# Patient Record
Sex: FEMALE | Race: WHITE | NOT HISPANIC OR LATINO | Employment: OTHER | URBAN - METROPOLITAN AREA
[De-identification: names, ages, dates, MRNs, and addresses within clinical notes are randomized per-mention and may not be internally consistent; named-entity substitution may affect disease eponyms.]

---

## 2018-06-18 ENCOUNTER — HOSPITAL ENCOUNTER (EMERGENCY)
Facility: HOSPITAL | Age: 23
Discharge: HOME | End: 2018-06-18
Attending: EMERGENCY MEDICINE
Payer: MEDICARE

## 2018-06-18 VITALS
HEART RATE: 86 BPM | HEIGHT: 59 IN | RESPIRATION RATE: 14 BRPM | TEMPERATURE: 99 F | DIASTOLIC BLOOD PRESSURE: 69 MMHG | SYSTOLIC BLOOD PRESSURE: 112 MMHG | WEIGHT: 98 LBS | OXYGEN SATURATION: 99 % | BODY MASS INDEX: 19.76 KG/M2

## 2018-06-18 DIAGNOSIS — H60.502 ACUTE OTITIS EXTERNA OF LEFT EAR, UNSPECIFIED TYPE: Primary | ICD-10-CM

## 2018-06-18 PROCEDURE — 99283 EMERGENCY DEPT VISIT LOW MDM: CPT

## 2018-06-18 RX ORDER — CIPROFLOXACIN AND DEXAMETHASONE 3; 1 MG/ML; MG/ML
4 SUSPENSION/ DROPS AURICULAR (OTIC) 2 TIMES DAILY
Qty: 10 ML | Refills: 0 | Status: SHIPPED | OUTPATIENT
Start: 2018-06-18 | End: 2018-12-02

## 2018-06-18 NOTE — ED PROVIDER NOTES
"Encounter Date: 6/18/2018    SCRIBE #1 NOTE: IYuli, am scribing for, and in the presence of, Zahraa Farris PA-C.       History     Chief Complaint   Patient presents with    Otalgia     L ear       06/18/2018 3:41 PM     Chief complaint: Ear Pain      Sherry Burns is a 22 y.o. female with no pertinent PMHx or SHx on file who presents to the ED with complaints of left ear pain that started last week. The patient denies fever, runny nose, cough, and sore throat. The patient endorses visiting [facility] x2 days ago (5/16) and was told that "she had an ear infection" and was given "pain medications". The patient endorses left ear pain radiates to her left jaw. She reports recently being exposed to water. Denies onset of any other new symptoms. The patient has no other medical concerns or complaints at this moment. NKDA noted.       The history is provided by the patient.     Review of patient's allergies indicates:  No Known Allergies  History reviewed. No pertinent past medical history.  History reviewed. No pertinent surgical history.  History reviewed. No pertinent family history.  Social History   Substance Use Topics    Smoking status: Never Smoker    Smokeless tobacco: Not on file    Alcohol use Not on file     Review of Systems   Constitutional: Negative for activity change, chills and fever.   HENT: Positive for ear pain (left). Negative for congestion, facial swelling, hearing loss, nosebleeds, rhinorrhea, sore throat and tinnitus.    Respiratory: Negative for cough and shortness of breath.    Cardiovascular: Negative for chest pain.   Gastrointestinal: Negative for abdominal pain, nausea and vomiting.   Musculoskeletal: Negative for back pain, gait problem and neck pain.   Skin: Negative for pallor, rash and wound.   Neurological: Negative for seizures, weakness and headaches.   Hematological: Does not bruise/bleed easily.   Psychiatric/Behavioral: Negative for confusion.       Physical " Exam     Initial Vitals [06/18/18 1510]   BP Pulse Resp Temp SpO2   112/69 86 14 98.5 °F (36.9 °C) 99 %      MAP       --         Physical Exam    Nursing note and vitals reviewed.  Constitutional: Vital signs are normal. She appears well-developed and well-nourished. She is not diaphoretic. She is cooperative.  Non-toxic appearance. She does not have a sickly appearance. She does not appear ill. No distress.   HENT:   Head: Normocephalic and atraumatic.   Right Ear: Hearing, tympanic membrane, external ear and ear canal normal.   Left Ear: External ear normal. There is drainage and swelling. No mastoid tenderness. Tympanic membrane is not perforated and not bulging.   Nose: Nose normal.   Mouth/Throat: Uvula is midline, oropharynx is clear and moist and mucous membranes are normal. No oropharyngeal exudate, posterior oropharyngeal edema, posterior oropharyngeal erythema or tonsillar abscesses.   Mild swelling and discharge noted to the left ear canal. TM visualized with perforation or bulging. No mastoid tenderness.    Eyes: EOM and lids are normal.   Neck: Normal range of motion and full passive range of motion without pain.   Cardiovascular: Normal rate, regular rhythm and normal heart sounds. Exam reveals no gallop and no friction rub.    No murmur heard.  Pulmonary/Chest: Breath sounds normal. She has no wheezes. She has no rhonchi. She has no rales.   Abdominal: Normal appearance. There is no rigidity.   Musculoskeletal: Normal range of motion.   Neurological: She is alert and oriented to person, place, and time.   Skin: Skin is warm, dry and intact. Capillary refill takes less than 2 seconds. No rash noted.   Psychiatric: She has a normal mood and affect.         ED Course   Procedures  Labs Reviewed - No data to display       No orders to display        Medical Decision Making:   History:   Old Medical Records: I decided to obtain old medical records.       APC / Resident Notes:   Urgent evaluation of a 22  year old female with complaint of left ear pain. Patient denied fever.  No abdominal pain, nausea or vomiting.  Vital signs are stable.  Patient is afebrile.  Bilateral TMs with no erythema, retraction or perforation.  There is no mastoid tenderness.  There is no movement tenderness to bilateral ears.  There is swelling and discharge to the left canal consistent with otitis externa. Return precautions given. Patient is to follow up with their primary care provider. Case was discussed with Dr. Mcdonald who is in agreement with the plan of care. All questions answered.          Scribe Attestation:   Scribe #1: I performed the above scribed service and the documentation accurately describes the services I performed. I attest to the accuracy of the note.    Attending Attestation:     Physician Attestation Statement for NP/PA:   I discussed this assessment and plan of this patient with the NP/PA, but I did not personally examine the patient. The face to face encounter was performed by the NP/PA.            I, Zahraa Farris PA-C, personally performed the services described in this documentation. All medical record entries made by the scribe were at my direction and in my presence.  I have reviewed the chart and agree that the record reflects my personal performance and is accurate and complete. Zahraa Farris PA-C.  5:28 PM 06/18/2018             Clinical Impression:   The encounter diagnosis was Acute otitis externa of left ear, unspecified type.      Disposition:   Disposition: Discharged  Condition: Stable                        Zahraa Farris PA-C  06/18/18 1729       Toño Mcdonald MD  06/18/18 1946

## 2018-06-18 NOTE — DISCHARGE INSTRUCTIONS
Take tylenol or motrin as needed for pain.  Use drops as prescribed.  Follow up with your primary care provider. If you don't have one, see the list attached.  For worsening symptoms, chest pain, shortness of breath, increased abdominal pain, high grade fever, stroke or stroke like symptoms, immediately go to the nearest Emergency Room or call 911 as soon as possible.

## 2018-09-21 ENCOUNTER — HOSPITAL ENCOUNTER (EMERGENCY)
Facility: HOSPITAL | Age: 23
Discharge: HOME | End: 2018-09-21
Attending: EMERGENCY MEDICINE
Payer: MEDICARE

## 2018-09-21 VITALS
SYSTOLIC BLOOD PRESSURE: 142 MMHG | OXYGEN SATURATION: 98 % | RESPIRATION RATE: 20 BRPM | DIASTOLIC BLOOD PRESSURE: 67 MMHG | HEART RATE: 92 BPM | TEMPERATURE: 98 F

## 2018-09-21 DIAGNOSIS — L30.9 DERMATITIS: Primary | ICD-10-CM

## 2018-09-21 LAB
B-HCG UR QL: NEGATIVE
CTP QC/QA: YES

## 2018-09-21 PROCEDURE — 81025 URINE PREGNANCY TEST: CPT | Performed by: PHYSICIAN ASSISTANT

## 2018-09-21 PROCEDURE — 63600175 PHARM REV CODE 636 W HCPCS: Performed by: PHYSICIAN ASSISTANT

## 2018-09-21 PROCEDURE — 99283 EMERGENCY DEPT VISIT LOW MDM: CPT | Mod: 25

## 2018-09-21 RX ORDER — PREDNISONE 20 MG/1
40 TABLET ORAL DAILY
Qty: 10 TABLET | Refills: 0 | Status: SHIPPED | OUTPATIENT
Start: 2018-09-21 | End: 2018-09-26

## 2018-09-21 RX ORDER — PREDNISONE 20 MG/1
40 TABLET ORAL
Status: COMPLETED | OUTPATIENT
Start: 2018-09-21 | End: 2018-09-21

## 2018-09-21 RX ADMIN — PREDNISONE 40 MG: 20 TABLET ORAL at 04:09

## 2018-09-21 NOTE — ED PROVIDER NOTES
Encounter Date: 9/21/2018    SCRIBE #1 NOTE: I, Todd Rubin, am scribing for, and in the presence of, Zahraa Farris PA-C.       History     Chief Complaint   Patient presents with    Eczema       Time seen by provider: 3:55 PM on 09/21/2018    Sherry Burns is a 23 y.o. Female who presents to the ED with an onset of a persistent rash to her neck, legs, and scalp. 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 She attempted to see her dermatologist today, but was referred to the ER for further mangement. She was seen by her dermatologist by 12 days ago and prescribed an antibiotic shampoo and a topical steroid cream which she reports no improvements on. Patient describes the quality as itching, burning rash that has been increasing in severity. Patient denies rash on the stomach or back. She states that she is otherwise healthy with no known drug allergies. The patient denies any other symptoms at this time. Mother reports she sometimes has to take steroids to improve her symptoms. Last steroid use was two months ago.      The history is provided by the patient and a parent.     Review of patient's allergies indicates:  No Known Allergies  No past medical history on file.  No past surgical history on file.  No family history on file.  Social History     Tobacco Use    Smoking status: Never Smoker   Substance Use Topics    Alcohol use: Not on file    Drug use: Not on file     Review of Systems   Constitutional: Negative for activity change, appetite change, chills, fatigue and fever.   HENT: Negative for congestion, rhinorrhea and sore throat.    Eyes: Negative for redness and visual disturbance.   Respiratory: Negative for cough, chest tightness and shortness of breath.    Cardiovascular: Negative for chest pain.   Gastrointestinal: Negative for abdominal pain, diarrhea, nausea and vomiting.   Genitourinary: Negative for  dysuria and frequency.   Musculoskeletal: Negative for back pain, neck pain and neck stiffness.   Skin: Positive for rash (Scalp, legs, and neck).   Neurological: Negative for dizziness, syncope, numbness and headaches.       Physical Exam     Initial Vitals [09/21/18 1444]   BP Pulse Resp Temp SpO2   (!) 142/67 92 20 98 °F (36.7 °C) 98 %      MAP       --         Physical Exam    Nursing note and vitals reviewed.  Constitutional: She appears well-developed and well-nourished. She is cooperative.  Non-toxic appearance. She does not have a sickly appearance.   HENT:   Head: Normocephalic and atraumatic.   Right Ear: External ear normal.   Left Ear: External ear normal.   Nose: Nose normal.   Mouth/Throat: Oropharynx is clear and moist.   Eyes: Conjunctivae and lids are normal. Pupils are equal, round, and reactive to light.   Neck: Normal range of motion and full passive range of motion without pain. Neck supple.   Cardiovascular: Normal rate, regular rhythm and normal heart sounds. Exam reveals no gallop and no friction rub.    No murmur heard.  Pulmonary/Chest: Breath sounds normal. She has no wheezes. She has no rhonchi. She has no rales.   Abdominal: Normal appearance. There is no rigidity.   Neurological: She is alert and oriented to person, place, and time.   Skin: Skin is warm, dry and intact. Rash noted.   Diffuse erythematous, pruritic, papular rash. No oral lesions. No petechia or purpura.          ED Course   Procedures  Labs Reviewed   POCT URINE PREGNANCY          Imaging Results    None          Medical Decision Making:   History:   Old Medical Records: I decided to obtain old medical records.       APC / Resident Notes:   Urgent evaluation of a 23-year-old female who presents with worsening of her chronic dermatitis.  She states she intermittently has to use steroids to help relieve her symptoms. She tried to see Dermatology was unable to get in with them today she has no oral lesions. No stridor.  No  sign of anaphylaxis.  She is to continue the recommendations from her dermatologist will add a short course of steroids.  Further management to be done by her dermatologist. Discussed results with patient. Return precautions given. Based on my clinical evaluation, I do not appreciate any immediate, emergent, or life threatening condition or etiology that warrants additional workup today and feel that the patient can be discharged with close follow up care.  Patient is to follow up with their primary care provider. Case was discussed with Dr. henry who is in agreement with the plan of care. All questions answered.          Scribe Attestation:   Scribe #1: I performed the above scribed service and the documentation accurately describes the services I performed. I attest to the accuracy of the note.    I, Zahraa Farris PA-C, personally performed the services described in this documentation. All medical record entries made by the scribe were at my direction and in my presence.  I have reviewed the chart and agree that the record reflects my personal performance and is accurate and complete. Zahraa Farris PA-C.  4:24 PM 09/22/2018             Clinical Impression:   The encounter diagnosis was Dermatitis.      Disposition:   Disposition: Discharged  Condition: Stable                        Zahraa Farris PA-C  09/22/18 [id number]

## 2018-09-21 NOTE — DISCHARGE INSTRUCTIONS
Continue the medications prescribed by your dermatologist.  Call and schedule a follow up as soon as possible.  For worsening symptoms, chest pain, shortness of breath, increased abdominal pain, high grade fever, stroke or stroke like symptoms, immediately go to the nearest Emergency Room or call 911 as soon as possible.

## 2019-05-01 ENCOUNTER — OFFICE VISIT (OUTPATIENT)
Dept: URGENT CARE | Facility: CLINIC | Age: 24
End: 2019-05-01
Payer: MEDICARE

## 2019-05-01 VITALS
SYSTOLIC BLOOD PRESSURE: 115 MMHG | TEMPERATURE: 97 F | WEIGHT: 93.19 LBS | OXYGEN SATURATION: 100 % | BODY MASS INDEX: 18.82 KG/M2 | HEART RATE: 73 BPM | DIASTOLIC BLOOD PRESSURE: 66 MMHG | RESPIRATION RATE: 16 BRPM

## 2019-05-01 DIAGNOSIS — L30.9 ECZEMA, UNSPECIFIED TYPE: Primary | ICD-10-CM

## 2019-05-01 PROCEDURE — 99204 OFFICE O/P NEW MOD 45 MIN: CPT | Mod: 25,S$GLB,, | Performed by: NURSE PRACTITIONER

## 2019-05-01 PROCEDURE — 99204 PR OFFICE/OUTPT VISIT, NEW, LEVL IV, 45-59 MIN: ICD-10-PCS | Mod: 25,S$GLB,, | Performed by: NURSE PRACTITIONER

## 2019-05-01 RX ORDER — TRIAMCINOLONE ACETONIDE 1 MG/G
CREAM TOPICAL 2 TIMES DAILY PRN
Qty: 80 G | Refills: 0 | Status: SHIPPED | OUTPATIENT
Start: 2019-05-01 | End: 2021-05-14 | Stop reason: CLARIF

## 2019-05-01 RX ORDER — CLOBETASOL PROPIONATE 0.5 MG/G
1 CREAM TOPICAL 2 TIMES DAILY
Qty: 60 G | Status: SHIPPED | OUTPATIENT
Start: 2019-05-01 | End: 2021-05-14 | Stop reason: CLARIF

## 2019-05-01 RX ORDER — BETAMETHASONE VALERATE 0.1 %
1 LOTION (ML) TOPICAL DAILY
Qty: 60 ML | Refills: 0 | Status: SHIPPED | OUTPATIENT
Start: 2019-05-01 | End: 2021-05-14 | Stop reason: CLARIF

## 2019-05-01 RX ORDER — CLOBETASOL PROPIONATE 0.5 MG/G
1 CREAM TOPICAL 2 TIMES DAILY
COMMUNITY
Start: 2016-10-07 | End: 2019-05-01 | Stop reason: SDUPTHER

## 2019-05-01 RX ORDER — TRIAMCINOLONE ACETONIDE 1 MG/G
1 CREAM TOPICAL 2 TIMES DAILY PRN
COMMUNITY
Start: 2018-09-12 | End: 2019-05-01 | Stop reason: SDUPTHER

## 2019-05-01 RX ORDER — KETOCONAZOLE 20 MG/ML
1 SHAMPOO, SUSPENSION TOPICAL
Qty: 120 ML | Refills: 0 | Status: SHIPPED | OUTPATIENT
Start: 2019-05-02 | End: 2021-05-14 | Stop reason: CLARIF

## 2019-05-01 RX ORDER — KETOCONAZOLE 20 MG/ML
1 SHAMPOO, SUSPENSION TOPICAL
COMMUNITY
Start: 2018-09-13 | End: 2019-05-01

## 2019-05-01 RX ORDER — PREDNISONE 20 MG/1
20 TABLET ORAL 2 TIMES DAILY
Qty: 10 TABLET | Refills: 0 | Status: SHIPPED | OUTPATIENT
Start: 2019-05-01 | End: 2019-05-06

## 2019-05-01 RX ORDER — DEXAMETHASONE SODIUM PHOSPHATE 4 MG/ML
8 INJECTION, SOLUTION INTRA-ARTICULAR; INTRALESIONAL; INTRAMUSCULAR; INTRAVENOUS; SOFT TISSUE
Status: COMPLETED | OUTPATIENT
Start: 2019-05-01 | End: 2019-05-01

## 2019-05-01 RX ORDER — BETAMETHASONE VALERATE 0.1 %
1 LOTION (ML) TOPICAL DAILY
COMMUNITY
Start: 2018-09-12 | End: 2019-05-01 | Stop reason: SDUPTHER

## 2019-05-01 RX ADMIN — DEXAMETHASONE SODIUM PHOSPHATE 8 MG: 4 INJECTION, SOLUTION INTRA-ARTICULAR; INTRALESIONAL; INTRAMUSCULAR; INTRAVENOUS; SOFT TISSUE at 06:05

## 2019-05-01 NOTE — PROGRESS NOTES
"Subjective: rash on arms and feet, redness with itching, pt states "has eczema and out of lotion"       Patient ID: Sherry Burns is a 23 y.o. female.    Vitals:  weight is 42.3 kg (93 lb 3.2 oz). Her temperature is 97.4 °F (36.3 °C). Her blood pressure is 115/66 and her pulse is 73. Her respiration is 16 and oxygen saturation is 100%.     Chief Complaint: Rash (redness with itching on arms and feet)    Rash   This is a chronic problem. The affected locations include the head, right hand, left hand, left foot, right foot and left arm. Pertinent negatives include no cough, fever or sore throat.       Constitution: Negative for chills and fever.   HENT: Negative for facial swelling and sore throat.    Neck: Negative for painful lymph nodes.   Eyes: Negative for eye itching and eyelid swelling.   Respiratory: Negative for cough.    Musculoskeletal: Negative for joint pain and joint swelling.   Skin: Positive for rash and erythema. Negative for color change, pale, wound, abrasion, laceration, lesion, skin thickening/induration, puncture wound, bruising, abscess, avulsion and hives.   Allergic/Immunologic: Negative for environmental allergies, immunocompromised state and hives.   Hematologic/Lymphatic: Negative for swollen lymph nodes.       Objective:      Physical Exam   Constitutional: She is oriented to person, place, and time. She appears well-developed and well-nourished.   HENT:   Head: Normocephalic and atraumatic. Head is without abrasion, without contusion and without laceration.   Right Ear: External ear normal.   Left Ear: External ear normal.   Nose: Nose normal.   Mouth/Throat: Oropharynx is clear and moist.   Eyes: Pupils are equal, round, and reactive to light. Conjunctivae, EOM and lids are normal.   Neck: Trachea normal, full passive range of motion without pain and phonation normal. Neck supple.   Cardiovascular: Normal rate, regular rhythm and normal heart sounds.   Pulmonary/Chest: Effort " normal and breath sounds normal. No stridor. No respiratory distress.   Musculoskeletal: Normal range of motion.   Neurological: She is alert and oriented to person, place, and time.   Skin: Skin is warm, dry and intact. Capillary refill takes less than 2 seconds. Rash noted. No abrasion, no bruising, no burn, no ecchymosis, no laceration and no lesion noted. Rash is maculopapular. There is erythema.   Eczema flare to forearms, feet and head   Psychiatric: She has a normal mood and affect. Her speech is normal and behavior is normal. Judgment and thought content normal. Cognition and memory are normal.   Nursing note and vitals reviewed.      Assessment:       1. Eczema, unspecified type        Plan:         Eczema, unspecified type    Other orders  -     betamethasone valerate 0.1% (VALISONE) 0.1 % Lotn; Apply 1 application topically once daily. Apply to scalp once or twice daily as needed for redness itching and scaling  Dispense: 60 mL; Refill: 0  -     clobetasol (TEMOVATE) 0.05 % cream; Apply 1 application topically 2 (two) times daily. Apply topically twice a day, thin layer to affected area  Dispense: 60 g; Refill: g  -     ketoconazole (NIZORAL) 2 % shampoo; Apply 1 application topically twice a week. Apply topically twice a week, lather, leave on 5 minutes and wash off  Dispense: 120 mL; Refill: 0  -     triamcinolone acetonide 0.1% (KENALOG) 0.1 % cream; Apply topically 2 (two) times daily as needed.  Dispense: 80 g; Refill: 0  -     dexamethasone injection 8 mg  -     predniSONE (DELTASONE) 20 MG tablet; Take 1 tablet (20 mg total) by mouth 2 (two) times daily. for 5 days  Dispense: 10 tablet; Refill: 0

## 2019-05-02 ENCOUNTER — TELEPHONE (OUTPATIENT)
Dept: URGENT CARE | Facility: CLINIC | Age: 24
End: 2019-05-02

## 2019-05-02 NOTE — TELEPHONE ENCOUNTER
Spoke with Zaida, she said we had to change the strength of prednisone because they did not have the 20 mg in stock , all they had was the 5 mg. Per Ownes, change it to the 5 mg that equals up to 20mg BID.

## 2019-10-01 ENCOUNTER — HOSPITAL ENCOUNTER (EMERGENCY)
Facility: HOSPITAL | Age: 24
Discharge: HOME | End: 2019-10-01
Attending: EMERGENCY MEDICINE
Payer: MEDICARE

## 2019-10-01 VITALS
HEIGHT: 59 IN | HEART RATE: 100 BPM | TEMPERATURE: 98 F | SYSTOLIC BLOOD PRESSURE: 119 MMHG | BODY MASS INDEX: 17.74 KG/M2 | WEIGHT: 88 LBS | DIASTOLIC BLOOD PRESSURE: 72 MMHG | OXYGEN SATURATION: 99 %

## 2019-10-01 DIAGNOSIS — R07.89 CHEST DISCOMFORT: ICD-10-CM

## 2019-10-01 DIAGNOSIS — R07.9 CHEST PAIN: ICD-10-CM

## 2019-10-01 DIAGNOSIS — R07.89 ATYPICAL CHEST PAIN: Primary | ICD-10-CM

## 2019-10-01 LAB
ALBUMIN SERPL BCP-MCNC: 4.7 G/DL (ref 3.5–5.2)
ALP SERPL-CCNC: 47 U/L (ref 55–135)
ALT SERPL W/O P-5'-P-CCNC: 12 U/L (ref 10–44)
ANION GAP SERPL CALC-SCNC: 8 MMOL/L (ref 8–16)
AST SERPL-CCNC: 18 U/L (ref 10–40)
B-HCG UR QL: NEGATIVE
BASOPHILS # BLD AUTO: 0.09 K/UL (ref 0–0.2)
BASOPHILS NFR BLD: 1.2 % (ref 0–1.9)
BILIRUB SERPL-MCNC: 1 MG/DL (ref 0.1–1)
BNP SERPL-MCNC: 22 PG/ML (ref 0–99)
BUN SERPL-MCNC: 13 MG/DL (ref 6–20)
CALCIUM SERPL-MCNC: 9.8 MG/DL (ref 8.7–10.5)
CHLORIDE SERPL-SCNC: 108 MMOL/L (ref 95–110)
CO2 SERPL-SCNC: 24 MMOL/L (ref 23–29)
CREAT SERPL-MCNC: 0.8 MG/DL (ref 0.5–1.4)
CTP QC/QA: YES
DIFFERENTIAL METHOD: ABNORMAL
EOSINOPHIL # BLD AUTO: 0.5 K/UL (ref 0–0.5)
EOSINOPHIL NFR BLD: 7.1 % (ref 0–8)
ERYTHROCYTE [DISTWIDTH] IN BLOOD BY AUTOMATED COUNT: 16.6 % (ref 11.5–14.5)
EST. GFR  (AFRICAN AMERICAN): >60 ML/MIN/1.73 M^2
EST. GFR  (NON AFRICAN AMERICAN): >60 ML/MIN/1.73 M^2
GLUCOSE SERPL-MCNC: 93 MG/DL (ref 70–110)
HCT VFR BLD AUTO: 36 % (ref 37–48.5)
HGB BLD-MCNC: 11.3 G/DL (ref 12–16)
IMM GRANULOCYTES # BLD AUTO: 0.01 K/UL (ref 0–0.04)
LYMPHOCYTES # BLD AUTO: 2 K/UL (ref 1–4.8)
LYMPHOCYTES NFR BLD: 25.8 % (ref 18–48)
MCH RBC QN AUTO: 24.7 PG (ref 27–31)
MCHC RBC AUTO-ENTMCNC: 31.4 G/DL (ref 32–36)
MCV RBC AUTO: 79 FL (ref 82–98)
MONOCYTES # BLD AUTO: 0.7 K/UL (ref 0.3–1)
MONOCYTES NFR BLD: 8.5 % (ref 4–15)
NEUTROPHILS # BLD AUTO: 4.4 K/UL (ref 1.8–7.7)
NEUTROPHILS NFR BLD: 57.3 % (ref 38–73)
NRBC BLD-RTO: 0 /100 WBC
PLATELET # BLD AUTO: 296 K/UL (ref 150–350)
PMV BLD AUTO: 9.7 FL (ref 9.2–12.9)
POTASSIUM SERPL-SCNC: 3.5 MMOL/L (ref 3.5–5.1)
PROT SERPL-MCNC: 7.6 G/DL (ref 6–8.4)
RBC # BLD AUTO: 4.58 M/UL (ref 4–5.4)
SODIUM SERPL-SCNC: 140 MMOL/L (ref 136–145)
TROPONIN I SERPL DL<=0.01 NG/ML-MCNC: <0.006 NG/ML (ref 0–0.03)
WBC # BLD AUTO: 7.65 K/UL (ref 3.9–12.7)

## 2019-10-01 PROCEDURE — 83880 ASSAY OF NATRIURETIC PEPTIDE: CPT

## 2019-10-01 PROCEDURE — 93005 ELECTROCARDIOGRAM TRACING: CPT

## 2019-10-01 PROCEDURE — 80053 COMPREHEN METABOLIC PANEL: CPT

## 2019-10-01 PROCEDURE — 81025 URINE PREGNANCY TEST: CPT | Performed by: EMERGENCY MEDICINE

## 2019-10-01 PROCEDURE — 36415 COLL VENOUS BLD VENIPUNCTURE: CPT

## 2019-10-01 PROCEDURE — 84484 ASSAY OF TROPONIN QUANT: CPT

## 2019-10-01 PROCEDURE — 85025 COMPLETE CBC W/AUTO DIFF WBC: CPT

## 2019-10-01 PROCEDURE — 99284 EMERGENCY DEPT VISIT MOD MDM: CPT | Mod: 25

## 2019-10-01 RX ORDER — DICLOFENAC SODIUM 50 MG/1
50 TABLET, DELAYED RELEASE ORAL 3 TIMES DAILY PRN
Qty: 30 TABLET | Refills: 0 | Status: SHIPPED | OUTPATIENT
Start: 2019-10-01 | End: 2021-03-03

## 2019-10-01 NOTE — ED PROVIDER NOTES
Encounter Date: 10/1/2019    SCRIBE #1 NOTE: IYuli, am scribing for, and in the presence of, Oseas Lozano MD.       History     Chief Complaint   Patient presents with    Chest Pain    Shortness of Breath       Time seen by provider: 3:10 PM on 10/01/2019    Sherry Burns is a 24 y.o. female with no PMHx or SHx who presents to the ED with complaints of mid sternal chest pain that started last night associated with racing heart beat and SOB. Patient endorses having similar pain over the course of x5 years. She endorses pain typically comes and goes daily and episodes lasts for a few minutes each time. She denies cough, fever, or vomiting but reports having recent diarrhea and nausea. She denies blood in urine or stool, urinary symptoms, or onset of any other new symptoms currently. She denies taking any OTC medications for the pain. The patient has no other medical concerns or complaints at this moment. NKDA.    The history is provided by the patient.     Review of patient's allergies indicates:  No Known Allergies  No past medical history on file.  No past surgical history on file.  No family history on file.  Social History     Tobacco Use    Smoking status: Never Smoker   Substance Use Topics    Alcohol use: Not on file    Drug use: Not on file     Review of Systems   Constitutional: Negative for chills and fever.   Respiratory: Positive for shortness of breath. Negative for cough.    Cardiovascular: Positive for chest pain and palpitations. Negative for leg swelling.   Gastrointestinal: Positive for diarrhea and nausea. Negative for abdominal pain, blood in stool, constipation and vomiting.   Genitourinary: Negative for decreased urine volume, difficulty urinating, dysuria, frequency and hematuria.   Musculoskeletal: Negative for joint swelling.   Skin: Negative for rash.   Neurological: Negative for weakness and numbness.   Hematological: Does not bruise/bleed easily.    Psychiatric/Behavioral: The patient is not nervous/anxious.        Physical Exam     Initial Vitals [10/01/19 1502]   BP Pulse Resp Temp SpO2   119/72 100 -- 98 °F (36.7 °C) 99 %      MAP       --         Physical Exam    Nursing note and vitals reviewed.  Constitutional: She appears well-developed and well-nourished. She is not diaphoretic. No distress.   HENT:   Head: Normocephalic and atraumatic.   Eyes: EOM are normal. Pupils are equal, round, and reactive to light.   Neck: Normal range of motion. Neck supple.   Cardiovascular: Normal rate, regular rhythm, normal heart sounds and intact distal pulses. Exam reveals no gallop and no friction rub.    No murmur heard.  Pulmonary/Chest: Breath sounds normal. No respiratory distress. She has no wheezes. She has no rhonchi. She has no rales. She exhibits tenderness.   Reproducible tenderness to mid sternal chest wall region.    Abdominal: Soft. Bowel sounds are normal. There is no tenderness. There is no rebound and no guarding.   Musculoskeletal: Normal range of motion.   Neurological: She is alert and oriented to person, place, and time.   Skin: Skin is warm and dry.   Psychiatric: Her behavior is normal. Judgment and thought content normal. Her mood appears anxious.         ED Course   Procedures  Labs Reviewed   COMPREHENSIVE METABOLIC PANEL - Abnormal; Notable for the following components:       Result Value    Alkaline Phosphatase 47 (*)     All other components within normal limits   CBC W/ AUTO DIFFERENTIAL - Abnormal; Notable for the following components:    Hemoglobin 11.3 (*)     Hematocrit 36.0 (*)     Mean Corpuscular Volume 79 (*)     Mean Corpuscular Hemoglobin 24.7 (*)     Mean Corpuscular Hemoglobin Conc 31.4 (*)     RDW 16.6 (*)     All other components within normal limits   B-TYPE NATRIURETIC PEPTIDE   TROPONIN I   POCT URINE PREGNANCY     EKG Readings: (Independently Interpreted)   Initial Reading: No STEMI. Heart Rate: 86 BPM. Axis: Normal.    Sinus rhythm. Normal intervals. No acute ST segment or T wave changes.        Imaging Results          X-Ray Chest PA And Lateral (Final result)  Result time 10/01/19 15:48:46    Final result by Michael Ordoñez MD (10/01/19 15:48:46)                 Impression:      Negative chest.  No significant change.      Electronically signed by: Michael Ordoñez MD  Date:    10/01/2019  Time:    15:48             Narrative:    EXAMINATION:  XR CHEST PA AND LATERAL    CLINICAL HISTORY:  Chest pain, unspecified    TECHNIQUE:  PA and lateral views of the chest were performed.    COMPARISON:  09/26/2018    FINDINGS:  The cardiomediastinal silhouette is with normal limits.  The lungs are well expanded without consolidation or pleural effusion.                                 Medical Decision Making:   History:   Old Medical Records: I decided to obtain old medical records.  Initial Assessment:   24-year-old female presented with a chief complaint of chest pain.  Differential Diagnosis:   Initial differential diagnosis includes but is not limited to: chest wall pain, anxiety, and atypical MI.  Independently Interpreted Test(s):   I have ordered and independently interpreted EKG Reading(s) - see prior notes  Clinical Tests:   Lab Tests: Reviewed and Ordered  Radiological Study: Reviewed and Ordered  Medical Tests: Reviewed and Ordered  ED Management:  The patient was emergently evaluated in the emergency department, her evaluation was significant for a young female with exactly reproducible chest pain on palpation.  The patient's EKG showed no acute abnormalities per my independent interpretation.  The patient's chest x-ray and labs showed no acute abnormalities.  The patient likely has chest wall pain.  She is stable for discharge to home.  She will be discharged home with p.o. diclofenac and she is to keep her follow-up as scheduled for further care.            Scribe Attestation:   Scribe #1: I performed the above scribed  service and the documentation accurately describes the services I performed. I attest to the accuracy of the note.           I, Dr. Oseas Lozano, personally performed the services described in this documentation. All medical record entries made by the scribe were at my direction and in my presence.  I have reviewed the chart and agree that the record reflects my personal performance and is accurate and complete. Oseas Lozano MD.  4:53 PM 10/01/2019       Clinical Impression:       ICD-10-CM ICD-9-CM   1. Atypical chest pain R07.89 786.59   2. Chest discomfort R07.89 786.59   3. Chest pain R07.9 786.50         Disposition:   Disposition: Discharged  Condition: Stable                        Oseas Lozano MD  10/01/19 [id number]

## 2020-01-03 ENCOUNTER — LAB VISIT (OUTPATIENT)
Dept: LAB | Facility: HOSPITAL | Age: 25
End: 2020-01-03
Attending: SPECIALIST
Payer: MEDICARE

## 2020-01-03 DIAGNOSIS — Z00.00 ROUTINE GENERAL MEDICAL EXAMINATION AT A HEALTH CARE FACILITY: ICD-10-CM

## 2020-01-03 DIAGNOSIS — R07.9 CHEST PAIN, UNSPECIFIED: Primary | ICD-10-CM

## 2020-01-03 DIAGNOSIS — R00.0 TACHYCARDIA, UNSPECIFIED: ICD-10-CM

## 2020-01-03 LAB
ALBUMIN SERPL BCP-MCNC: 4.5 G/DL (ref 3.5–5.2)
ALP SERPL-CCNC: 35 U/L (ref 55–135)
ALT SERPL W/O P-5'-P-CCNC: 15 U/L (ref 10–44)
ANION GAP SERPL CALC-SCNC: 7 MMOL/L (ref 8–16)
AST SERPL-CCNC: 16 U/L (ref 10–40)
BASOPHILS # BLD AUTO: 0.08 K/UL (ref 0–0.2)
BASOPHILS NFR BLD: 1.4 % (ref 0–1.9)
BILIRUB SERPL-MCNC: 0.9 MG/DL (ref 0.1–1)
BUN SERPL-MCNC: 16 MG/DL (ref 6–20)
CALCIUM SERPL-MCNC: 9.7 MG/DL (ref 8.7–10.5)
CHLORIDE SERPL-SCNC: 108 MMOL/L (ref 95–110)
CHOLEST SERPL-MCNC: 159 MG/DL (ref 120–199)
CHOLEST/HDLC SERPL: 2.7 {RATIO} (ref 2–5)
CO2 SERPL-SCNC: 27 MMOL/L (ref 23–29)
CREAT SERPL-MCNC: 0.9 MG/DL (ref 0.5–1.4)
DIFFERENTIAL METHOD: ABNORMAL
EOSINOPHIL # BLD AUTO: 0.5 K/UL (ref 0–0.5)
EOSINOPHIL NFR BLD: 8.3 % (ref 0–8)
ERYTHROCYTE [DISTWIDTH] IN BLOOD BY AUTOMATED COUNT: 14.9 % (ref 11.5–14.5)
EST. GFR  (AFRICAN AMERICAN): >60 ML/MIN/1.73 M^2
EST. GFR  (NON AFRICAN AMERICAN): >60 ML/MIN/1.73 M^2
GLUCOSE SERPL-MCNC: 96 MG/DL (ref 70–110)
HCT VFR BLD AUTO: 35.6 % (ref 37–48.5)
HDLC SERPL-MCNC: 58 MG/DL (ref 40–75)
HDLC SERPL: 36.5 % (ref 20–50)
HGB BLD-MCNC: 10.9 G/DL (ref 12–16)
IMM GRANULOCYTES # BLD AUTO: 0.02 K/UL (ref 0–0.04)
IMM GRANULOCYTES NFR BLD AUTO: 0.3 % (ref 0–0.5)
LDLC SERPL CALC-MCNC: 91.6 MG/DL (ref 63–159)
LYMPHOCYTES # BLD AUTO: 1.5 K/UL (ref 1–4.8)
LYMPHOCYTES NFR BLD: 26.5 % (ref 18–48)
MCH RBC QN AUTO: 23.9 PG (ref 27–31)
MCHC RBC AUTO-ENTMCNC: 30.6 G/DL (ref 32–36)
MCV RBC AUTO: 78 FL (ref 82–98)
MONOCYTES # BLD AUTO: 0.6 K/UL (ref 0.3–1)
MONOCYTES NFR BLD: 10.7 % (ref 4–15)
NEUTROPHILS # BLD AUTO: 3.1 K/UL (ref 1.8–7.7)
NEUTROPHILS NFR BLD: 52.8 % (ref 38–73)
NONHDLC SERPL-MCNC: 101 MG/DL
NRBC BLD-RTO: 0 /100 WBC
PLATELET # BLD AUTO: 319 K/UL (ref 150–350)
PMV BLD AUTO: 10 FL (ref 9.2–12.9)
POTASSIUM SERPL-SCNC: 4.1 MMOL/L (ref 3.5–5.1)
PROT SERPL-MCNC: 7.4 G/DL (ref 6–8.4)
RBC # BLD AUTO: 4.56 M/UL (ref 4–5.4)
SODIUM SERPL-SCNC: 142 MMOL/L (ref 136–145)
TRIGL SERPL-MCNC: 47 MG/DL (ref 30–150)
TSH SERPL DL<=0.005 MIU/L-ACNC: 1.89 UIU/ML (ref 0.34–5.6)
WBC # BLD AUTO: 5.81 K/UL (ref 3.9–12.7)

## 2020-01-03 PROCEDURE — 80053 COMPREHEN METABOLIC PANEL: CPT

## 2020-01-03 PROCEDURE — 36415 COLL VENOUS BLD VENIPUNCTURE: CPT

## 2020-01-03 PROCEDURE — 84443 ASSAY THYROID STIM HORMONE: CPT

## 2020-01-03 PROCEDURE — 80061 LIPID PANEL: CPT

## 2020-01-03 PROCEDURE — 85025 COMPLETE CBC W/AUTO DIFF WBC: CPT

## 2020-01-14 ENCOUNTER — OFFICE VISIT (OUTPATIENT)
Dept: URGENT CARE | Facility: CLINIC | Age: 25
End: 2020-01-14
Payer: MEDICARE

## 2020-01-14 VITALS
HEART RATE: 58 BPM | TEMPERATURE: 98 F | WEIGHT: 86.19 LBS | DIASTOLIC BLOOD PRESSURE: 58 MMHG | SYSTOLIC BLOOD PRESSURE: 93 MMHG | OXYGEN SATURATION: 98 % | RESPIRATION RATE: 18 BRPM | HEIGHT: 59 IN | BODY MASS INDEX: 17.38 KG/M2

## 2020-01-14 DIAGNOSIS — H66.92 LEFT OTITIS MEDIA, UNSPECIFIED OTITIS MEDIA TYPE: Primary | ICD-10-CM

## 2020-01-14 PROCEDURE — 99214 OFFICE O/P EST MOD 30 MIN: CPT | Mod: 25,S$GLB,, | Performed by: NURSE PRACTITIONER

## 2020-01-14 PROCEDURE — 99214 PR OFFICE/OUTPT VISIT, EST, LEVL IV, 30-39 MIN: ICD-10-PCS | Mod: 25,S$GLB,, | Performed by: NURSE PRACTITIONER

## 2020-01-14 RX ORDER — AMOXICILLIN 875 MG/1
875 TABLET, FILM COATED ORAL 2 TIMES DAILY
Qty: 20 TABLET | Refills: 0 | Status: SHIPPED | OUTPATIENT
Start: 2020-01-14 | End: 2020-01-24

## 2020-01-14 NOTE — PROGRESS NOTES
"Subjective:       Patient ID: Sherry Burns is a 24 y.o. female.    Vitals:  height is 4' 11" (1.499 m) and weight is 39.1 kg (86 lb 3.2 oz). Her temperature is 97.5 °F (36.4 °C). Her blood pressure is 93/58 (abnormal) and her pulse is 58 (abnormal). Her respiration is 18 and oxygen saturation is 98%.     Chief Complaint: Sinus Problem    Pt presents with left ear pain x 2 days. Pt states pain is throbbing quality, constant timing, mod severity. She denies f/c/n/v.     Sinus Problem   Episode onset: 2 day  Associated symptoms include ear pain. Pertinent negatives include no chills, congestion, coughing, diaphoresis, headaches, shortness of breath, sinus pressure or sore throat. Treatments tried: dayquil  The treatment provided no relief.       Constitution: Negative for chills, sweating, fatigue and fever.   HENT: Positive for ear pain. Negative for congestion, sinus pain, sinus pressure, sore throat and voice change.    Neck: Negative for painful lymph nodes.   Eyes: Negative for eye redness.   Respiratory: Negative for chest tightness, cough, sputum production, bloody sputum, COPD, shortness of breath, stridor, wheezing and asthma.    Gastrointestinal: Negative for nausea and vomiting.   Musculoskeletal: Negative for muscle ache.   Skin: Negative for rash.   Allergic/Immunologic: Negative for seasonal allergies and asthma.   Neurological: Negative for headaches.   Hematologic/Lymphatic: Negative for swollen lymph nodes.       Objective:      Physical Exam   Constitutional: She is oriented to person, place, and time. She appears well-developed and well-nourished. She is cooperative.  Non-toxic appearance. She does not have a sickly appearance. She does not appear ill. No distress.   HENT:   Head: Normocephalic and atraumatic.   Right Ear: Hearing, tympanic membrane, external ear and ear canal normal.   Left Ear: Hearing, external ear and ear canal normal. Tympanic membrane is erythematous.   Nose: Nose " normal. No mucosal edema, rhinorrhea or nasal deformity. No epistaxis. Right sinus exhibits no maxillary sinus tenderness and no frontal sinus tenderness. Left sinus exhibits no maxillary sinus tenderness and no frontal sinus tenderness.   Mouth/Throat: Uvula is midline, oropharynx is clear and moist and mucous membranes are normal. No trismus in the jaw. Normal dentition. No uvula swelling. No oropharyngeal exudate, posterior oropharyngeal edema or posterior oropharyngeal erythema.   Eyes: Pupils are equal, round, and reactive to light. Conjunctivae, EOM and lids are normal. No scleral icterus.   Neck: Trachea normal, full passive range of motion without pain and phonation normal. Neck supple. No neck rigidity. No edema and no erythema present.   Cardiovascular: Normal rate, regular rhythm, normal heart sounds, intact distal pulses and normal pulses.   Pulmonary/Chest: Effort normal and breath sounds normal. No stridor. No respiratory distress. She has no decreased breath sounds. She has no wheezes. She has no rhonchi. She has no rales. She exhibits no tenderness.   Abdominal: Normal appearance.   Musculoskeletal: Normal range of motion. She exhibits no edema or deformity.   Neurological: She is alert and oriented to person, place, and time. She exhibits normal muscle tone. Coordination normal.   Skin: Skin is warm, dry, intact, not diaphoretic and not pale.   Psychiatric: She has a normal mood and affect. Her speech is normal and behavior is normal. Judgment and thought content normal. Cognition and memory are normal.   Nursing note and vitals reviewed.        Assessment:       1. Left otitis media, unspecified otitis media type        Plan:         Left otitis media, unspecified otitis media type    Other orders  -     amoxicillin (AMOXIL) 875 MG tablet; Take 1 tablet (875 mg total) by mouth 2 (two) times daily. for 10 days  Dispense: 20 tablet; Refill: 0

## 2020-01-14 NOTE — PATIENT INSTRUCTIONS
Middle Ear Infection (Adult)  You have an infection of the middle ear, the space behind the eardrum. This is also called acute otitis media (AOM). Sometimes it is caused by the common cold. This is because congestion can block the internal passage (eustachian tube) that drains fluid from the middle ear. When the middle ear fills with fluid, bacteria can grow there and cause an infection. Oral antibiotics are used to treat this illness, not ear drops. Symptoms usually start to improve within 1 to 2 days of treatment.    Home care  The following are general care guidelines:  · Finish all of the antibiotic medicine given, even though you may feel better after the first few days.  · You may use over-the-counter medicine, such as acetaminophen or ibuprofen, to control pain and fever, unless something else was prescribed. If you have chronic liver or kidney disease or have ever had a stomach ulcer or gastrointestinal bleeding, talk with your healthcare provider before using these medicines. Do not give aspirin to anyone under 18 years of age who has a fever. It may cause severe illness or death.  Follow-up care  Follow up with your healthcare provider, or as advised, in 2 weeks if all symptoms have not gotten better, or if hearing doesn't go back to normal within 1 month.  When to seek medical advice  Call your healthcare provider right away if any of these occur:  · Ear pain gets worse or does not improve after 3 days of treatment  · Unusual drowsiness or confusion  · Neck pain, stiff neck, or headache  · Fluid or blood draining from the ear canal  · Fever of 100.4°F (38°C) or as advised   · Seizure  Date Last Reviewed: 6/1/2016  © 8635-7122 [employer]. [address] [place] [address], [place] [address]. All rights reserved. This information is not intended as a substitute for professional medical care. Always follow your healthcare professional's instructions.

## 2020-02-01 ENCOUNTER — HOSPITAL ENCOUNTER (EMERGENCY)
Facility: HOSPITAL | Age: 25
Discharge: HOME | End: 2020-02-01
Attending: EMERGENCY MEDICINE
Payer: MEDICARE

## 2020-02-01 VITALS
DIASTOLIC BLOOD PRESSURE: 56 MMHG | HEIGHT: 59 IN | OXYGEN SATURATION: 100 % | SYSTOLIC BLOOD PRESSURE: 98 MMHG | TEMPERATURE: 98 F | WEIGHT: 86 LBS | RESPIRATION RATE: 16 BRPM | HEART RATE: 67 BPM | BODY MASS INDEX: 17.34 KG/M2

## 2020-02-01 DIAGNOSIS — L30.9 ECZEMA, UNSPECIFIED TYPE: Primary | ICD-10-CM

## 2020-02-01 LAB
B-HCG UR QL: NEGATIVE
CTP QC/QA: YES

## 2020-02-01 PROCEDURE — 99284 EMERGENCY DEPT VISIT MOD MDM: CPT | Mod: 25

## 2020-02-01 PROCEDURE — 96372 THER/PROPH/DIAG INJ SC/IM: CPT | Mod: 59

## 2020-02-01 PROCEDURE — 63600175 PHARM REV CODE 636 W HCPCS: Performed by: NURSE PRACTITIONER

## 2020-02-01 PROCEDURE — 81025 URINE PREGNANCY TEST: CPT | Performed by: NURSE PRACTITIONER

## 2020-02-01 RX ORDER — HYDROXYZINE HYDROCHLORIDE 25 MG/1
25 TABLET, FILM COATED ORAL 3 TIMES DAILY PRN
Qty: 30 TABLET | Refills: 1 | Status: SHIPPED | OUTPATIENT
Start: 2020-02-01 | End: 2021-05-14 | Stop reason: CLARIF

## 2020-02-01 RX ORDER — DEXAMETHASONE SODIUM PHOSPHATE 4 MG/ML
8 INJECTION, SOLUTION INTRA-ARTICULAR; INTRALESIONAL; INTRAMUSCULAR; INTRAVENOUS; SOFT TISSUE
Status: COMPLETED | OUTPATIENT
Start: 2020-02-01 | End: 2020-02-01

## 2020-02-01 RX ORDER — HYDROCORTISONE 1 %
CREAM (GRAM) TOPICAL
Qty: 30 G | Refills: 0 | Status: SHIPPED | OUTPATIENT
Start: 2020-02-01 | End: 2021-05-14 | Stop reason: CLARIF

## 2020-02-01 RX ORDER — HYDROXYZINE HYDROCHLORIDE 25 MG/1
25 TABLET, FILM COATED ORAL 3 TIMES DAILY
COMMUNITY
End: 2020-02-01 | Stop reason: SDUPTHER

## 2020-02-01 RX ORDER — CETIRIZINE HYDROCHLORIDE 10 MG/1
10 TABLET ORAL DAILY PRN
COMMUNITY

## 2020-02-01 RX ADMIN — DEXAMETHASONE SODIUM PHOSPHATE 8 MG: 4 INJECTION, SOLUTION INTRAMUSCULAR; INTRAVENOUS at 09:02

## 2020-02-02 NOTE — DISCHARGE INSTRUCTIONS
Take medications as prescribed. Follow up with dermatology for recheck. Return to ER for any worsening symptoms or concerns.

## 2020-02-02 NOTE — ED PROVIDER NOTES
Encounter Date: 2/1/2020       History     Chief Complaint   Patient presents with    Rash     c/o itching, burning, dry, scaly rash to ankles x 1 month.  Also reports dryness and itching to arms.  Was treated by her MD with Triamcinolone cream without improvement.  Mother reports pt has had eczema all of her life and this is a flare up.     Patient presents with eczema flare to bilateral ankles and hands.  Patient states eczema flare has been going on for approximately a week.  She has tried over-the-counter medications without improvement.  She states normally her eczema flare will improve with a Decadron injection.  She is also out of her Atarax and is requesting a refill.  She denies fevers, chills, nausea and vomiting.        Review of patient's allergies indicates:  No Known Allergies  Past Medical History:   Diagnosis Date    Eczema      History reviewed. No pertinent surgical history.  History reviewed. No pertinent family history.  Social History     Tobacco Use    Smoking status: Never Smoker   Substance Use Topics    Alcohol use: Not on file    Drug use: Not on file     Review of Systems   Constitutional: Negative for fever.   HENT: Negative for sore throat.    Respiratory: Negative for shortness of breath.    Cardiovascular: Negative for chest pain.   Gastrointestinal: Negative for nausea.   Genitourinary: Negative for dysuria.   Musculoskeletal: Negative for back pain.   Skin: Negative for rash.        eczema to ankles and hands   Neurological: Negative for weakness.   Hematological: Does not bruise/bleed easily.   All other systems reviewed and are negative.      Physical Exam     Initial Vitals [02/01/20 1908]   BP Pulse Resp Temp SpO2   (!) 98/56 67 16 97.9 °F (36.6 °C) 100 %      MAP       --         Physical Exam    Nursing note and vitals reviewed.  Constitutional: She appears well-developed and well-nourished.   HENT:   Head: Normocephalic and atraumatic.   Eyes: Conjunctivae and EOM are  normal. Pupils are equal, round, and reactive to light.   Neck: Normal range of motion. Neck supple.   Cardiovascular: Normal rate, regular rhythm, normal heart sounds and intact distal pulses.   Pulmonary/Chest: Breath sounds normal.   Abdominal: Soft. Bowel sounds are normal.   Musculoskeletal: Normal range of motion.   Neurological: She is alert and oriented to person, place, and time. She has normal strength.   Skin: Skin is warm and dry. Capillary refill takes less than 2 seconds.   Eczema flare to bilat ankles and hands, no open wounds or infection   Psychiatric: She has a normal mood and affect.         ED Course   Procedures  Labs Reviewed   POCT URINE PREGNANCY          Imaging Results    None          Medical Decision Making:   ED Management:  Patient presents for eczema flare-up to ankles and hands.  Patient given decadron inj in ED and discharged with prescription for hydrocortisone cream and advised on follow-up with dermatology.  Patient verbalized understanding of discharge instructions and return precautions.                                 Clinical Impression:       ICD-10-CM ICD-9-CM   1. Eczema, unspecified type L30.9 692.9         Disposition:   Disposition: Discharged  Condition: Stable                     Ilda Schmidt NP  02/01/20 9713

## 2020-02-14 ENCOUNTER — CLINICAL SUPPORT (OUTPATIENT)
Dept: URGENT CARE | Facility: CLINIC | Age: 25
End: 2020-02-14
Payer: MEDICARE

## 2020-02-14 VITALS
SYSTOLIC BLOOD PRESSURE: 84 MMHG | BODY MASS INDEX: 17.94 KG/M2 | OXYGEN SATURATION: 100 % | HEIGHT: 59 IN | DIASTOLIC BLOOD PRESSURE: 54 MMHG | HEART RATE: 80 BPM | TEMPERATURE: 98 F | RESPIRATION RATE: 16 BRPM | WEIGHT: 89 LBS

## 2020-02-14 DIAGNOSIS — R21 RASH OF BODY: Primary | ICD-10-CM

## 2020-02-14 PROCEDURE — 96372 PR INJECTION,THERAP/PROPH/DIAG2ST, IM OR SUBCUT: ICD-10-PCS | Mod: S$GLB,,, | Performed by: NURSE PRACTITIONER

## 2020-02-14 PROCEDURE — 99214 OFFICE O/P EST MOD 30 MIN: CPT | Mod: 25,S$GLB,, | Performed by: NURSE PRACTITIONER

## 2020-02-14 PROCEDURE — 96372 THER/PROPH/DIAG INJ SC/IM: CPT | Mod: S$GLB,,, | Performed by: NURSE PRACTITIONER

## 2020-02-14 PROCEDURE — 99214 PR OFFICE/OUTPT VISIT, EST, LEVL IV, 30-39 MIN: ICD-10-PCS | Mod: 25,S$GLB,, | Performed by: NURSE PRACTITIONER

## 2020-02-14 RX ORDER — PREDNISONE 20 MG/1
20 TABLET ORAL 2 TIMES DAILY
Qty: 10 TABLET | Refills: 0 | Status: SHIPPED | OUTPATIENT
Start: 2020-02-14 | End: 2020-02-19

## 2020-02-14 RX ORDER — DEXAMETHASONE SODIUM PHOSPHATE 4 MG/ML
8 INJECTION, SOLUTION INTRA-ARTICULAR; INTRALESIONAL; INTRAMUSCULAR; INTRAVENOUS; SOFT TISSUE
Status: COMPLETED | OUTPATIENT
Start: 2020-02-14 | End: 2020-02-14

## 2020-02-14 RX ADMIN — DEXAMETHASONE SODIUM PHOSPHATE 8 MG: 4 INJECTION, SOLUTION INTRA-ARTICULAR; INTRALESIONAL; INTRAMUSCULAR; INTRAVENOUS; SOFT TISSUE at 01:02

## 2020-02-14 NOTE — PROGRESS NOTES
"Subjective:       Patient ID: Sherry Burns is a 24 y.o. female.    Vitals:  height is 4' 11" (1.499 m) and weight is 40.4 kg (89 lb). Her temperature is 98.4 °F (36.9 °C). Her blood pressure is 84/54 (abnormal) and her pulse is 80. Her respiration is 16 and oxygen saturation is 100%.     Chief Complaint: Rash (rash to both legs and both arms X one week. Was seen in [facility] ER and was given a shot and OTC cream without results)    Pt presents with rash to bilat arms and legs. Pt states rash started several days ago and has progressively worsened. She denies known etiology. She reports itching to areas. She denies f/c/n/v.     Rash   This is a recurrent problem. The current episode started in the past 7 days. The problem has been gradually worsening since onset. Pertinent negatives include no cough, fever or sore throat.       Constitution: Negative for chills and fever.   HENT: Negative for facial swelling and sore throat.    Neck: Negative for painful lymph nodes.   Eyes: Negative for eye itching and eyelid swelling.   Respiratory: Negative for cough.    Musculoskeletal: Negative for joint pain and joint swelling.   Skin: Positive for rash. Negative for color change, pale, wound, abrasion, laceration, lesion, skin thickening/induration, puncture wound, erythema, bruising, abscess, avulsion and hives.   Allergic/Immunologic: Negative for environmental allergies, immunocompromised state and hives.   Hematologic/Lymphatic: Negative for swollen lymph nodes.       Objective:      Physical Exam   Constitutional: She is oriented to person, place, and time. She appears well-developed and well-nourished.   HENT:   Head: Normocephalic and atraumatic. Head is without abrasion, without contusion and without laceration.   Right Ear: External ear normal.   Left Ear: External ear normal.   Nose: Nose normal.   Mouth/Throat: Oropharynx is clear and moist and mucous membranes are normal.   Eyes: Pupils are equal, round, and " reactive to light. Conjunctivae, EOM and lids are normal.   Neck: Trachea normal, full passive range of motion without pain and phonation normal. Neck supple.   Cardiovascular: Normal rate, regular rhythm and normal heart sounds.   Pulmonary/Chest: Effort normal and breath sounds normal. No stridor. No respiratory distress.   Musculoskeletal: Normal range of motion.   Neurological: She is alert and oriented to person, place, and time.   Skin: Skin is warm, dry, intact and rash. Capillary refill takes less than 2 seconds.   Raised maculopapular rash to bilat arms and bilat thighs abrasion, burn, bruising, erythema and ecchymosis  Psychiatric: She has a normal mood and affect. Her speech is normal and behavior is normal. Judgment and thought content normal. Cognition and memory are normal.   Nursing note and vitals reviewed.        Assessment:       1. Rash of body        Plan:         Rash of body  -     Ambulatory referral/consult to Allergy    Other orders  -     dexamethasone injection 8 mg  -     predniSONE (DELTASONE) 20 MG tablet; Take 1 tablet (20 mg total) by mouth 2 (two) times daily. for 5 days  Dispense: 10 tablet; Refill: 0

## 2020-02-14 NOTE — PATIENT INSTRUCTIONS
Self-Care for Skin Rashes     Pat your skin dry. Do not rub.     When your skin reacts to a substance your body is sensitive to, it can cause a rash. You can treat most rashes at home by keeping the skin clean and dry. Many rashes may get better on their own within 2 to 3 days. You may need medical attention if your rash itches, drains, or hurts, particularly if the rash is getting worse.  What can cause a skin rash?  · Sun poisoning, caused by too much exposure to the sun  · An irritant or allergic reaction to a certain type of food, plant, or chemical, such as  shellfish, poison ivy, and or cleaning products  · An infection caused by a fungus (ringworm), virus (chickenpox), or bacteria (strep)  · Bites or infestation caused by insects or pests, such as ticks, lice, or mites  · Dry skin, which is often seen during the winter months and in older people  How can I control itching and skin damage?  · Take soothing lukewarm baths in a colloidal oatmeal product. You can buy this at the [employer].  · Do your best not to scratch. Clip fingernails short, especially in young children, to reduce skin damage if scratching does occur.  · Use moisturizing skin lotion instead of scratching your dry skin.  · Use sunscreen whenever going out into direct sun.  · Use only mild cleansing agents whenever possible.  · Wash with mild, nonirritating soap and warm water.  · Wear clothing that breathes, such as cotton shirts or canvas shoes.  · If fluid is seeping from the rash, cover it loosely with clean gauze to absorb the discharge.  · Many rashes are contagious. Prevent the rash from spreading to others by washing your hands often before or after touching others with any skin rash.  Use medicine  · Antihistamines such as diphenhydramine can help control itching. But use with caution because they can make you drowsy.  · Using over-the-counter hydrocortisone cream on small rashes may help reduce swelling and itching  · Most  over-the-counter antifungal medicines can treat athletes foot and many other fungal infections of the skin.  Check with your healthcare provider  Call your healthcare provider if:  · You were told that you have a fungal infection on your skin to make sure you have the correct type of medicine.  · You have questions or concerns about medicines or their side effects.     Call 911  Call 911 if either of these occur:  · Your tongue or lips start to swell  · You have difficulty breathing      Call your healthcare provider  Call your healthcare provider if any of these occur:  · Temperature of more than 101.0°F (38.3°C), or as directed  · Sore throat, a cough, or unusual fatigue  · Red, oozy, or painful rash gets worse. These are signs of infection.  · Rash covers your face, genitals, or most of your body  · Crusty sores or red rings that begin to spread  · You were exposed to someone who has a contagious rash, such as scabies or lice.  · Red bulls-eye rash with a white center (a sign of Lyme disease)  · You were told that you have resistant bacteria (MRSA) on your skin.   Date Last Reviewed: 5/12/2015  © 5435-4341 [employer]. [address] [place] [address], [place] [address]. All rights reserved. This information is not intended as a substitute for professional medical care. Always follow your healthcare professional's instructions.

## 2020-06-26 ENCOUNTER — OFFICE VISIT (OUTPATIENT)
Dept: URGENT CARE | Facility: CLINIC | Age: 25
End: 2020-06-26
Payer: MEDICARE

## 2020-06-26 VITALS
HEART RATE: 100 BPM | SYSTOLIC BLOOD PRESSURE: 120 MMHG | DIASTOLIC BLOOD PRESSURE: 75 MMHG | WEIGHT: 91 LBS | RESPIRATION RATE: 14 BRPM | OXYGEN SATURATION: 99 % | BODY MASS INDEX: 18.35 KG/M2 | TEMPERATURE: 99 F | HEIGHT: 59 IN

## 2020-06-26 DIAGNOSIS — R11.2 NAUSEA VOMITING AND DIARRHEA: Primary | ICD-10-CM

## 2020-06-26 DIAGNOSIS — R14.0 ABDOMINAL BLOATING: ICD-10-CM

## 2020-06-26 DIAGNOSIS — R19.7 NAUSEA VOMITING AND DIARRHEA: Primary | ICD-10-CM

## 2020-06-26 LAB
B-HCG UR QL: NEGATIVE
BILIRUB UR QL STRIP: NEGATIVE
CTP QC/QA: YES
GLUCOSE UR QL STRIP: NEGATIVE
KETONES UR QL STRIP: NEGATIVE
LEUKOCYTE ESTERASE UR QL STRIP: NEGATIVE
PH, POC UA: 5.5
POC BLOOD, URINE: NEGATIVE
POC NITRATES, URINE: NEGATIVE
PROT UR QL STRIP: NEGATIVE
SP GR UR STRIP: 1.02 (ref 1–1.03)
UROBILINOGEN UR STRIP-ACNC: NORMAL (ref 0.1–1.1)

## 2020-06-26 PROCEDURE — 99214 OFFICE O/P EST MOD 30 MIN: CPT | Mod: 25,S$GLB,, | Performed by: NURSE PRACTITIONER

## 2020-06-26 PROCEDURE — 81003 POCT URINALYSIS, DIPSTICK, AUTOMATED, W/O SCOPE: ICD-10-PCS | Mod: QW,S$GLB,, | Performed by: NURSE PRACTITIONER

## 2020-06-26 PROCEDURE — 99214 PR OFFICE/OUTPT VISIT, EST, LEVL IV, 30-39 MIN: ICD-10-PCS | Mod: 25,S$GLB,, | Performed by: NURSE PRACTITIONER

## 2020-06-26 PROCEDURE — 81003 URINALYSIS AUTO W/O SCOPE: CPT | Mod: QW,S$GLB,, | Performed by: NURSE PRACTITIONER

## 2020-06-26 PROCEDURE — 81025 PR  URINE PREGNANCY TEST: ICD-10-PCS | Mod: S$GLB,,, | Performed by: NURSE PRACTITIONER

## 2020-06-26 PROCEDURE — 81025 URINE PREGNANCY TEST: CPT | Mod: S$GLB,,, | Performed by: NURSE PRACTITIONER

## 2020-06-26 RX ORDER — OMEPRAZOLE 40 MG/1
40 CAPSULE, DELAYED RELEASE ORAL DAILY
Qty: 30 CAPSULE | Refills: 0 | Status: SHIPPED | OUTPATIENT
Start: 2020-06-26 | End: 2023-08-02 | Stop reason: DRUGHIGH

## 2020-06-26 NOTE — PATIENT INSTRUCTIONS
Begin taking Prilosec daily. Refrain from eating or drinking any dairy products (ice cream, milk, cheese).     Nonspecific Vomiting and Diarrhea (Adult)  Vomiting and diarrhea can have many causes, including:  · Helping your body get rid of harmful substances   · Gastroenteritis caused by viruses, parasites, bacteria, or toxins.  · Allergy to or side effect of a food or medicine  · Severe stress or worry (anxiety)   · Other illnesses  · Pregnancy  It is often hard to pinpoint an exact cause, even with testing. Vomiting and diarrhea often go away within a day or two without problems. If they continue, though, they can lead to too much loss of fluid (dehydration). This can be serious if not treated.    Home care  Medications  · You may use acetaminophen or NSAID medicines like ibuprofen or naproxen to control fever, unless another medicine was prescribed. If you have chronic liver or kidney disease, talk with your healthcare provider before using these medicines. Also talk with your provider if you've had a stomach ulcer or gastrointestinal bleeding. Don't give aspirin to anyone under 18 years of age who is ill with a fever. Don't use NSAID medicines if you are already taking one for another condition (like arthritis) or are on aspirin (such as for heart disease or after a stroke)  · If medicines for diarrhea or vomiting were prescribed, take these only as directed. Never take these without a healthcare providers approval.  General care  · If symptoms are severe, rest at home for the next 24 hours, or until you are feeling better.  · Washing your hands with soap and water, or using alcohol-based hand  is the best way to stop the spread of infection. Wash your hands after touching anyone who is sick.  · Wash your hands after using the toilet and before meals. Clean the toilet after each use.  · Caffeine, tobacco, and alcohol can make the diarrhea, cramping, and pain worse. Remember, caffeine not only is in  coffee, but also is in chocolate, some energy drinks, and teas.  Diet  · Water and clear liquids are important so you don't get dehydrated. Drink a small amount at a time. Don't guzzle down the drinks. That may increase your nausea, make cramping worse, and cause the drinks to come back up.  · Sports drinks may also help. Make sure they are not too sugary, because this can sometimes make things worse. Also, don't drink beverages that are too acidic, like orange juice and grape juice.  · If you are very dehydrated, sports drinks aren't a good choice. They have too much sugar and not enough electrolytes. In this case, commercially available products called oral rehydration solutions are best.  Food  · Don't force yourself to eat, especially if you have cramps, diarrhea, or vomiting. Eat just a little at a time, and then wait a few minutes before you try to eat more.  · Don't eat fatty, greasy, spicy, or fried foods.  · Don't eat dairy products if you have diarrhea. They can make it worse.  During the first 24 hours (the first full day), follow the diet below:  · Beverages: Sports drinks, soft drinks without caffeine, mineral water, and decaffeinated tea and coffee  · Soups: Clear broth, consommé, and bouillon  · Desserts: Plain gelatin, popsicles, and fruit juice bars  During the next 24 hours (the second day), you may add the following to the above if you are better. If not, continue what you did the first day:  · Hot cereal, plain toast, bread, rolls, crackers  · Plain noodles, rice, mashed potatoes, chicken noodle or rice soup  · Unsweetened canned fruit (avoid pineapple), bananas  · Limit fat intake to less than 15 grams per day by avoiding margarine, butter, oils, mayonnaise, sauces, gravies, fried foods, peanut butter, meat, poultry, and fish.  · Limit fiber. Avoid raw or cooked vegetables, fresh fruits (except bananas) and bran cereals.  · Limit caffeine and chocolate. No spices or seasonings except  salt.  During the next 24 hours:  · Gradually resume a normal diet, as you feel better and your symptoms improve.  · If at any time your symptoms start getting worse again, go back to clear liquids until you feel better.  Food preparation  · If you have diarrhea, you should not prepare food for others. When preparing foods, wash your hands before and after.  · Wash your hands or use alcohol-based  after using cutting boards, countertops, and knives that have been in contact with raw food.  · Keep uncooked meats away from cooked and ready-to-eat foods.  Follow-up care  Follow up with your healthcare advisor, or as advised. Call if you do not get better in the next 2 to 3 days. If a stool (diarrhea) sample was taken, or cultures done, you will be told if they are positive, or if your treatment needs to be changed. You may call as directed for the results.  If X-rays were taken, and a radiologist has not yet looked at them, he or she will do so. You will be told if there is a change in the reading, especially if it affects your treatment.  Call 911  Call 911 if any of these occur:  · Trouble breathing  · Chest pain  · Confusion  · Severe drowsiness or trouble awakening  · Fainting or loss of consciousness  · Rapid heart rate  · Seizure  · Stiff neck  · Severe weakness, dizziness, or lightheadedness  When to seek medical advice  Call your healthcare provider right away if any of these occur:  · Bloody or black vomit or stools.  · Severe, steady abdominal pain or any abdominal pain that is getting worse.  · Severe headache or stiff neck  · An inability to hold down even sips of liquids for more than 12 hours.  · Vomiting that lasts more than 24 hours.  · Diarrhea that lasts more than 24 hours.  · Fever of 100.4°F (38.0°C) or higher that lasts more than 48 hours, or as directed by your health care provider  · Yellowish color to your skin or the whites of your eyes.  · Signs of dehydration, such as dry mouth,  little urine (less than every 6 hours), or very dark urine.  Date Last Reviewed: 1/3/2016  © 5228-4260 [employer] [address] [place] [address], [place] [address]. All rights reserved. This information is not intended as a substitute for professional medical care. Always follow your healthcare professional's instructions.        Diet for Vomiting or Diarrhea (Adult)    Your symptoms may return or get worse after eating certain foods listed below. If this happens, stop eating these foods until your symptoms ease and you feel better.  Once the vomiting stops, follow the steps below.   During the first 12 to 24 hours  During the first 12 to 24 hours, follow this diet:  · Drinks. Plain water, sport drinks like electrolyte solutions, soft drinks without caffeine, mineral water (plain or flavored), clear fruit juices, and decaffeinated tea and coffee.  · Soups. Clear broth.  · Desserts. Plain gelatin, popsicles, and fruit juice bars. As you feel better, you may add 6 to 8 ounces of yogurt per day. If you have diarrhea, don't have foods or drinks that contain sugar, high-fructose corn syrup, or sugar alcohols.  During the next 24 hours  During the next 24 hours you may add the following to the above:  · Hot cereal, plain toast, bread, rolls, and crackers  · Plain noodles, rice, mashed potatoes, and chicken noodle or rice soup  · Unsweetened canned fruit (but not pineapple) and bananas  Don't eat more than 15 grams of fat a day. Do this by staying away from margarine, butter, oils, mayonnaise, sauces, gravies, fried foods, peanut butter, meat, poultry, and fish.  Don't eat much fiber. Stay away from raw or cooked vegetables, fresh fruits (except bananas), and bran cereals.  Limit how much caffeine and chocolate you have. Do not use any spices or seasonings except salt.  During the next 24 hours  Slowly go back to your normal diet, as you feel better and your symptoms ease.  Date Last Reviewed: 8/1/2016 © 2000-2017  [employer]. [address] [place] [address], [place] [address]. All rights reserved. This information is not intended as a substitute for professional medical care. Always follow your healthcare professional's instructions.

## 2020-06-26 NOTE — PROGRESS NOTES
"Subjective:       Patient ID: Sherry Burns is a 24 y.o. female.    Vitals:  height is 4' 11" (1.499 m) and weight is 41.3 kg (91 lb). Her temperature is 99 °F (37.2 °C). Her blood pressure is 120/75 and her pulse is 100. Her respiration is 14 and oxygen saturation is 99%.     Chief Complaint: Bloated    Patient complains of feeling nauseated and having abdominal bloating after eating dairy products for 1 week. Reports she has had 3 weeks of diarrhea and has vomited one time last week. Reports she is belching more often, but denies flatulence. Denies fever, weakness, dizziness or abdominal pain.           Constitution: Negative for sweating, fatigue and fever.   HENT: Negative.    Neck: negative.   Cardiovascular: Negative.    Eyes: Negative.    Respiratory: Negative for cough, shortness of breath and wheezing.    Gastrointestinal: Positive for abdominal bloating, nausea, vomiting and diarrhea. Negative for constipation.   Endocrine: negative.   Genitourinary: Negative.    Musculoskeletal: Negative.    Skin: Negative for rash.   Allergic/Immunologic: Negative.    Neurological: Negative.    Hematologic/Lymphatic: Negative.    Psychiatric/Behavioral: Negative.        Objective:      Physical Exam   Constitutional: She is oriented to person, place, and time. She appears well-developed. She is cooperative.  Non-toxic appearance. She does not appear ill. No distress.   HENT:   Head: Normocephalic and atraumatic.   Right Ear: Hearing, tympanic membrane, external ear and ear canal normal.   Left Ear: Hearing, tympanic membrane, external ear and ear canal normal.   Nose: Nose normal. No mucosal edema, rhinorrhea or nasal deformity. No epistaxis. Right sinus exhibits no maxillary sinus tenderness and no frontal sinus tenderness. Left sinus exhibits no maxillary sinus tenderness and no frontal sinus tenderness.   Mouth/Throat: Uvula is midline, oropharynx is clear and moist and mucous membranes are normal. No trismus " in the jaw. Normal dentition. No uvula swelling. No posterior oropharyngeal erythema.   Eyes: Conjunctivae and lids are normal. Right eye exhibits no discharge. Left eye exhibits no discharge. No scleral icterus.   Neck: Trachea normal, normal range of motion, full passive range of motion without pain and phonation normal. Neck supple.   Cardiovascular: Normal rate, regular rhythm and normal pulses.   Murmur heard.  Pulmonary/Chest: Effort normal and breath sounds normal. No respiratory distress.   Abdominal: Soft. Normal appearance and bowel sounds are normal. She exhibits no distension, no pulsatile midline mass and no mass. There is no abdominal tenderness.   Musculoskeletal: Normal range of motion.         General: No deformity.   Neurological: She is alert and oriented to person, place, and time. She exhibits normal muscle tone. Coordination normal. GCS eye subscore is 4. GCS verbal subscore is 5. GCS motor subscore is 6.   Skin: Skin is warm, dry, intact, not diaphoretic and not pale.   Psychiatric: Her speech is normal and behavior is normal. Judgment and thought content normal.   Nursing note and vitals reviewed.        Assessment:       1. Nausea vomiting and diarrhea    2. Abdominal bloating        Plan:       UA negative. UPT negative.     Advised patient to refrain from eating diary products for about 1-2 weeks to determine if she is still experiencing the symptoms. Advised to follow up with Dr. Odonnell. Will prescribe Prilosec for symptoms.     Nausea vomiting and diarrhea  -     POCT Urinalysis, Dipstick, Automated, W/O Scope  -     POCT urine pregnancy    Abdominal bloating    Other orders  -     omeprazole (PRILOSEC) 40 MG capsule; Take 1 capsule (40 mg total) by mouth once daily.  Dispense: 30 capsule; Refill: 0

## 2020-12-28 ENCOUNTER — OFFICE VISIT (OUTPATIENT)
Dept: CARDIOLOGY | Facility: CLINIC | Age: 25
End: 2020-12-28
Payer: MEDICARE

## 2020-12-28 VITALS
DIASTOLIC BLOOD PRESSURE: 70 MMHG | HEIGHT: 59 IN | OXYGEN SATURATION: 100 % | HEART RATE: 85 BPM | SYSTOLIC BLOOD PRESSURE: 120 MMHG | WEIGHT: 86 LBS | BODY MASS INDEX: 17.34 KG/M2

## 2020-12-28 DIAGNOSIS — R00.2 PALPITATIONS: ICD-10-CM

## 2020-12-28 DIAGNOSIS — K29.70 GASTRITIS, PRESENCE OF BLEEDING UNSPECIFIED, UNSPECIFIED CHRONICITY, UNSPECIFIED GASTRITIS TYPE: Primary | ICD-10-CM

## 2020-12-28 PROCEDURE — 99213 PR OFFICE/OUTPT VISIT, EST, LEVL III, 20-29 MIN: ICD-10-PCS | Mod: S$GLB,,, | Performed by: SPECIALIST

## 2020-12-28 PROCEDURE — 3008F PR BODY MASS INDEX (BMI) DOCUMENTED: ICD-10-PCS | Mod: CPTII,S$GLB,, | Performed by: SPECIALIST

## 2020-12-28 PROCEDURE — 3008F BODY MASS INDEX DOCD: CPT | Mod: CPTII,S$GLB,, | Performed by: SPECIALIST

## 2020-12-28 PROCEDURE — 99213 OFFICE O/P EST LOW 20 MIN: CPT | Mod: S$GLB,,, | Performed by: SPECIALIST

## 2020-12-28 RX ORDER — ALBUTEROL SULFATE 90 UG/1
1 AEROSOL, METERED RESPIRATORY (INHALATION) EVERY 4 HOURS PRN
COMMUNITY
Start: 2020-11-13 | End: 2023-08-17 | Stop reason: SDUPTHER

## 2020-12-28 RX ORDER — METOPROLOL TARTRATE 25 MG/1
25 TABLET, FILM COATED ORAL 2 TIMES DAILY
COMMUNITY
End: 2022-07-27 | Stop reason: SDUPTHER

## 2020-12-29 NOTE — PROGRESS NOTES
Subjective:    Patient ID:  Sherry Burns is a 25 y.o. female who presents for   Mitral Valve Prolapse and Heart Murmur    One of palpitations of better she is taken Toprol XL  2.  She is having stomach problems but she is eating hot spicy food and she lows jalapeno and pop by fried chicken spice examined  She is on omeprazole      Review of patient's allergies indicates:  No Known Allergies    Past Medical History:   Diagnosis Date    Asthma     Eczema      History reviewed. No pertinent surgical history.  Social History     Tobacco Use    Smoking status: Never Smoker    Smokeless tobacco: Never Used   Substance Use Topics    Alcohol use: Not Currently    Drug use: Never        Review of Systems     Review of Systems   Constitution: Positive for weight loss.   HENT: Negative.    Cardiovascular: Positive for palpitations.   Gastrointestinal: Positive for abdominal pain and heartburn.   Neurological: Negative.            Objective:        Vitals:    12/28/20 1733   BP: 120/70   Pulse: 85       Lab Results   Component Value Date    WBC 5.81 01/03/2020    HGB 10.9 (L) 01/03/2020    HCT 35.6 (L) 01/03/2020     01/03/2020    CHOL 159 01/03/2020    TRIG 47 01/03/2020    HDL 58 01/03/2020    ALT 15 01/03/2020    AST 16 01/03/2020     01/03/2020    K 4.1 01/03/2020     01/03/2020    CREATININE 0.9 01/03/2020    BUN 16 01/03/2020    CO2 27 01/03/2020    TSH 1.890 01/03/2020        ECHOCARDIOGRAM RESULTS  No results found for this or any previous visit.    CURRENT/PREVIOUS VISIT EKG  Results for orders placed or performed during the hospital encounter of 10/01/19   EKG 12-lead    Collection Time: 10/01/19  3:09 PM    Narrative    Test Reason : R07.89,    Vent. Rate : 086 BPM     Atrial Rate : 086 BPM     P-R Int : 128 ms          QRS Dur : 072 ms      QT Int : 362 ms       P-R-T Axes : 043 064 045 degrees     QTc Int : 433 ms    Normal sinus rhythm  Normal ECG  No previous ECGs  available  Confirmed by Aylin ZAMBRANO, Red ([id number]) on 10/5/2019 11:24:31 AM    Referred By: [email or web address]   SELF           Confirmed By:Red Viera MD     No results found for this or any previous visit.  No results found for this or any previous visit.    PHYSICAL EXAM    Physical Exam of blood pressure is 100/80 pulse rates a  Lungs are clear  Cardiac sounds no  Abdomen mild tenderness    Medication List with Changes/Refills   Current Medications    ALBUTEROL (PROVENTIL/VENTOLIN HFA) 90 MCG/ACTUATION INHALER        BETAMETHASONE VALERATE 0.1% (VALISONE) 0.1 % LOTN    Apply 1 application topically once daily. Apply to scalp once or twice daily as needed for redness itching and scaling    CETIRIZINE (ZYRTEC) 10 MG TABLET    Take 10 mg by mouth once daily.    CLOBETASOL (TEMOVATE) 0.05 % CREAM    Apply 1 application topically 2 (two) times daily. Apply topically twice a day, thin layer to affected area    DICLOFENAC (VOLTAREN) 50 MG EC TABLET    Take 1 tablet (50 mg total) by mouth 3 (three) times daily as needed (pain).    HYDROCORTISONE 1 % CREAM    Apply to affected area 2 times daily    HYDROXYZINE HCL (ATARAX) 25 MG TABLET    Take 1 tablet (25 mg total) by mouth 3 (three) times daily as needed for Itching.    KETOCONAZOLE (NIZORAL) 2 % SHAMPOO    Apply 1 application topically twice a week. Apply topically twice a week, lather, leave on 5 minutes and wash off    METOPROLOL TARTRATE (LOPRESSOR) 25 MG TABLET    Take 25 mg by mouth 2 (two) times daily.    OMEPRAZOLE (PRILOSEC) 40 MG CAPSULE    Take 1 capsule (40 mg total) by mouth once daily.    TRIAMCINOLONE ACETONIDE 0.1% (KENALOG) 0.1 % CREAM    Apply topically 2 (two) times daily as needed.           Assessment:     gastritis secondary to spicy food  Tachycardia control with metoprolol XL       Plan:   Stop spicy food return to clinic in 8 month    Problem List Items Addressed This Visit     None           Follow up in about 8 months (around 8/28/2021).

## 2021-03-03 ENCOUNTER — HOSPITAL ENCOUNTER (OUTPATIENT)
Dept: PREADMISSION TESTING | Facility: HOSPITAL | Age: 26
Discharge: HOME | End: 2021-03-03
Attending: INTERNAL MEDICINE
Payer: MEDICARE

## 2021-03-03 VITALS
BODY MASS INDEX: 17.78 KG/M2 | HEART RATE: 76 BPM | WEIGHT: 88.19 LBS | SYSTOLIC BLOOD PRESSURE: 109 MMHG | DIASTOLIC BLOOD PRESSURE: 78 MMHG | OXYGEN SATURATION: 100 % | RESPIRATION RATE: 14 BRPM | TEMPERATURE: 97 F | HEIGHT: 59 IN

## 2021-03-03 DIAGNOSIS — Z01.818 PRE-OP TESTING: Primary | ICD-10-CM

## 2021-03-03 PROCEDURE — U0003 INFECTIOUS AGENT DETECTION BY NUCLEIC ACID (DNA OR RNA); SEVERE ACUTE RESPIRATORY SYNDROME CORONAVIRUS 2 (SARS-COV-2) (CORONAVIRUS DISEASE [COVID-19]), AMPLIFIED PROBE TECHNIQUE, MAKING USE OF HIGH THROUGHPUT TECHNOLOGIES AS DESCRIBED BY CMS-2020-01-R: HCPCS | Performed by: ANESTHESIOLOGY

## 2021-03-03 PROCEDURE — U0005 INFEC AGEN DETEC AMPLI PROBE: HCPCS | Performed by: ANESTHESIOLOGY

## 2021-03-03 RX ORDER — BUPROPION HYDROCHLORIDE 75 MG/1
75 TABLET ORAL DAILY
COMMUNITY
End: 2021-05-14 | Stop reason: CLARIF

## 2021-03-03 RX ORDER — VENLAFAXINE HYDROCHLORIDE 75 MG/1
75 CAPSULE, EXTENDED RELEASE ORAL DAILY
COMMUNITY
End: 2021-05-14 | Stop reason: CLARIF

## 2021-03-04 LAB — SARS-COV-2 RNA RESP QL NAA+PROBE: NOT DETECTED

## 2021-03-05 ENCOUNTER — HOSPITAL ENCOUNTER (OUTPATIENT)
Facility: HOSPITAL | Age: 26
Discharge: HOME | End: 2021-03-05
Attending: INTERNAL MEDICINE | Admitting: INTERNAL MEDICINE
Payer: MEDICARE

## 2021-03-05 ENCOUNTER — ANESTHESIA EVENT (OUTPATIENT)
Dept: SURGERY | Facility: HOSPITAL | Age: 26
End: 2021-03-05
Payer: MEDICARE

## 2021-03-05 ENCOUNTER — ANESTHESIA (OUTPATIENT)
Dept: SURGERY | Facility: HOSPITAL | Age: 26
End: 2021-03-05
Payer: MEDICARE

## 2021-03-05 VITALS
TEMPERATURE: 98 F | RESPIRATION RATE: 18 BRPM | OXYGEN SATURATION: 99 % | HEART RATE: 97 BPM | DIASTOLIC BLOOD PRESSURE: 92 MMHG | SYSTOLIC BLOOD PRESSURE: 121 MMHG

## 2021-03-05 DIAGNOSIS — D50.9 IRON DEFICIENCY ANEMIA: ICD-10-CM

## 2021-03-05 LAB
B-HCG UR QL: NEGATIVE
CTP QC/QA: YES

## 2021-03-05 PROCEDURE — 27200043 HC FORCEPS, BIOPSY: Performed by: INTERNAL MEDICINE

## 2021-03-05 PROCEDURE — 88305 TISSUE EXAM BY PATHOLOGIST: CPT | Mod: TC,59

## 2021-03-05 PROCEDURE — 43239 EGD BIOPSY SINGLE/MULTIPLE: CPT | Performed by: INTERNAL MEDICINE

## 2021-03-05 PROCEDURE — 37000008 HC ANESTHESIA 1ST 15 MINUTES: Performed by: INTERNAL MEDICINE

## 2021-03-05 PROCEDURE — 00813 ANES UPR LWR GI NDSC PX: CPT | Performed by: INTERNAL MEDICINE

## 2021-03-05 PROCEDURE — 25000003 PHARM REV CODE 250: Performed by: NURSE ANESTHETIST, CERTIFIED REGISTERED

## 2021-03-05 PROCEDURE — 45378 DIAGNOSTIC COLONOSCOPY: CPT | Performed by: INTERNAL MEDICINE

## 2021-03-05 PROCEDURE — 81025 URINE PREGNANCY TEST: CPT | Performed by: INTERNAL MEDICINE

## 2021-03-05 PROCEDURE — 63600175 PHARM REV CODE 636 W HCPCS: Performed by: NURSE ANESTHETIST, CERTIFIED REGISTERED

## 2021-03-05 PROCEDURE — 27000671 HC TUBING MICROBORE EXT: Performed by: ANESTHESIOLOGY

## 2021-03-05 PROCEDURE — 37000009 HC ANESTHESIA EA ADD 15 MINS: Performed by: INTERNAL MEDICINE

## 2021-03-05 RX ORDER — SODIUM CHLORIDE 0.9 % (FLUSH) 0.9 %
2 SYRINGE (ML) INJECTION
Status: DISCONTINUED | OUTPATIENT
Start: 2021-03-05 | End: 2021-03-05 | Stop reason: HOSPADM

## 2021-03-05 RX ORDER — SODIUM CHLORIDE 9 MG/ML
INJECTION, SOLUTION INTRAVENOUS CONTINUOUS
Status: DISCONTINUED | OUTPATIENT
Start: 2021-03-05 | End: 2021-03-05 | Stop reason: HOSPADM

## 2021-03-05 RX ORDER — PROPOFOL 10 MG/ML
VIAL (ML) INTRAVENOUS
Status: DISCONTINUED | OUTPATIENT
Start: 2021-03-05 | End: 2021-03-05

## 2021-03-05 RX ADMIN — PROPOFOL 20 MG: 10 INJECTION, EMULSION INTRAVENOUS at 10:03

## 2021-03-05 RX ADMIN — PROPOFOL 30 MG: 10 INJECTION, EMULSION INTRAVENOUS at 10:03

## 2021-03-05 RX ADMIN — PROPOFOL 60 MG: 10 INJECTION, EMULSION INTRAVENOUS at 10:03

## 2021-03-05 RX ADMIN — PROPOFOL 20 MG: 10 INJECTION, EMULSION INTRAVENOUS at 11:03

## 2021-03-05 RX ADMIN — SODIUM CHLORIDE: 900 INJECTION INTRAVENOUS at 10:03

## 2021-03-19 ENCOUNTER — OFFICE VISIT (OUTPATIENT)
Dept: URGENT CARE | Facility: CLINIC | Age: 26
End: 2021-03-19
Payer: MEDICARE

## 2021-03-19 VITALS
HEIGHT: 59 IN | DIASTOLIC BLOOD PRESSURE: 65 MMHG | OXYGEN SATURATION: 99 % | HEART RATE: 70 BPM | WEIGHT: 90.38 LBS | TEMPERATURE: 97 F | SYSTOLIC BLOOD PRESSURE: 109 MMHG | BODY MASS INDEX: 18.22 KG/M2

## 2021-03-19 DIAGNOSIS — L30.9 ECZEMA, UNSPECIFIED TYPE: Primary | ICD-10-CM

## 2021-03-19 PROCEDURE — 96372 PR INJECTION,THERAP/PROPH/DIAG2ST, IM OR SUBCUT: ICD-10-PCS | Mod: S$GLB,,, | Performed by: EMERGENCY MEDICINE

## 2021-03-19 PROCEDURE — 3008F PR BODY MASS INDEX (BMI) DOCUMENTED: ICD-10-PCS | Mod: CPTII,S$GLB,, | Performed by: NURSE PRACTITIONER

## 2021-03-19 PROCEDURE — 99214 PR OFFICE/OUTPT VISIT, EST, LEVL IV, 30-39 MIN: ICD-10-PCS | Mod: 25,S$GLB,, | Performed by: NURSE PRACTITIONER

## 2021-03-19 PROCEDURE — 96372 THER/PROPH/DIAG INJ SC/IM: CPT | Mod: S$GLB,,, | Performed by: EMERGENCY MEDICINE

## 2021-03-19 PROCEDURE — 3008F BODY MASS INDEX DOCD: CPT | Mod: CPTII,S$GLB,, | Performed by: NURSE PRACTITIONER

## 2021-03-19 PROCEDURE — 99214 OFFICE O/P EST MOD 30 MIN: CPT | Mod: 25,S$GLB,, | Performed by: NURSE PRACTITIONER

## 2021-03-19 RX ORDER — FLUTICASONE PROPIONATE 50 MCG
1 SPRAY, SUSPENSION (ML) NASAL DAILY
Qty: 16 G | Refills: 0 | Status: SHIPPED | OUTPATIENT
Start: 2021-03-19 | End: 2024-02-15

## 2021-03-19 RX ORDER — DEXAMETHASONE SODIUM PHOSPHATE 4 MG/ML
8 INJECTION, SOLUTION INTRA-ARTICULAR; INTRALESIONAL; INTRAMUSCULAR; INTRAVENOUS; SOFT TISSUE
Status: COMPLETED | OUTPATIENT
Start: 2021-03-19 | End: 2021-03-19

## 2021-03-19 RX ADMIN — DEXAMETHASONE SODIUM PHOSPHATE 8 MG: 4 INJECTION, SOLUTION INTRA-ARTICULAR; INTRALESIONAL; INTRAMUSCULAR; INTRAVENOUS; SOFT TISSUE at 12:03

## 2021-03-30 ENCOUNTER — OFFICE VISIT (OUTPATIENT)
Dept: DERMATOLOGY | Facility: CLINIC | Age: 26
End: 2021-03-30
Payer: MEDICARE

## 2021-03-30 DIAGNOSIS — L30.9 DERMATITIS: Primary | ICD-10-CM

## 2021-03-30 PROCEDURE — 99204 PR OFFICE/OUTPT VISIT, NEW, LEVL IV, 45-59 MIN: ICD-10-PCS | Mod: S$GLB,,, | Performed by: STUDENT IN AN ORGANIZED HEALTH CARE EDUCATION/TRAINING PROGRAM

## 2021-03-30 PROCEDURE — 99999 PR PBB SHADOW E&M-EST. PATIENT-LVL III: CPT | Mod: PBBFAC,,, | Performed by: STUDENT IN AN ORGANIZED HEALTH CARE EDUCATION/TRAINING PROGRAM

## 2021-03-30 PROCEDURE — 99999 PR PBB SHADOW E&M-EST. PATIENT-LVL III: ICD-10-PCS | Mod: PBBFAC,,, | Performed by: STUDENT IN AN ORGANIZED HEALTH CARE EDUCATION/TRAINING PROGRAM

## 2021-03-30 PROCEDURE — 99204 OFFICE O/P NEW MOD 45 MIN: CPT | Mod: S$GLB,,, | Performed by: STUDENT IN AN ORGANIZED HEALTH CARE EDUCATION/TRAINING PROGRAM

## 2021-03-30 RX ORDER — HYDROXYZINE HYDROCHLORIDE 25 MG/1
25 TABLET, FILM COATED ORAL NIGHTLY PRN
Qty: 30 TABLET | Refills: 1 | Status: SHIPPED | OUTPATIENT
Start: 2021-03-30 | End: 2021-05-14 | Stop reason: CLARIF

## 2021-03-30 RX ORDER — HYDROCORTISONE 25 MG/G
CREAM TOPICAL 2 TIMES DAILY
Qty: 20 G | Refills: 2 | Status: SHIPPED | OUTPATIENT
Start: 2021-03-30 | End: 2021-04-13

## 2021-03-30 RX ORDER — CLOBETASOL PROPIONATE 0.46 MG/ML
SOLUTION TOPICAL 2 TIMES DAILY
Qty: 50 ML | Refills: 2 | Status: SHIPPED | OUTPATIENT
Start: 2021-03-30 | End: 2021-05-14 | Stop reason: CLARIF

## 2021-04-16 ENCOUNTER — OFFICE VISIT (OUTPATIENT)
Dept: HEMATOLOGY/ONCOLOGY | Facility: CLINIC | Age: 26
End: 2021-04-16
Payer: MEDICARE

## 2021-04-16 VITALS
DIASTOLIC BLOOD PRESSURE: 77 MMHG | HEIGHT: 59 IN | SYSTOLIC BLOOD PRESSURE: 122 MMHG | BODY MASS INDEX: 18.55 KG/M2 | WEIGHT: 92 LBS | HEART RATE: 97 BPM | RESPIRATION RATE: 20 BRPM

## 2021-04-16 DIAGNOSIS — R00.2 RAPID PALPITATIONS: ICD-10-CM

## 2021-04-16 DIAGNOSIS — D53.9 ANEMIA ASSOCIATED WITH NUTRITIONAL DEFICIENCY: Primary | ICD-10-CM

## 2021-04-16 DIAGNOSIS — R23.3 EASY BRUISING: ICD-10-CM

## 2021-04-16 PROCEDURE — 3008F BODY MASS INDEX DOCD: CPT | Mod: S$GLB,,, | Performed by: INTERNAL MEDICINE

## 2021-04-16 PROCEDURE — 99204 OFFICE O/P NEW MOD 45 MIN: CPT | Mod: S$GLB,,, | Performed by: INTERNAL MEDICINE

## 2021-04-16 PROCEDURE — 1126F AMNT PAIN NOTED NONE PRSNT: CPT | Mod: S$GLB,,, | Performed by: INTERNAL MEDICINE

## 2021-04-16 PROCEDURE — 99204 PR OFFICE/OUTPT VISIT, NEW, LEVL IV, 45-59 MIN: ICD-10-PCS | Mod: S$GLB,,, | Performed by: INTERNAL MEDICINE

## 2021-04-16 PROCEDURE — 3008F PR BODY MASS INDEX (BMI) DOCUMENTED: ICD-10-PCS | Mod: S$GLB,,, | Performed by: INTERNAL MEDICINE

## 2021-04-16 PROCEDURE — 1126F PR PAIN SEVERITY QUANTIFIED, NO PAIN PRESENT: ICD-10-PCS | Mod: S$GLB,,, | Performed by: INTERNAL MEDICINE

## 2021-04-21 ENCOUNTER — IMMUNIZATION (OUTPATIENT)
Dept: PRIMARY CARE CLINIC | Facility: CLINIC | Age: 26
End: 2021-04-21
Payer: MEDICARE

## 2021-04-21 ENCOUNTER — TELEPHONE (OUTPATIENT)
Dept: HEMATOLOGY/ONCOLOGY | Facility: HOSPITAL | Age: 26
End: 2021-04-21

## 2021-04-21 DIAGNOSIS — Z23 NEED FOR VACCINATION: Primary | ICD-10-CM

## 2021-04-21 PROCEDURE — 0001A COVID-19, MRNA, LNP-S, PF, 30 MCG/0.3 ML DOSE VACCINE: CPT | Mod: CV19,S$GLB,, | Performed by: FAMILY MEDICINE

## 2021-04-21 PROCEDURE — 91300 COVID-19, MRNA, LNP-S, PF, 30 MCG/0.3 ML DOSE VACCINE: CPT | Mod: S$GLB,,, | Performed by: FAMILY MEDICINE

## 2021-04-21 PROCEDURE — 91300 COVID-19, MRNA, LNP-S, PF, 30 MCG/0.3 ML DOSE VACCINE: ICD-10-PCS | Mod: S$GLB,,, | Performed by: FAMILY MEDICINE

## 2021-04-21 PROCEDURE — 0001A COVID-19, MRNA, LNP-S, PF, 30 MCG/0.3 ML DOSE VACCINE: ICD-10-PCS | Mod: CV19,S$GLB,, | Performed by: FAMILY MEDICINE

## 2021-04-23 LAB
ALBUMIN SERPL-MCNC: 4.7 G/DL (ref 3.6–5.1)
ALBUMIN/GLOB SERPL: 1.7 (CALC) (ref 1–2.5)
ALP SERPL-CCNC: 52 U/L (ref 31–125)
ALT SERPL-CCNC: 50 U/L (ref 6–29)
AST SERPL-CCNC: 35 U/L (ref 10–30)
BASOPHILS # BLD AUTO: 77 CELLS/UL (ref 0–200)
BASOPHILS NFR BLD AUTO: 1.1 %
BILIRUB SERPL-MCNC: 0.7 MG/DL (ref 0.2–1.2)
BUN SERPL-MCNC: 16 MG/DL (ref 7–25)
BUN/CREAT SERPL: ABNORMAL (CALC) (ref 6–22)
CALCIUM SERPL-MCNC: 9.6 MG/DL (ref 8.6–10.2)
CHLORIDE SERPL-SCNC: 103 MMOL/L (ref 98–110)
CLOT LYSIS PPP: >60 MIN
CO2 SERPL-SCNC: 27 MMOL/L (ref 20–32)
CREAT SERPL-MCNC: 0.69 MG/DL (ref 0.5–1.1)
EOSINOPHIL # BLD AUTO: 399 CELLS/UL (ref 15–500)
EOSINOPHIL NFR BLD AUTO: 5.7 %
EPO SERPL-ACNC: 26.4 MIU/ML (ref 2.6–18.5)
ERYTHROCYTE [DISTWIDTH] IN BLOOD BY AUTOMATED COUNT: 14.5 % (ref 11–15)
FACT IX ACT/NOR PPP: 79 % NORMAL (ref 60–160)
FACT V ACT/NOR PPP: 130 % NORMAL (ref 65–150)
FACT VII ACT/NOR PPP: 77 % NORMAL (ref 60–150)
FACT X ACT/NOR PPP: 117 % NORMAL (ref 70–150)
FACT XII ACT/NOR PPP: 88 % NORMAL (ref 50–150)
FACT XIII ACT/NOR PPP CHRO: 157 % ACTIVITY (ref 57–192)
FERRITIN SERPL-MCNC: 6 NG/ML (ref 16–154)
FIBRINOGEN PPP-MCNC: 287 MG/DL (ref 175–425)
FOLATE SERPL-MCNC: 7.5 NG/ML
GLOBULIN SER CALC-MCNC: 2.7 G/DL (CALC) (ref 1.9–3.7)
GLUCOSE SERPL-MCNC: 105 MG/DL (ref 65–99)
HCT VFR BLD AUTO: 32.4 % (ref 35–45)
HGB BLD-MCNC: 9.9 G/DL (ref 11.7–15.5)
LYMPHOCYTES # BLD AUTO: 1694 CELLS/UL (ref 850–3900)
LYMPHOCYTES NFR BLD AUTO: 24.2 %
MCH RBC QN AUTO: 22.6 PG (ref 27–33)
MCHC RBC AUTO-ENTMCNC: 30.6 G/DL (ref 32–36)
MCV RBC AUTO: 74 FL (ref 80–100)
MONOCYTES # BLD AUTO: 462 CELLS/UL (ref 200–950)
MONOCYTES NFR BLD AUTO: 6.6 %
NEUTROPHILS # BLD AUTO: 4368 CELLS/UL (ref 1500–7800)
NEUTROPHILS NFR BLD AUTO: 62.4 %
PLATELET # BLD AUTO: 428 THOUSAND/UL (ref 140–400)
PMV BLD REES-ECKER: 9.6 FL (ref 7.5–12.5)
POTASSIUM SERPL-SCNC: 3.6 MMOL/L (ref 3.5–5.3)
PROT SERPL-MCNC: 7.4 G/DL (ref 6.1–8.1)
RBC # BLD AUTO: 4.38 MILLION/UL (ref 3.8–5.1)
RETICS #: NORMAL CELLS/UL (ref 20000–80000)
RETICS/RBC NFR AUTO: 0.9 %
SODIUM SERPL-SCNC: 138 MMOL/L (ref 135–146)
THROMBIN TIME: 21 SEC (ref 13–19)
TSH SERPL-ACNC: 1.83 MIU/L
VIT B12 SERPL-MCNC: 552 PG/ML (ref 200–1100)
VIT B6 SERPL-MCNC: 9.3 NG/ML (ref 2.1–21.7)
VWF AG ACT/NOR PPP IA: 146 % (ref 50–217)
VWF MULTIMERS PPP QL: NORMAL
WBC # BLD AUTO: 7 THOUSAND/UL (ref 3.8–10.8)

## 2021-04-27 ENCOUNTER — LAB VISIT (OUTPATIENT)
Dept: LAB | Facility: HOSPITAL | Age: 26
End: 2021-04-27
Attending: STUDENT IN AN ORGANIZED HEALTH CARE EDUCATION/TRAINING PROGRAM
Payer: MEDICARE

## 2021-04-27 ENCOUNTER — OFFICE VISIT (OUTPATIENT)
Dept: DERMATOLOGY | Facility: CLINIC | Age: 26
End: 2021-04-27
Payer: MEDICARE

## 2021-04-27 ENCOUNTER — OFFICE VISIT (OUTPATIENT)
Dept: HEMATOLOGY/ONCOLOGY | Facility: CLINIC | Age: 26
End: 2021-04-27
Payer: MEDICARE

## 2021-04-27 VITALS
HEART RATE: 81 BPM | SYSTOLIC BLOOD PRESSURE: 108 MMHG | DIASTOLIC BLOOD PRESSURE: 70 MMHG | TEMPERATURE: 98 F | RESPIRATION RATE: 18 BRPM | WEIGHT: 93.81 LBS | BODY MASS INDEX: 18.95 KG/M2

## 2021-04-27 DIAGNOSIS — L30.9 DERMATITIS: Primary | ICD-10-CM

## 2021-04-27 DIAGNOSIS — Z51.81 MEDICATION MONITORING ENCOUNTER: ICD-10-CM

## 2021-04-27 DIAGNOSIS — L29.9 PRURITUS: ICD-10-CM

## 2021-04-27 DIAGNOSIS — D50.0 IRON DEFICIENCY ANEMIA DUE TO CHRONIC BLOOD LOSS: Primary | ICD-10-CM

## 2021-04-27 DIAGNOSIS — L30.9 DERMATITIS: ICD-10-CM

## 2021-04-27 DIAGNOSIS — R23.3 EASY BRUISING: ICD-10-CM

## 2021-04-27 PROCEDURE — 86706 HEP B SURFACE ANTIBODY: CPT | Performed by: STUDENT IN AN ORGANIZED HEALTH CARE EDUCATION/TRAINING PROGRAM

## 2021-04-27 PROCEDURE — 1126F PR PAIN SEVERITY QUANTIFIED, NO PAIN PRESENT: ICD-10-PCS | Mod: S$GLB,,, | Performed by: STUDENT IN AN ORGANIZED HEALTH CARE EDUCATION/TRAINING PROGRAM

## 2021-04-27 PROCEDURE — 87340 HEPATITIS B SURFACE AG IA: CPT | Performed by: STUDENT IN AN ORGANIZED HEALTH CARE EDUCATION/TRAINING PROGRAM

## 2021-04-27 PROCEDURE — 11104 PR PUNCH BIOPSY, SKIN, SINGLE LESION: ICD-10-PCS | Mod: S$GLB,,, | Performed by: STUDENT IN AN ORGANIZED HEALTH CARE EDUCATION/TRAINING PROGRAM

## 2021-04-27 PROCEDURE — 3008F BODY MASS INDEX DOCD: CPT | Mod: S$GLB,,, | Performed by: INTERNAL MEDICINE

## 2021-04-27 PROCEDURE — 86704 HEP B CORE ANTIBODY TOTAL: CPT | Performed by: STUDENT IN AN ORGANIZED HEALTH CARE EDUCATION/TRAINING PROGRAM

## 2021-04-27 PROCEDURE — 3008F PR BODY MASS INDEX (BMI) DOCUMENTED: ICD-10-PCS | Mod: S$GLB,,, | Performed by: INTERNAL MEDICINE

## 2021-04-27 PROCEDURE — 88305 TISSUE EXAM BY PATHOLOGIST: ICD-10-PCS | Mod: 26,,, | Performed by: PATHOLOGY

## 2021-04-27 PROCEDURE — 88312 PR  SPECIAL STAINS,GROUP I: ICD-10-PCS | Mod: 26,,, | Performed by: PATHOLOGY

## 2021-04-27 PROCEDURE — 99214 PR OFFICE/OUTPT VISIT, EST, LEVL IV, 30-39 MIN: ICD-10-PCS | Mod: 25,S$GLB,, | Performed by: STUDENT IN AN ORGANIZED HEALTH CARE EDUCATION/TRAINING PROGRAM

## 2021-04-27 PROCEDURE — 88305 TISSUE EXAM BY PATHOLOGIST: CPT | Performed by: PATHOLOGY

## 2021-04-27 PROCEDURE — 1125F PR PAIN SEVERITY QUANTIFIED, PAIN PRESENT: ICD-10-PCS | Mod: S$GLB,,, | Performed by: INTERNAL MEDICINE

## 2021-04-27 PROCEDURE — 88305 TISSUE EXAM BY PATHOLOGIST: CPT | Mod: 26,,, | Performed by: PATHOLOGY

## 2021-04-27 PROCEDURE — 88312 SPECIAL STAINS GROUP 1: CPT | Mod: 26,,, | Performed by: PATHOLOGY

## 2021-04-27 PROCEDURE — 1125F AMNT PAIN NOTED PAIN PRSNT: CPT | Mod: S$GLB,,, | Performed by: INTERNAL MEDICINE

## 2021-04-27 PROCEDURE — 86803 HEPATITIS C AB TEST: CPT | Performed by: STUDENT IN AN ORGANIZED HEALTH CARE EDUCATION/TRAINING PROGRAM

## 2021-04-27 PROCEDURE — 11104 PUNCH BX SKIN SINGLE LESION: CPT | Mod: S$GLB,,, | Performed by: STUDENT IN AN ORGANIZED HEALTH CARE EDUCATION/TRAINING PROGRAM

## 2021-04-27 PROCEDURE — 99214 OFFICE O/P EST MOD 30 MIN: CPT | Mod: 25,S$GLB,, | Performed by: STUDENT IN AN ORGANIZED HEALTH CARE EDUCATION/TRAINING PROGRAM

## 2021-04-27 PROCEDURE — 88312 SPECIAL STAINS GROUP 1: CPT | Performed by: PATHOLOGY

## 2021-04-27 PROCEDURE — 99999 PR PBB SHADOW E&M-EST. PATIENT-LVL III: ICD-10-PCS | Mod: PBBFAC,,, | Performed by: STUDENT IN AN ORGANIZED HEALTH CARE EDUCATION/TRAINING PROGRAM

## 2021-04-27 PROCEDURE — 1126F AMNT PAIN NOTED NONE PRSNT: CPT | Mod: S$GLB,,, | Performed by: STUDENT IN AN ORGANIZED HEALTH CARE EDUCATION/TRAINING PROGRAM

## 2021-04-27 PROCEDURE — 99214 OFFICE O/P EST MOD 30 MIN: CPT | Mod: S$GLB,,, | Performed by: INTERNAL MEDICINE

## 2021-04-27 PROCEDURE — 99214 PR OFFICE/OUTPT VISIT, EST, LEVL IV, 30-39 MIN: ICD-10-PCS | Mod: S$GLB,,, | Performed by: INTERNAL MEDICINE

## 2021-04-27 PROCEDURE — 99999 PR PBB SHADOW E&M-EST. PATIENT-LVL III: CPT | Mod: PBBFAC,,, | Performed by: STUDENT IN AN ORGANIZED HEALTH CARE EDUCATION/TRAINING PROGRAM

## 2021-04-27 PROCEDURE — 36415 COLL VENOUS BLD VENIPUNCTURE: CPT | Performed by: STUDENT IN AN ORGANIZED HEALTH CARE EDUCATION/TRAINING PROGRAM

## 2021-04-27 RX ORDER — TRIAMCINOLONE ACETONIDE 1 MG/G
OINTMENT TOPICAL 2 TIMES DAILY
Qty: 454 G | Refills: 1 | Status: SHIPPED | OUTPATIENT
Start: 2021-04-27 | End: 2021-05-14 | Stop reason: CLARIF

## 2021-04-27 RX ORDER — HYDROXYZINE HYDROCHLORIDE 25 MG/1
25 TABLET, FILM COATED ORAL NIGHTLY
Qty: 30 TABLET | Refills: 1 | Status: SHIPPED | OUTPATIENT
Start: 2021-04-27 | End: 2023-04-13

## 2021-04-28 ENCOUNTER — TELEPHONE (OUTPATIENT)
Dept: DERMATOLOGY | Facility: CLINIC | Age: 26
End: 2021-04-28

## 2021-04-28 LAB
HBV CORE AB SERPL QL IA: NEGATIVE
HBV SURFACE AB SER-ACNC: NEGATIVE M[IU]/ML
HBV SURFACE AG SERPL QL IA: NEGATIVE
HCV AB SERPL QL IA: NEGATIVE

## 2021-04-30 LAB
FINAL PATHOLOGIC DIAGNOSIS: NORMAL
GROSS: NORMAL
Lab: NORMAL
MICROSCOPIC EXAM: NORMAL

## 2021-05-11 ENCOUNTER — IMMUNIZATION (OUTPATIENT)
Dept: PRIMARY CARE CLINIC | Facility: CLINIC | Age: 26
End: 2021-05-11
Payer: MEDICARE

## 2021-05-11 DIAGNOSIS — Z23 NEED FOR VACCINATION: Primary | ICD-10-CM

## 2021-05-11 PROCEDURE — 91300 COVID-19, MRNA, LNP-S, PF, 30 MCG/0.3 ML DOSE VACCINE: ICD-10-PCS | Mod: S$GLB,,, | Performed by: FAMILY MEDICINE

## 2021-05-11 PROCEDURE — 0002A COVID-19, MRNA, LNP-S, PF, 30 MCG/0.3 ML DOSE VACCINE: ICD-10-PCS | Mod: CV19,S$GLB,, | Performed by: FAMILY MEDICINE

## 2021-05-11 PROCEDURE — 0002A COVID-19, MRNA, LNP-S, PF, 30 MCG/0.3 ML DOSE VACCINE: CPT | Mod: CV19,S$GLB,, | Performed by: FAMILY MEDICINE

## 2021-05-11 PROCEDURE — 91300 COVID-19, MRNA, LNP-S, PF, 30 MCG/0.3 ML DOSE VACCINE: CPT | Mod: S$GLB,,, | Performed by: FAMILY MEDICINE

## 2021-05-12 ENCOUNTER — OFFICE VISIT (OUTPATIENT)
Dept: DERMATOLOGY | Facility: CLINIC | Age: 26
End: 2021-05-12
Payer: MEDICARE

## 2021-05-12 ENCOUNTER — SPECIALTY PHARMACY (OUTPATIENT)
Dept: PHARMACY | Facility: CLINIC | Age: 26
End: 2021-05-12

## 2021-05-12 DIAGNOSIS — Q80.0 ICHTHYOSIS VULGARIS: ICD-10-CM

## 2021-05-12 DIAGNOSIS — L20.84 INTRINSIC ATOPIC DERMATITIS: Primary | ICD-10-CM

## 2021-05-12 PROCEDURE — 99999 PR PBB SHADOW E&M-EST. PATIENT-LVL III: CPT | Mod: PBBFAC,,, | Performed by: STUDENT IN AN ORGANIZED HEALTH CARE EDUCATION/TRAINING PROGRAM

## 2021-05-12 PROCEDURE — 1126F AMNT PAIN NOTED NONE PRSNT: CPT | Mod: S$GLB,,, | Performed by: STUDENT IN AN ORGANIZED HEALTH CARE EDUCATION/TRAINING PROGRAM

## 2021-05-12 PROCEDURE — 99214 OFFICE O/P EST MOD 30 MIN: CPT | Mod: S$GLB,,, | Performed by: STUDENT IN AN ORGANIZED HEALTH CARE EDUCATION/TRAINING PROGRAM

## 2021-05-12 PROCEDURE — 1126F PR PAIN SEVERITY QUANTIFIED, NO PAIN PRESENT: ICD-10-PCS | Mod: S$GLB,,, | Performed by: STUDENT IN AN ORGANIZED HEALTH CARE EDUCATION/TRAINING PROGRAM

## 2021-05-12 PROCEDURE — 99214 PR OFFICE/OUTPT VISIT, EST, LEVL IV, 30-39 MIN: ICD-10-PCS | Mod: S$GLB,,, | Performed by: STUDENT IN AN ORGANIZED HEALTH CARE EDUCATION/TRAINING PROGRAM

## 2021-05-12 PROCEDURE — 99999 PR PBB SHADOW E&M-EST. PATIENT-LVL III: ICD-10-PCS | Mod: PBBFAC,,, | Performed by: STUDENT IN AN ORGANIZED HEALTH CARE EDUCATION/TRAINING PROGRAM

## 2021-05-12 RX ORDER — DUPILUMAB 300 MG/2ML
INJECTION, SOLUTION SUBCUTANEOUS
Qty: 4 ML | Refills: 2 | OUTPATIENT
Start: 2021-05-12 | End: 2021-05-14 | Stop reason: CLARIF

## 2021-05-12 RX ORDER — DUPILUMAB 300 MG/2ML
INJECTION, SOLUTION SUBCUTANEOUS
Qty: 8 ML | Refills: 0 | OUTPATIENT
Start: 2021-05-12 | End: 2021-05-14 | Stop reason: CLARIF

## 2021-05-14 ENCOUNTER — HOSPITAL ENCOUNTER (EMERGENCY)
Facility: HOSPITAL | Age: 26
Discharge: HOME | End: 2021-05-14
Attending: EMERGENCY MEDICINE
Payer: MEDICARE

## 2021-05-14 VITALS
SYSTOLIC BLOOD PRESSURE: 114 MMHG | BODY MASS INDEX: 19.56 KG/M2 | HEART RATE: 82 BPM | WEIGHT: 97 LBS | OXYGEN SATURATION: 100 % | TEMPERATURE: 98 F | HEIGHT: 59 IN | RESPIRATION RATE: 16 BRPM | DIASTOLIC BLOOD PRESSURE: 67 MMHG

## 2021-05-14 DIAGNOSIS — N83.209 CYST OF OVARY, UNSPECIFIED LATERALITY: Primary | ICD-10-CM

## 2021-05-14 LAB
ALBUMIN SERPL BCP-MCNC: 4.2 G/DL (ref 3.5–5.2)
ALP SERPL-CCNC: 68 U/L (ref 55–135)
ALT SERPL W/O P-5'-P-CCNC: 43 U/L (ref 10–44)
ANION GAP SERPL CALC-SCNC: 11 MMOL/L (ref 8–16)
AST SERPL-CCNC: 23 U/L (ref 10–40)
B-HCG UR QL: NEGATIVE
BACTERIA #/AREA URNS HPF: ABNORMAL /HPF
BASOPHILS # BLD AUTO: 0.09 K/UL (ref 0–0.2)
BASOPHILS NFR BLD: 0.8 % (ref 0–1.9)
BILIRUB SERPL-MCNC: 0.5 MG/DL (ref 0.1–1)
BILIRUB UR QL STRIP: NEGATIVE
BUN SERPL-MCNC: 17 MG/DL (ref 6–20)
CALCIUM SERPL-MCNC: 9.7 MG/DL (ref 8.7–10.5)
CHLORIDE SERPL-SCNC: 103 MMOL/L (ref 95–110)
CLARITY UR: CLEAR
CO2 SERPL-SCNC: 25 MMOL/L (ref 23–29)
COLOR UR: YELLOW
CREAT SERPL-MCNC: 0.8 MG/DL (ref 0.5–1.4)
CTP QC/QA: YES
DIFFERENTIAL METHOD: ABNORMAL
EOSINOPHIL # BLD AUTO: 0.5 K/UL (ref 0–0.5)
EOSINOPHIL NFR BLD: 4.5 % (ref 0–8)
ERYTHROCYTE [DISTWIDTH] IN BLOOD BY AUTOMATED COUNT: 15.4 % (ref 11.5–14.5)
EST. GFR  (AFRICAN AMERICAN): >60 ML/MIN/1.73 M^2
EST. GFR  (NON AFRICAN AMERICAN): >60 ML/MIN/1.73 M^2
GLUCOSE SERPL-MCNC: 83 MG/DL (ref 70–110)
GLUCOSE UR QL STRIP: NEGATIVE
HCT VFR BLD AUTO: 33.1 % (ref 37–48.5)
HGB BLD-MCNC: 10 G/DL (ref 12–16)
HGB UR QL STRIP: ABNORMAL
IMM GRANULOCYTES # BLD AUTO: 0.05 K/UL (ref 0–0.04)
IMM GRANULOCYTES NFR BLD AUTO: 0.4 % (ref 0–0.5)
KETONES UR QL STRIP: NEGATIVE
LEUKOCYTE ESTERASE UR QL STRIP: NEGATIVE
LIPASE SERPL-CCNC: 31 U/L (ref 4–60)
LYMPHOCYTES # BLD AUTO: 1.2 K/UL (ref 1–4.8)
LYMPHOCYTES NFR BLD: 10.5 % (ref 18–48)
MCH RBC QN AUTO: 22.7 PG (ref 27–31)
MCHC RBC AUTO-ENTMCNC: 30.2 G/DL (ref 32–36)
MCV RBC AUTO: 75 FL (ref 82–98)
MICROSCOPIC COMMENT: ABNORMAL
MONOCYTES # BLD AUTO: 0.8 K/UL (ref 0.3–1)
MONOCYTES NFR BLD: 6.7 % (ref 4–15)
NEUTROPHILS # BLD AUTO: 9 K/UL (ref 1.8–7.7)
NEUTROPHILS NFR BLD: 77.1 % (ref 38–73)
NITRITE UR QL STRIP: NEGATIVE
NRBC BLD-RTO: 0 /100 WBC
PH UR STRIP: 6 [PH] (ref 5–8)
PLATELET # BLD AUTO: 356 K/UL (ref 150–450)
PMV BLD AUTO: 9.9 FL (ref 9.2–12.9)
POTASSIUM SERPL-SCNC: 3.9 MMOL/L (ref 3.5–5.1)
PROT SERPL-MCNC: 7.6 G/DL (ref 6–8.4)
PROT UR QL STRIP: NEGATIVE
RBC # BLD AUTO: 4.4 M/UL (ref 4–5.4)
RBC #/AREA URNS HPF: 2 /HPF (ref 0–4)
SODIUM SERPL-SCNC: 139 MMOL/L (ref 136–145)
SP GR UR STRIP: 1.02 (ref 1–1.03)
SQUAMOUS #/AREA URNS HPF: 5 /HPF
URN SPEC COLLECT METH UR: ABNORMAL
UROBILINOGEN UR STRIP-ACNC: NEGATIVE EU/DL
WBC # BLD AUTO: 11.67 K/UL (ref 3.9–12.7)

## 2021-05-14 PROCEDURE — 80053 COMPREHEN METABOLIC PANEL: CPT | Performed by: EMERGENCY MEDICINE

## 2021-05-14 PROCEDURE — 63600175 PHARM REV CODE 636 W HCPCS: Performed by: EMERGENCY MEDICINE

## 2021-05-14 PROCEDURE — 81025 URINE PREGNANCY TEST: CPT | Performed by: EMERGENCY MEDICINE

## 2021-05-14 PROCEDURE — 81000 URINALYSIS NONAUTO W/SCOPE: CPT | Performed by: EMERGENCY MEDICINE

## 2021-05-14 PROCEDURE — 25500020 PHARM REV CODE 255

## 2021-05-14 PROCEDURE — 85025 COMPLETE CBC W/AUTO DIFF WBC: CPT | Performed by: EMERGENCY MEDICINE

## 2021-05-14 PROCEDURE — 99285 EMERGENCY DEPT VISIT HI MDM: CPT | Mod: 25

## 2021-05-14 PROCEDURE — 36415 COLL VENOUS BLD VENIPUNCTURE: CPT | Performed by: EMERGENCY MEDICINE

## 2021-05-14 PROCEDURE — 83690 ASSAY OF LIPASE: CPT | Performed by: EMERGENCY MEDICINE

## 2021-05-14 PROCEDURE — 96374 THER/PROPH/DIAG INJ IV PUSH: CPT | Mod: 59

## 2021-05-14 RX ORDER — KETOROLAC TROMETHAMINE 30 MG/ML
10 INJECTION, SOLUTION INTRAMUSCULAR; INTRAVENOUS
Status: COMPLETED | OUTPATIENT
Start: 2021-05-14 | End: 2021-05-14

## 2021-05-14 RX ORDER — DICLOFENAC SODIUM 50 MG/1
50 TABLET, DELAYED RELEASE ORAL 3 TIMES DAILY PRN
Qty: 15 TABLET | Refills: 0 | Status: SHIPPED | OUTPATIENT
Start: 2021-05-14 | End: 2024-01-05

## 2021-05-14 RX ORDER — SERTRALINE HYDROCHLORIDE 100 MG/1
100 TABLET, FILM COATED ORAL NIGHTLY
COMMUNITY
End: 2024-01-05 | Stop reason: ALTCHOICE

## 2021-05-14 RX ORDER — BUSPIRONE HYDROCHLORIDE 10 MG/1
10 TABLET ORAL 2 TIMES DAILY
COMMUNITY
End: 2023-04-13

## 2021-05-14 RX ADMIN — KETOROLAC TROMETHAMINE 10 MG: 30 INJECTION, SOLUTION INTRAMUSCULAR; INTRAVENOUS at 05:05

## 2021-05-14 RX ADMIN — IOHEXOL 75 ML: 350 INJECTION, SOLUTION INTRAVENOUS at 04:05

## 2021-05-17 DIAGNOSIS — R23.3 EASY BRUISABILITY: Primary | ICD-10-CM

## 2021-05-18 ENCOUNTER — TELEPHONE (OUTPATIENT)
Dept: HEMATOLOGY/ONCOLOGY | Facility: CLINIC | Age: 26
End: 2021-05-18

## 2021-05-20 ENCOUNTER — OFFICE VISIT (OUTPATIENT)
Dept: URGENT CARE | Facility: CLINIC | Age: 26
End: 2021-05-20
Payer: MEDICARE

## 2021-05-20 ENCOUNTER — LAB VISIT (OUTPATIENT)
Dept: LAB | Facility: HOSPITAL | Age: 26
End: 2021-05-20
Payer: MEDICARE

## 2021-05-20 VITALS
DIASTOLIC BLOOD PRESSURE: 73 MMHG | HEIGHT: 59 IN | WEIGHT: 97 LBS | SYSTOLIC BLOOD PRESSURE: 120 MMHG | TEMPERATURE: 98 F | BODY MASS INDEX: 19.56 KG/M2 | HEART RATE: 71 BPM | RESPIRATION RATE: 16 BRPM

## 2021-05-20 DIAGNOSIS — R23.3 EASY BRUISABILITY: ICD-10-CM

## 2021-05-20 DIAGNOSIS — R59.9 ADENOPATHY: Primary | ICD-10-CM

## 2021-05-20 DIAGNOSIS — L20.84 INTRINSIC ATOPIC DERMATITIS: Primary | ICD-10-CM

## 2021-05-20 LAB
CTP QC/QA: YES
PLATELET AGGREGATION: NORMAL
SARS-COV-2 RDRP RESP QL NAA+PROBE: NEGATIVE

## 2021-05-20 PROCEDURE — U0002: ICD-10-PCS | Mod: QW,S$GLB,, | Performed by: NURSE PRACTITIONER

## 2021-05-20 PROCEDURE — 3008F BODY MASS INDEX DOCD: CPT | Mod: CPTII,S$GLB,, | Performed by: NURSE PRACTITIONER

## 2021-05-20 PROCEDURE — 99214 OFFICE O/P EST MOD 30 MIN: CPT | Mod: S$GLB,,, | Performed by: NURSE PRACTITIONER

## 2021-05-20 PROCEDURE — 99214 PR OFFICE/OUTPT VISIT, EST, LEVL IV, 30-39 MIN: ICD-10-PCS | Mod: S$GLB,,, | Performed by: NURSE PRACTITIONER

## 2021-05-20 PROCEDURE — 3008F PR BODY MASS INDEX (BMI) DOCUMENTED: ICD-10-PCS | Mod: CPTII,S$GLB,, | Performed by: NURSE PRACTITIONER

## 2021-05-20 PROCEDURE — U0002 COVID-19 LAB TEST NON-CDC: HCPCS | Mod: QW,S$GLB,, | Performed by: NURSE PRACTITIONER

## 2021-05-20 PROCEDURE — 85576 BLOOD PLATELET AGGREGATION: CPT | Mod: 91 | Performed by: INTERNAL MEDICINE

## 2021-05-20 RX ORDER — DUPILUMAB 300 MG/2ML
INJECTION, SOLUTION SUBCUTANEOUS
Qty: 4 ML | Refills: 2 | Status: SHIPPED | OUTPATIENT
Start: 2021-05-20 | End: 2022-06-02 | Stop reason: SDUPTHER

## 2021-05-20 RX ORDER — DUPILUMAB 300 MG/2ML
INJECTION, SOLUTION SUBCUTANEOUS
Qty: 8 ML | Refills: 0 | Status: SHIPPED | OUTPATIENT
Start: 2021-05-20 | End: 2022-06-02 | Stop reason: SDUPTHER

## 2021-05-25 ENCOUNTER — TELEPHONE (OUTPATIENT)
Dept: DERMATOLOGY | Facility: CLINIC | Age: 26
End: 2021-05-25

## 2021-05-25 DIAGNOSIS — L20.84 INTRINSIC ATOPIC DERMATITIS: ICD-10-CM

## 2021-05-28 ENCOUNTER — HOSPITAL ENCOUNTER (EMERGENCY)
Facility: HOSPITAL | Age: 26
Discharge: HOME | End: 2021-05-28
Attending: EMERGENCY MEDICINE
Payer: MEDICARE

## 2021-05-28 VITALS
DIASTOLIC BLOOD PRESSURE: 68 MMHG | WEIGHT: 97 LBS | BODY MASS INDEX: 19.56 KG/M2 | HEART RATE: 78 BPM | RESPIRATION RATE: 16 BRPM | HEIGHT: 59 IN | OXYGEN SATURATION: 100 % | TEMPERATURE: 98 F | SYSTOLIC BLOOD PRESSURE: 110 MMHG

## 2021-05-28 DIAGNOSIS — N93.8 DYSFUNCTIONAL UTERINE BLEEDING: Primary | ICD-10-CM

## 2021-05-28 DIAGNOSIS — D64.9 ANEMIA, UNSPECIFIED TYPE: ICD-10-CM

## 2021-05-28 LAB
ALBUMIN SERPL BCP-MCNC: 4 G/DL (ref 3.5–5.2)
ALP SERPL-CCNC: 52 U/L (ref 55–135)
ALT SERPL W/O P-5'-P-CCNC: 27 U/L (ref 10–44)
ANION GAP SERPL CALC-SCNC: 8 MMOL/L (ref 8–16)
AST SERPL-CCNC: 21 U/L (ref 10–40)
B-HCG UR QL: NEGATIVE
BACTERIA #/AREA URNS HPF: NORMAL /HPF
BASOPHILS # BLD AUTO: 0.11 K/UL (ref 0–0.2)
BASOPHILS NFR BLD: 1.5 % (ref 0–1.9)
BILIRUB SERPL-MCNC: 0.5 MG/DL (ref 0.1–1)
BILIRUB UR QL STRIP: NEGATIVE
BUN SERPL-MCNC: 15 MG/DL (ref 6–20)
CALCIUM SERPL-MCNC: 9.5 MG/DL (ref 8.7–10.5)
CHLORIDE SERPL-SCNC: 103 MMOL/L (ref 95–110)
CLARITY UR: CLEAR
CO2 SERPL-SCNC: 27 MMOL/L (ref 23–29)
COLOR UR: YELLOW
CREAT SERPL-MCNC: 0.7 MG/DL (ref 0.5–1.4)
CTP QC/QA: YES
DIFFERENTIAL METHOD: ABNORMAL
EOSINOPHIL # BLD AUTO: 0.5 K/UL (ref 0–0.5)
EOSINOPHIL NFR BLD: 7.2 % (ref 0–8)
ERYTHROCYTE [DISTWIDTH] IN BLOOD BY AUTOMATED COUNT: 15.3 % (ref 11.5–14.5)
EST. GFR  (AFRICAN AMERICAN): >60 ML/MIN/1.73 M^2
EST. GFR  (NON AFRICAN AMERICAN): >60 ML/MIN/1.73 M^2
GLUCOSE SERPL-MCNC: 95 MG/DL (ref 70–110)
GLUCOSE UR QL STRIP: NEGATIVE
HCT VFR BLD AUTO: 29.8 % (ref 37–48.5)
HGB BLD-MCNC: 8.8 G/DL (ref 12–16)
HGB UR QL STRIP: ABNORMAL
IMM GRANULOCYTES # BLD AUTO: 0.03 K/UL (ref 0–0.04)
IMM GRANULOCYTES NFR BLD AUTO: 0.4 % (ref 0–0.5)
KETONES UR QL STRIP: NEGATIVE
LEUKOCYTE ESTERASE UR QL STRIP: NEGATIVE
LIPASE SERPL-CCNC: 23 U/L (ref 4–60)
LYMPHOCYTES # BLD AUTO: 1.6 K/UL (ref 1–4.8)
LYMPHOCYTES NFR BLD: 21.4 % (ref 18–48)
MCH RBC QN AUTO: 22.3 PG (ref 27–31)
MCHC RBC AUTO-ENTMCNC: 29.5 G/DL (ref 32–36)
MCV RBC AUTO: 76 FL (ref 82–98)
MICROSCOPIC COMMENT: NORMAL
MONOCYTES # BLD AUTO: 0.6 K/UL (ref 0.3–1)
MONOCYTES NFR BLD: 8.2 % (ref 4–15)
NEUTROPHILS # BLD AUTO: 4.5 K/UL (ref 1.8–7.7)
NEUTROPHILS NFR BLD: 61.3 % (ref 38–73)
NITRITE UR QL STRIP: NEGATIVE
NRBC BLD-RTO: 0 /100 WBC
PH UR STRIP: 7 [PH] (ref 5–8)
PLATELET # BLD AUTO: 317 K/UL (ref 150–450)
PMV BLD AUTO: 9 FL (ref 9.2–12.9)
POTASSIUM SERPL-SCNC: 4 MMOL/L (ref 3.5–5.1)
PROT SERPL-MCNC: 6.8 G/DL (ref 6–8.4)
PROT UR QL STRIP: NEGATIVE
RBC # BLD AUTO: 3.94 M/UL (ref 4–5.4)
RBC #/AREA URNS HPF: 2 /HPF (ref 0–4)
SODIUM SERPL-SCNC: 138 MMOL/L (ref 136–145)
SP GR UR STRIP: 1.01 (ref 1–1.03)
SQUAMOUS #/AREA URNS HPF: 3 /HPF
URN SPEC COLLECT METH UR: ABNORMAL
UROBILINOGEN UR STRIP-ACNC: NEGATIVE EU/DL
WBC # BLD AUTO: 7.35 K/UL (ref 3.9–12.7)

## 2021-05-28 PROCEDURE — 85025 COMPLETE CBC W/AUTO DIFF WBC: CPT | Performed by: NURSE PRACTITIONER

## 2021-05-28 PROCEDURE — 99285 EMERGENCY DEPT VISIT HI MDM: CPT | Mod: 25

## 2021-05-28 PROCEDURE — 36415 COLL VENOUS BLD VENIPUNCTURE: CPT | Performed by: NURSE PRACTITIONER

## 2021-05-28 PROCEDURE — 81025 URINE PREGNANCY TEST: CPT | Performed by: NURSE PRACTITIONER

## 2021-05-28 PROCEDURE — 81000 URINALYSIS NONAUTO W/SCOPE: CPT | Performed by: NURSE PRACTITIONER

## 2021-05-28 PROCEDURE — 83690 ASSAY OF LIPASE: CPT | Performed by: NURSE PRACTITIONER

## 2021-05-28 PROCEDURE — 25500020 PHARM REV CODE 255

## 2021-05-28 PROCEDURE — 80053 COMPREHEN METABOLIC PANEL: CPT | Performed by: NURSE PRACTITIONER

## 2021-05-28 RX ORDER — FERROUS SULFATE 325(65) MG
325 TABLET ORAL DAILY
Qty: 30 TABLET | Refills: 0 | Status: SHIPPED | OUTPATIENT
Start: 2021-05-28 | End: 2021-06-27

## 2021-05-28 RX ADMIN — IOHEXOL 75 ML: 350 INJECTION, SOLUTION INTRAVENOUS at 02:05

## 2021-05-30 ENCOUNTER — TELEPHONE (OUTPATIENT)
Dept: HEMATOLOGY/ONCOLOGY | Facility: CLINIC | Age: 26
End: 2021-05-30

## 2021-06-02 DIAGNOSIS — R10.2 PELVIC AND PERINEAL PAIN: Primary | ICD-10-CM

## 2021-06-04 ENCOUNTER — HOSPITAL ENCOUNTER (OUTPATIENT)
Dept: RADIOLOGY | Facility: HOSPITAL | Age: 26
Discharge: HOME | End: 2021-06-04
Attending: NURSE PRACTITIONER
Payer: MEDICARE

## 2021-06-04 DIAGNOSIS — R10.2 PELVIC AND PERINEAL PAIN: ICD-10-CM

## 2021-06-04 PROCEDURE — 76830 TRANSVAGINAL US NON-OB: CPT | Mod: TC

## 2021-09-08 ENCOUNTER — TELEPHONE (OUTPATIENT)
Dept: DERMATOLOGY | Facility: CLINIC | Age: 26
End: 2021-09-08

## 2021-09-10 ENCOUNTER — TELEPHONE (OUTPATIENT)
Dept: DERMATOLOGY | Facility: CLINIC | Age: 26
End: 2021-09-10

## 2021-09-12 DIAGNOSIS — L20.84 INTRINSIC ECZEMA: Primary | ICD-10-CM

## 2021-09-12 RX ORDER — TRIAMCINOLONE ACETONIDE 1 MG/G
CREAM TOPICAL 2 TIMES DAILY
Qty: 454 G | Refills: 0 | Status: SHIPPED | OUTPATIENT
Start: 2021-09-12 | End: 2022-12-01 | Stop reason: SDUPTHER

## 2022-01-12 ENCOUNTER — LAB VISIT (OUTPATIENT)
Dept: PRIMARY CARE CLINIC | Facility: OTHER | Age: 27
End: 2022-01-12
Attending: INTERNAL MEDICINE
Payer: MEDICARE

## 2022-01-12 DIAGNOSIS — Z20.822 ENCOUNTER FOR LABORATORY TESTING FOR COVID-19 VIRUS: ICD-10-CM

## 2022-01-12 PROCEDURE — U0003 INFECTIOUS AGENT DETECTION BY NUCLEIC ACID (DNA OR RNA); SEVERE ACUTE RESPIRATORY SYNDROME CORONAVIRUS 2 (SARS-COV-2) (CORONAVIRUS DISEASE [COVID-19]), AMPLIFIED PROBE TECHNIQUE, MAKING USE OF HIGH THROUGHPUT TECHNOLOGIES AS DESCRIBED BY CMS-2020-01-R: HCPCS | Performed by: INTERNAL MEDICINE

## 2022-01-13 LAB
SARS-COV-2 RNA RESP QL NAA+PROBE: DETECTED
SARS-COV-2- CYCLE NUMBER: 39

## 2022-03-27 ENCOUNTER — HOSPITAL ENCOUNTER (EMERGENCY)
Facility: HOSPITAL | Age: 27
Discharge: HOME | End: 2022-03-27
Attending: EMERGENCY MEDICINE
Payer: MEDICARE

## 2022-03-27 VITALS
RESPIRATION RATE: 16 BRPM | HEIGHT: 59 IN | TEMPERATURE: 98 F | WEIGHT: 130 LBS | SYSTOLIC BLOOD PRESSURE: 130 MMHG | OXYGEN SATURATION: 100 % | DIASTOLIC BLOOD PRESSURE: 81 MMHG | HEART RATE: 68 BPM | BODY MASS INDEX: 26.21 KG/M2

## 2022-03-27 DIAGNOSIS — S51.812A LACERATION OF LEFT FOREARM, INITIAL ENCOUNTER: Primary | ICD-10-CM

## 2022-03-27 PROCEDURE — 99283 EMERGENCY DEPT VISIT LOW MDM: CPT | Mod: 25

## 2022-03-27 PROCEDURE — 90471 IMMUNIZATION ADMIN: CPT | Performed by: STUDENT IN AN ORGANIZED HEALTH CARE EDUCATION/TRAINING PROGRAM

## 2022-03-27 PROCEDURE — 12002 RPR S/N/AX/GEN/TRNK2.6-7.5CM: CPT

## 2022-03-27 PROCEDURE — 63600175 PHARM REV CODE 636 W HCPCS: Performed by: STUDENT IN AN ORGANIZED HEALTH CARE EDUCATION/TRAINING PROGRAM

## 2022-03-27 PROCEDURE — 25000003 PHARM REV CODE 250: Performed by: STUDENT IN AN ORGANIZED HEALTH CARE EDUCATION/TRAINING PROGRAM

## 2022-03-27 PROCEDURE — 90714 TD VACC NO PRESV 7 YRS+ IM: CPT | Performed by: STUDENT IN AN ORGANIZED HEALTH CARE EDUCATION/TRAINING PROGRAM

## 2022-03-27 RX ORDER — CEPHALEXIN 500 MG/1
500 CAPSULE ORAL 4 TIMES DAILY
Qty: 20 CAPSULE | Refills: 0 | Status: SHIPPED | OUTPATIENT
Start: 2022-03-27 | End: 2022-04-01

## 2022-03-27 RX ORDER — LIDOCAINE HYDROCHLORIDE 10 MG/ML
10 INJECTION, SOLUTION EPIDURAL; INFILTRATION; INTRACAUDAL; PERINEURAL
Status: COMPLETED | OUTPATIENT
Start: 2022-03-27 | End: 2022-03-27

## 2022-03-27 RX ADMIN — LIDOCAINE HYDROCHLORIDE 100 MG: 10 INJECTION, SOLUTION EPIDURAL; INFILTRATION; INTRACAUDAL; PERINEURAL at 09:03

## 2022-03-27 RX ADMIN — TETANUS AND DIPHTHERIA TOXOIDS ADSORBED 0.5 ML: 2; 2 INJECTION INTRAMUSCULAR at 09:03

## 2022-03-28 NOTE — DISCHARGE INSTRUCTIONS
Take antibiotics as prescribed.  Return to the emergency room, urgent care or the care for re-evaluation of the wound and suture removal.

## 2022-03-28 NOTE — ED PROVIDER NOTES
Encounter Date: 3/27/2022       History     Chief Complaint   Patient presents with    Laceration     Pt states that she cut her arm while closing a gate     26-year-old female without significant past medical history presents emergency room for evaluation of laceration to the anterior left forearm.  Patient states she was reaching over a gate when she cut herself.  Laceration shallow, bleeding controlled prior to presentation.  Last known tetanus unknown.  No additional complaints today.        Review of patient's allergies indicates:  No Known Allergies  Past Medical History:   Diagnosis Date    Anemia     Asthma     Eczema     Glaucoma      Past Surgical History:   Procedure Laterality Date    COLONOSCOPY N/A 3/5/2021    Procedure: COLONOSCOPY;  Surgeon: Jake Williamson MD;  Location: [facility];  Service: Endoscopy;  Laterality: N/A;    ESOPHAGOGASTRODUODENOSCOPY N/A 3/5/2021    Procedure: EGD (ESOPHAGOGASTRODUODENOSCOPY);  Surgeon: Jake Williamson MD;  Location: [facility];  Service: Endoscopy;  Laterality: N/A;     Family History   Problem Relation Age of Onset    Melanoma Neg Hx      Social History     Tobacco Use    Smoking status: Never Smoker    Smokeless tobacco: Never Used   Substance Use Topics    Alcohol use: Not Currently    Drug use: Never     Review of Systems   Constitutional: Negative for chills, fatigue and fever.   HENT: Negative for congestion, hearing loss, sore throat and trouble swallowing.    Eyes: Negative for visual disturbance.   Respiratory: Negative for cough, chest tightness and shortness of breath.    Cardiovascular: Negative for chest pain.   Gastrointestinal: Negative for abdominal pain and nausea.   Endocrine: Negative for polyuria.   Genitourinary: Negative for difficulty urinating.   Musculoskeletal: Negative for arthralgias and myalgias.   Skin: Positive for wound. Negative for rash.   Neurological: Negative for dizziness and headaches.   Psychiatric/Behavioral: The  patient is not nervous/anxious.    All other systems reviewed and are negative.      Physical Exam     Initial Vitals [03/27/22 2100]   BP Pulse Resp Temp SpO2   135/80 77 18 98.1 °F (36.7 °C) 98 %      MAP       --         Physical Exam    Nursing note and vitals reviewed.  Constitutional: She appears well-developed and well-nourished.   HENT:   Head: Normocephalic and atraumatic.   Eyes: Conjunctivae are normal. Pupils are equal, round, and reactive to light.   Neck: Neck supple.   Normal range of motion.  Cardiovascular: Normal rate, regular rhythm and normal heart sounds.   Pulmonary/Chest: Breath sounds normal.   Abdominal: Abdomen is soft. She exhibits no distension. There is no abdominal tenderness.   Musculoskeletal:         General: Normal range of motion.      Cervical back: Normal range of motion and neck supple.     Neurological: She is alert and oriented to person, place, and time. GCS score is 15. GCS eye subscore is 4. GCS verbal subscore is 5. GCS motor subscore is 6.   Skin: Skin is warm and dry. Capillary refill takes less than 2 seconds.   Neurovascularly intact left upper extremity demonstrates a 6 cm linear, horizontal laceration to the proximal anterior forearm.  It is shallow, not involving muscle belly or tendinous structures.  Bleeding controlled prior to arrival.  No obvious foreign body, no significant visual visual contamination.   Psychiatric: She has a normal mood and affect. Her behavior is normal. Judgment and thought content normal.         ED Course   Lac Repair    Date/Time: 3/27/2022 9:40 PM  Performed by: Florian Reeder PA-C  Authorized by: Julieta Waters MD     Consent:     Consent obtained:  Verbal    Consent given by:  Patient    Risks, benefits, and alternatives were discussed: yes      Risks discussed:  Infection, pain, poor cosmetic result, poor wound healing and need for additional repair    Alternatives discussed:  No treatment, delayed treatment and  observation  Laceration details:     Location:  Shoulder/arm    Shoulder/arm location:  R lower arm    Length (cm):  6    Depth (mm):  4  Pre-procedure details:     Preparation:  Patient was prepped and draped in usual sterile fashion  Exploration:     Limited defect created (wound extended): no      Hemostasis achieved with:  Direct pressure and epinephrine    Wound exploration: wound explored through full range of motion and entire depth of wound visualized      Wound extent: no foreign bodies/material noted    Treatment:     Area cleansed with:  Soap and water    Amount of cleaning:  Standard    Irrigation solution:  Sterile saline    Irrigation volume:  1000mL    Irrigation method:  Syringe    Debridement:  None    Undermining:  None  Skin repair:     Repair method:  Sutures    Suture size:  4-0    Suture material:  Nylon    Suture technique:  Simple interrupted    Number of sutures:  4  Approximation:     Approximation:  Close  Repair type:     Repair type:  Simple  Post-procedure details:     Dressing:  Non-adherent dressing      Labs Reviewed - No data to display       Imaging Results    None          Medications   diptheria-tetanus toxoids 2-2 Lf unit/0.5 mL injection 0.5 mL (0.5 mLs Intramuscular Given 3/27/22 2117)   LIDOcaine (PF) 10 mg/ml (1%) injection 100 mg (100 mg Infiltration Given 3/27/22 2117)     Medical Decision Making:   Initial Assessment:   26-year-old female without significant past medical history presents emergency room for evaluation of laceration to the anterior left forearm.  Patient states she was reaching over a gate when she cut herself.  Laceration shallow, bleeding controlled prior to presentation.  Last known tetanus unknown.  No additional complaints today.  Differential Diagnosis:   Laceration  ED Management:  26-year-old female as above with shallow laceration to the anterior left forearm.  Repaired using 4-0 nylon sutures, x4.  Patient tolerated procedure well.  Wound was  irrigated prior to repair with 1 L sterile water.  Plan for suture removal in 5 days.  Patient placed on Keflex on discharge.    Disposition:  Improved, discharged.  Plan to discharge home with appropriate follow-up, including primary care manager.  Will discharge with prescription for Keflex.    I discussed the findings and plan of care with this patient.  All questions were answered to the patient's satisfaction.  Disposition plan as above.  Verbal and written discharge instructions provided to the patient on discharge.  Return precautions discussed prior to discharge.     I discuss this patient case with the cosigning physician, who agrees with diagnosis and plan of care. This note was written using the assistance of a dictation program and may contain grammatical errors.                       Clinical Impression:   Final diagnoses:  [S53.084Y] Laceration of left forearm, initial encounter (Primary)          ED Disposition Condition    Discharge Stable        ED Prescriptions     Medication Sig Dispense Start Date End Date Auth. Provider    cephALEXin (KEFLEX) 500 MG capsule Take 1 capsule (500 mg total) by mouth 4 (four) times daily. for 5 days 20 capsule 3/27/2022 4/1/2022 Florian Reeder PA-C        Follow-up Information     Follow up With Specialties Details Why Contact Info Additional Information    Kleber Odonnell MD Internal Medicine Schedule an appointment as soon as possible for a visit in 1 week  [address] [place] [address] [address]  [facility] [id number]  [phone]       [facility] - Emergency Dept Emergency Medicine Go to  As needed, If symptoms worsen [address] [facility] [id number]  [phone] 1st floor           Florian Reeder PA-C  03/28/22 [id number]

## 2022-05-04 ENCOUNTER — OFFICE VISIT (OUTPATIENT)
Dept: HEMATOLOGY/ONCOLOGY | Facility: CLINIC | Age: 27
End: 2022-05-04
Payer: MEDICARE

## 2022-05-04 ENCOUNTER — TELEPHONE (OUTPATIENT)
Dept: HEMATOLOGY/ONCOLOGY | Facility: CLINIC | Age: 27
End: 2022-05-04

## 2022-05-04 VITALS
DIASTOLIC BLOOD PRESSURE: 60 MMHG | BODY MASS INDEX: 24.39 KG/M2 | TEMPERATURE: 98 F | WEIGHT: 121 LBS | HEIGHT: 59 IN | SYSTOLIC BLOOD PRESSURE: 98 MMHG | HEART RATE: 83 BPM | RESPIRATION RATE: 18 BRPM

## 2022-05-04 DIAGNOSIS — D51.8 OTHER VITAMIN B12 DEFICIENCY ANEMIA: ICD-10-CM

## 2022-05-04 DIAGNOSIS — D50.0 IRON DEFICIENCY ANEMIA DUE TO CHRONIC BLOOD LOSS: Primary | ICD-10-CM

## 2022-05-04 PROCEDURE — 1159F PR MEDICATION LIST DOCUMENTED IN MEDICAL RECORD: ICD-10-PCS | Mod: CPTII,S$GLB,, | Performed by: INTERNAL MEDICINE

## 2022-05-04 PROCEDURE — 3008F BODY MASS INDEX DOCD: CPT | Mod: CPTII,S$GLB,, | Performed by: INTERNAL MEDICINE

## 2022-05-04 PROCEDURE — 3074F SYST BP LT 130 MM HG: CPT | Mod: CPTII,S$GLB,, | Performed by: INTERNAL MEDICINE

## 2022-05-04 PROCEDURE — 1159F MED LIST DOCD IN RCRD: CPT | Mod: CPTII,S$GLB,, | Performed by: INTERNAL MEDICINE

## 2022-05-04 PROCEDURE — 3078F PR MOST RECENT DIASTOLIC BLOOD PRESSURE < 80 MM HG: ICD-10-PCS | Mod: CPTII,S$GLB,, | Performed by: INTERNAL MEDICINE

## 2022-05-04 PROCEDURE — 3008F PR BODY MASS INDEX (BMI) DOCUMENTED: ICD-10-PCS | Mod: CPTII,S$GLB,, | Performed by: INTERNAL MEDICINE

## 2022-05-04 PROCEDURE — 3074F PR MOST RECENT SYSTOLIC BLOOD PRESSURE < 130 MM HG: ICD-10-PCS | Mod: CPTII,S$GLB,, | Performed by: INTERNAL MEDICINE

## 2022-05-04 PROCEDURE — 99214 PR OFFICE/OUTPT VISIT, EST, LEVL IV, 30-39 MIN: ICD-10-PCS | Mod: S$GLB,,, | Performed by: INTERNAL MEDICINE

## 2022-05-04 PROCEDURE — 3078F DIAST BP <80 MM HG: CPT | Mod: CPTII,S$GLB,, | Performed by: INTERNAL MEDICINE

## 2022-05-04 PROCEDURE — 99214 OFFICE O/P EST MOD 30 MIN: CPT | Mod: S$GLB,,, | Performed by: INTERNAL MEDICINE

## 2022-05-05 ENCOUNTER — TELEPHONE (OUTPATIENT)
Dept: HEMATOLOGY/ONCOLOGY | Facility: CLINIC | Age: 27
End: 2022-05-05

## 2022-05-05 LAB
BASOPHILS # BLD AUTO: 70 CELLS/UL (ref 0–200)
BASOPHILS NFR BLD AUTO: 0.8 %
EOSINOPHIL # BLD AUTO: 348 CELLS/UL (ref 15–500)
EOSINOPHIL NFR BLD AUTO: 4 %
ERYTHROCYTE [DISTWIDTH] IN BLOOD BY AUTOMATED COUNT: 12.4 % (ref 11–15)
FERRITIN SERPL-MCNC: 22 NG/ML (ref 16–154)
HCT VFR BLD AUTO: 39.3 % (ref 35–45)
HGB BLD-MCNC: 13.3 G/DL (ref 11.7–15.5)
LYMPHOCYTES # BLD AUTO: 1375 CELLS/UL (ref 850–3900)
LYMPHOCYTES NFR BLD AUTO: 15.8 %
MCH RBC QN AUTO: 29.6 PG (ref 27–33)
MCHC RBC AUTO-ENTMCNC: 33.8 G/DL (ref 32–36)
MCV RBC AUTO: 87.3 FL (ref 80–100)
MONOCYTES # BLD AUTO: 687 CELLS/UL (ref 200–950)
MONOCYTES NFR BLD AUTO: 7.9 %
NEUTROPHILS # BLD AUTO: 6221 CELLS/UL (ref 1500–7800)
NEUTROPHILS NFR BLD AUTO: 71.5 %
PLATELET # BLD AUTO: 296 THOUSAND/UL (ref 140–400)
PMV BLD REES-ECKER: 9.8 FL (ref 7.5–12.5)
RBC # BLD AUTO: 4.5 MILLION/UL (ref 3.8–5.1)
VIT B12 SERPL-MCNC: 427 PG/ML (ref 200–1100)
WBC # BLD AUTO: 8.7 THOUSAND/UL (ref 3.8–10.8)

## 2022-05-05 NOTE — PROGRESS NOTES
Colleen Pal and Dr. Flood's group are not accepting new patients with Medicaid/[employer] unless the patient is pregnant.  Called to speak with patient; no answer, message left with family member to return my call.

## 2022-05-05 NOTE — TELEPHONE ENCOUNTER
"She has a medicare [employer] managed product.    She needs GYN referral.    She will not see Dr. Irby, said she had "bad experience".    Would you see if there are other GYN in area on her plan.?  "

## 2022-05-05 NOTE — PROGRESS NOTES
[facility] in office hematology Oncology Subsequent  encounter note    5/4/22    Subjective:      Patient ID:   Sherry Burns  26 y.o. female  1995    [place]    Chief Complaint:   Anemia    HPI:  26 y.o. female   With intermittent anemia and easy bruisability.  She has been on iron pills in the past but has not been on B12 injections.  She denies history of jaundice or gallbladder disorder and has not been transfused.  Her mother admits to having easy bruisability also.      She has history of increased heart rate and is seen per Dr. Parson.  She has history of digestive problems of unclear nature and has had EGD and colonoscopy per Dr. Williamson.  Other history includes eczema and asthma.    She has not had previous surgeries.  She does not have history of drug allergies.  She does not smoke.  She does not drink alcohol.  She is not presently employed.    She had onset of menses at age 16, she describes her menses as normal to heavy, she does not have history of pregnancy.    Her mother has hypertension, diabetes, high cholesterol and admits to having easy bruising.  Her father is alive and well.  She has a brother with asthma.  She does not have children.    Last seen 4/2021.  Hx of Fe deficiency, on po Fe, but compliance is unclear.    She has [employer] Medicare  Try for GYN referral on her plan, she says she will not use Dr. Irby's office.    Check CBC, Ferritin.  Ferrlicet trial?      ROS:   GEN: normal without any fever, night sweats or weight loss  HEENT: normal with no HA's, sore throat, stiff neck, changes in vision  CV: normal with no CP, SOB, PND, RAMIREZ or orthopnea.  See HPI  PULM: normal with no SOB, cough, hemoptysis, sputum or pleuritic pain.  See HPI  GI: See HPI  : normal with no hematuria, dysuria  BREAST: normal with no mass, discharge, pain  SKIN: See HPI.     Past Medical History:   Diagnosis Date    Anemia     Asthma     Eczema     Glaucoma      Past Surgical History:    Procedure Laterality Date    COLONOSCOPY N/A 3/5/2021    Procedure: COLONOSCOPY;  Surgeon: Jake Williamson MD;  Location: [facility];  Service: Endoscopy;  Laterality: N/A;    ESOPHAGOGASTRODUODENOSCOPY N/A 3/5/2021    Procedure: EGD (ESOPHAGOGASTRODUODENOSCOPY);  Surgeon: Jake Williamson MD;  Location: [facility];  Service: Endoscopy;  Laterality: N/A;       Review of patient's allergies indicates:  No Known Allergies  Social History     Socioeconomic History    Marital status: Single   Tobacco Use    Smoking status: Never Smoker    Smokeless tobacco: Never Used   Substance and Sexual Activity    Alcohol use: Not Currently    Drug use: Never         Current Outpatient Medications:     albuterol (PROVENTIL/VENTOLIN HFA) 90 mcg/actuation inhaler, Inhale 1 puff into the lungs every 4 (four) hours as needed. , Disp: , Rfl:     busPIRone (BUSPAR) 10 MG tablet, Take 10 mg by mouth 2 (two) times daily., Disp: , Rfl:     cetirizine (ZYRTEC) 10 MG tablet, Take 10 mg by mouth daily as needed. , Disp: , Rfl:     fluticasone propionate (FLONASE) 50 mcg/actuation nasal spray, 1 spray (50 mcg total) by Each Nostril route once daily., Disp: 16 g, Rfl: 0    triamcinolone acetonide 0.1% (KENALOG) 0.1 % cream, Apply topically 2 (two) times daily., Disp: 454 g, Rfl: 0    diclofenac (VOLTAREN) 50 MG EC tablet, Take 1 tablet (50 mg total) by mouth 3 (three) times daily as needed (pain). (Patient not taking: No sig reported), Disp: 15 tablet, Rfl: 0    dupilumab (DUPIXENT) 300 mg/2 mL Syrg, Inject 600 mg into the skin on day 1, then 300 mg on day 14 and day 28 (Patient not taking: Reported on 5/4/2022), Disp: 8 mL, Rfl: 0    dupilumab (DUPIXENT) 300 mg/2 mL Syrg, Inject 2 mLs (300 mg total) into the skin every other week. (Patient not taking: Reported on 5/4/2022), Disp: 4 mL, Rfl: 2    DUPIXENT SYRINGE 300 mg/2 mL Syrg, 600mg (4ml) SQ on day 1 then 300mg (2ml) on day 14 and day 28 (Patient not taking: Reported on  "5/4/2022), Disp: 8 mL, Rfl: 0    DUPIXENT SYRINGE 300 mg/2 mL Syrg, 300mg (2ml) SQ q o week (Patient not taking: Reported on 5/4/2022), Disp: 4 mL, Rfl: 2    hydrOXYzine HCL (ATARAX) 25 MG tablet, Take 1 tablet (25 mg total) by mouth every evening. (Patient not taking: Reported on 5/4/2022), Disp: 30 tablet, Rfl: 1    metoprolol tartrate (LOPRESSOR) 25 MG tablet, Take 25 mg by mouth 2 (two) times daily., Disp: , Rfl:     omeprazole (PRILOSEC) 40 MG capsule, Take 1 capsule (40 mg total) by mouth once daily., Disp: 30 capsule, Rfl: 0    sertraline (ZOLOFT) 100 MG tablet, Take 100 mg by mouth every evening., Disp: , Rfl:           Objective:   Vitals:  Blood pressure 98/60, pulse 83, temperature 98.2 °F (36.8 °C), resp. rate 18, height 4' 11" (1.499 m), weight 54.9 kg (121 lb).    Physical Examination:   GEN: no apparent distress, comfortable  HEAD: atraumatic and normocephalic  EYES: no pallor, no icterus, no jaundice  ENT:  no pharyngeal erythema, external ears WNL; no nasal discharge  NECK: no masses, thyroid normal, trachea midline, no LAD/LN's, supple  CV: RRR with no murmur; normal pulse  CHEST: Normal respiratory effort; CTAB; normal breath sounds; no wheeze or crackles  ABDOM: nontender and nondistended; soft; no rebound/guarding, L/S NP  MUSC/Skeletal: ROM normal; no crepitus; joints normal  EXTREM: no clubbing, cyanosis, inflammation or swelling  SKIN: no rashes, lesions, ulcers, petechiae.  She has 2 small ecchymoses on the anterior lower aspect of the right leg  : no cvat  NEURO: grossly intact; motor/sensory WNL; no tremors  PSYCH: normal mood, affect and behavior  LYMPH: normal cervical, supraclavicular, axillary and groin LN's  BREASTS:  Left and right breast nontender, without discharge, without palpable mass.    Labs:   Lab Results   Component Value Date    WBC 7.35 05/28/2021    HGB 8.8 (L) 05/28/2021    HCT 29.8 (L) 05/28/2021    MCV 76 (L) 05/28/2021     05/28/2021    CMP  Sodium "   Date Value Ref Range Status   05/28/2021 138 136 - 145 mmol/L Final     Potassium   Date Value Ref Range Status   05/28/2021 4.0 3.5 - 5.1 mmol/L Final     Chloride   Date Value Ref Range Status   05/28/2021 103 95 - 110 mmol/L Final     CO2   Date Value Ref Range Status   05/28/2021 27 23 - 29 mmol/L Final     Glucose   Date Value Ref Range Status   05/28/2021 95 70 - 110 mg/dL Final     BUN   Date Value Ref Range Status   05/28/2021 15 6 - 20 mg/dL Final     Creatinine   Date Value Ref Range Status   05/28/2021 0.7 0.5 - 1.4 mg/dL Final     Calcium   Date Value Ref Range Status   05/28/2021 9.5 8.7 - 10.5 mg/dL Final     Total Protein   Date Value Ref Range Status   05/28/2021 6.8 6.0 - 8.4 g/dL Final     Albumin   Date Value Ref Range Status   05/28/2021 4.0 3.5 - 5.2 g/dL Final     Total Bilirubin   Date Value Ref Range Status   05/28/2021 0.5 0.1 - 1.0 mg/dL Final     Comment:     For infants and newborns, interpretation of results should be based  on gestational age, weight and in agreement with clinical  observations.    Premature Infant recommended reference ranges:  Up to 24 hours.............<8.0 mg/dL  Up to 48 hours............<12.0 mg/dL  3-5 days..................<15.0 mg/dL  6-29 days.................<15.0 mg/dL       Alkaline Phosphatase   Date Value Ref Range Status   05/28/2021 52 (L) 55 - 135 U/L Final     AST   Date Value Ref Range Status   05/28/2021 21 10 - 40 U/L Final     ALT   Date Value Ref Range Status   05/28/2021 27 10 - 44 U/L Final     Anion Gap   Date Value Ref Range Status   05/28/2021 8 8 - 16 mmol/L Final     eGFR if    Date Value Ref Range Status   05/28/2021 >60 >60 mL/min/1.73 m^2 Final     eGFR if non    Date Value Ref Range Status   05/28/2021 >60 >60 mL/min/1.73 m^2 Final     Comment:     Calculation used to obtain the estimated glomerular filtration  rate (eGFR) is the CKD-EPI equation.            Assessment:   (1) 26 y.o. female with  history of easy bruisability.  Initial coagulation studies have been done thus far and have returned normal.  I have ordered additional studies to be completed at [facility].    These results have shown normal coag studies.  I am scheduling platelet aggregation studies at [facility] main campus.    (2) she appears to have a moderate microcytic anemia.  Hgb 8.  Of This may be nutritional or secondary to iron deficiency.    Ferritin returned low at 6.  Fe deficiency anemia 2nd heavy menses.  She is on oral FeSO4 daily.    Await CBC, Ferritin report.  GYN referral    RTC 6 months with CBC, Ferritin.              I have explained and the patient understands all of  the current recommendation(s). I have answered all of their questions to the best of my ability and to their complete satisfaction.

## 2022-05-06 ENCOUNTER — TELEPHONE (OUTPATIENT)
Dept: HEMATOLOGY/ONCOLOGY | Facility: CLINIC | Age: 27
End: 2022-05-06

## 2022-05-06 DIAGNOSIS — Z01.419 ENCOUNTER FOR ANNUAL ROUTINE GYNECOLOGICAL EXAMINATION: Primary | ICD-10-CM

## 2022-05-06 NOTE — PROGRESS NOTES
Dr. Salgado is the only Gyn accepting new patients with Medicaid but patient can't be seen until September.  Number provided to patient.  She was instructed to go to ED if needed.

## 2022-05-17 ENCOUNTER — TELEPHONE (OUTPATIENT)
Dept: HEMATOLOGY/ONCOLOGY | Facility: CLINIC | Age: 27
End: 2022-05-17

## 2022-05-17 RX ORDER — HEPARIN 100 UNIT/ML
500 SYRINGE INTRAVENOUS
Status: CANCELLED | OUTPATIENT
Start: 2022-05-17

## 2022-05-17 RX ORDER — DIPHENHYDRAMINE HYDROCHLORIDE 50 MG/ML
50 INJECTION INTRAMUSCULAR; INTRAVENOUS ONCE AS NEEDED
Status: CANCELLED | OUTPATIENT
Start: 2022-05-17

## 2022-05-17 RX ORDER — SODIUM CHLORIDE 0.9 % (FLUSH) 0.9 %
10 SYRINGE (ML) INJECTION
Status: CANCELLED | OUTPATIENT
Start: 2022-05-17

## 2022-05-17 RX ORDER — METHYLPREDNISOLONE SOD SUCC 125 MG
125 VIAL (EA) INJECTION ONCE AS NEEDED
Status: CANCELLED | OUTPATIENT
Start: 2022-05-17

## 2022-05-17 RX ORDER — EPINEPHRINE 0.3 MG/.3ML
0.3 INJECTION SUBCUTANEOUS ONCE AS NEEDED
Status: CANCELLED | OUTPATIENT
Start: 2022-05-17

## 2022-05-17 NOTE — TELEPHONE ENCOUNTER
Spoke to pts mother.  Per Dr. Bowling, she is not anemic.  But her iron is low.  Dr. bowling wants to start her on iron infusions weekly x 8 weeks.  Verbalized understanding. Orders placed. Will call and schedule once auth received.

## 2022-05-17 NOTE — TELEPHONE ENCOUNTER
----- Message from Davina Herrera sent at 5/16/2022  2:35 PM CDT -----  The patient wants a call back with the results of her lab work. Please call her at [phone]

## 2022-05-18 ENCOUNTER — OFFICE VISIT (OUTPATIENT)
Dept: DERMATOLOGY | Facility: CLINIC | Age: 27
End: 2022-05-18
Payer: MEDICARE

## 2022-05-18 ENCOUNTER — HOSPITAL ENCOUNTER (EMERGENCY)
Facility: HOSPITAL | Age: 27
Discharge: HOME | End: 2022-05-18
Attending: EMERGENCY MEDICINE
Payer: MEDICARE

## 2022-05-18 VITALS
WEIGHT: 120 LBS | BODY MASS INDEX: 24.19 KG/M2 | OXYGEN SATURATION: 98 % | HEIGHT: 59 IN | SYSTOLIC BLOOD PRESSURE: 112 MMHG | TEMPERATURE: 98 F | RESPIRATION RATE: 18 BRPM | HEART RATE: 72 BPM | DIASTOLIC BLOOD PRESSURE: 76 MMHG

## 2022-05-18 DIAGNOSIS — N73.0 PID (ACUTE PELVIC INFLAMMATORY DISEASE): Primary | ICD-10-CM

## 2022-05-18 DIAGNOSIS — L20.84 INTRINSIC ECZEMA: ICD-10-CM

## 2022-05-18 DIAGNOSIS — Q80.0 ICHTHYOSIS VULGARIS: Primary | ICD-10-CM

## 2022-05-18 LAB
ALBUMIN SERPL BCP-MCNC: 3.9 G/DL (ref 3.5–5.2)
ALP SERPL-CCNC: 68 U/L (ref 55–135)
ALT SERPL W/O P-5'-P-CCNC: 16 U/L (ref 10–44)
ANION GAP SERPL CALC-SCNC: 10 MMOL/L (ref 8–16)
AST SERPL-CCNC: 14 U/L (ref 10–40)
B-HCG UR QL: NEGATIVE
BACTERIA #/AREA URNS HPF: ABNORMAL /HPF
BACTERIA GENITAL QL WET PREP: ABNORMAL
BASOPHILS # BLD AUTO: 0.08 K/UL (ref 0–0.2)
BASOPHILS NFR BLD: 0.8 % (ref 0–1.9)
BILIRUB SERPL-MCNC: 0.6 MG/DL (ref 0.1–1)
BILIRUB UR QL STRIP: NEGATIVE
BUN SERPL-MCNC: 14 MG/DL (ref 6–20)
CALCIUM SERPL-MCNC: 9.6 MG/DL (ref 8.7–10.5)
CHLORIDE SERPL-SCNC: 103 MMOL/L (ref 95–110)
CLARITY UR: ABNORMAL
CLUE CELLS VAG QL WET PREP: ABNORMAL
CO2 SERPL-SCNC: 28 MMOL/L (ref 23–29)
COLOR UR: YELLOW
CREAT SERPL-MCNC: 0.8 MG/DL (ref 0.5–1.4)
DIFFERENTIAL METHOD: NORMAL
EOSINOPHIL # BLD AUTO: 0.4 K/UL (ref 0–0.5)
EOSINOPHIL NFR BLD: 3.4 % (ref 0–8)
ERYTHROCYTE [DISTWIDTH] IN BLOOD BY AUTOMATED COUNT: 12.3 % (ref 11.5–14.5)
EST. GFR  (AFRICAN AMERICAN): >60 ML/MIN/1.73 M^2
EST. GFR  (NON AFRICAN AMERICAN): >60 ML/MIN/1.73 M^2
FILAMENT FUNGI VAG WET PREP-#/AREA: ABNORMAL
GLUCOSE SERPL-MCNC: 100 MG/DL (ref 70–110)
GLUCOSE UR QL STRIP: NEGATIVE
HCT VFR BLD AUTO: 38.2 % (ref 37–48.5)
HGB BLD-MCNC: 12.7 G/DL (ref 12–16)
HGB UR QL STRIP: ABNORMAL
IMM GRANULOCYTES # BLD AUTO: 0.03 K/UL (ref 0–0.04)
IMM GRANULOCYTES NFR BLD AUTO: 0.3 % (ref 0–0.5)
KETONES UR QL STRIP: NEGATIVE
LEUKOCYTE ESTERASE UR QL STRIP: ABNORMAL
LIPASE SERPL-CCNC: 27 U/L (ref 4–60)
LYMPHOCYTES # BLD AUTO: 2.2 K/UL (ref 1–4.8)
LYMPHOCYTES NFR BLD: 21.4 % (ref 18–48)
MCH RBC QN AUTO: 29 PG (ref 27–31)
MCHC RBC AUTO-ENTMCNC: 33.2 G/DL (ref 32–36)
MCV RBC AUTO: 87 FL (ref 82–98)
MICROSCOPIC COMMENT: ABNORMAL
MONOCYTES # BLD AUTO: 0.9 K/UL (ref 0.3–1)
MONOCYTES NFR BLD: 9.3 % (ref 4–15)
NEUTROPHILS # BLD AUTO: 6.6 K/UL (ref 1.8–7.7)
NEUTROPHILS NFR BLD: 64.8 % (ref 38–73)
NITRITE UR QL STRIP: NEGATIVE
NRBC BLD-RTO: 0 /100 WBC
PH UR STRIP: 6 [PH] (ref 5–8)
PLATELET # BLD AUTO: 323 K/UL (ref 150–450)
PMV BLD AUTO: 9.5 FL (ref 9.2–12.9)
POTASSIUM SERPL-SCNC: 4.1 MMOL/L (ref 3.5–5.1)
PROT SERPL-MCNC: 6.7 G/DL (ref 6–8.4)
PROT UR QL STRIP: NEGATIVE
RBC # BLD AUTO: 4.38 M/UL (ref 4–5.4)
RBC #/AREA URNS HPF: 10 /HPF (ref 0–4)
SODIUM SERPL-SCNC: 141 MMOL/L (ref 136–145)
SP GR UR STRIP: 1.02 (ref 1–1.03)
SPECIMEN SOURCE: ABNORMAL
SQUAMOUS #/AREA URNS HPF: 10 /HPF
T VAGINALIS GENITAL QL WET PREP: ABNORMAL
TRICHOMONAS UR QL MICRO: ABNORMAL
URN SPEC COLLECT METH UR: ABNORMAL
UROBILINOGEN UR STRIP-ACNC: NEGATIVE EU/DL
WBC # BLD AUTO: 10.15 K/UL (ref 3.9–12.7)
WBC #/AREA URNS HPF: 60 /HPF (ref 0–5)
WBC #/AREA VAG WET PREP: ABNORMAL
YEAST GENITAL QL WET PREP: ABNORMAL

## 2022-05-18 PROCEDURE — 87086 URINE CULTURE/COLONY COUNT: CPT | Performed by: NURSE PRACTITIONER

## 2022-05-18 PROCEDURE — 87389 HIV-1 AG W/HIV-1&-2 AB AG IA: CPT | Performed by: EMERGENCY MEDICINE

## 2022-05-18 PROCEDURE — 86803 HEPATITIS C AB TEST: CPT | Performed by: EMERGENCY MEDICINE

## 2022-05-18 PROCEDURE — 87220 PR  TISSUE EXAM BY KOH: ICD-10-PCS | Mod: S$GLB,,, | Performed by: STUDENT IN AN ORGANIZED HEALTH CARE EDUCATION/TRAINING PROGRAM

## 2022-05-18 PROCEDURE — 1160F PR REVIEW ALL MEDS BY PRESCRIBER/CLIN PHARMACIST DOCUMENTED: ICD-10-PCS | Mod: CPTII,S$GLB,, | Performed by: STUDENT IN AN ORGANIZED HEALTH CARE EDUCATION/TRAINING PROGRAM

## 2022-05-18 PROCEDURE — 85025 COMPLETE CBC W/AUTO DIFF WBC: CPT | Performed by: NURSE PRACTITIONER

## 2022-05-18 PROCEDURE — 99999 PR PBB SHADOW E&M-EST. PATIENT-LVL II: ICD-10-PCS | Mod: PBBFAC,,, | Performed by: STUDENT IN AN ORGANIZED HEALTH CARE EDUCATION/TRAINING PROGRAM

## 2022-05-18 PROCEDURE — 1159F PR MEDICATION LIST DOCUMENTED IN MEDICAL RECORD: ICD-10-PCS | Mod: CPTII,S$GLB,, | Performed by: STUDENT IN AN ORGANIZED HEALTH CARE EDUCATION/TRAINING PROGRAM

## 2022-05-18 PROCEDURE — 87210 SMEAR WET MOUNT SALINE/INK: CPT | Performed by: NURSE PRACTITIONER

## 2022-05-18 PROCEDURE — 99999 PR PBB SHADOW E&M-EST. PATIENT-LVL II: CPT | Mod: PBBFAC,,, | Performed by: STUDENT IN AN ORGANIZED HEALTH CARE EDUCATION/TRAINING PROGRAM

## 2022-05-18 PROCEDURE — 81000 URINALYSIS NONAUTO W/SCOPE: CPT | Performed by: NURSE PRACTITIONER

## 2022-05-18 PROCEDURE — 1159F MED LIST DOCD IN RCRD: CPT | Mod: CPTII,S$GLB,, | Performed by: STUDENT IN AN ORGANIZED HEALTH CARE EDUCATION/TRAINING PROGRAM

## 2022-05-18 PROCEDURE — 96361 HYDRATE IV INFUSION ADD-ON: CPT

## 2022-05-18 PROCEDURE — 1160F RVW MEDS BY RX/DR IN RCRD: CPT | Mod: CPTII,S$GLB,, | Performed by: STUDENT IN AN ORGANIZED HEALTH CARE EDUCATION/TRAINING PROGRAM

## 2022-05-18 PROCEDURE — 63600175 PHARM REV CODE 636 W HCPCS: Performed by: NURSE PRACTITIONER

## 2022-05-18 PROCEDURE — 99285 EMERGENCY DEPT VISIT HI MDM: CPT | Mod: 25

## 2022-05-18 PROCEDURE — 99214 PR OFFICE/OUTPT VISIT, EST, LEVL IV, 30-39 MIN: ICD-10-PCS | Mod: S$GLB,,, | Performed by: STUDENT IN AN ORGANIZED HEALTH CARE EDUCATION/TRAINING PROGRAM

## 2022-05-18 PROCEDURE — 80053 COMPREHEN METABOLIC PANEL: CPT | Performed by: NURSE PRACTITIONER

## 2022-05-18 PROCEDURE — 96374 THER/PROPH/DIAG INJ IV PUSH: CPT

## 2022-05-18 PROCEDURE — 96375 TX/PRO/DX INJ NEW DRUG ADDON: CPT

## 2022-05-18 PROCEDURE — 25000003 PHARM REV CODE 250: Performed by: NURSE PRACTITIONER

## 2022-05-18 PROCEDURE — 87591 N.GONORRHOEAE DNA AMP PROB: CPT | Performed by: NURSE PRACTITIONER

## 2022-05-18 PROCEDURE — 99214 OFFICE O/P EST MOD 30 MIN: CPT | Mod: S$GLB,,, | Performed by: STUDENT IN AN ORGANIZED HEALTH CARE EDUCATION/TRAINING PROGRAM

## 2022-05-18 PROCEDURE — 36415 COLL VENOUS BLD VENIPUNCTURE: CPT | Performed by: EMERGENCY MEDICINE

## 2022-05-18 PROCEDURE — 87220 TISSUE EXAM FOR FUNGI: CPT | Mod: S$GLB,,, | Performed by: STUDENT IN AN ORGANIZED HEALTH CARE EDUCATION/TRAINING PROGRAM

## 2022-05-18 PROCEDURE — 81025 URINE PREGNANCY TEST: CPT | Performed by: NURSE PRACTITIONER

## 2022-05-18 PROCEDURE — 83690 ASSAY OF LIPASE: CPT | Performed by: NURSE PRACTITIONER

## 2022-05-18 PROCEDURE — 96372 THER/PROPH/DIAG INJ SC/IM: CPT | Performed by: NURSE PRACTITIONER

## 2022-05-18 PROCEDURE — 87491 CHLMYD TRACH DNA AMP PROBE: CPT | Performed by: NURSE PRACTITIONER

## 2022-05-18 RX ORDER — ONDANSETRON 2 MG/ML
4 INJECTION INTRAMUSCULAR; INTRAVENOUS
Status: COMPLETED | OUTPATIENT
Start: 2022-05-18 | End: 2022-05-18

## 2022-05-18 RX ORDER — CEFTRIAXONE 500 MG/1
500 INJECTION, POWDER, FOR SOLUTION INTRAMUSCULAR; INTRAVENOUS
Status: COMPLETED | OUTPATIENT
Start: 2022-05-18 | End: 2022-05-18

## 2022-05-18 RX ORDER — CLOBETASOL PROPIONATE 0.5 MG/G
OINTMENT TOPICAL 2 TIMES DAILY
Qty: 60 G | Refills: 1 | Status: SHIPPED | OUTPATIENT
Start: 2022-05-18

## 2022-05-18 RX ORDER — METRONIDAZOLE 500 MG/1
500 TABLET ORAL EVERY 12 HOURS
Qty: 21 TABLET | Refills: 0 | OUTPATIENT
Start: 2022-05-18 | End: 2022-05-28

## 2022-05-18 RX ORDER — KETOROLAC TROMETHAMINE 30 MG/ML
10 INJECTION, SOLUTION INTRAMUSCULAR; INTRAVENOUS
Status: COMPLETED | OUTPATIENT
Start: 2022-05-18 | End: 2022-05-18

## 2022-05-18 RX ORDER — DOXYCYCLINE 100 MG/1
100 CAPSULE ORAL 2 TIMES DAILY
Qty: 28 CAPSULE | Refills: 0 | Status: SHIPPED | OUTPATIENT
Start: 2022-05-18 | End: 2022-06-01

## 2022-05-18 RX ADMIN — CEFTRIAXONE SODIUM 500 MG: 500 INJECTION, POWDER, FOR SOLUTION INTRAMUSCULAR; INTRAVENOUS at 12:05

## 2022-05-18 RX ADMIN — KETOROLAC TROMETHAMINE 10 MG: 30 INJECTION, SOLUTION INTRAMUSCULAR at 10:05

## 2022-05-18 RX ADMIN — ONDANSETRON 4 MG: 2 INJECTION INTRAMUSCULAR; INTRAVENOUS at 10:05

## 2022-05-18 RX ADMIN — SODIUM CHLORIDE 1000 ML: 0.9 INJECTION, SOLUTION INTRAVENOUS at 09:05

## 2022-05-18 NOTE — ED PROVIDER NOTES
"Encounter Date: 5/18/2022    SCRIBE #1 NOTE: IBoris, am scribing for, and in the presence of, Marleny Barahona NP.       History     Chief Complaint   Patient presents with    Abdominal Pain    Diarrhea     Abdominal pain and diarrhea for several weeks, states today the pain is a stabbing pain      Time seen by provider: 9:23 AM on 05/18/2022    Sherry Burns is a 26 y.o. female who presents to the ED with abdominal pain, nausea, and diarrhea. The Pt states that she has had lower right abdominal pain for about a month, but says it worsened today and "feels swollen." She states she has about 3 episodes of a diarrhea per day. The patient denies vaginal discharge, blood in urine, dysuria, vomiting, blood in stool, or any other symptoms at this time. PMHx of anemia and glaucoma. PSHx of colonoscopy.       The history is provided by the patient.     Review of patient's allergies indicates:  No Known Allergies  Past Medical History:   Diagnosis Date    Anemia     Asthma     Eczema     Glaucoma      Past Surgical History:   Procedure Laterality Date    COLONOSCOPY N/A 3/5/2021    Procedure: COLONOSCOPY;  Surgeon: Jake Williamson MD;  Location: [facility];  Service: Endoscopy;  Laterality: N/A;    ESOPHAGOGASTRODUODENOSCOPY N/A 3/5/2021    Procedure: EGD (ESOPHAGOGASTRODUODENOSCOPY);  Surgeon: Jake Williamson MD;  Location: [facility];  Service: Endoscopy;  Laterality: N/A;     Family History   Problem Relation Age of Onset    Melanoma Neg Hx      Social History     Tobacco Use    Smoking status: Never Smoker    Smokeless tobacco: Never Used   Substance Use Topics    Alcohol use: Not Currently    Drug use: Never     Review of Systems   Constitutional: Negative for fever.   HENT: Negative for sore throat.    Respiratory: Negative for shortness of breath.    Cardiovascular: Negative for chest pain.   Gastrointestinal: Positive for abdominal pain, diarrhea and nausea. Negative for blood in stool and " vomiting.   Genitourinary: Negative for dysuria, hematuria and vaginal discharge.   Musculoskeletal: Negative for back pain.   Skin: Negative for rash.   Neurological: Negative for weakness.   Hematological: Does not bruise/bleed easily.       Physical Exam     Initial Vitals [05/18/22 0833]   BP Pulse Resp Temp SpO2   (!) 91/57 79 18 98 °F (36.7 °C) 99 %      MAP       --         Physical Exam    Nursing note and vitals reviewed.  Constitutional: Vital signs are normal. She appears well-developed and well-nourished.   HENT:   Head: Normocephalic and atraumatic.   Eyes: Pupils are equal, round, and reactive to light.   Neck: Neck supple.   Cardiovascular: Normal rate, regular rhythm, normal heart sounds and intact distal pulses. Exam reveals no gallop and no friction rub.    No murmur heard.  Pulmonary/Chest: Breath sounds normal. She has no wheezes. She has no rhonchi. She has no rales.   Abdominal: Abdomen is soft. Bowel sounds are normal. She exhibits no distension. There is abdominal tenderness in the right lower quadrant and suprapubic area. Hernia confirmed negative in the right inguinal area and confirmed negative in the left inguinal area. There is no guarding.   Genitourinary:    Vagina and uterus normal.      Pelvic exam was performed with patient supine.   There is no rash or tenderness on the right labia. There is no rash or tenderness on the left labia. Cervix exhibits discharge. Cervix exhibits no motion tenderness and no friability. Right adnexum displays tenderness. Right adnexum displays no mass and no fullness. Left adnexum displays no mass, no tenderness and no fullness.    Genitourinary Comments: Green thick moderate amount of discharge in vaginal vault.      Musculoskeletal:      Cervical back: Neck supple.     Lymphadenopathy: No inguinal adenopathy noted on the right or left side.   Neurological: She is alert and oriented to person, place, and time. She has normal strength.   Skin: Skin is  warm, dry and intact.   Psychiatric: She has a normal mood and affect. Her speech is normal and behavior is normal.         ED Course   Procedures  Labs Reviewed   VAGINAL SCREEN - Abnormal; Notable for the following components:       Result Value    Trichomonas Many (*)     Clue Cells Rare (*)     WBC - Vaginal Screen Moderate (*)     Bacteria - Vaginal Screen Few (*)     All other components within normal limits    Narrative:     Release to patient->Immediate   URINALYSIS, REFLEX TO URINE CULTURE - Abnormal; Notable for the following components:    Appearance, UA Cloudy (*)     Occult Blood UA 1+ (*)     Leukocytes, UA 2+ (*)     All other components within normal limits    Narrative:     Specimen Source->Urine   URINALYSIS MICROSCOPIC - Abnormal; Notable for the following components:    RBC, UA 10 (*)     WBC, UA 60 (*)     Bacteria Moderate (*)     Trichomonas, UA Moderate (*)     All other components within normal limits    Narrative:     Specimen Source->Urine   CULTURE, URINE   C. TRACHOMATIS/N. GONORRHOEAE BY AMP DNA   CBC W/ AUTO DIFFERENTIAL   COMPREHENSIVE METABOLIC PANEL   LIPASE   PREGNANCY TEST, URINE RAPID    Narrative:     Specimen Source->Urine   HIV 1 / 2 ANTIBODY   HEPATITIS C ANTIBODY          Imaging Results          CT Abdomen Pelvis  Without Contrast (Final result)  Result time 05/18/22 11:23:07    Final result by Albert Montana MD (05/18/22 11:23:07)                 Impression:      No acute findings in the abdomen including appendix.      Electronically signed by: Albert Montana  Date:    05/18/2022  Time:    11:23             Narrative:    EXAMINATION:  CT ABDOMEN PELVIS WITHOUT CONTRAST    CLINICAL HISTORY:  RLQ abdominal pain (Age >= 14y);    TECHNIQUE:  Low dose axial images, sagittal and coronal reformations were obtained from the lung bases to the pubic symphysis.  Oral contrast was not administered.    COMPARISON:  05/28/2021    FINDINGS:  there is a 2 mm nodule in the left  lower lobe unchanged from prior exam in favored benign given length of stability. Normal size heart.  Nondistended gallbladder.    No hydroureteronephrosis or calcified nephroureterolithiasis.  Remaining solid abdominal organs are grossly unremarkable on this noncontrast exam.    There is no enteric contrast which limits bowel assessment. Normal appendix which extends retrocecal, as can be seen coronal series 602, image 79 and sagittal series 601, image 77.    Trace low density pelvic free fluid which could be physiologic.  Unremarkable noncontrast uterus and urinary bladder.    No acute osseous abnormality.                                 Medications   sodium chloride 0.9% bolus 1,000 mL (0 mLs Intravenous Stopped 5/18/22 1040)   ondansetron injection 4 mg (4 mg Intravenous Given 5/18/22 1016)   ketorolac injection 9.999 mg (9.999 mg Intravenous Given 5/18/22 1015)   cefTRIAXone injection 500 mg (500 mg Intramuscular Given 5/18/22 1214)     Medical Decision Making:   History:   Old Medical Records: I decided to obtain old medical records.  Differential Diagnosis:   Appendicitis  PID  TOA  Clinical Tests:   Lab Tests: Ordered and Reviewed  Radiological Study: Ordered and Reviewed       APC / Resident Notes:   Patient is a 26 y.o. female who presents to the ED 05/18/2022 who underwent emergent evaluation for abdominal pain and diarrhea.  The abdominal pain has been present for months and is in the right lower quadrant suprapubic area she is tender.  No CMT but she does have suprapubic and right adnexal tenderness.  Wet prep consistent with BV and Trichomonas.  She is treated empirically for PID and cultures for gonorrhea and chlamydia are pending.  Labs noted are unremarkable.  No leukocytosis.  Vital signs normal.  CT abdomen pelvis without acute findings and I have low suspicion for TOA or ovarian torsion.  No other acute findings such as appendicitis and the appendix appears normal.  Patient's symptoms stable for  1 month.  Will refer to OBGYN.  Patient made aware of all findings. Based on my clinical evaluation, I do not appreciate any immediate, emergent, or life threatening condition or etiology that warrants additional workup today and feel that the patient can be discharged with close follow up care. Case discussed with Dr. Leiva who also evaluated patient and who is agreeable to plan of care. Follow up and return precautions discussed; patient verbalized understanding and is agreeable to plan of care. Patient discharged home in stable condition.            Scribe Attestation:   Scribe #1: I performed the above scribed service and the documentation accurately describes the services I performed. I attest to the accuracy of the note.    Attending Attestation:           Physician Attestation for Scribe:  Physician Attestation Statement for Scribe #1: I, Marleny Barahona, reviewed documentation, as scribed by in my presence, and it is both accurate and complete.     Comments: I, PAVITHRA NicoleC, personally performed the services described in this documentation. All medical record entries made by the scribe were at my direction and in my presence.  I have reviewed the chart and agree that the record reflects my personal performance and is accurate and complete. MICHEAL Nicole.  2:32 PM 05/18/2022                   Clinical Impression:   Final diagnoses:  [N73.0] PID (acute pelvic inflammatory disease) (Primary)          ED Disposition Condition    Discharge Stable        ED Prescriptions     Medication Sig Dispense Start Date End Date Auth. Provider    doxycycline (VIBRAMYCIN) 100 MG Cap Take 1 capsule (100 mg total) by mouth 2 (two) times daily. for 14 days 28 capsule 5/18/2022 6/1/2022 Marleny Barahona NP    metroNIDAZOLE (FLAGYL) 500 MG tablet Take 1 tablet (500 mg total) by mouth every 12 (twelve) hours. for 14 days 21 tablet 5/18/2022 6/1/2022 Marleny Barahona NP        Follow-up Information     Follow up With Specialties  Details Why Contact Info    Marleny Moura MD Obstetrics and Gynecology In 2 days  [address] [facility] [address]  [phone]      [facility] Emergency Dept Emergency Medicine  As needed, If symptoms worsen [address] [place] [id number]  [phone]           Marleny Barahona NP  05/18/22 3621

## 2022-05-18 NOTE — PROGRESS NOTES
Subjective:       Patient ID:  Sherry Burns is a 26 y.o. female who presents for   Chief Complaint   Patient presents with    Follow-up     LOV 5/12/21    Patient here today for f/u on atopic dermatitis   Patient states it is flared up on her feet currently. Itches some on her arms. It was worse on her hands but has resolved some.  No current treatment. Uses lupe butter.   Ordered dupixent 1 year ago but she declined to take it.       Skin, left back, punch biopsy:   - FEATURES SUGGESTIVE OF ICHTHYOSIS VULGARIS (SEE COMMENT).   COMMENT:  These histologic features are relatively subtle and non-specific.   However, the thickened layer of compact orthokeratosis overlying a diminished   granular layer in the absence of any significant epidermal spongiosis or   dermal inflammation are features that are consistent with this diagnosis.  A   resolving eczematous process cannot be entirely excluded.     Previous derm hx:     -born bright red with scaling on whole body. She was treated with PO steroids for > 1 year as a child. Her rash has lessened in severity over time but it is still present.       Review of Systems   Constitutional: Negative for fever, chills and fatigue.   Respiratory: Negative for cough and shortness of breath.    Gastrointestinal: Negative for nausea and vomiting.   Skin: Positive for itching, rash and dry skin.        Objective:    Physical Exam   Constitutional: She appears well-developed and well-nourished.   Neurological: She is alert and oriented to person, place, and time.   Psychiatric: She has a normal mood and affect.   Skin:   Areas Examined (abnormalities noted in diagram):   RUE Inspected  LUE Inspection Performed  RLE Inspected  LLE Inspection Performed              Diagram Legend     Erythematous scaling macule/papule c/w actinic keratosis       Vascular papule c/w angioma      Pigmented verrucoid papule/plaque c/w seborrheic keratosis      Yellow umbilicated papule c/w  sebaceous hyperplasia      Irregularly shaped tan macule c/w lentigo     1-2 mm smooth white papules consistent with Milia      Movable subcutaneous cyst with punctum c/w epidermal inclusion cyst      Subcutaneous movable cyst c/w pilar cyst      Firm pink to brown papule c/w dermatofibroma      Pedunculated fleshy papule(s) c/w skin tag(s)      Evenly pigmented macule c/w junctional nevus     Mildly variegated pigmented, slightly irregular-bordered macule c/w mildly atypical nevus      Flesh colored to evenly pigmented papule c/w intradermal nevus       Pink pearly papule/plaque c/w basal cell carcinoma      Erythematous hyperkeratotic cursted plaque c/w SCC      Surgical scar with no sign of skin cancer recurrence      Open and closed comedones      Inflammatory papules and pustules      Verrucoid papule consistent consistent with wart     Erythematous eczematous patches and plaques     Dystrophic onycholytic nail with subungual debris c/w onychomycosis     Umbilicated papule    Erythematous-base heme-crusted tan verrucoid plaque consistent with inflamed seborrheic keratosis     Erythematous Silvery Scaling Plaque c/w Psoriasis     See annotation      Assessment / Plan:        Ichthyosis vulgaris  Intrinsic eczema- flaring today  -     clobetasol 0.05% (TEMOVATE) 0.05 % Oint; Apply topically 2 (two) times daily.  Dispense: 60 g; Refill: 1  - recommend vanicream or cerave moisturizing cream BID  - can also add amlactin   - clobetasol on itchy plaques on feet BID x 2 weeks PRN flares  - koh on feet negative  - dupixent prescribed last year but while going through the insurance process she declined today start therapy.   - recommend starting with regular use of emollients, TCS which she has not been using consistently before proceeding to dupixent             No follow-ups on file.

## 2022-05-19 ENCOUNTER — PATIENT MESSAGE (OUTPATIENT)
Dept: FAMILY MEDICINE | Facility: CLINIC | Age: 27
End: 2022-05-19
Payer: MEDICARE

## 2022-05-19 LAB
BACTERIA UR CULT: NORMAL
C TRACH DNA SPEC QL NAA+PROBE: NOT DETECTED
N GONORRHOEA DNA SPEC QL NAA+PROBE: NOT DETECTED

## 2022-05-23 LAB
HCV AB SERPL QL IA: NEGATIVE
HIV 1+2 AB+HIV1 P24 AG SERPL QL IA: NEGATIVE

## 2022-05-27 DIAGNOSIS — E73.8 OTHER LACTOSE INTOLERANCE: ICD-10-CM

## 2022-05-27 DIAGNOSIS — K80.50 BILIARY COLIC: Primary | ICD-10-CM

## 2022-05-28 ENCOUNTER — HOSPITAL ENCOUNTER (EMERGENCY)
Facility: HOSPITAL | Age: 27
Discharge: HOME | End: 2022-05-28
Attending: EMERGENCY MEDICINE
Payer: MEDICARE

## 2022-05-28 ENCOUNTER — PATIENT MESSAGE (OUTPATIENT)
Dept: DERMATOLOGY | Facility: CLINIC | Age: 27
End: 2022-05-28
Payer: MEDICARE

## 2022-05-28 VITALS
SYSTOLIC BLOOD PRESSURE: 117 MMHG | RESPIRATION RATE: 18 BRPM | HEIGHT: 59 IN | BODY MASS INDEX: 24.19 KG/M2 | WEIGHT: 120 LBS | TEMPERATURE: 98 F | HEART RATE: 95 BPM | OXYGEN SATURATION: 95 % | DIASTOLIC BLOOD PRESSURE: 58 MMHG

## 2022-05-28 DIAGNOSIS — B37.9 CANDIDIASIS: ICD-10-CM

## 2022-05-28 DIAGNOSIS — R11.2 NON-INTRACTABLE VOMITING WITH NAUSEA, UNSPECIFIED VOMITING TYPE: Primary | ICD-10-CM

## 2022-05-28 LAB
ALBUMIN SERPL BCP-MCNC: 4.2 G/DL (ref 3.5–5.2)
ALP SERPL-CCNC: 61 U/L (ref 55–135)
ALT SERPL W/O P-5'-P-CCNC: 15 U/L (ref 10–44)
ANION GAP SERPL CALC-SCNC: 10 MMOL/L (ref 8–16)
AST SERPL-CCNC: 16 U/L (ref 10–40)
B-HCG UR QL: NEGATIVE
BACTERIA #/AREA URNS HPF: ABNORMAL /HPF
BASOPHILS # BLD AUTO: 0.12 K/UL (ref 0–0.2)
BASOPHILS NFR BLD: 1.2 % (ref 0–1.9)
BILIRUB SERPL-MCNC: 1 MG/DL (ref 0.1–1)
BILIRUB UR QL STRIP: ABNORMAL
BUN SERPL-MCNC: 12 MG/DL (ref 6–20)
CALCIUM SERPL-MCNC: 9.6 MG/DL (ref 8.7–10.5)
CHLORIDE SERPL-SCNC: 107 MMOL/L (ref 95–110)
CLARITY UR: CLEAR
CO2 SERPL-SCNC: 23 MMOL/L (ref 23–29)
COLOR UR: YELLOW
CREAT SERPL-MCNC: 0.9 MG/DL (ref 0.5–1.4)
DIFFERENTIAL METHOD: ABNORMAL
EOSINOPHIL # BLD AUTO: 0.2 K/UL (ref 0–0.5)
EOSINOPHIL NFR BLD: 2.1 % (ref 0–8)
ERYTHROCYTE [DISTWIDTH] IN BLOOD BY AUTOMATED COUNT: 13 % (ref 11.5–14.5)
EST. GFR  (AFRICAN AMERICAN): >60 ML/MIN/1.73 M^2
EST. GFR  (NON AFRICAN AMERICAN): >60 ML/MIN/1.73 M^2
GLUCOSE SERPL-MCNC: 93 MG/DL (ref 70–110)
GLUCOSE UR QL STRIP: NEGATIVE
HCT VFR BLD AUTO: 39.6 % (ref 37–48.5)
HGB BLD-MCNC: 13.6 G/DL (ref 12–16)
HGB UR QL STRIP: NEGATIVE
IMM GRANULOCYTES # BLD AUTO: 0.02 K/UL (ref 0–0.04)
IMM GRANULOCYTES NFR BLD AUTO: 0.2 % (ref 0–0.5)
KETONES UR QL STRIP: NEGATIVE
LEUKOCYTE ESTERASE UR QL STRIP: ABNORMAL
LIPASE SERPL-CCNC: 23 U/L (ref 4–60)
LYMPHOCYTES # BLD AUTO: 1.5 K/UL (ref 1–4.8)
LYMPHOCYTES NFR BLD: 15.8 % (ref 18–48)
MCH RBC QN AUTO: 29.8 PG (ref 27–31)
MCHC RBC AUTO-ENTMCNC: 34.3 G/DL (ref 32–36)
MCV RBC AUTO: 87 FL (ref 82–98)
MICROSCOPIC COMMENT: ABNORMAL
MONOCYTES # BLD AUTO: 0.9 K/UL (ref 0.3–1)
MONOCYTES NFR BLD: 9.2 % (ref 4–15)
NEUTROPHILS # BLD AUTO: 7 K/UL (ref 1.8–7.7)
NEUTROPHILS NFR BLD: 71.5 % (ref 38–73)
NITRITE UR QL STRIP: NEGATIVE
NRBC BLD-RTO: 0 /100 WBC
PH UR STRIP: 7 [PH] (ref 5–8)
PLATELET # BLD AUTO: 320 K/UL (ref 150–450)
PMV BLD AUTO: 9.5 FL (ref 9.2–12.9)
POTASSIUM SERPL-SCNC: 3.9 MMOL/L (ref 3.5–5.1)
PROT SERPL-MCNC: 7 G/DL (ref 6–8.4)
PROT UR QL STRIP: NEGATIVE
RBC # BLD AUTO: 4.57 M/UL (ref 4–5.4)
RBC #/AREA URNS HPF: 3 /HPF (ref 0–4)
SODIUM SERPL-SCNC: 140 MMOL/L (ref 136–145)
SP GR UR STRIP: 1.02 (ref 1–1.03)
SQUAMOUS #/AREA URNS HPF: 8 /HPF
URN SPEC COLLECT METH UR: ABNORMAL
UROBILINOGEN UR STRIP-ACNC: NEGATIVE EU/DL
WBC # BLD AUTO: 9.72 K/UL (ref 3.9–12.7)
WBC #/AREA URNS HPF: 10 /HPF (ref 0–5)
YEAST URNS QL MICRO: ABNORMAL

## 2022-05-28 PROCEDURE — 83690 ASSAY OF LIPASE: CPT | Performed by: EMERGENCY MEDICINE

## 2022-05-28 PROCEDURE — 85025 COMPLETE CBC W/AUTO DIFF WBC: CPT | Performed by: EMERGENCY MEDICINE

## 2022-05-28 PROCEDURE — 80053 COMPREHEN METABOLIC PANEL: CPT | Performed by: EMERGENCY MEDICINE

## 2022-05-28 PROCEDURE — 36415 COLL VENOUS BLD VENIPUNCTURE: CPT | Performed by: EMERGENCY MEDICINE

## 2022-05-28 PROCEDURE — 81025 URINE PREGNANCY TEST: CPT | Performed by: EMERGENCY MEDICINE

## 2022-05-28 PROCEDURE — 99284 EMERGENCY DEPT VISIT MOD MDM: CPT

## 2022-05-28 PROCEDURE — 81000 URINALYSIS NONAUTO W/SCOPE: CPT | Performed by: EMERGENCY MEDICINE

## 2022-05-28 RX ORDER — FLUCONAZOLE 200 MG/1
200 TABLET ORAL ONCE
Qty: 1 TABLET | Refills: 0 | Status: SHIPPED | OUTPATIENT
Start: 2022-05-28 | End: 2022-05-28

## 2022-05-28 RX ORDER — ONDANSETRON 4 MG/1
4 TABLET, ORALLY DISINTEGRATING ORAL EVERY 8 HOURS PRN
Qty: 12 TABLET | Refills: 0 | OUTPATIENT
Start: 2022-05-28 | End: 2023-12-18

## 2022-05-28 RX ORDER — TINIDAZOLE 500 MG/1
2 TABLET ORAL
Qty: 28 TABLET | Refills: 0 | Status: SHIPPED | OUTPATIENT
Start: 2022-05-28 | End: 2022-06-04

## 2022-05-28 NOTE — ED PROVIDER NOTES
Encounter Date: 5/28/2022    SCRIBE #1 NOTE: IMarley, am scribing for, and in the presence of, Oseas Lozano MD.       History     Chief Complaint   Patient presents with    Nausea    Vomiting     X 2 days      Time seen by provider: 5:50 PM on 05/28/2022    Sherry Burns is a 26 y.o. female who presents to the ED with an onset of N/V x 2 days.  Patient was seen 10 days ago for acute PID and was started on Abx with 3 days remaining (finished Tuesday).  Patient reports that whenever she takes her Abx it sits for a few then she starts vomiting.  The patient denies a fever, painful urination, vaginal discharge or any other symptoms at this time.  No EtOH use reported.  PMHx includes asthma, glaucoma and anemia.  She has a PSHx of colonoscopy and EGD.      The history is provided by the patient.     Review of patient's allergies indicates:  No Known Allergies  Past Medical History:   Diagnosis Date    Anemia     Asthma     Eczema     Glaucoma      Past Surgical History:   Procedure Laterality Date    COLONOSCOPY N/A 3/5/2021    Procedure: COLONOSCOPY;  Surgeon: Jake Williamson MD;  Location: [facility];  Service: Endoscopy;  Laterality: N/A;    ESOPHAGOGASTRODUODENOSCOPY N/A 3/5/2021    Procedure: EGD (ESOPHAGOGASTRODUODENOSCOPY);  Surgeon: Jake Williamson MD;  Location: [facility];  Service: Endoscopy;  Laterality: N/A;     Family History   Problem Relation Age of Onset    Melanoma Neg Hx      Social History     Tobacco Use    Smoking status: Never Smoker    Smokeless tobacco: Never Used   Substance Use Topics    Alcohol use: Not Currently    Drug use: Never     Review of Systems   Constitutional: Negative for fever.   HENT: Negative for sore throat.    Respiratory: Negative for shortness of breath.    Cardiovascular: Negative for chest pain.   Gastrointestinal: Positive for nausea and vomiting.   Genitourinary: Negative for dysuria and vaginal discharge.   Musculoskeletal: Negative for  back pain.   Skin: Negative for rash.   Neurological: Negative for dizziness, syncope and weakness.        Positive for near syncopal episodes.   Hematological: Does not bruise/bleed easily.       Physical Exam     Initial Vitals [05/28/22 1725]   BP Pulse Resp Temp SpO2   (!) 117/58 95 18 98.4 °F (36.9 °C) 95 %      MAP       --         Physical Exam    Nursing note and vitals reviewed.  Constitutional: She appears well-developed and well-nourished. She is not diaphoretic. No distress.   HENT:   Head: Normocephalic and atraumatic.   Eyes: EOM are normal. Pupils are equal, round, and reactive to light.   Neck: Neck supple.   Normal range of motion.  Cardiovascular: Normal rate, regular rhythm, normal heart sounds and intact distal pulses. Exam reveals no gallop and no friction rub.    No murmur heard.  Pulmonary/Chest: Breath sounds normal. No respiratory distress. She has no wheezes. She has no rhonchi. She has no rales.   Abdominal: Abdomen is soft. Bowel sounds are normal. There is no abdominal tenderness. There is no rebound and no guarding.   Musculoskeletal:         General: Normal range of motion.      Cervical back: Normal range of motion and neck supple.     Neurological: She is alert and oriented to person, place, and time.   Skin: Skin is warm and dry.   Psychiatric: She has a normal mood and affect. Her behavior is normal. Judgment and thought content normal.         ED Course   Procedures  Labs Reviewed   CBC W/ AUTO DIFFERENTIAL - Abnormal; Notable for the following components:       Result Value    Lymph % 15.8 (*)     All other components within normal limits   URINALYSIS, REFLEX TO URINE CULTURE - Abnormal; Notable for the following components:    Bilirubin (UA) 1+ (*)     Leukocytes, UA 2+ (*)     All other components within normal limits    Narrative:     Specimen Source->Urine   URINALYSIS MICROSCOPIC - Abnormal; Notable for the following components:    WBC, UA 10 (*)     Yeast, UA Occasional (*)      All other components within normal limits    Narrative:     Specimen Source->Urine   COMPREHENSIVE METABOLIC PANEL   PREGNANCY TEST, URINE RAPID    Narrative:     Specimen Source->Urine   LIPASE          Imaging Results    None          Medications - No data to display  Medical Decision Making:   History:   Old Medical Records: I decided to obtain old medical records.  Initial Assessment:   26-year-old female presented with multiple complaints.  Differential Diagnosis:   Initial differential diagnosis included but not limited to medication reaction, dehydration, and electrolyte abnormality.  Clinical Tests:   Lab Tests: Ordered and Reviewed  ED Management:  The patient was emergently evaluated in the emergency department, her evaluation was significant for a young female with a benign abdominal exam.  The patient's labs were significant for 10 white blood cells as well as yeast in her urine.  The etiology of patient's symptoms is likely a side effect of the patient's Flagyl.  The patient has been instructed to stop her p.o. Flagyl.  She will be discharged home with ODT Zofran, p.o. Diflucan, and p.o. Tinidazole.  She is stable for discharge to home.  She is referred to primary care for follow-up.          Scribe Attestation:   Scribe #1: I performed the above scribed service and the documentation accurately describes the services I performed. I attest to the accuracy of the note.              I, Dr. Oseas Lozano, personally performed the services described in this documentation. All medical record entries made by the scribe were at my direction and in my presence.  I have reviewed the chart and agree that the record reflects my personal performance and is accurate and complete. Oseas Lozano MD.  10:46 PM 05/28/2022      Clinical Impression:   Final diagnoses:  [R11.2] Non-intractable vomiting with nausea, unspecified vomiting type (Primary)  [B37.9] Candidiasis          ED Disposition Condition    Discharge  Stable        ED Prescriptions     Medication Sig Dispense Start Date End Date Auth. Provider    ondansetron (ZOFRAN-ODT) 4 MG TbDL Take 1 tablet (4 mg total) by mouth every 8 (eight) hours as needed (nausea/vomiting). 12 tablet 5/28/2022  Oseas Lozano MD    fluconazole (DIFLUCAN) 200 MG Tab (Expires today) Take 1 tablet (200 mg total) by mouth once. for 1 dose 1 tablet 5/28/2022 5/28/2022 Oseas Lozano MD    tinidazole (TINDAMAX) 500 MG tablet Take 4 tablets (2 g total) by mouth daily with breakfast. for 7 days 28 tablet 5/28/2022 6/4/2022 Oseas Lozano MD        Follow-up Information     Follow up With Specialties Details Why Contact Info    Kleber Odonnell MD Internal Medicine Schedule an appointment as soon as possible for a visit   [address] [place] [address] [address]  [facility] [address]  [phone]             Oseas Lozano MD  05/28/22 7336

## 2022-06-01 ENCOUNTER — OFFICE VISIT (OUTPATIENT)
Dept: DERMATOLOGY | Facility: CLINIC | Age: 27
End: 2022-06-01
Payer: MEDICARE

## 2022-06-01 DIAGNOSIS — Q80.9 ICHTHYOSIS: ICD-10-CM

## 2022-06-01 DIAGNOSIS — L20.84 INTRINSIC ATOPIC DERMATITIS: ICD-10-CM

## 2022-06-01 DIAGNOSIS — Z51.81 MEDICATION MONITORING ENCOUNTER: Primary | ICD-10-CM

## 2022-06-01 PROCEDURE — 1159F PR MEDICATION LIST DOCUMENTED IN MEDICAL RECORD: ICD-10-PCS | Mod: CPTII,S$GLB,, | Performed by: STUDENT IN AN ORGANIZED HEALTH CARE EDUCATION/TRAINING PROGRAM

## 2022-06-01 PROCEDURE — 99214 OFFICE O/P EST MOD 30 MIN: CPT | Mod: S$GLB,,, | Performed by: STUDENT IN AN ORGANIZED HEALTH CARE EDUCATION/TRAINING PROGRAM

## 2022-06-01 PROCEDURE — 1159F MED LIST DOCD IN RCRD: CPT | Mod: CPTII,S$GLB,, | Performed by: STUDENT IN AN ORGANIZED HEALTH CARE EDUCATION/TRAINING PROGRAM

## 2022-06-01 PROCEDURE — 99214 PR OFFICE/OUTPT VISIT, EST, LEVL IV, 30-39 MIN: ICD-10-PCS | Mod: S$GLB,,, | Performed by: STUDENT IN AN ORGANIZED HEALTH CARE EDUCATION/TRAINING PROGRAM

## 2022-06-01 PROCEDURE — 1160F RVW MEDS BY RX/DR IN RCRD: CPT | Mod: CPTII,S$GLB,, | Performed by: STUDENT IN AN ORGANIZED HEALTH CARE EDUCATION/TRAINING PROGRAM

## 2022-06-01 PROCEDURE — 1160F PR REVIEW ALL MEDS BY PRESCRIBER/CLIN PHARMACIST DOCUMENTED: ICD-10-PCS | Mod: CPTII,S$GLB,, | Performed by: STUDENT IN AN ORGANIZED HEALTH CARE EDUCATION/TRAINING PROGRAM

## 2022-06-01 PROCEDURE — 99999 PR PBB SHADOW E&M-EST. PATIENT-LVL II: ICD-10-PCS | Mod: PBBFAC,,, | Performed by: STUDENT IN AN ORGANIZED HEALTH CARE EDUCATION/TRAINING PROGRAM

## 2022-06-01 PROCEDURE — 99999 PR PBB SHADOW E&M-EST. PATIENT-LVL II: CPT | Mod: PBBFAC,,, | Performed by: STUDENT IN AN ORGANIZED HEALTH CARE EDUCATION/TRAINING PROGRAM

## 2022-06-01 RX ORDER — DUPILUMAB 300 MG/2ML
INJECTION, SOLUTION SUBCUTANEOUS
Qty: 8 ML | Refills: 0 | Status: SHIPPED | OUTPATIENT
Start: 2022-06-01 | End: 2022-06-13 | Stop reason: SDUPTHER

## 2022-06-01 NOTE — PROGRESS NOTES
Subjective:       Patient ID:  Sherry Burns is a 26 y.o. female who presents for   Chief Complaint   Patient presents with    Follow-up     rash     LOV 5/18/22    Patient here today for f/u on atopic dermatitis   Patient states it is flared up on her feet, arms, hands, and face. It is itching and burning. Very flaky.  She is using Clobetasol ointment and Cerave lotion BID, no improvement. She uses Cocoa Butter and Aquaphor.      Skin, left back, punch biopsy:   - FEATURES SUGGESTIVE OF ICHTHYOSIS VULGARIS (SEE COMMENT).   COMMENT:  These histologic features are relatively subtle and non-specific.   However, the thickened layer of compact orthokeratosis overlying a diminished   granular layer in the absence of any significant epidermal spongiosis or   dermal inflammation are features that are consistent with this diagnosis.  A   resolving eczematous process cannot be entirely excluded.     Previous derm hx:     -born bright red with scaling on whole body. She was treated with PO steroids for > 1 year as a child. Her rash has lessened in severity over time but it is still present.          Review of Systems   Constitutional: Negative for fever, chills and fatigue.   Respiratory: Negative for cough and shortness of breath.    Gastrointestinal: Negative for nausea and vomiting.   Skin: Positive for itching, rash and dry skin.        Objective:    Physical Exam   Constitutional: She appears well-developed and well-nourished.   Neurological: She is alert and oriented to person, place, and time.   Psychiatric: She has a normal mood and affect.   Skin:   Areas Examined (abnormalities noted in diagram):   Back Inspection Performed  RUE Inspected  LUE Inspection Performed  RLE Inspected  LLE Inspection Performed              Diagram Legend     Erythematous scaling macule/papule c/w actinic keratosis       Vascular papule c/w angioma      Pigmented verrucoid papule/plaque c/w seborrheic keratosis      Yellow  umbilicated papule c/w sebaceous hyperplasia      Irregularly shaped tan macule c/w lentigo     1-2 mm smooth white papules consistent with Milia      Movable subcutaneous cyst with punctum c/w epidermal inclusion cyst      Subcutaneous movable cyst c/w pilar cyst      Firm pink to brown papule c/w dermatofibroma      Pedunculated fleshy papule(s) c/w skin tag(s)      Evenly pigmented macule c/w junctional nevus     Mildly variegated pigmented, slightly irregular-bordered macule c/w mildly atypical nevus      Flesh colored to evenly pigmented papule c/w intradermal nevus       Pink pearly papule/plaque c/w basal cell carcinoma      Erythematous hyperkeratotic cursted plaque c/w SCC      Surgical scar with no sign of skin cancer recurrence      Open and closed comedones      Inflammatory papules and pustules      Verrucoid papule consistent consistent with wart     Erythematous eczematous patches and plaques     Dystrophic onycholytic nail with subungual debris c/w onychomycosis     Umbilicated papule    Erythematous-base heme-crusted tan verrucoid plaque consistent with inflamed seborrheic keratosis     Erythematous Silvery Scaling Plaque c/w Psoriasis     See annotation      Assessment / Plan:        Medication monitoring encounter  -     Quantiferon Gold TB; Future; Expected date: 06/01/2022  - cbc, cmp grossly WNL from 5/28/022    Intrinsic atopic dermatitis  Ichthyosis   -     DUPIXENT SYRINGE 300 mg/2 mL Syrg; 600mg (4ml) SQ on day 1 then 300mg (2ml) on day 14 and day 28  Dispense: 8 mL; Refill: 0  -     dupilumab (DUPIXENT) 300 mg/2 mL Syrg; Inject 2 mLs (300 mg total) into the skin every other week.  Dispense: 4 mL; Refill: 2  - reviewed importance of moisturizing with moisturizer BID,  Reviewed application of clobetasol BID and topical moisturizer  - suspect she is not apply topicals as prescribed  - Discussed  benefits and risks of Dupixent including but not limited to injection site reactions, Dupixent-  induced facial dermatitis, increased risk of eye/eyelid irritation and inflammation as well as oral herpes infection and possible helminth infections.    Patient counseled to avoid live vaccines.    Will check baseline CBC and CMP.     Start Dupixent at 600mg SQ x 1 dose then 300mg SQ every other week. Available in prefilled syringe and autoinjector.   Filled out paperwork and [facility]ed [facility]             No follow-ups on file.

## 2022-06-02 ENCOUNTER — SPECIALTY PHARMACY (OUTPATIENT)
Dept: PHARMACY | Facility: CLINIC | Age: 27
End: 2022-06-02
Payer: MEDICARE

## 2022-06-02 ENCOUNTER — TELEPHONE (OUTPATIENT)
Dept: DERMATOLOGY | Facility: CLINIC | Age: 27
End: 2022-06-02
Payer: MEDICARE

## 2022-06-02 ENCOUNTER — LAB VISIT (OUTPATIENT)
Dept: LAB | Facility: HOSPITAL | Age: 27
End: 2022-06-02
Attending: STUDENT IN AN ORGANIZED HEALTH CARE EDUCATION/TRAINING PROGRAM
Payer: MEDICARE

## 2022-06-02 DIAGNOSIS — L20.84 INTRINSIC ATOPIC DERMATITIS: ICD-10-CM

## 2022-06-02 DIAGNOSIS — Z51.81 MEDICATION MONITORING ENCOUNTER: ICD-10-CM

## 2022-06-02 PROCEDURE — 86480 TB TEST CELL IMMUN MEASURE: CPT | Performed by: STUDENT IN AN ORGANIZED HEALTH CARE EDUCATION/TRAINING PROGRAM

## 2022-06-02 NOTE — TELEPHONE ENCOUNTER
PA Case: [id number],   Status: Approved,   Coverage Starts on: 1/1/2022 12:00:00 AM,   Coverage Ends on: 12/31/2022 12:00:00 AM. Questions? Contact [phone].

## 2022-06-02 NOTE — TELEPHONE ENCOUNTER
"Dupixent PA was completed by clinic staff. Test claim for loading dose adjudicates correctly (but must be run at 30 d/s). Maintenance dose order was "printed" -- refill request sent to provider to send electronically to [facility].    Nathan, this is Tori Ferreira with [facility].  We are working on your prescription that your doctor has sent us. We will be working with your insurance to get this approved for you. We will be calling you along the way with updates on your medication.  If you have any questions, you can reach us at [phone].    Welcome call outcome: Patient/caregiver reached  "

## 2022-06-06 LAB
GAMMA INTERFERON BACKGROUND BLD IA-ACNC: 0.01 IU/ML
M TB IFN-G CD4+ BCKGRND COR BLD-ACNC: -0 IU/ML
MITOGEN IGNF BCKGRD COR BLD-ACNC: 9.99 IU/ML
TB GOLD PLUS: NEGATIVE
TB2 - NIL: 0.01 IU/ML

## 2022-06-07 ENCOUNTER — TELEPHONE (OUTPATIENT)
Dept: DERMATOLOGY | Facility: CLINIC | Age: 27
End: 2022-06-07
Payer: MEDICARE

## 2022-06-07 NOTE — TELEPHONE ENCOUNTER
Patient's mom stated that she would call back to schedule an appointment when she picks up the medication.

## 2022-06-07 NOTE — TELEPHONE ENCOUNTER
----- Message from Clair Cartagena sent at 6/7/2022 10:33 AM CDT -----  Contact: [phone]  Type: Needs Medical Advice  Who Called:  Pts Eze brown    Tommy Call Back Number: [phone]    Additional Information: Pts mother calling stating that she has approved for Prescription drug coverage. For DUPIXENT SYRINGE 300 mg/2 mL Syrg. Pls call back and advise

## 2022-06-09 NOTE — TELEPHONE ENCOUNTER
PA Case: [id number] for Dupixent approved for LD and MD per insurance rep, Rubin.  Coverage dates: 1/1/2022 until 12/31/2022

## 2022-06-09 NOTE — TELEPHONE ENCOUNTER
BENEFIT INVESTIGATION: LIS Level 2  Human Medicare plan.  [facility] in network.   Deductible $465 ($465 Met)  Max Out of Pocket $3,450     Patient is in Inital stage of coverage.  Copay $4  in initial   FA not required.

## 2022-06-10 ENCOUNTER — INFUSION (OUTPATIENT)
Dept: INFUSION THERAPY | Facility: HOSPITAL | Age: 27
End: 2022-06-10
Attending: INTERNAL MEDICINE
Payer: MEDICARE

## 2022-06-10 ENCOUNTER — SPECIALTY PHARMACY (OUTPATIENT)
Dept: PHARMACY | Facility: CLINIC | Age: 27
End: 2022-06-10
Payer: MEDICARE

## 2022-06-10 VITALS
HEART RATE: 78 BPM | WEIGHT: 117 LBS | SYSTOLIC BLOOD PRESSURE: 106 MMHG | RESPIRATION RATE: 18 BRPM | TEMPERATURE: 98 F | DIASTOLIC BLOOD PRESSURE: 71 MMHG | BODY MASS INDEX: 23.59 KG/M2 | OXYGEN SATURATION: 99 % | HEIGHT: 59 IN

## 2022-06-10 DIAGNOSIS — D50.0 IRON DEFICIENCY ANEMIA DUE TO CHRONIC BLOOD LOSS: Primary | ICD-10-CM

## 2022-06-10 DIAGNOSIS — L20.9 ATOPIC DERMATITIS, UNSPECIFIED TYPE: Primary | ICD-10-CM

## 2022-06-10 PROCEDURE — 96365 THER/PROPH/DIAG IV INF INIT: CPT

## 2022-06-10 PROCEDURE — 25000003 PHARM REV CODE 250: Performed by: INTERNAL MEDICINE

## 2022-06-10 PROCEDURE — 63600175 PHARM REV CODE 636 W HCPCS: Performed by: INTERNAL MEDICINE

## 2022-06-10 PROCEDURE — A4216 STERILE WATER/SALINE, 10 ML: HCPCS | Performed by: INTERNAL MEDICINE

## 2022-06-10 RX ORDER — METHYLPREDNISOLONE SOD SUCC 125 MG
125 VIAL (EA) INJECTION ONCE AS NEEDED
Status: CANCELLED | OUTPATIENT
Start: 2022-06-17

## 2022-06-10 RX ORDER — DIPHENHYDRAMINE HYDROCHLORIDE 50 MG/ML
50 INJECTION INTRAMUSCULAR; INTRAVENOUS ONCE AS NEEDED
Status: CANCELLED | OUTPATIENT
Start: 2022-06-17

## 2022-06-10 RX ORDER — HEPARIN 100 UNIT/ML
500 SYRINGE INTRAVENOUS
Status: CANCELLED | OUTPATIENT
Start: 2022-06-17

## 2022-06-10 RX ORDER — SODIUM CHLORIDE 0.9 % (FLUSH) 0.9 %
10 SYRINGE (ML) INJECTION
Status: DISCONTINUED | OUTPATIENT
Start: 2022-06-10 | End: 2022-06-10 | Stop reason: HOSPADM

## 2022-06-10 RX ORDER — SODIUM CHLORIDE 0.9 % (FLUSH) 0.9 %
10 SYRINGE (ML) INJECTION
Status: CANCELLED | OUTPATIENT
Start: 2022-06-17

## 2022-06-10 RX ORDER — EPINEPHRINE 0.3 MG/.3ML
0.3 INJECTION SUBCUTANEOUS ONCE AS NEEDED
Status: CANCELLED | OUTPATIENT
Start: 2022-06-17

## 2022-06-10 RX ADMIN — SODIUM CHLORIDE, PRESERVATIVE FREE 10 ML: 5 INJECTION INTRAVENOUS at 03:06

## 2022-06-10 RX ADMIN — SODIUM CHLORIDE 125 MG: 9 INJECTION, SOLUTION INTRAVENOUS at 01:06

## 2022-06-10 NOTE — PLAN OF CARE
Problem: Anemia  Goal: Anemia Symptom Improvement  Outcome: Ongoing, Progressing  Intervention: Monitor and Manage Anemia  Flowsheets (Taken 6/10/2022 1321)  Oral Nutrition Promotion:   nutritional therapy counseling provided   medicated   rest periods promoted  Safety Promotion/Fall Prevention:   Fall Risk reviewed with patient/family   in recliner, wheels locked   supervised activity   instructed to call staff for mobility  Fatigue Management:   paced activity encouraged   fatigue-related activity identified   frequent rest breaks encouraged

## 2022-06-10 NOTE — TELEPHONE ENCOUNTER
Specialty Pharmacy - Initial Clinical Assessment    Specialty Medication Orders Linked to Encounter    Flowsheet Row Most Recent Value   Medication #1 DUPIXENT SYRINGE 300 mg/2 mL Syrg (Order#[id number], Rx#[id number])        Patient Diagnosis   L20.9 - Atopic dermatitis, unspecified type    Subjective    Sherry Burns is a 26 y.o. female, who is followed by the specialty pharmacy service for management and education.    Recent Encounters     Date Type Provider Description    06/10/2022 Specialty Pharmacy Luis Eduardo, PharmD Initial Clinical Assessment    06/02/2022 Specialty Pharmacy Tori Ferreira, PharmD Referral Authorization    05/12/2021 Specialty Pharmacy Myron Danielson, PharmD Referral Authorization        Clinical call attempts since last clinical assessment   No call attempts found.     Current Outpatient Medications   Medication Sig    albuterol (PROVENTIL/VENTOLIN HFA) 90 mcg/actuation inhaler Inhale 1 puff into the lungs every 4 (four) hours as needed.     busPIRone (BUSPAR) 10 MG tablet Take 10 mg by mouth 2 (two) times daily.    cetirizine (ZYRTEC) 10 MG tablet Take 10 mg by mouth daily as needed.     clobetasol 0.05% (TEMOVATE) 0.05 % Oint Apply topically 2 (two) times daily.    diclofenac (VOLTAREN) 50 MG EC tablet Take 1 tablet (50 mg total) by mouth 3 (three) times daily as needed (pain). (Patient not taking: No sig reported)    dupilumab (DUPIXENT) 300 mg/2 mL Syrg Inject 2 mLs (300 mg total) into the skin every other week.    DUPIXENT SYRINGE 300 mg/2 mL Syrg 600mg (4ml) SQ on day 1 then 300mg (2ml) on day 14 and day 28    fluticasone propionate (FLONASE) 50 mcg/actuation nasal spray 1 spray (50 mcg total) by Each Nostril route once daily.    hydrOXYzine HCL (ATARAX) 25 MG tablet Take 1 tablet (25 mg total) by mouth every evening. (Patient not taking: No sig reported)    metoprolol tartrate (LOPRESSOR) 25 MG tablet Take 25 mg by mouth 2 (two) times daily.    omeprazole (PRILOSEC) 40 MG  capsule Take 1 capsule (40 mg total) by mouth once daily.    ondansetron (ZOFRAN-ODT) 4 MG TbDL Take 1 tablet (4 mg total) by mouth every 8 (eight) hours as needed (nausea/vomiting).    sertraline (ZOLOFT) 100 MG tablet Take 100 mg by mouth every evening.    triamcinolone acetonide 0.1% (KENALOG) 0.1 % cream Apply topically 2 (two) times daily.   Last reviewed on 6/10/2022 12:38 PM by Luis Eduardo Cordero, PharmD    Review of patient's allergies indicates:  No Known AllergiesLast reviewed on  6/1/2022 3:19 PM by Skyla Yeager    Drug Interactions    Drug interactions evaluated: yes  Clinically relevant drug interactions identified: no  Provided the patient with educational material regarding drug interactions: not applicable           Assessment Questions - Documented Responses    Flowsheet Row Most Recent Value   Assessment    Medication Reconciliation completed for patient Yes   During the past 4 weeks, has patient missed any activities due to condition or medication? No   During the past 4 weeks, did patient have any of the following urgent care visits? None   Goals of Therapy Status Discussed (new start)   Status of the patients ability to self-administer: Is Able   All education points have been covered with patient? Yes, supplemental printed education provided   Welcome packet contents reviewed and discussed with patient? Yes   Assesment completed? Yes   Plan Therapy being initiated   Do you need to open a clinical intervention (i-vent)? No   Do you want to schedule first shipment? Yes        Refill Questions - Documented Responses    Flowsheet Row Most Recent Value   Refill Screening Questions    When does the patient need to receive the medication? 06/15/22   Refill Delivery Questions    How will the patient receive the medication? Delivery Daisha   When does the patient need to receive the medication? 06/15/22   Shipping Address Home   Address in [facility] confirmed and updated if neccessary? Yes  "  Expected Copay ($) 4   Is the patient able to afford the medication copay? Yes   Payment Method CC on file   Days supply of Refill 42   Supplies needed? No supplies needed   Refill activity completed? Yes   Refill activity plan Refill scheduled   Shipment/Pickup Date: 06/14/22          Objective    She has a past medical history of Anemia, Asthma, Eczema, and Glaucoma.    Tried/failed medications: clobetasol    BP Readings from Last 4 Encounters:   05/28/22 (!) 117/58   05/18/22 112/76   05/04/22 98/60   03/27/22 130/81     Ht Readings from Last 4 Encounters:   05/28/22 4' 11" (1.499 m)   05/18/22 4' 11" (1.499 m)   05/04/22 4' 11" (1.499 m)   03/27/22 4' 11" (1.499 m)     Wt Readings from Last 4 Encounters:   05/28/22 54.4 kg (120 lb)   05/18/22 54.4 kg (120 lb)   05/04/22 54.9 kg (121 lb)   03/27/22 59 kg (130 lb)     Recent Labs   Lab Result Units 05/28/22  1810 05/18/22  0934   Creatinine mg/dL 0.9 0.8   ALT U/L 15 16   AST U/L 16 14     The goals of prescribed drug therapy management include:  · Supporting patient to meet the prescriber's medical treatment objectives  · Improving or maintaining quality of life  · Maintaining optimal therapy adherence  · Minimizing and managing side effects      Goals of Therapy Status: Discussed (new start)    Assessment/Plan  Patient plans to start therapy on 06/15/22      Indication, dosage, appropriateness, effectiveness, safety and convenience of her specialty medication(s) were reviewed today.     Patient Education   Patient received education on the following:    Expectations and possible outcomes of therapy   Proper use, timely administration, and missed dose management   Duration of therapy   Side effects, including prevention, minimization, and management   Contraindications and safety precautions   New or changed medications, including prescribe and over the counter medications and supplements   Reviews recommended vaccinations, as appropriate   Storage, " safe handling, and disposal        Tasks added this encounter   No tasks added.   Tasks due within next 3 months   6/10/2022 - Clinical - Initial Assessment     [facility]  [address]  [facility] [address]  Phone: [phone]  Fax: [phone]

## 2022-06-13 ENCOUNTER — TELEPHONE (OUTPATIENT)
Dept: DERMATOLOGY | Facility: CLINIC | Age: 27
End: 2022-06-13
Payer: MEDICARE

## 2022-06-13 DIAGNOSIS — L20.84 INTRINSIC ATOPIC DERMATITIS: ICD-10-CM

## 2022-06-13 RX ORDER — DUPILUMAB 300 MG/2ML
INJECTION, SOLUTION SUBCUTANEOUS
Qty: 8 ML | Refills: 0 | OUTPATIENT
Start: 2022-06-13 | End: 2024-03-08

## 2022-06-14 ENCOUNTER — TELEPHONE (OUTPATIENT)
Dept: DERMATOLOGY | Facility: CLINIC | Age: 27
End: 2022-06-14
Payer: MEDICARE

## 2022-06-14 NOTE — TELEPHONE ENCOUNTER
----- Message from Pineda Meza sent at 6/14/2022  1:06 PM CDT -----  Regarding: injection  Contact: Mom, Lotus Gautam want to speak with a nurse regarding showing her how to give injections for dupixent, please call back at [phone] (home)     Case number [id number]

## 2022-06-16 ENCOUNTER — CLINICAL SUPPORT (OUTPATIENT)
Dept: DERMATOLOGY | Facility: CLINIC | Age: 27
End: 2022-06-16
Payer: MEDICARE

## 2022-06-16 NOTE — PROGRESS NOTES
Patient here with her mother for training on dupixent injections and for the loading dose of dupixent.  Patient brought the medication with her which was in the syringe form.  Reviewed the steps for correctly administering the injection with patient and her mother.  Writer administered the first injection in patient's right thigh.  Patient's mom administered the second injection following correct technique in patient's left thigh. Patient observed for 15 minutes post injection with no reactions noted.    Dupixent-300 mg/2 ml x 2  Lot # [id number]  Exp: 11/2024

## 2022-06-17 ENCOUNTER — INFUSION (OUTPATIENT)
Dept: INFUSION THERAPY | Facility: HOSPITAL | Age: 27
End: 2022-06-17
Attending: INTERNAL MEDICINE
Payer: MEDICARE

## 2022-06-17 VITALS
HEIGHT: 59 IN | RESPIRATION RATE: 18 BRPM | SYSTOLIC BLOOD PRESSURE: 127 MMHG | BODY MASS INDEX: 23.87 KG/M2 | TEMPERATURE: 98 F | HEART RATE: 85 BPM | DIASTOLIC BLOOD PRESSURE: 85 MMHG | OXYGEN SATURATION: 97 % | WEIGHT: 118.38 LBS

## 2022-06-17 DIAGNOSIS — D50.0 IRON DEFICIENCY ANEMIA DUE TO CHRONIC BLOOD LOSS: Primary | ICD-10-CM

## 2022-06-17 PROCEDURE — 96365 THER/PROPH/DIAG IV INF INIT: CPT

## 2022-06-17 PROCEDURE — 25000003 PHARM REV CODE 250: Performed by: INTERNAL MEDICINE

## 2022-06-17 PROCEDURE — 63600175 PHARM REV CODE 636 W HCPCS: Performed by: INTERNAL MEDICINE

## 2022-06-17 RX ORDER — SODIUM CHLORIDE 0.9 % (FLUSH) 0.9 %
10 SYRINGE (ML) INJECTION
Status: CANCELLED | OUTPATIENT
Start: 2022-06-24

## 2022-06-17 RX ORDER — SODIUM CHLORIDE 0.9 % (FLUSH) 0.9 %
10 SYRINGE (ML) INJECTION
Status: DISCONTINUED | OUTPATIENT
Start: 2022-06-17 | End: 2022-06-17 | Stop reason: HOSPADM

## 2022-06-17 RX ORDER — DIPHENHYDRAMINE HYDROCHLORIDE 50 MG/ML
50 INJECTION INTRAMUSCULAR; INTRAVENOUS ONCE AS NEEDED
Status: CANCELLED | OUTPATIENT
Start: 2022-06-24

## 2022-06-17 RX ORDER — EPINEPHRINE 0.3 MG/.3ML
0.3 INJECTION SUBCUTANEOUS ONCE AS NEEDED
Status: CANCELLED | OUTPATIENT
Start: 2022-06-24

## 2022-06-17 RX ORDER — HEPARIN 100 UNIT/ML
500 SYRINGE INTRAVENOUS
Status: CANCELLED | OUTPATIENT
Start: 2022-06-24

## 2022-06-17 RX ORDER — METHYLPREDNISOLONE SOD SUCC 125 MG
125 VIAL (EA) INJECTION ONCE AS NEEDED
Status: CANCELLED | OUTPATIENT
Start: 2022-06-24

## 2022-06-17 RX ADMIN — SODIUM CHLORIDE 125 MG: 9 INJECTION, SOLUTION INTRAVENOUS at 01:06

## 2022-06-17 NOTE — PLAN OF CARE
Problem: Anemia  Goal: Anemia Symptom Improvement  Outcome: Ongoing, Progressing  Intervention: Monitor and Manage Anemia  Flowsheets (Taken 6/17/2022 1303)  Oral Nutrition Promotion: rest periods promoted  Fatigue Management:   frequent rest breaks encouraged   paced activity encouraged   fatigue-related activity identified

## 2022-06-22 ENCOUNTER — TELEPHONE (OUTPATIENT)
Dept: DERMATOLOGY | Facility: CLINIC | Age: 27
End: 2022-06-22
Payer: MEDICARE

## 2022-06-22 NOTE — TELEPHONE ENCOUNTER
----- Message from Veronica Klein sent at 6/22/2022  1:10 PM CDT -----  Regarding: refill  Can the clinic reply in [facility]:       Please refill the medication(s) listed below. Please call the patient when the prescription(s) is ready for  at this phone number    Fauzia ( [facility] pharmacy )      Medication #1 DUPIXENT SYRINGE 300 mg/2 mL Syrg          Preferred Pharmacy: [place] [address] [phone] fax [phone]

## 2022-06-24 ENCOUNTER — INFUSION (OUTPATIENT)
Dept: INFUSION THERAPY | Facility: HOSPITAL | Age: 27
End: 2022-06-24
Attending: INTERNAL MEDICINE
Payer: MEDICARE

## 2022-06-24 VITALS
HEART RATE: 72 BPM | RESPIRATION RATE: 18 BRPM | SYSTOLIC BLOOD PRESSURE: 107 MMHG | DIASTOLIC BLOOD PRESSURE: 72 MMHG | HEIGHT: 59 IN | TEMPERATURE: 98 F | WEIGHT: 117.5 LBS | BODY MASS INDEX: 23.69 KG/M2 | OXYGEN SATURATION: 99 %

## 2022-06-24 DIAGNOSIS — D50.0 IRON DEFICIENCY ANEMIA DUE TO CHRONIC BLOOD LOSS: Primary | ICD-10-CM

## 2022-06-24 PROCEDURE — 96365 THER/PROPH/DIAG IV INF INIT: CPT

## 2022-06-24 PROCEDURE — 63600175 PHARM REV CODE 636 W HCPCS: Performed by: INTERNAL MEDICINE

## 2022-06-24 PROCEDURE — 25000003 PHARM REV CODE 250: Performed by: INTERNAL MEDICINE

## 2022-06-24 RX ORDER — SODIUM CHLORIDE 0.9 % (FLUSH) 0.9 %
10 SYRINGE (ML) INJECTION
Status: CANCELLED | OUTPATIENT
Start: 2022-07-01

## 2022-06-24 RX ORDER — METHYLPREDNISOLONE SOD SUCC 125 MG
125 VIAL (EA) INJECTION ONCE AS NEEDED
Status: CANCELLED | OUTPATIENT
Start: 2022-07-01

## 2022-06-24 RX ORDER — HEPARIN 100 UNIT/ML
500 SYRINGE INTRAVENOUS
Status: CANCELLED | OUTPATIENT
Start: 2022-07-01

## 2022-06-24 RX ORDER — EPINEPHRINE 0.3 MG/.3ML
0.3 INJECTION SUBCUTANEOUS ONCE AS NEEDED
Status: CANCELLED | OUTPATIENT
Start: 2022-07-01

## 2022-06-24 RX ORDER — DIPHENHYDRAMINE HYDROCHLORIDE 50 MG/ML
50 INJECTION INTRAMUSCULAR; INTRAVENOUS ONCE AS NEEDED
Status: CANCELLED | OUTPATIENT
Start: 2022-07-01

## 2022-06-24 RX ADMIN — SODIUM CHLORIDE: 9 INJECTION, SOLUTION INTRAVENOUS at 01:06

## 2022-06-24 RX ADMIN — SODIUM CHLORIDE 125 MG: 9 INJECTION, SOLUTION INTRAVENOUS at 01:06

## 2022-06-24 NOTE — PLAN OF CARE
Problem: Anemia  Goal: Anemia Symptom Improvement  Outcome: Ongoing, Progressing  Intervention: Monitor and Manage Anemia  Flowsheets (Taken 6/24/2022 1313)  Oral Nutrition Promotion: rest periods promoted  Safety Promotion/Fall Prevention: instructed to call staff for mobility  Fatigue Management:   activity schedule adjusted   frequent rest breaks encouraged

## 2022-06-29 ENCOUNTER — HOSPITAL ENCOUNTER (EMERGENCY)
Facility: HOSPITAL | Age: 27
Discharge: HOME | End: 2022-06-29
Attending: EMERGENCY MEDICINE
Payer: MEDICARE

## 2022-06-29 VITALS
HEART RATE: 74 BPM | DIASTOLIC BLOOD PRESSURE: 69 MMHG | SYSTOLIC BLOOD PRESSURE: 107 MMHG | RESPIRATION RATE: 16 BRPM | TEMPERATURE: 98 F | HEIGHT: 59 IN | OXYGEN SATURATION: 97 % | BODY MASS INDEX: 23.99 KG/M2 | WEIGHT: 119 LBS

## 2022-06-29 DIAGNOSIS — R59.1 LYMPHADENOPATHY: ICD-10-CM

## 2022-06-29 DIAGNOSIS — B00.1 COLD SORE: Primary | ICD-10-CM

## 2022-06-29 LAB — B-HCG UR QL: NEGATIVE

## 2022-06-29 PROCEDURE — 25500020 PHARM REV CODE 255

## 2022-06-29 PROCEDURE — 99285 EMERGENCY DEPT VISIT HI MDM: CPT | Mod: 25

## 2022-06-29 PROCEDURE — 81025 URINE PREGNANCY TEST: CPT | Performed by: NURSE PRACTITIONER

## 2022-06-29 RX ORDER — VALACYCLOVIR HYDROCHLORIDE 1 G/1
1000 TABLET, FILM COATED ORAL EVERY 12 HOURS
Qty: 14 TABLET | Refills: 0 | Status: SHIPPED | OUTPATIENT
Start: 2022-06-29 | End: 2024-01-05

## 2022-06-29 RX ORDER — CLINDAMYCIN HYDROCHLORIDE 150 MG/1
300 CAPSULE ORAL 4 TIMES DAILY
Qty: 56 CAPSULE | Refills: 0 | Status: SHIPPED | OUTPATIENT
Start: 2022-06-29 | End: 2022-06-29 | Stop reason: SDUPTHER

## 2022-06-29 RX ORDER — VALACYCLOVIR HYDROCHLORIDE 1 G/1
2000 TABLET, FILM COATED ORAL EVERY 12 HOURS
Qty: 4 TABLET | Refills: 0 | Status: SHIPPED | OUTPATIENT
Start: 2022-06-29 | End: 2022-06-29 | Stop reason: SDUPTHER

## 2022-06-29 RX ORDER — CLINDAMYCIN HYDROCHLORIDE 150 MG/1
300 CAPSULE ORAL 4 TIMES DAILY
Qty: 56 CAPSULE | Refills: 0 | Status: SHIPPED | OUTPATIENT
Start: 2022-06-29 | End: 2022-07-06

## 2022-06-29 RX ADMIN — IOHEXOL 75 ML: 350 INJECTION, SOLUTION INTRAVENOUS at 10:06

## 2022-06-29 NOTE — ED PROVIDER NOTES
"Encounter Date: 6/29/2022    SCRIBE #1 NOTE: IMarley, am scribing for, and in the presence of, MICHEAL Nicole.       History     Chief Complaint   Patient presents with    Oral Swelling     Pt noticed a lump under chin since Monday.  Says it is becoming painful and having problems swallowing.     Time seen by provider: 9:12 AM on 06/29/2022    Sherry Burns is a 26 y.o. female who presents to the ED for an evaluation of facial swelling.  Patient reports Sx began with chin pain followed by a "lump" under her chin 3 days ago.  Since then the area has become more painful with associated swelling and odynophagia.  No OTC medications were taken for pain.  No other aggravating or alleviating factors discussed.  The patient denies fever, pain to the floor of her mouth, SOB, allergies or any other symptoms at this time.  Patient has a PMHx of eczema, asthma, glaucoma and anemia.  PSHx includes EGD.      The history is provided by the patient.     Review of patient's allergies indicates:  No Known Allergies  Past Medical History:   Diagnosis Date    Anemia     Asthma     Eczema     Glaucoma      Past Surgical History:   Procedure Laterality Date    COLONOSCOPY N/A 3/5/2021    Procedure: COLONOSCOPY;  Surgeon: Jake Williamson MD;  Location: [facility];  Service: Endoscopy;  Laterality: N/A;    ESOPHAGOGASTRODUODENOSCOPY N/A 3/5/2021    Procedure: EGD (ESOPHAGOGASTRODUODENOSCOPY);  Surgeon: Jake Williamson MD;  Location: [facility];  Service: Endoscopy;  Laterality: N/A;     Family History   Problem Relation Age of Onset    Melanoma Neg Hx      Social History     Tobacco Use    Smoking status: Never Smoker    Smokeless tobacco: Never Used   Substance Use Topics    Alcohol use: Not Currently    Drug use: Never     Review of Systems   Constitutional: Negative for fever.   HENT: Positive for facial swelling (chin) and trouble swallowing (due to pain). Negative for sore throat.         Positive for " chin pain and lump to chin.    Respiratory: Negative for shortness of breath.    Cardiovascular: Negative for chest pain.   Gastrointestinal: Negative for nausea.   Genitourinary: Negative for dysuria.   Musculoskeletal: Negative for back pain.   Skin: Negative for rash.   Allergic/Immunologic: Negative for environmental allergies and food allergies.   Neurological: Negative for weakness.   Hematological: Does not bruise/bleed easily.       Physical Exam     Initial Vitals [06/29/22 0835]   BP Pulse Resp Temp SpO2   108/60 82 16 98.3 °F (36.8 °C) 98 %      MAP       --         Physical Exam    Nursing note and vitals reviewed.  Constitutional: Vital signs are normal. She appears well-developed and well-nourished.   HENT:   Head: Normocephalic and atraumatic.   Mouth/Throat: Uvula is midline, oropharynx is clear and moist and mucous membranes are normal.   Induration, tenderness, and swelling to the submental area. No erythema.  Normal phonation. Uvula midline. No facial swelling. Floor of the mouth without swelling or palpable stones or masses. No drainage from bladimir's duct. No drooling. Chin with yellow vesicular lesions. No intra oral lesions.    Eyes: Pupils are equal, round, and reactive to light.   Neck: Phonation normal. Neck supple.   Cardiovascular: Normal rate, regular rhythm, normal heart sounds and intact distal pulses. Exam reveals no gallop and no friction rub.    No murmur heard.  Pulmonary/Chest: Breath sounds normal. She has no wheezes. She has no rhonchi. She has no rales.   Abdominal: Abdomen is soft. Bowel sounds are normal. There is no abdominal tenderness.   Musculoskeletal:      Cervical back: Neck supple.     Neurological: She is alert and oriented to person, place, and time. She has normal strength.   Skin: Skin is warm, dry and intact.   Psychiatric: She has a normal mood and affect. Her speech is normal and behavior is normal.         ED Course   Procedures  Labs Reviewed   PREGNANCY  TEST, URINE RAPID    Narrative:     Specimen Source->Urine          Imaging Results          CT Soft Tissue Neck With Contrast (Final result)  Result time 06/29/22 10:28:34    Final result by Nato Barrera MD (06/29/22 10:28:34)                 Impression:      1. There is stranding in the chin soft tissues likely representing nonspecific inflammation or edema.  There are mildly prominent submental and submandibular lymph nodes.  These are nonspecific but likely reactive.  There is no focal mass, abscess or necrotic lymph node.  The patient has a tongue piercing which does result in some beam hardening artifact.  2. There is asymmetric heterogeneous enhancement in the right parotid gland without focal or well-defined mass.  This could represent nonspecific inflammation or infection.      Electronically signed by: Nato Barrera MD  Date:    06/29/2022  Time:    10:28             Narrative:    EXAMINATION:  CT soft tissue neck with contrast    TECHNIQUE:  Contrast-enhanced soft tissue neck CT was performed after the uneventful intravenous administration of 75 ml Omnipaque 350.  Sagittal and coronal reformatted images were created.  The study is reviewed in bone and soft tissue windows.    COMPARISON:  None    FINDINGS:  Soft tissues: A marker was placed on the skin under the chin.  There is stranding in the subjacent subcutaneous fat.  Also, there are mildly prominent submental and submandibular lymph nodes.  These are nonspecific but likely reactive.  There is no focal solid or cystic mass.  There is no low-density rim enhancing structure to suggest abscess or necrotic lymph node.  The patient has a tongue piercing which does result in some beam hardening artifact.    Skull base and brain: The included intracranial contents are normal.  This is a limited evaluation of the brain but the basilar cisterns are open.  The 4th ventricle is normal in size and position.  The vessels at the base of the brain  enhance normally.    Parotid/Submandibular glands: The submandibular glands appear normal and symmetric in volume and enhancement.  The left parotid gland appears normal.  There is heterogeneous enhancement in the right parotid gland mostly involving the superficial portion.  This is nonspecific and could represent focal inflammation, infection.  A well-defined mass is not present.    Thyroid gland: The thyroid gland appears normal in volume and enhancement.    Larynx, pharynx: The posterior nasopharyngeal soft tissues are normal.  The parapharyngeal fat spaces are preserved.  The epiglottis, vallecular and piriform sinuses are normal.  The vocal cords are normal.    Lymph nodes: As mentioned above, there are mildly prominent bilateral submandibular lymph nodes as well as submental lymph nodes.    Vasculature: The vessels in the neck enhance normally.    Airway, lungs: The airway is patent.  The included lung apices are expanded and clear.    Sinus/mastoid: The paranasal sinuses and mastoid air cells are clear.    Bones: The included spine is normal.  The mandible is normal.  There is normal articulation at the TMJ bilaterally.  The included facial structures are normal.                                 Medications   iohexoL (OMNIPAQUE 350) 350 mg iodine/mL injection (75 mLs Intravenous Given 6/29/22 1007)     Medical Decision Making:   History:   Old Medical Records: I decided to obtain old medical records.  Differential Diagnosis:   Bradley's angina  sialadenitis  Reactive lymphadenopathy  Clinical Tests:   Lab Tests: Ordered and Reviewed  Radiological Study: Ordered and Reviewed       APC / Resident Notes:   Patient is a 26 y.o. female who presents to the ED 06/29/2022 who underwent emergent evaluation for oral swelling.  Patient has some swelling and tenderness to the submental area of her chin.  No swelling of the floor the mouth.  Normal phonation.  She is not drooling.  No palpable oral lesions or masses or  drainable abscess present.  She has no other facial swelling or erythema or induration.  Uvula midline.  Patient does have vesicular lesions on her chin and this is likely reactive lymphadenopathy.  CT without evidence of drainable abscess.  Do not think parotitis.  No evidence of other emergent process such as epiglottitis.  Patient tolerating p.o. and does not appear septic or toxic.  No systemic signs of infection.  She is given antiviral therapy for her oral herpes and she is given antibiotics as well for possibly early secondary bacterial infection.  There is no evidence of airway compromise. Based on my clinical evaluation, I do not appreciate any immediate, emergent, or life threatening condition or etiology that warrants additional workup today and feel that the patient can be discharged with close follow up care. Case discussed with Dr. Lei who is agreeable to plan of care. Follow up and return precautions discussed; patient verbalized understanding and is agreeable to plan of care. Patient discharged home in stable condition.              Scribe Attestation:   Scribe #1: I performed the above scribed service and the documentation accurately describes the services I performed. I attest to the accuracy of the note.    Attending Attestation:           Physician Attestation for Scribe:  Physician Attestation Statement for Scribe #1: I, Marleny Barahona, reviewed documentation, as scribed by in my presence, and it is both accurate and complete.     Comments: I, MICHEAL Nicole, personally performed the services described in this documentation. All medical record entries made by the scribe were at my direction and in my presence.  I have reviewed the chart and agree that the record reflects my personal performance and is accurate and complete. MICHEAL Nicole.  11:30 AM 06/29/2022                     Clinical Impression:   Final diagnoses:  [B00.1] Cold sore (Primary)  [R59.1] Lymphadenopathy          ED  Disposition Condition    Discharge Stable        ED Prescriptions     Medication Sig Dispense Start Date End Date Auth. Provider    clindamycin (CLEOCIN) 150 MG capsule  (Status: Discontinued) Take 2 capsules (300 mg total) by mouth 4 (four) times daily. for 7 days 56 capsule 6/29/2022 6/29/2022 Marleny Barahona NP    valACYclovir (VALTREX) 1000 MG tablet  (Status: Discontinued) Take 2 tablets (2,000 mg total) by mouth every 12 (twelve) hours. for 1 day 4 tablet 6/29/2022 6/29/2022 Marleny Barahona NP    clindamycin (CLEOCIN) 150 MG capsule Take 2 capsules (300 mg total) by mouth 4 (four) times daily. for 7 days 56 capsule 6/29/2022 7/6/2022 Marleny Barahona NP    valACYclovir (VALTREX) 1000 MG tablet Take 1 tablet (1,000 mg total) by mouth every 12 (twelve) hours. for 7 days 14 tablet 6/29/2022 7/6/2022 Marleny Barahona NP        Follow-up Information     Follow up With Specialties Details Why Contact Info    Kleber Odonnell MD Internal Medicine In 2 days  [address] [place] [address] [facility] [id number]  [phone]      [facility] Emergency Dept Emergency Medicine  As needed, If symptoms worsen [address] [place] [id number]  [phone]           Marleny aBrahona NP  06/29/22 7694

## 2022-07-01 ENCOUNTER — INFUSION (OUTPATIENT)
Dept: INFUSION THERAPY | Facility: HOSPITAL | Age: 27
End: 2022-07-01
Attending: INTERNAL MEDICINE
Payer: MEDICARE

## 2022-07-01 VITALS
TEMPERATURE: 98 F | SYSTOLIC BLOOD PRESSURE: 114 MMHG | DIASTOLIC BLOOD PRESSURE: 80 MMHG | BODY MASS INDEX: 23.72 KG/M2 | HEART RATE: 91 BPM | RESPIRATION RATE: 18 BRPM | HEIGHT: 59 IN | WEIGHT: 117.69 LBS | OXYGEN SATURATION: 96 %

## 2022-07-01 DIAGNOSIS — D50.0 IRON DEFICIENCY ANEMIA DUE TO CHRONIC BLOOD LOSS: Primary | ICD-10-CM

## 2022-07-01 PROCEDURE — 96365 THER/PROPH/DIAG IV INF INIT: CPT

## 2022-07-01 PROCEDURE — 63600175 PHARM REV CODE 636 W HCPCS: Performed by: INTERNAL MEDICINE

## 2022-07-01 PROCEDURE — 25000003 PHARM REV CODE 250: Performed by: INTERNAL MEDICINE

## 2022-07-01 RX ORDER — HEPARIN 100 UNIT/ML
500 SYRINGE INTRAVENOUS
Status: CANCELLED | OUTPATIENT
Start: 2022-07-08

## 2022-07-01 RX ORDER — METHYLPREDNISOLONE SOD SUCC 125 MG
125 VIAL (EA) INJECTION ONCE AS NEEDED
Status: CANCELLED | OUTPATIENT
Start: 2022-07-08

## 2022-07-01 RX ORDER — SODIUM CHLORIDE 0.9 % (FLUSH) 0.9 %
10 SYRINGE (ML) INJECTION
Status: CANCELLED | OUTPATIENT
Start: 2022-07-08

## 2022-07-01 RX ORDER — SODIUM CHLORIDE 0.9 % (FLUSH) 0.9 %
10 SYRINGE (ML) INJECTION
Status: DISCONTINUED | OUTPATIENT
Start: 2022-07-01 | End: 2022-07-01 | Stop reason: HOSPADM

## 2022-07-01 RX ORDER — EPINEPHRINE 0.3 MG/.3ML
0.3 INJECTION SUBCUTANEOUS ONCE AS NEEDED
Status: CANCELLED | OUTPATIENT
Start: 2022-07-08

## 2022-07-01 RX ORDER — DIPHENHYDRAMINE HYDROCHLORIDE 50 MG/ML
50 INJECTION INTRAMUSCULAR; INTRAVENOUS ONCE AS NEEDED
Status: CANCELLED | OUTPATIENT
Start: 2022-07-08

## 2022-07-01 RX ADMIN — SODIUM CHLORIDE 125 MG: 9 INJECTION, SOLUTION INTRAVENOUS at 01:07

## 2022-07-01 NOTE — PLAN OF CARE
Problem: Anemia  Goal: Anemia Symptom Improvement  Intervention: Monitor and Manage Anemia  Flowsheets (Taken 7/1/2022 153)  Oral Nutrition Promotion: rest periods promoted  Fatigue Management:   activity schedule adjusted   frequent rest breaks encouraged

## 2022-07-08 ENCOUNTER — INFUSION (OUTPATIENT)
Dept: INFUSION THERAPY | Facility: HOSPITAL | Age: 27
End: 2022-07-08
Attending: INTERNAL MEDICINE
Payer: MEDICARE

## 2022-07-08 VITALS
BODY MASS INDEX: 23.66 KG/M2 | SYSTOLIC BLOOD PRESSURE: 116 MMHG | RESPIRATION RATE: 18 BRPM | DIASTOLIC BLOOD PRESSURE: 77 MMHG | WEIGHT: 117.38 LBS | HEART RATE: 86 BPM | HEIGHT: 59 IN | TEMPERATURE: 98 F

## 2022-07-08 DIAGNOSIS — D50.0 IRON DEFICIENCY ANEMIA DUE TO CHRONIC BLOOD LOSS: Primary | ICD-10-CM

## 2022-07-08 PROCEDURE — 96365 THER/PROPH/DIAG IV INF INIT: CPT

## 2022-07-08 PROCEDURE — 25000003 PHARM REV CODE 250: Performed by: INTERNAL MEDICINE

## 2022-07-08 PROCEDURE — 63600175 PHARM REV CODE 636 W HCPCS: Performed by: INTERNAL MEDICINE

## 2022-07-08 RX ORDER — DIPHENHYDRAMINE HYDROCHLORIDE 50 MG/ML
50 INJECTION INTRAMUSCULAR; INTRAVENOUS ONCE AS NEEDED
Status: CANCELLED | OUTPATIENT
Start: 2022-07-15

## 2022-07-08 RX ORDER — HEPARIN 100 UNIT/ML
500 SYRINGE INTRAVENOUS
Status: CANCELLED | OUTPATIENT
Start: 2022-07-15

## 2022-07-08 RX ORDER — EPINEPHRINE 0.3 MG/.3ML
0.3 INJECTION SUBCUTANEOUS ONCE AS NEEDED
Status: CANCELLED | OUTPATIENT
Start: 2022-07-15

## 2022-07-08 RX ORDER — SODIUM CHLORIDE 0.9 % (FLUSH) 0.9 %
10 SYRINGE (ML) INJECTION
Status: CANCELLED | OUTPATIENT
Start: 2022-07-15

## 2022-07-08 RX ORDER — SODIUM CHLORIDE 0.9 % (FLUSH) 0.9 %
10 SYRINGE (ML) INJECTION
Status: DISCONTINUED | OUTPATIENT
Start: 2022-07-08 | End: 2022-07-08 | Stop reason: HOSPADM

## 2022-07-08 RX ORDER — METHYLPREDNISOLONE SOD SUCC 125 MG
125 VIAL (EA) INJECTION ONCE AS NEEDED
Status: CANCELLED | OUTPATIENT
Start: 2022-07-15

## 2022-07-08 RX ADMIN — SODIUM CHLORIDE 125 MG: 9 INJECTION, SOLUTION INTRAVENOUS at 01:07

## 2022-07-08 NOTE — PLAN OF CARE
Problem: Anemia  Goal: Anemia Symptom Improvement  7/8/2022 1403 by Jami Rios RN  Outcome: Met  7/8/2022 1403 by Jami Rios RN  Outcome: Ongoing, Progressing

## 2022-07-08 NOTE — NURSING
Pt declined the observation period following infusion.  Discussed signs and symptoms of adverse reactions and when to call the doctor and/or EMS.  Pt did not exhibit any adverse reactions upon discharge.

## 2022-07-15 ENCOUNTER — INFUSION (OUTPATIENT)
Dept: INFUSION THERAPY | Facility: HOSPITAL | Age: 27
End: 2022-07-15
Attending: INTERNAL MEDICINE
Payer: MEDICARE

## 2022-07-15 VITALS
DIASTOLIC BLOOD PRESSURE: 72 MMHG | TEMPERATURE: 98 F | HEIGHT: 59 IN | HEART RATE: 69 BPM | SYSTOLIC BLOOD PRESSURE: 108 MMHG | RESPIRATION RATE: 18 BRPM | WEIGHT: 118.88 LBS | BODY MASS INDEX: 23.96 KG/M2

## 2022-07-15 DIAGNOSIS — D50.0 IRON DEFICIENCY ANEMIA DUE TO CHRONIC BLOOD LOSS: Primary | ICD-10-CM

## 2022-07-15 PROCEDURE — 63600175 PHARM REV CODE 636 W HCPCS: Performed by: INTERNAL MEDICINE

## 2022-07-15 PROCEDURE — 96365 THER/PROPH/DIAG IV INF INIT: CPT

## 2022-07-15 PROCEDURE — 25000003 PHARM REV CODE 250: Performed by: INTERNAL MEDICINE

## 2022-07-15 RX ORDER — SODIUM CHLORIDE 0.9 % (FLUSH) 0.9 %
10 SYRINGE (ML) INJECTION
Status: DISCONTINUED | OUTPATIENT
Start: 2022-07-15 | End: 2022-07-15 | Stop reason: HOSPADM

## 2022-07-15 RX ORDER — EPINEPHRINE 0.3 MG/.3ML
0.3 INJECTION SUBCUTANEOUS ONCE AS NEEDED
Status: CANCELLED | OUTPATIENT
Start: 2022-07-22

## 2022-07-15 RX ORDER — DIPHENHYDRAMINE HYDROCHLORIDE 50 MG/ML
50 INJECTION INTRAMUSCULAR; INTRAVENOUS ONCE AS NEEDED
Status: CANCELLED | OUTPATIENT
Start: 2022-07-22

## 2022-07-15 RX ORDER — SODIUM CHLORIDE 0.9 % (FLUSH) 0.9 %
10 SYRINGE (ML) INJECTION
Status: CANCELLED | OUTPATIENT
Start: 2022-07-22

## 2022-07-15 RX ORDER — METHYLPREDNISOLONE SOD SUCC 125 MG
125 VIAL (EA) INJECTION ONCE AS NEEDED
Status: CANCELLED | OUTPATIENT
Start: 2022-07-22

## 2022-07-15 RX ORDER — HEPARIN 100 UNIT/ML
500 SYRINGE INTRAVENOUS
Status: CANCELLED | OUTPATIENT
Start: 2022-07-22

## 2022-07-15 RX ADMIN — SODIUM CHLORIDE 125 MG: 9 INJECTION, SOLUTION INTRAVENOUS at 01:07

## 2022-07-15 NOTE — PLAN OF CARE
Problem: Fatigue  Goal: Improved Activity Tolerance  Intervention: Promote Improved Energy  Flowsheets (Taken 7/15/2022 9969)  Fatigue Management:   frequent rest breaks encouraged   fatigue-related activity identified  Activity Management: Ambulated -L4

## 2022-07-18 ENCOUNTER — SPECIALTY PHARMACY (OUTPATIENT)
Dept: PHARMACY | Facility: CLINIC | Age: 27
End: 2022-07-18
Payer: MEDICARE

## 2022-07-18 NOTE — TELEPHONE ENCOUNTER
Specialty Pharmacy - Refill Coordination    Specialty Medication Orders Linked to Encounter    Flowsheet Row Most Recent Value   Medication #1 dupilumab (DUPIXENT) 300 mg/2 mL Syrg (Order#[id number], Rx#[id number])          Refill Questions - Documented Responses    Flowsheet Row Most Recent Value   Patient Availability and HIPAA Verification    Does patient want to proceed with activity? Yes   HIPAA/medical authority confirmed? Yes   Relationship to patient of person spoken to? Mother   Refill Screening Questions    Changes to allergies? No   Changes to medications? No   New conditions since last clinic visit? No   Unplanned office visit, urgent care, ED, or hospital admission in the last 4 weeks? No   How does patient/caregiver feel medication is working? Good   Financial problems or insurance changes? No   How many doses of your specialty medications were missed in the last 4 weeks? 0   Would patient like to speak to a pharmacist? No   When does the patient need to receive the medication? 07/25/22   Refill Delivery Questions    How will the patient receive the medication? Delivery Daisha   When does the patient need to receive the medication? 07/25/22   Shipping Address Prescription   Address in [facility] confirmed and updated if neccessary? Yes   Expected Copay ($) 4   Is the patient able to afford the medication copay? Yes   Payment Method CC on file   Days supply of Refill 28   Supplies needed? No supplies needed   Refill activity completed? Yes   Refill activity plan Refill scheduled   Shipment/Pickup Date: 07/20/22          Current Outpatient Medications   Medication Sig    albuterol (PROVENTIL/VENTOLIN HFA) 90 mcg/actuation inhaler Inhale 1 puff into the lungs every 4 (four) hours as needed.     busPIRone (BUSPAR) 10 MG tablet Take 10 mg by mouth 2 (two) times daily.    cetirizine (ZYRTEC) 10 MG tablet Take 10 mg by mouth daily as needed.     clobetasol 0.05% (TEMOVATE) 0.05 % Oint Apply topically  2 (two) times daily.    diclofenac (VOLTAREN) 50 MG EC tablet Take 1 tablet (50 mg total) by mouth 3 (three) times daily as needed (pain). (Patient not taking: No sig reported)    dupilumab (DUPIXENT) 300 mg/2 mL Syrg Inject 2 mLs (300 mg total) into the skin every other week.    DUPIXENT SYRINGE 300 mg/2 mL Syrg Inject 600mg (2 syringes) into the skin on day 1 then inject 300mg (1 syringe) every other week.    fluticasone propionate (FLONASE) 50 mcg/actuation nasal spray 1 spray (50 mcg total) by Each Nostril route once daily.    hydrOXYzine HCL (ATARAX) 25 MG tablet Take 1 tablet (25 mg total) by mouth every evening. (Patient not taking: No sig reported)    metoprolol tartrate (LOPRESSOR) 25 MG tablet Take 25 mg by mouth 2 (two) times daily.    omeprazole (PRILOSEC) 40 MG capsule Take 1 capsule (40 mg total) by mouth once daily.    ondansetron (ZOFRAN-ODT) 4 MG TbDL Take 1 tablet (4 mg total) by mouth every 8 (eight) hours as needed (nausea/vomiting).    sertraline (ZOLOFT) 100 MG tablet Take 100 mg by mouth every evening.    triamcinolone acetonide 0.1% (KENALOG) 0.1 % cream Apply topically 2 (two) times daily.    valACYclovir (VALTREX) 1000 MG tablet Take 1 tablet (1,000 mg total) by mouth every 12 (twelve) hours. for 7 days   Last reviewed on 7/8/2022  1:11 PM by Jami Rios RN    Review of patient's allergies indicates:  No Known Allergies Last reviewed on  7/15/2022 1:12 PM by Елена Anderson      Tasks added this encounter   8/25/2022 - Refill Call (Auto Added)   Tasks due within next 3 months   No tasks due.     Cynthia Marie [facility]  [address] [place] [id number]  Phone: [phone]  Fax: [phone]

## 2022-07-22 ENCOUNTER — INFUSION (OUTPATIENT)
Dept: INFUSION THERAPY | Facility: HOSPITAL | Age: 27
End: 2022-07-22
Attending: INTERNAL MEDICINE
Payer: MEDICARE

## 2022-07-22 VITALS
SYSTOLIC BLOOD PRESSURE: 119 MMHG | OXYGEN SATURATION: 97 % | DIASTOLIC BLOOD PRESSURE: 83 MMHG | RESPIRATION RATE: 18 BRPM | TEMPERATURE: 97 F | WEIGHT: 117.63 LBS | HEIGHT: 59 IN | BODY MASS INDEX: 23.72 KG/M2 | HEART RATE: 68 BPM

## 2022-07-22 DIAGNOSIS — D50.0 IRON DEFICIENCY ANEMIA DUE TO CHRONIC BLOOD LOSS: Primary | ICD-10-CM

## 2022-07-22 PROCEDURE — 96365 THER/PROPH/DIAG IV INF INIT: CPT

## 2022-07-22 PROCEDURE — 63600175 PHARM REV CODE 636 W HCPCS: Performed by: INTERNAL MEDICINE

## 2022-07-22 PROCEDURE — 25000003 PHARM REV CODE 250: Performed by: INTERNAL MEDICINE

## 2022-07-22 RX ORDER — HEPARIN 100 UNIT/ML
500 SYRINGE INTRAVENOUS
Status: CANCELLED | OUTPATIENT
Start: 2022-07-29

## 2022-07-22 RX ORDER — SODIUM CHLORIDE 0.9 % (FLUSH) 0.9 %
10 SYRINGE (ML) INJECTION
Status: DISCONTINUED | OUTPATIENT
Start: 2022-07-22 | End: 2022-07-22 | Stop reason: HOSPADM

## 2022-07-22 RX ORDER — SODIUM CHLORIDE 0.9 % (FLUSH) 0.9 %
10 SYRINGE (ML) INJECTION
Status: CANCELLED | OUTPATIENT
Start: 2022-07-29

## 2022-07-22 RX ORDER — EPINEPHRINE 0.3 MG/.3ML
0.3 INJECTION SUBCUTANEOUS ONCE AS NEEDED
Status: CANCELLED | OUTPATIENT
Start: 2022-07-29

## 2022-07-22 RX ORDER — DIPHENHYDRAMINE HYDROCHLORIDE 50 MG/ML
50 INJECTION INTRAMUSCULAR; INTRAVENOUS ONCE AS NEEDED
Status: CANCELLED | OUTPATIENT
Start: 2022-07-29

## 2022-07-22 RX ORDER — METHYLPREDNISOLONE SOD SUCC 125 MG
125 VIAL (EA) INJECTION ONCE AS NEEDED
Status: CANCELLED | OUTPATIENT
Start: 2022-07-29

## 2022-07-22 RX ADMIN — SODIUM CHLORIDE 125 MG: 9 INJECTION, SOLUTION INTRAVENOUS at 01:07

## 2022-07-22 NOTE — NURSING
Patient declines to wait one hour post infusion as ordered by MD. Patient educated on s/s of infusion reaction and what to do if one occurs. Patient verbalized understanding. No acute distress noted at time of discharge.

## 2022-07-22 NOTE — TELEPHONE ENCOUNTER
Incoming call from pt's mother to give new credit card information.  Entered card into [facility] and set up delivery for 7/25.

## 2022-07-22 NOTE — PLAN OF CARE
Problem: Fatigue  Goal: Improved Activity Tolerance  Outcome: Ongoing, Progressing  Intervention: Promote Improved Energy  Flowsheets (Taken 7/22/2022 1307)  Fatigue Management:   frequent rest breaks encouraged   fatigue-related activity identified   paced activity encouraged  Sleep/Rest Enhancement:   regular sleep/rest pattern promoted   relaxation techniques promoted

## 2022-07-25 ENCOUNTER — TELEPHONE (OUTPATIENT)
Dept: PHARMACY | Facility: CLINIC | Age: 27
End: 2022-07-25
Payer: MEDICARE

## 2022-07-25 NOTE — TELEPHONE ENCOUNTER
Called and LVM for pt, ccof declining still. Sent invoice and need updated payment - need to try cc in [facility] if FSA/HSA. Patient owes $8 and will still receive shipment 7/26.

## 2022-07-27 ENCOUNTER — OFFICE VISIT (OUTPATIENT)
Dept: CARDIOLOGY | Facility: CLINIC | Age: 27
End: 2022-07-27
Payer: MEDICARE

## 2022-07-27 VITALS
DIASTOLIC BLOOD PRESSURE: 80 MMHG | WEIGHT: 119.19 LBS | SYSTOLIC BLOOD PRESSURE: 120 MMHG | OXYGEN SATURATION: 100 % | HEIGHT: 59 IN | BODY MASS INDEX: 24.03 KG/M2 | HEART RATE: 92 BPM

## 2022-07-27 DIAGNOSIS — R00.2 PALPITATIONS: Primary | ICD-10-CM

## 2022-07-27 DIAGNOSIS — I95.1 ORTHOSTASIS: ICD-10-CM

## 2022-07-27 PROCEDURE — 3079F PR MOST RECENT DIASTOLIC BLOOD PRESSURE 80-89 MM HG: ICD-10-PCS | Mod: CPTII,S$GLB,, | Performed by: SPECIALIST

## 2022-07-27 PROCEDURE — 93000 EKG 12-LEAD: ICD-10-PCS | Mod: S$GLB,,, | Performed by: INTERNAL MEDICINE

## 2022-07-27 PROCEDURE — 93000 ELECTROCARDIOGRAM COMPLETE: CPT | Mod: S$GLB,,, | Performed by: INTERNAL MEDICINE

## 2022-07-27 PROCEDURE — 1159F MED LIST DOCD IN RCRD: CPT | Mod: CPTII,S$GLB,, | Performed by: SPECIALIST

## 2022-07-27 PROCEDURE — 3074F SYST BP LT 130 MM HG: CPT | Mod: CPTII,S$GLB,, | Performed by: SPECIALIST

## 2022-07-27 PROCEDURE — 99214 OFFICE O/P EST MOD 30 MIN: CPT | Mod: S$GLB,,, | Performed by: SPECIALIST

## 2022-07-27 PROCEDURE — 3008F PR BODY MASS INDEX (BMI) DOCUMENTED: ICD-10-PCS | Mod: CPTII,S$GLB,, | Performed by: SPECIALIST

## 2022-07-27 PROCEDURE — 3074F PR MOST RECENT SYSTOLIC BLOOD PRESSURE < 130 MM HG: ICD-10-PCS | Mod: CPTII,S$GLB,, | Performed by: SPECIALIST

## 2022-07-27 PROCEDURE — 3079F DIAST BP 80-89 MM HG: CPT | Mod: CPTII,S$GLB,, | Performed by: SPECIALIST

## 2022-07-27 PROCEDURE — 99214 PR OFFICE/OUTPT VISIT, EST, LEVL IV, 30-39 MIN: ICD-10-PCS | Mod: S$GLB,,, | Performed by: SPECIALIST

## 2022-07-27 PROCEDURE — 1160F RVW MEDS BY RX/DR IN RCRD: CPT | Mod: CPTII,S$GLB,, | Performed by: SPECIALIST

## 2022-07-27 PROCEDURE — 1159F PR MEDICATION LIST DOCUMENTED IN MEDICAL RECORD: ICD-10-PCS | Mod: CPTII,S$GLB,, | Performed by: SPECIALIST

## 2022-07-27 PROCEDURE — 1160F PR REVIEW ALL MEDS BY PRESCRIBER/CLIN PHARMACIST DOCUMENTED: ICD-10-PCS | Mod: CPTII,S$GLB,, | Performed by: SPECIALIST

## 2022-07-27 PROCEDURE — 3008F BODY MASS INDEX DOCD: CPT | Mod: CPTII,S$GLB,, | Performed by: SPECIALIST

## 2022-07-27 RX ORDER — METOPROLOL TARTRATE 25 MG/1
25 TABLET, FILM COATED ORAL 2 TIMES DAILY
Qty: 90 TABLET | Refills: 3 | Status: SHIPPED | OUTPATIENT
Start: 2022-07-27 | End: 2023-07-19

## 2022-07-27 NOTE — PROGRESS NOTES
Subjective:    Patient ID:  Sherry Burns is a 27 y.o. female who presents for   Mitral Valve Prolapse    Still with palpatations pre syncope   lmp on  Period   Smoke  No etoh    has palpatations  Prn   Patient is feeling well  Having a. The day   1 para 1 AB1        Review of patient's allergies indicates:  No Known Allergies    Past Medical History:   Diagnosis Date    Anemia     Asthma     Eczema     Glaucoma      Past Surgical History:   Procedure Laterality Date    COLONOSCOPY N/A 3/5/2021    Procedure: COLONOSCOPY;  Surgeon: Jake Williamson MD;  [facility];  Service: Endoscopy;  Laterality: N/A;    ESOPHAGOGASTRODUODENOSCOPY N/A 3/5/2021    Procedure: EGD (ESOPHAGOGASTRODUODENOSCOPY);  Surgeon: Jake Williamson MD;  Location: [facility] ENDO;  Service: Endoscopy;  Laterality: N/A;     Social History     Tobacco Use    Smoking status: Never Smoker    Smokeless tobacco: Never Used   Substance Use Topics    Alcohol use: Not Currently    Drug use: Never        Review of Systems     ROS        Objective:        Vitals:    22 1608   BP: 120/80   Pulse: 92       Lab Results   Component Value Date    WBC 9.72 2022    HGB 13.6 2022    HCT 39.6 2022     2022    CHOL 159 2020    TRIG 47 2020    HDL 58 2020    ALT 15 2022    AST 16 2022     2022    K 3.9 2022     2022    CREATININE 0.9 2022    BUN 12 2022    CO2 23 2022    TSH 1.83 2021        ECHOCARDIOGRAM RESULTS  No results found for this or any previous visit.      CURRENT/PREVIOUS VISIT EKG  Results for orders placed or performed during the hospital encounter of 10/01/19   EKG 12-lead    Collection Time: 10/01/19  3:09 PM    Narrative    Test Reason : R07.89,    Vent. Rate : 086 BPM     Atrial Rate : 086 BPM     P-R Int : 128 ms          QRS Dur : 072 ms      QT Int : 362 ms       P-R-T Axes : 043 064 045 degrees     QTc  Int : 433 ms    Normal sinus rhythm  Normal ECG  No previous ECGs available  Confirmed by Aylin ZAMBRANO, Red ([id number]) on 10/5/2019 11:24:31 AM    Referred By: [email or web address]   SELF           Confirmed By:Red Viera MD     No results found for this or any previous visit.    No results found for this or any previous visit.      PHYSICAL EXAM    Physical Exam 100/80  Head neck no bruits  Lungs are clear  Cardiac regular  Extremities normal    Medication List with Changes/Refills   Current Medications    ALBUTEROL (PROVENTIL/VENTOLIN HFA) 90 MCG/ACTUATION INHALER    Inhale 1 puff into the lungs every 4 (four) hours as needed.     BUSPIRONE (BUSPAR) 10 MG TABLET    Take 10 mg by mouth 2 (two) times daily.    CETIRIZINE (ZYRTEC) 10 MG TABLET    Take 10 mg by mouth daily as needed.     CLOBETASOL 0.05% (TEMOVATE) 0.05 % OINT    Apply topically 2 (two) times daily.    DICLOFENAC (VOLTAREN) 50 MG EC TABLET    Take 1 tablet (50 mg total) by mouth 3 (three) times daily as needed (pain).    DUPILUMAB (DUPIXENT) 300 MG/2 ML SYRG    Inject 2 mLs (300 mg total) into the skin every other week.    DUPIXENT SYRINGE 300 MG/2 ML SYRG    Inject 600mg (2 syringes) into the skin on day 1 then inject 300mg (1 syringe) every other week.    FLUTICASONE PROPIONATE (FLONASE) 50 MCG/ACTUATION NASAL SPRAY    1 spray (50 mcg total) by Each Nostril route once daily.    HYDROXYZINE HCL (ATARAX) 25 MG TABLET    Take 1 tablet (25 mg total) by mouth every evening.    METOPROLOL TARTRATE (LOPRESSOR) 25 MG TABLET    Take 25 mg by mouth 2 (two) times daily.    OMEPRAZOLE (PRILOSEC) 40 MG CAPSULE    Take 1 capsule (40 mg total) by mouth once daily.    ONDANSETRON (ZOFRAN-ODT) 4 MG TBDL    Take 1 tablet (4 mg total) by mouth every 8 (eight) hours as needed (nausea/vomiting).    SERTRALINE (ZOLOFT) 100 MG TABLET    Take 100 mg by mouth every evening.    TRIAMCINOLONE ACETONIDE 0.1% (KENALOG) 0.1 % CREAM    Apply topically 2 (two) times daily.    VALACYCLOVIR (VALTREX)  1000 MG TABLET    Take 1 tablet (1,000 mg total) by mouth every 12 (twelve) hours. for 7 days           Assessment:     palpitations  Mild hypotension       Plan:   Metoprolol qd p.r.n.  Patient did well on this medicine previously      Problem List Items Addressed This Visit    None          No follow-ups on file.

## 2022-07-29 ENCOUNTER — INFUSION (OUTPATIENT)
Dept: INFUSION THERAPY | Facility: HOSPITAL | Age: 27
End: 2022-07-29
Attending: INTERNAL MEDICINE
Payer: MEDICARE

## 2022-07-29 ENCOUNTER — TELEPHONE (OUTPATIENT)
Dept: CARDIOLOGY | Facility: CLINIC | Age: 27
End: 2022-07-29
Payer: MEDICARE

## 2022-07-29 VITALS
DIASTOLIC BLOOD PRESSURE: 48 MMHG | BODY MASS INDEX: 23.35 KG/M2 | OXYGEN SATURATION: 100 % | HEIGHT: 59 IN | WEIGHT: 115.81 LBS | HEART RATE: 56 BPM | RESPIRATION RATE: 18 BRPM | SYSTOLIC BLOOD PRESSURE: 93 MMHG | TEMPERATURE: 97 F

## 2022-07-29 DIAGNOSIS — R00.2 PALPITATIONS: Primary | ICD-10-CM

## 2022-07-29 DIAGNOSIS — D50.0 IRON DEFICIENCY ANEMIA DUE TO CHRONIC BLOOD LOSS: Primary | ICD-10-CM

## 2022-07-29 PROCEDURE — 63600175 PHARM REV CODE 636 W HCPCS: Performed by: INTERNAL MEDICINE

## 2022-07-29 PROCEDURE — 25000003 PHARM REV CODE 250: Performed by: INTERNAL MEDICINE

## 2022-07-29 PROCEDURE — 96365 THER/PROPH/DIAG IV INF INIT: CPT

## 2022-07-29 RX ORDER — HEPARIN 100 UNIT/ML
500 SYRINGE INTRAVENOUS
Status: CANCELLED | OUTPATIENT
Start: 2022-08-05

## 2022-07-29 RX ORDER — METHYLPREDNISOLONE SOD SUCC 125 MG
125 VIAL (EA) INJECTION ONCE AS NEEDED
Status: CANCELLED | OUTPATIENT
Start: 2022-08-05

## 2022-07-29 RX ORDER — EPINEPHRINE 0.3 MG/.3ML
0.3 INJECTION SUBCUTANEOUS ONCE AS NEEDED
Status: CANCELLED | OUTPATIENT
Start: 2022-08-05

## 2022-07-29 RX ORDER — SODIUM CHLORIDE 0.9 % (FLUSH) 0.9 %
10 SYRINGE (ML) INJECTION
Status: CANCELLED | OUTPATIENT
Start: 2022-08-05

## 2022-07-29 RX ORDER — SODIUM CHLORIDE 0.9 % (FLUSH) 0.9 %
10 SYRINGE (ML) INJECTION
Status: DISCONTINUED | OUTPATIENT
Start: 2022-07-29 | End: 2022-07-29 | Stop reason: HOSPADM

## 2022-07-29 RX ORDER — DIPHENHYDRAMINE HYDROCHLORIDE 50 MG/ML
50 INJECTION INTRAMUSCULAR; INTRAVENOUS ONCE AS NEEDED
Status: CANCELLED | OUTPATIENT
Start: 2022-08-05

## 2022-07-29 RX ADMIN — SODIUM CHLORIDE 125 MG: 9 INJECTION, SOLUTION INTRAVENOUS at 01:07

## 2022-07-29 NOTE — TELEPHONE ENCOUNTER
----- Message from Pat Gaspar RN sent at 2022 11:46 AM CDT -----  Regardin/27- EKG Order  The EKG performed on 22 is missing an order.  Please place an order so that the EKG can be read.    Thank You,  Pat Gaspar RN  [facility]  [phone]

## 2022-07-29 NOTE — PLAN OF CARE
Problem: Anemia  Goal: Anemia Symptom Improvement  Outcome: Ongoing, Progressing  Intervention: Monitor and Manage Anemia  Flowsheets (Taken 7/29/2022 0171)  Oral Nutrition Promotion: rest periods promoted  Safety Promotion/Fall Prevention: assistive device/personal item within reach  Fatigue Management:   activity schedule adjusted   frequent rest breaks encouraged

## 2022-07-29 NOTE — NURSING
Patient did not want to wait. Instructions  given on signs and symptoms of reaction and to seek medical attention should anything occur. Verbalized understanding.

## 2022-08-11 ENCOUNTER — TELEPHONE (OUTPATIENT)
Dept: PHARMACY | Facility: CLINIC | Age: 27
End: 2022-08-11
Payer: MEDICARE

## 2022-08-25 ENCOUNTER — SPECIALTY PHARMACY (OUTPATIENT)
Dept: PHARMACY | Facility: CLINIC | Age: 27
End: 2022-08-25
Payer: MEDICARE

## 2022-08-25 NOTE — TELEPHONE ENCOUNTER
Specialty Pharmacy - Refill Coordination    Specialty Medication Orders Linked to Encounter    Flowsheet Row Most Recent Value   Medication #1 dupilumab (DUPIXENT) 300 mg/2 mL Syrg (Order#[id number], Rx#[id number])          Refill Questions - Documented Responses    Flowsheet Row Most Recent Value   Patient Availability and HIPAA Verification    Does patient want to proceed with activity? Yes   HIPAA/medical authority confirmed? Yes   Relationship to patient of person spoken to? Mother   Refill Screening Questions    Changes to allergies? No   Changes to medications? No   New conditions since last clinic visit? No   Unplanned office visit, urgent care, ED, or hospital admission in the last 4 weeks? No   How does patient/caregiver feel medication is working? Good   Financial problems or insurance changes? No   How many doses of your specialty medications were missed in the last 4 weeks? 0   Would patient like to speak to a pharmacist? No   When does the patient need to receive the medication? 08/25/22   Refill Delivery Questions    How will the patient receive the medication? Delivery Daisha   When does the patient need to receive the medication? 08/25/22   Shipping Address Home   Address in [facility] confirmed and updated if neccessary? Yes   Expected Copay ($) 0   Is the patient able to afford the medication copay? Yes   Payment Method zero copay   Days supply of Refill 28   Supplies needed? No supplies needed   Refill activity completed? Yes   Shipment/Pickup Date: 08/25/22          Current Outpatient Medications   Medication Sig    albuterol (PROVENTIL/VENTOLIN HFA) 90 mcg/actuation inhaler Inhale 1 puff into the lungs every 4 (four) hours as needed.     busPIRone (BUSPAR) 10 MG tablet Take 10 mg by mouth 2 (two) times daily.    cetirizine (ZYRTEC) 10 MG tablet Take 10 mg by mouth daily as needed.     clobetasol 0.05% (TEMOVATE) 0.05 % Oint Apply topically 2 (two) times daily.    diclofenac (VOLTAREN)  50 MG EC tablet Take 1 tablet (50 mg total) by mouth 3 (three) times daily as needed (pain).    dupilumab (DUPIXENT) 300 mg/2 mL Syrg Inject 2 mLs (300 mg total) into the skin every other week.    DUPIXENT SYRINGE 300 mg/2 mL Syrg Inject 600mg (2 syringes) into the skin on day 1 then inject 300mg (1 syringe) every other week.    fluticasone propionate (FLONASE) 50 mcg/actuation nasal spray 1 spray (50 mcg total) by Each Nostril route once daily.    hydrOXYzine HCL (ATARAX) 25 MG tablet Take 1 tablet (25 mg total) by mouth every evening.    metoprolol tartrate (LOPRESSOR) 25 MG tablet Take 1 tablet (25 mg total) by mouth 2 (two) times daily.    omeprazole (PRILOSEC) 40 MG capsule Take 1 capsule (40 mg total) by mouth once daily.    ondansetron (ZOFRAN-ODT) 4 MG TbDL Take 1 tablet (4 mg total) by mouth every 8 (eight) hours as needed (nausea/vomiting).    sertraline (ZOLOFT) 100 MG tablet Take 100 mg by mouth every evening.    triamcinolone acetonide 0.1% (KENALOG) 0.1 % cream Apply topically 2 (two) times daily.    valACYclovir (VALTREX) 1000 MG tablet Take 1 tablet (1,000 mg total) by mouth every 12 (twelve) hours. for 7 days   Last reviewed on 7/27/2022  5:07 PM by Kevin Parson MD    Review of patient's allergies indicates:  No Known Allergies Last reviewed on  7/29/2022 1:23 PM by Bernardo Boyle      Tasks added this encounter   9/16/2022 - Refill Call (Auto Added)   Tasks due within next 3 months   No tasks due.     Olivia Banuelos, PharmD  [facility]  [address] [place] [id number]  Phone: [phone]  Fax: [phone]

## 2022-09-06 ENCOUNTER — OFFICE VISIT (OUTPATIENT)
Dept: HEMATOLOGY/ONCOLOGY | Facility: CLINIC | Age: 27
End: 2022-09-06
Payer: MEDICARE

## 2022-09-06 VITALS
HEART RATE: 88 BPM | BODY MASS INDEX: 23.41 KG/M2 | TEMPERATURE: 98 F | RESPIRATION RATE: 16 BRPM | WEIGHT: 116.13 LBS | HEIGHT: 59 IN | DIASTOLIC BLOOD PRESSURE: 69 MMHG | SYSTOLIC BLOOD PRESSURE: 102 MMHG

## 2022-09-06 DIAGNOSIS — D50.0 IRON DEFICIENCY ANEMIA DUE TO CHRONIC BLOOD LOSS: Primary | ICD-10-CM

## 2022-09-06 PROCEDURE — 3078F DIAST BP <80 MM HG: CPT | Mod: CPTII,S$GLB,, | Performed by: INTERNAL MEDICINE

## 2022-09-06 PROCEDURE — 1159F MED LIST DOCD IN RCRD: CPT | Mod: CPTII,S$GLB,, | Performed by: INTERNAL MEDICINE

## 2022-09-06 PROCEDURE — 99214 PR OFFICE/OUTPT VISIT, EST, LEVL IV, 30-39 MIN: ICD-10-PCS | Mod: S$GLB,,, | Performed by: INTERNAL MEDICINE

## 2022-09-06 PROCEDURE — 3074F PR MOST RECENT SYSTOLIC BLOOD PRESSURE < 130 MM HG: ICD-10-PCS | Mod: CPTII,S$GLB,, | Performed by: INTERNAL MEDICINE

## 2022-09-06 PROCEDURE — 1159F PR MEDICATION LIST DOCUMENTED IN MEDICAL RECORD: ICD-10-PCS | Mod: CPTII,S$GLB,, | Performed by: INTERNAL MEDICINE

## 2022-09-06 PROCEDURE — 99214 OFFICE O/P EST MOD 30 MIN: CPT | Mod: S$GLB,,, | Performed by: INTERNAL MEDICINE

## 2022-09-06 PROCEDURE — 3008F PR BODY MASS INDEX (BMI) DOCUMENTED: ICD-10-PCS | Mod: CPTII,S$GLB,, | Performed by: INTERNAL MEDICINE

## 2022-09-06 PROCEDURE — 3074F SYST BP LT 130 MM HG: CPT | Mod: CPTII,S$GLB,, | Performed by: INTERNAL MEDICINE

## 2022-09-06 PROCEDURE — 3078F PR MOST RECENT DIASTOLIC BLOOD PRESSURE < 80 MM HG: ICD-10-PCS | Mod: CPTII,S$GLB,, | Performed by: INTERNAL MEDICINE

## 2022-09-06 PROCEDURE — 3008F BODY MASS INDEX DOCD: CPT | Mod: CPTII,S$GLB,, | Performed by: INTERNAL MEDICINE

## 2022-09-06 NOTE — PROGRESS NOTES
[facility] in office hematology Oncology Subsequent  encounter note    September 6, 2022    Subjective:      Patient ID:   Sherry Burns  27 y.o. female  1995    [place]    Chief Complaint:   Anemia    HPI:  27 y.o. female   With intermittent anemia and easy bruisability.  She has been on iron pills in the past but has not been on B12 injections.  She denies history of jaundice or gallbladder disorder and has not been transfused.  Her mother admits to having easy bruisability also.      She has history of increased heart rate and is seen per Dr. Parson.  She has history of digestive problems of unclear nature and has had EGD and colonoscopy per Dr. Williamson.  Other history includes eczema and asthma.    She has not had previous surgeries.  She does not have history of drug allergies.  She does not smoke.  She does not drink alcohol.  She is not presently employed.    She had onset of menses at age 16, she describes her menses as normal to heavy, she does not have history of pregnancy.    Her mother has hypertension, diabetes, high cholesterol and admits to having easy bruising.  Her father is alive and well.  She has a brother with asthma.  She does not have children.    Last seen 4/2021.  Hx of Fe deficiency, on po Fe, but compliance is unclear.    She has [employer]  Try for GYN referral on her plan, she says she will not use Dr. Irby's office.    Since May of 2022 I ordered iron infusion to replenish iron stores.  Energy is much improved, pallor has resolved.  She is in good spirits.  She places her energy at a 10/10.      ROS:   GEN: normal without any fever, night sweats or weight loss  HEENT: normal with no HA's, sore throat, stiff neck, changes in vision  CV: normal with no CP, SOB, PND, RAMIREZ or orthopnea.  See HPI  PULM: normal with no SOB, cough, hemoptysis, sputum or pleuritic pain.  See HPI  GI: See HPI  : normal with no hematuria, dysuria  BREAST: normal with no mass,  discharge, pain  SKIN: See HPI.     Past Medical History:   Diagnosis Date    Anemia     Asthma     Eczema     Glaucoma      Past Surgical History:   Procedure Laterality Date    COLONOSCOPY N/A 3/5/2021    Procedure: COLONOSCOPY;  Surgeon: Jake Williamson MD;  Location: [facility];  Service: Endoscopy;  Laterality: N/A;    ESOPHAGOGASTRODUODENOSCOPY N/A 3/5/2021    Procedure: EGD (ESOPHAGOGASTRODUODENOSCOPY);  Surgeon: Jake Williamson MD;  Location: [facility];  Service: Endoscopy;  Laterality: N/A;       Review of patient's allergies indicates:  No Known Allergies  Social History     Socioeconomic History    Marital status: Single   Tobacco Use    Smoking status: Never    Smokeless tobacco: Never   Substance and Sexual Activity    Alcohol use: Not Currently    Drug use: Never         Current Outpatient Medications:     albuterol (PROVENTIL/VENTOLIN HFA) 90 mcg/actuation inhaler, Inhale 1 puff into the lungs every 4 (four) hours as needed. , Disp: , Rfl:     busPIRone (BUSPAR) 10 MG tablet, Take 10 mg by mouth 2 (two) times daily., Disp: , Rfl:     cetirizine (ZYRTEC) 10 MG tablet, Take 10 mg by mouth daily as needed. , Disp: , Rfl:     clobetasol 0.05% (TEMOVATE) 0.05 % Oint, Apply topically 2 (two) times daily., Disp: 60 g, Rfl: 1    diclofenac (VOLTAREN) 50 MG EC tablet, Take 1 tablet (50 mg total) by mouth 3 (three) times daily as needed (pain)., Disp: 15 tablet, Rfl: 0    dupilumab (DUPIXENT) 300 mg/2 mL Syrg, Inject 2 mLs (300 mg total) into the skin every other week., Disp: 4 mL, Rfl: 2    DUPIXENT SYRINGE 300 mg/2 mL Syrg, Inject 600mg (2 syringes) into the skin on day 1 then inject 300mg (1 syringe) every other week., Disp: 8 mL, Rfl: 0    fluticasone propionate (FLONASE) 50 mcg/actuation nasal spray, 1 spray (50 mcg total) by Each Nostril route once daily., Disp: 16 g, Rfl: 0    hydrOXYzine HCL (ATARAX) 25 MG tablet, Take 1 tablet (25 mg total) by mouth every evening., Disp: 30 tablet, Rfl: 1    metoprolol  "tartrate (LOPRESSOR) 25 MG tablet, Take 1 tablet (25 mg total) by mouth 2 (two) times daily., Disp: 90 tablet, Rfl: 3    ondansetron (ZOFRAN-ODT) 4 MG TbDL, Take 1 tablet (4 mg total) by mouth every 8 (eight) hours as needed (nausea/vomiting)., Disp: 12 tablet, Rfl: 0    sertraline (ZOLOFT) 100 MG tablet, Take 100 mg by mouth every evening., Disp: , Rfl:     triamcinolone acetonide 0.1% (KENALOG) 0.1 % cream, Apply topically 2 (two) times daily., Disp: 454 g, Rfl: 0    omeprazole (PRILOSEC) 40 MG capsule, Take 1 capsule (40 mg total) by mouth once daily., Disp: 30 capsule, Rfl: 0    valACYclovir (VALTREX) 1000 MG tablet, Take 1 tablet (1,000 mg total) by mouth every 12 (twelve) hours. for 7 days, Disp: 14 tablet, Rfl: 0          Objective:   Vitals:  Blood pressure 102/69, pulse 88, temperature 98.3 °F (36.8 °C), resp. rate 16, height 4' 11" (1.499 m), weight 52.7 kg (116 lb 1.6 oz).    Physical Examination:   GEN: no apparent distress, comfortable  HEAD: atraumatic and normocephalic  EYES: no pallor, no icterus, no jaundice  ENT:  no pharyngeal erythema, external ears WNL; no nasal discharge  NECK: no masses, thyroid normal, trachea midline, no LAD/LN's, supple  CV: RRR with no murmur; normal pulse  CHEST: Normal respiratory effort; CTAB; normal breath sounds; no wheeze or crackles  ABDOM: nontender and nondistended; soft; no rebound/guarding, L/S NP  MUSC/Skeletal: ROM normal; no crepitus; joints normal  EXTREM: no clubbing, cyanosis, inflammation or swelling  SKIN: no rashes, lesions, ulcers, petechiae.  She has 2 small ecchymoses on the anterior lower aspect of the right leg  : no cvat  NEURO: grossly intact; motor/sensory WNL; no tremors  PSYCH: normal mood, affect and behavior  LYMPH: normal cervical, supraclavicular, axillary and groin LN's  BREASTS:  Left and right breast nontender, without discharge, without palpable mass.    Labs:   Lab Results   Component Value Date    WBC 9.72 05/28/2022    HGB 13.6 " 05/28/2022    HCT 39.6 05/28/2022    MCV 87 05/28/2022     05/28/2022    CMP  Sodium   Date Value Ref Range Status   05/28/2022 140 136 - 145 mmol/L Final     Potassium   Date Value Ref Range Status   05/28/2022 3.9 3.5 - 5.1 mmol/L Final     Chloride   Date Value Ref Range Status   05/28/2022 107 95 - 110 mmol/L Final     CO2   Date Value Ref Range Status   05/28/2022 23 23 - 29 mmol/L Final     Glucose   Date Value Ref Range Status   05/28/2022 93 70 - 110 mg/dL Final     BUN   Date Value Ref Range Status   05/28/2022 12 6 - 20 mg/dL Final     Creatinine   Date Value Ref Range Status   05/28/2022 0.9 0.5 - 1.4 mg/dL Final     Calcium   Date Value Ref Range Status   05/28/2022 9.6 8.7 - 10.5 mg/dL Final     Total Protein   Date Value Ref Range Status   05/28/2022 7.0 6.0 - 8.4 g/dL Final     Albumin   Date Value Ref Range Status   05/28/2022 4.2 3.5 - 5.2 g/dL Final     Total Bilirubin   Date Value Ref Range Status   05/28/2022 1.0 0.1 - 1.0 mg/dL Final     Comment:     For infants and newborns, interpretation of results should be based  on gestational age, weight and in agreement with clinical  observations.    Premature Infant recommended reference ranges:  Up to 24 hours.............<8.0 mg/dL  Up to 48 hours............<12.0 mg/dL  3-5 days..................<15.0 mg/dL  6-29 days.................<15.0 mg/dL       Alkaline Phosphatase   Date Value Ref Range Status   05/28/2022 61 55 - 135 U/L Final     AST   Date Value Ref Range Status   05/28/2022 16 10 - 40 U/L Final     ALT   Date Value Ref Range Status   05/28/2022 15 10 - 44 U/L Final     Anion Gap   Date Value Ref Range Status   05/28/2022 10 8 - 16 mmol/L Final     eGFR if    Date Value Ref Range Status   05/28/2022 >60 >60 mL/min/1.73 m^2 Final     eGFR if non    Date Value Ref Range Status   05/28/2022 >60 >60 mL/min/1.73 m^2 Final     Comment:     Calculation used to obtain the estimated glomerular  filtration  rate (eGFR) is the CKD-EPI equation.        Hemoglobin is 12.7.    Assessment:   (1) 27 y.o. female with history of easy bruisability.  Initial coagulation studies have been done thus far and have returned normal.  I have ordered additional studies to be completed at [facility].    These results have shown normal coag studies.  I am scheduling platelet aggregation studies at [facility] main [facility].    (2) she appears to have a moderate microcytic anemia.  Hgb 8.  Of This may be nutritional or secondary to iron deficiency.    Ferritin returned low at 6.  Fe deficiency anemia 2nd heavy menses.  She is on oral FeSO4 daily.  I supplemented her with iron infusions given through the infusion center.  Hemoglobin is improved to 12.7, anemia has resolved.      RTC 6 months with CBC, Ferritin.              I have explained and the patient understands all of  the current recommendation(s). I have answered all of their questions to the best of my ability and to their complete satisfaction.

## 2022-09-06 NOTE — LETTER
September 6, 2022        Kleber Odonnell MD  [address] [place] [address] [address]  [place] [address]             [facility] - Hematology Oncology  [address]  [place] [address]  Phone: [phone]  Fax: [phone]   Patient: Sherry Burns   MR Number: [id number]   YOB: 1995   Date of Visit: 9/6/2022       Dear Dr. Odonnell:    Thank you for referring Sherry Burns to me for evaluation. Below are the relevant portions of my assessment and plan of care.            If you have questions, please do not hesitate to call me. I look forward to following Sherry along with you.    Sincerely,      SUMAYA Gregg MD           CC    No Recipients

## 2022-09-16 ENCOUNTER — SPECIALTY PHARMACY (OUTPATIENT)
Dept: PHARMACY | Facility: CLINIC | Age: 27
End: 2022-09-16
Payer: MEDICARE

## 2022-09-16 NOTE — TELEPHONE ENCOUNTER
Specialty Pharmacy - Refill Coordination    Specialty Medication Orders Linked to Encounter      Flowsheet Row Most Recent Value   Medication #1 dupilumab (DUPIXENT) 300 mg/2 mL Syrg (Order#[id number], Rx#[id number])            Refill Questions - Documented Responses      Flowsheet Row Most Recent Value   Patient Availability and HIPAA Verification    Does patient want to proceed with activity? Yes   HIPAA/medical authority confirmed? Yes   Relationship to patient of person spoken to? Self   Refill Screening Questions    Changes to allergies? No   Changes to medications? No   New conditions since last clinic visit? No   Unplanned office visit, urgent care, ED, or hospital admission in the last 4 weeks? No   How does patient/caregiver feel medication is working? Very good   Financial problems or insurance changes? No   How many doses of your specialty medications were missed in the last 4 weeks? 0   Would patient like to speak to a pharmacist? No   When does the patient need to receive the medication? 09/23/22   Refill Delivery Questions    How will the patient receive the medication? Delivery Daisha   When does the patient need to receive the medication? 09/23/22   Shipping Address Home   Address in [facility] confirmed and updated if neccessary? Yes   Expected Copay ($) 0   Is the patient able to afford the medication copay? Yes   Payment Method zero copay   Days supply of Refill 28   Supplies needed? No supplies needed   Refill activity completed? Yes   Refill activity plan Refill scheduled   Shipment/Pickup Date: 09/21/22            Current Outpatient Medications   Medication Sig    albuterol (PROVENTIL/VENTOLIN HFA) 90 mcg/actuation inhaler Inhale 1 puff into the lungs every 4 (four) hours as needed.     busPIRone (BUSPAR) 10 MG tablet Take 10 mg by mouth 2 (two) times daily.    cetirizine (ZYRTEC) 10 MG tablet Take 10 mg by mouth daily as needed.     clobetasol 0.05% (TEMOVATE) 0.05 % Oint Apply topically  2 (two) times daily.    diclofenac (VOLTAREN) 50 MG EC tablet Take 1 tablet (50 mg total) by mouth 3 (three) times daily as needed (pain).    dupilumab (DUPIXENT) 300 mg/2 mL Syrg Inject 2 mLs (300 mg total) into the skin every other week.    DUPIXENT SYRINGE 300 mg/2 mL Syrg Inject 600mg (2 syringes) into the skin on day 1 then inject 300mg (1 syringe) every other week.    fluticasone propionate (FLONASE) 50 mcg/actuation nasal spray 1 spray (50 mcg total) by Each Nostril route once daily.    hydrOXYzine HCL (ATARAX) 25 MG tablet Take 1 tablet (25 mg total) by mouth every evening.    metoprolol tartrate (LOPRESSOR) 25 MG tablet Take 1 tablet (25 mg total) by mouth 2 (two) times daily.    omeprazole (PRILOSEC) 40 MG capsule Take 1 capsule (40 mg total) by mouth once daily.    ondansetron (ZOFRAN-ODT) 4 MG TbDL Take 1 tablet (4 mg total) by mouth every 8 (eight) hours as needed (nausea/vomiting).    sertraline (ZOLOFT) 100 MG tablet Take 100 mg by mouth every evening.    triamcinolone acetonide 0.1% (KENALOG) 0.1 % cream Apply topically 2 (two) times daily.    valACYclovir (VALTREX) 1000 MG tablet Take 1 tablet (1,000 mg total) by mouth every 12 (twelve) hours. for 7 days   Last reviewed on 9/6/2022  2:11 PM by Marlon Brand MA    Review of patient's allergies indicates:  No Known Allergies Last reviewed on  9/6/2022 2:12 PM by Marlon Brand      Tasks added this encounter   10/14/2022 - Refill Call (Auto Added)   Tasks due within next 3 months   No tasks due.     [facility]  [address] [place] [id number]  Phone: [phone]  Fax: [phone]

## 2022-09-19 ENCOUNTER — TELEPHONE (OUTPATIENT)
Dept: CARDIOLOGY | Facility: CLINIC | Age: 27
End: 2022-09-19
Payer: MEDICARE

## 2022-09-19 NOTE — TELEPHONE ENCOUNTER
----- Message from Tad Fall MA sent at 9/19/2022 10:44 AM CDT -----  Contact: CHUCKY WEI [[id number]]  Type: Needs Medical Advice    Who Called: CHUCKY WEI [[id number]]   Best Call Back Number: [phone]  Inquiry/Question: Please call CHUCKY WEI [[id number]] regarding blood pressure fluctuating    Thank you~

## 2022-10-14 ENCOUNTER — SPECIALTY PHARMACY (OUTPATIENT)
Dept: PHARMACY | Facility: CLINIC | Age: 27
End: 2022-10-14
Payer: MEDICARE

## 2022-10-14 DIAGNOSIS — L20.84 INTRINSIC ATOPIC DERMATITIS: ICD-10-CM

## 2022-10-14 RX ORDER — DUPILUMAB 300 MG/2ML
300 INJECTION, SOLUTION SUBCUTANEOUS
Qty: 4 ML | Refills: 2 | Status: CANCELLED | OUTPATIENT
Start: 2022-10-14

## 2022-10-14 NOTE — TELEPHONE ENCOUNTER
Informed patients mother that a refill request was sent to her doctor and once the refills get approved [facility] will follow up.

## 2022-10-17 ENCOUNTER — PATIENT MESSAGE (OUTPATIENT)
Dept: DERMATOLOGY | Facility: CLINIC | Age: 27
End: 2022-10-17
Payer: MEDICARE

## 2022-10-17 DIAGNOSIS — L20.84 INTRINSIC ATOPIC DERMATITIS: ICD-10-CM

## 2022-10-17 NOTE — TELEPHONE ENCOUNTER
Patient aware that [facility] denied our request for Dupixent refill and should contact the office on the denial. Explained typically they want a follow up appt made and to request refills once scheduled. Pt verbalized understanding. Per patient, she is due to inject 10/21/2022.     Will follow up once rx received.

## 2022-10-19 ENCOUNTER — TELEPHONE (OUTPATIENT)
Dept: DERMATOLOGY | Facility: CLINIC | Age: 27
End: 2022-10-19
Payer: MEDICARE

## 2022-10-19 DIAGNOSIS — L20.84 INTRINSIC ATOPIC DERMATITIS: ICD-10-CM

## 2022-10-19 NOTE — TELEPHONE ENCOUNTER
----- Message from Skyla Yeager MD sent at 10/19/2022 12:55 PM CDT -----  She needs f/u scheduled.

## 2022-10-19 NOTE — TELEPHONE ENCOUNTER
Returned pt call in regards to F/U visit for medication. She has an upcoming appointment she stated she will discuss her medications then.

## 2022-10-24 NOTE — TELEPHONE ENCOUNTER
Patient was due to inject 10/21, advised patient to inject once received 10/26 and proceed with a new calendar. Patient agreed to this plan.  Specialty Pharmacy - Refill Coordination    Specialty Medication Orders Linked to Encounter      Flowsheet Row Most Recent Value   Medication #1 DUPIXENT SYRINGE 300 mg/2 mL Syrg (Order#[id number], Rx#[id number])            Refill Questions - Documented Responses      Flowsheet Row Most Recent Value   Patient Availability and HIPAA Verification    Does patient want to proceed with activity? Yes   HIPAA/medical authority confirmed? Yes   Relationship to patient of person spoken to? Self   Refill Screening Questions    Changes to allergies? No   Changes to medications? No   New conditions since last clinic visit? No   Unplanned office visit, urgent care, ED, or hospital admission in the last 4 weeks? No   How does patient/caregiver feel medication is working? Good   Financial problems or insurance changes? No   How many doses of your specialty medications were missed in the last 4 weeks? 0   Would patient like to speak to a pharmacist? No   When does the patient need to receive the medication? 10/25/22   Refill Delivery Questions    How will the patient receive the medication? [facility]   When does the patient need to receive the medication? 10/25/22   Shipping Address Prescription   Address in [facility] confirmed and updated if neccessary? Yes   Expected Copay ($) 0   Is the patient able to afford the medication copay? Yes   Payment Method zero copay   Days supply of Refill 28   Supplies needed? No supplies needed   Refill activity completed? Yes   Refill activity plan Refill scheduled   Shipment/Pickup Date: 10/25/22            Current Outpatient Medications   Medication Sig    albuterol (PROVENTIL/VENTOLIN HFA) 90 mcg/actuation inhaler Inhale 1 puff into the lungs every 4 (four) hours as needed.     busPIRone (BUSPAR) 10 MG tablet Take 10 mg by mouth 2 (two) times daily.     cetirizine (ZYRTEC) 10 MG tablet Take 10 mg by mouth daily as needed.     clobetasol 0.05% (TEMOVATE) 0.05 % Oint Apply topically 2 (two) times daily.    diclofenac (VOLTAREN) 50 MG EC tablet Take 1 tablet (50 mg total) by mouth 3 (three) times daily as needed (pain).    dupilumab (DUPIXENT) 300 mg/2 mL Syrg Inject 2 mLs (300 mg total) into the skin every other week.    DUPIXENT SYRINGE 300 mg/2 mL Syrg Inject 600mg (2 syringes) into the skin on day 1 then inject 300mg (1 syringe) every other week.    fluticasone propionate (FLONASE) 50 mcg/actuation nasal spray 1 spray (50 mcg total) by Each Nostril route once daily.    hydrOXYzine HCL (ATARAX) 25 MG tablet Take 1 tablet (25 mg total) by mouth every evening.    metoprolol tartrate (LOPRESSOR) 25 MG tablet Take 1 tablet (25 mg total) by mouth 2 (two) times daily.    omeprazole (PRILOSEC) 40 MG capsule Take 1 capsule (40 mg total) by mouth once daily.    ondansetron (ZOFRAN-ODT) 4 MG TbDL Take 1 tablet (4 mg total) by mouth every 8 (eight) hours as needed (nausea/vomiting).    sertraline (ZOLOFT) 100 MG tablet Take 100 mg by mouth every evening.    triamcinolone acetonide 0.1% (KENALOG) 0.1 % cream Apply topically 2 (two) times daily.    valACYclovir (VALTREX) 1000 MG tablet Take 1 tablet (1,000 mg total) by mouth every 12 (twelve) hours. for 7 days   Last reviewed on 9/6/2022  2:11 PM by Marlon Brand MA    Review of patient's allergies indicates:  No Known Allergies Last reviewed on  10/19/2022 12:57 PM by Skyla Yeager      Tasks added this encounter   11/15/2022 - Refill Call (Auto Added)   Tasks due within next 3 months   No tasks due.     Samantha Wallace, PharmD  [facility]  [address] [place] [id number]  Phone: [phone]  Fax: [phone]

## 2022-11-15 ENCOUNTER — SPECIALTY PHARMACY (OUTPATIENT)
Dept: PHARMACY | Facility: CLINIC | Age: 27
End: 2022-11-15
Payer: MEDICARE

## 2022-11-15 ENCOUNTER — PATIENT MESSAGE (OUTPATIENT)
Dept: PHARMACY | Facility: CLINIC | Age: 27
End: 2022-11-15
Payer: MEDICARE

## 2022-11-15 DIAGNOSIS — L20.84 INTRINSIC ATOPIC DERMATITIS: ICD-10-CM

## 2022-11-15 RX ORDER — DUPILUMAB 300 MG/2ML
300 INJECTION, SOLUTION SUBCUTANEOUS
Qty: 4 ML | Refills: 0 | OUTPATIENT
Start: 2022-11-15

## 2022-11-15 NOTE — TELEPHONE ENCOUNTER
Patient's mother returned call to [facility]. Informed them that we sent refill request to [facility]. Next injection date 11/25.

## 2022-12-01 ENCOUNTER — OFFICE VISIT (OUTPATIENT)
Dept: DERMATOLOGY | Facility: CLINIC | Age: 27
End: 2022-12-01
Payer: MEDICARE

## 2022-12-01 VITALS — BODY MASS INDEX: 23.39 KG/M2 | HEIGHT: 59 IN | WEIGHT: 116 LBS

## 2022-12-01 DIAGNOSIS — L85.9 HYPERKERATOSIS: ICD-10-CM

## 2022-12-01 DIAGNOSIS — L20.84 INTRINSIC ATOPIC DERMATITIS: Primary | ICD-10-CM

## 2022-12-01 DIAGNOSIS — B07.9 VERRUCA VULGARIS: ICD-10-CM

## 2022-12-01 PROCEDURE — 1160F RVW MEDS BY RX/DR IN RCRD: CPT | Mod: CPTII,S$GLB,, | Performed by: STUDENT IN AN ORGANIZED HEALTH CARE EDUCATION/TRAINING PROGRAM

## 2022-12-01 PROCEDURE — 1159F PR MEDICATION LIST DOCUMENTED IN MEDICAL RECORD: ICD-10-PCS | Mod: CPTII,S$GLB,, | Performed by: STUDENT IN AN ORGANIZED HEALTH CARE EDUCATION/TRAINING PROGRAM

## 2022-12-01 PROCEDURE — 3008F PR BODY MASS INDEX (BMI) DOCUMENTED: ICD-10-PCS | Mod: CPTII,S$GLB,, | Performed by: STUDENT IN AN ORGANIZED HEALTH CARE EDUCATION/TRAINING PROGRAM

## 2022-12-01 PROCEDURE — 99214 OFFICE O/P EST MOD 30 MIN: CPT | Mod: 25,S$GLB,, | Performed by: STUDENT IN AN ORGANIZED HEALTH CARE EDUCATION/TRAINING PROGRAM

## 2022-12-01 PROCEDURE — 17110 DESTRUCTION B9 LES UP TO 14: CPT | Mod: S$GLB,,, | Performed by: STUDENT IN AN ORGANIZED HEALTH CARE EDUCATION/TRAINING PROGRAM

## 2022-12-01 PROCEDURE — 1159F MED LIST DOCD IN RCRD: CPT | Mod: CPTII,S$GLB,, | Performed by: STUDENT IN AN ORGANIZED HEALTH CARE EDUCATION/TRAINING PROGRAM

## 2022-12-01 PROCEDURE — 1160F PR REVIEW ALL MEDS BY PRESCRIBER/CLIN PHARMACIST DOCUMENTED: ICD-10-PCS | Mod: CPTII,S$GLB,, | Performed by: STUDENT IN AN ORGANIZED HEALTH CARE EDUCATION/TRAINING PROGRAM

## 2022-12-01 PROCEDURE — 99999 PR PBB SHADOW E&M-EST. PATIENT-LVL IV: ICD-10-PCS | Mod: PBBFAC,,, | Performed by: STUDENT IN AN ORGANIZED HEALTH CARE EDUCATION/TRAINING PROGRAM

## 2022-12-01 PROCEDURE — 99214 PR OFFICE/OUTPT VISIT, EST, LEVL IV, 30-39 MIN: ICD-10-PCS | Mod: 25,S$GLB,, | Performed by: STUDENT IN AN ORGANIZED HEALTH CARE EDUCATION/TRAINING PROGRAM

## 2022-12-01 PROCEDURE — 17110 PR DESTRUCTION BENIGN LESIONS UP TO 14: ICD-10-PCS | Mod: S$GLB,,, | Performed by: STUDENT IN AN ORGANIZED HEALTH CARE EDUCATION/TRAINING PROGRAM

## 2022-12-01 PROCEDURE — 3008F BODY MASS INDEX DOCD: CPT | Mod: CPTII,S$GLB,, | Performed by: STUDENT IN AN ORGANIZED HEALTH CARE EDUCATION/TRAINING PROGRAM

## 2022-12-01 PROCEDURE — 99999 PR PBB SHADOW E&M-EST. PATIENT-LVL IV: CPT | Mod: PBBFAC,,, | Performed by: STUDENT IN AN ORGANIZED HEALTH CARE EDUCATION/TRAINING PROGRAM

## 2022-12-01 RX ORDER — DUPILUMAB 300 MG/2ML
INJECTION, SOLUTION SUBCUTANEOUS
Qty: 4 ML | Refills: 5 | Status: SHIPPED | OUTPATIENT
Start: 2022-12-01 | End: 2023-04-03 | Stop reason: SDUPTHER

## 2022-12-01 RX ORDER — UREA 40 G/100G
LOTION TOPICAL DAILY
Qty: 226 G | Refills: 5 | Status: SHIPPED | OUTPATIENT
Start: 2022-12-01

## 2022-12-01 RX ORDER — TRIAMCINOLONE ACETONIDE 1 MG/G
CREAM TOPICAL 2 TIMES DAILY
Qty: 454 G | Refills: 2 | Status: SHIPPED | OUTPATIENT
Start: 2022-12-01 | End: 2024-01-08 | Stop reason: SDUPTHER

## 2022-12-01 NOTE — PATIENT INSTRUCTIONS

## 2022-12-01 NOTE — PROGRESS NOTES
Subjective:       Patient ID:  Sherry Burns is a 27 y.o. female who presents for   Chief Complaint   Patient presents with    Follow-up     Eczema     LOV 6/1/22    Patient here today for F/U on eczema  Pt would like to discuss Dupixent- last dose 10/22  Pt states she's been using Vaseline to skin for relief, helps some  Pt states she's very itchy  Was doing well on dupixent without side effects.   She is out of tac cream and would like refills    Patient complains of lesion(s)  Location: right upper arm  Duration: months  Symptoms: raised and scaly  Relieving factors/Previous treatments: none    She also complains of scaling on her left foot. She has been picking at it.   Derm Hx:  Denies Phx of NMSC  Denies Fhx of MM    Current Outpatient Medications:   ·  albuterol (PROVENTIL/VENTOLIN HFA) 90 mcg/actuation inhaler, Inhale 1 puff into the lungs every 4 (four) hours as needed. , Disp: , Rfl:   ·  busPIRone (BUSPAR) 10 MG tablet, Take 10 mg by mouth 2 (two) times daily., Disp: , Rfl:   ·  cetirizine (ZYRTEC) 10 MG tablet, Take 10 mg by mouth daily as needed. , Disp: , Rfl:   ·  clobetasol 0.05% (TEMOVATE) 0.05 % Oint, Apply topically 2 (two) times daily., Disp: 60 g, Rfl: 1  ·  diclofenac (VOLTAREN) 50 MG EC tablet, Take 1 tablet (50 mg total) by mouth 3 (three) times daily as needed (pain)., Disp: 15 tablet, Rfl: 0  ·  dupilumab (DUPIXENT) 300 mg/2 mL Syrg, Inject 2 mLs (300 mg total) into the skin every other week. (Patient not taking: Reported on 12/1/2022), Disp: 4 mL, Rfl: 0  ·  DUPIXENT SYRINGE 300 mg/2 mL Syrg, Inject 600mg (2 syringes) into the skin on day 1 then inject 300mg (1 syringe) every other week. (Patient not taking: Reported on 12/1/2022), Disp: 8 mL, Rfl: 0  ·  fluticasone propionate (FLONASE) 50 mcg/actuation nasal spray, 1 spray (50 mcg total) by Each Nostril route once daily., Disp: 16 g, Rfl: 0  ·  hydrOXYzine HCL (ATARAX) 25 MG tablet, Take 1 tablet (25 mg total) by mouth every  evening. (Patient not taking: Reported on 12/1/2022), Disp: 30 tablet, Rfl: 1  ·  metoprolol tartrate (LOPRESSOR) 25 MG tablet, Take 1 tablet (25 mg total) by mouth 2 (two) times daily., Disp: 90 tablet, Rfl: 3  ·  omeprazole (PRILOSEC) 40 MG capsule, Take 1 capsule (40 mg total) by mouth once daily., Disp: 30 capsule, Rfl: 0  ·  ondansetron (ZOFRAN-ODT) 4 MG TbDL, Take 1 tablet (4 mg total) by mouth every 8 (eight) hours as needed (nausea/vomiting)., Disp: 12 tablet, Rfl: 0  ·  sertraline (ZOLOFT) 100 MG tablet, Take 100 mg by mouth every evening., Disp: , Rfl:   ·  triamcinolone acetonide 0.1% (KENALOG) 0.1 % cream, Apply topically 2 (two) times daily. (Patient not taking: Reported on 12/1/2022), Disp: 454 g, Rfl: 0  ·  valACYclovir (VALTREX) 1000 MG tablet, Take 1 tablet (1,000 mg total) by mouth every 12 (twelve) hours. for 7 days, Disp: 14 tablet, Rfl: 0      Review of Systems   Constitutional:  Negative for fever, chills and fatigue.   Skin:  Positive for itching, dry skin and activity-related sunscreen use.      Objective:    Physical Exam   Constitutional: She appears well-developed and well-nourished.   Neurological: She is alert and oriented to person, place, and time.   Psychiatric: She has a normal mood and affect.   Skin:   Areas Examined (abnormalities noted in diagram):   Head / Face Inspection Performed  Neck Inspection Performed  Chest / Axilla Inspection Performed  Abdomen Inspection Performed  Back Inspection Performed  RUE Inspected  LUE Inspection Performed  RLE Inspected  LLE Inspection Performed            Diagram Legend     Erythematous scaling macule/papule c/w actinic keratosis       Vascular papule c/w angioma      Pigmented verrucoid papule/plaque c/w seborrheic keratosis      Yellow umbilicated papule c/w sebaceous hyperplasia      Irregularly shaped tan macule c/w lentigo     1-2 mm smooth white papules consistent with Milia      Movable subcutaneous cyst with punctum c/w epidermal  inclusion cyst      Subcutaneous movable cyst c/w pilar cyst      Firm pink to brown papule c/w dermatofibroma      Pedunculated fleshy papule(s) c/w skin tag(s)      Evenly pigmented macule c/w junctional nevus     Mildly variegated pigmented, slightly irregular-bordered macule c/w mildly atypical nevus      Flesh colored to evenly pigmented papule c/w intradermal nevus       Pink pearly papule/plaque c/w basal cell carcinoma      Erythematous hyperkeratotic cursted plaque c/w SCC      Surgical scar with no sign of skin cancer recurrence      Open and closed comedones      Inflammatory papules and pustules      Verrucoid papule consistent consistent with wart     Erythematous eczematous patches and plaques     Dystrophic onycholytic nail with subungual debris c/w onychomycosis     Umbilicated papule    Erythematous-base heme-crusted tan verrucoid plaque consistent with inflamed seborrheic keratosis     Erythematous Silvery Scaling Plaque c/w Psoriasis     See annotation                Assessment / Plan:        Intrinsic atopic dermatitis- restart dupixent   -     DUPIXENT SYRINGE 300 mg/2 mL Syrg; 300mg (2ml) SQ q o week  Dispense: 4 mL; Refill: 5  -     triamcinolone acetonide 0.1% (KENALOG) 0.1 % cream; Apply topically 2 (two) times daily.  Dispense: 454 g; Refill: 2  - continue vaseline  Restart dupixent  - restart tac cream  Discussed  benefits and risks of Dupixent including but not limited to injection site reactions, Dupixent- induced facial dermatitis, increased risk of eye/eyelid irritation and inflammation as well as oral herpes infection and possible helminth infections.    Patient counseled to avoid live vaccines.    Will check baseline CBC and CMP.     Dosing:  FDA approved for treatment of adult atopic dermatitis (4/2017) - 121K pts  Start Dupixent at 600mg SQ load then 300mg SQ qoweek   FDA approved for treatment of pediatric AD in ages 6 months+ (7/2022):  Dosing for 30 - 60kg: Start Dupixent at  400mg SC day 0 then 200mg SC q 2 weeks   Dosing for 15 - <30kg: Start Dupixent at 300mg SC q 4weeks  Dosing for 5 - <15kg: Start Dupixent at 200mg SC q 4 weeks     Both 200mg and 300mg available in autoinjector or pre-filled syringe       Hyperkeratosis- feet  -     urea 40 % Lotn lotion; Apply topically once daily.  Dispense: 226 g; Refill: 5  - avoid picking   - use urea lotion nightly-- if not covered can order on amaz[employer]  Gentle use of pumice stone    Verruca vulgaris  Right upper arm  Cryosurgery procedure note:    Verbal consent from the patient is obtained. Liquid nitrogen cryosurgery is applied to 1 verruca with prior paring, as detailed in the physical exam, to produce a freeze injury. 1 consecutive freeze thaw cycles are applied to each verruca. The patient is aware that blisters (possibly blood blisters) may form.             No follow-ups on file.

## 2022-12-06 NOTE — TELEPHONE ENCOUNTER
Specialty Pharmacy - Refill Coordination    Specialty Medication Orders Linked to Encounter      Flowsheet Row Most Recent Value   Medication #1 dupilumab (DUPIXENT) 300 mg/2 mL Syrg (Order#[id number], Rx#[id number])            Refill Questions - Documented Responses      Flowsheet Row Most Recent Value   Patient Availability and HIPAA Verification    Does patient want to proceed with activity? Yes   HIPAA/medical authority confirmed? Yes   Relationship to patient of person spoken to? Self   Refill Screening Questions    Changes to allergies? No   Changes to medications? No   New conditions since last clinic visit? No   Unplanned office visit, urgent care, ED, or hospital admission in the last 4 weeks? No   How does patient/caregiver feel medication is working? Good   Financial problems or insurance changes? No   How many doses of your specialty medications were missed in the last 4 weeks? 0   Would patient like to speak to a pharmacist? No   When does the patient need to receive the medication? 12/07/22   Refill Delivery Questions    How will the patient receive the medication? MEDRx   When does the patient need to receive the medication? 12/07/22   Shipping Address Home   Address in [facility] confirmed and updated if neccessary? Yes   Expected Copay ($) 0   Is the patient able to afford the medication copay? Yes   Payment Method zero copay   Days supply of Refill 28   Supplies needed? No supplies needed   Refill activity completed? Yes   Refill activity plan Refill scheduled   Shipment/Pickup Date: 12/06/22            Current Outpatient Medications   Medication Sig    albuterol (PROVENTIL/VENTOLIN HFA) 90 mcg/actuation inhaler Inhale 1 puff into the lungs every 4 (four) hours as needed.     busPIRone (BUSPAR) 10 MG tablet Take 10 mg by mouth 2 (two) times daily.    cetirizine (ZYRTEC) 10 MG tablet Take 10 mg by mouth daily as needed.     clobetasol 0.05% (TEMOVATE) 0.05 % Oint Apply topically 2 (two) times  daily.    diclofenac (VOLTAREN) 50 MG EC tablet Take 1 tablet (50 mg total) by mouth 3 (three) times daily as needed (pain).    DUPIXENT SYRINGE 300 mg/2 mL Syrg Inject 600mg (2 syringes) into the skin on day 1 then inject 300mg (1 syringe) every other week. (Patient not taking: Reported on 12/1/2022)    DUPIXENT SYRINGE 300 mg/2 mL Syrg 300mg (2ml) SQ q o week    fluticasone propionate (FLONASE) 50 mcg/actuation nasal spray 1 spray (50 mcg total) by Each Nostril route once daily.    hydrOXYzine HCL (ATARAX) 25 MG tablet Take 1 tablet (25 mg total) by mouth every evening. (Patient not taking: Reported on 12/1/2022)    metoprolol tartrate (LOPRESSOR) 25 MG tablet Take 1 tablet (25 mg total) by mouth 2 (two) times daily.    omeprazole (PRILOSEC) 40 MG capsule Take 1 capsule (40 mg total) by mouth once daily.    ondansetron (ZOFRAN-ODT) 4 MG TbDL Take 1 tablet (4 mg total) by mouth every 8 (eight) hours as needed (nausea/vomiting).    sertraline (ZOLOFT) 100 MG tablet Take 100 mg by mouth every evening.    triamcinolone acetonide 0.1% (KENALOG) 0.1 % cream Apply topically 2 (two) times daily.    urea 40 % Lotn lotion Apply topically once daily.    valACYclovir (VALTREX) 1000 MG tablet Take 1 tablet (1,000 mg total) by mouth every 12 (twelve) hours. for 7 days   Last reviewed on 12/1/2022 10:12 AM by Skyla Yeager MD    Review of patient's allergies indicates:  No Known Allergies Last reviewed on  12/1/2022 10:12 AM by Skyla Yeager      Tasks added this encounter   12/28/2022 - Refill Call (Auto Added)   Tasks due within next 3 months   No tasks due.     Cynthia Garcia  [facility]  [address] [place] [id number]  Phone: [phone]  Fax: [phone]

## 2022-12-09 ENCOUNTER — OFFICE VISIT (OUTPATIENT)
Dept: OTOLARYNGOLOGY | Facility: CLINIC | Age: 27
End: 2022-12-09
Payer: MEDICARE

## 2022-12-09 VITALS — HEIGHT: 59 IN | RESPIRATION RATE: 16 BRPM | WEIGHT: 119.06 LBS | BODY MASS INDEX: 24 KG/M2

## 2022-12-09 DIAGNOSIS — R59.0 POSTERIOR AURICULAR LYMPHADENOPATHY: Primary | ICD-10-CM

## 2022-12-09 PROCEDURE — 3008F BODY MASS INDEX DOCD: CPT | Mod: CPTII,S$GLB,, | Performed by: PHYSICIAN ASSISTANT

## 2022-12-09 PROCEDURE — 1160F PR REVIEW ALL MEDS BY PRESCRIBER/CLIN PHARMACIST DOCUMENTED: ICD-10-PCS | Mod: CPTII,S$GLB,, | Performed by: PHYSICIAN ASSISTANT

## 2022-12-09 PROCEDURE — 99203 PR OFFICE/OUTPT VISIT, NEW, LEVL III, 30-44 MIN: ICD-10-PCS | Mod: S$GLB,,, | Performed by: PHYSICIAN ASSISTANT

## 2022-12-09 PROCEDURE — 1160F RVW MEDS BY RX/DR IN RCRD: CPT | Mod: CPTII,S$GLB,, | Performed by: PHYSICIAN ASSISTANT

## 2022-12-09 PROCEDURE — 99999 PR PBB SHADOW E&M-EST. PATIENT-LVL III: CPT | Mod: PBBFAC,,, | Performed by: PHYSICIAN ASSISTANT

## 2022-12-09 PROCEDURE — 99203 OFFICE O/P NEW LOW 30 MIN: CPT | Mod: S$GLB,,, | Performed by: PHYSICIAN ASSISTANT

## 2022-12-09 PROCEDURE — 1159F PR MEDICATION LIST DOCUMENTED IN MEDICAL RECORD: ICD-10-PCS | Mod: CPTII,S$GLB,, | Performed by: PHYSICIAN ASSISTANT

## 2022-12-09 PROCEDURE — 1159F MED LIST DOCD IN RCRD: CPT | Mod: CPTII,S$GLB,, | Performed by: PHYSICIAN ASSISTANT

## 2022-12-09 PROCEDURE — 99999 PR PBB SHADOW E&M-EST. PATIENT-LVL III: ICD-10-PCS | Mod: PBBFAC,,, | Performed by: PHYSICIAN ASSISTANT

## 2022-12-09 PROCEDURE — 3008F PR BODY MASS INDEX (BMI) DOCUMENTED: ICD-10-PCS | Mod: CPTII,S$GLB,, | Performed by: PHYSICIAN ASSISTANT

## 2022-12-09 NOTE — PROGRESS NOTES
[facility]    Subjective:      Patient: Sherry Burns Patient PCP: Kleber Odonnell MD         :  1995     Sex:  female      MRN:  [id number]          Date of Visit: 2022      Chief Complaint: Left post-auricular growth    Patient ID: Sherry Burns is a 27 y.o. female who presents to clinic for growth behind left ear. Pt reports small growth behind left ear that appeared around 10 years ago and has been stable in size. Pt has some tenderness with palpation. Pt denies growth of left ear mass. Pt denies ear pain/discharge, hearing loss or tinnitus. Pt denies history of ear infections as a child. Pt denies prior ear surgery.     Past Medical History  She has a past medical history of Anemia, Asthma, Eczema, and Glaucoma.    Family History  Her family history is not on file.    Past Surgical History:   Procedure Laterality Date    COLONOSCOPY N/A 3/5/2021    Procedure: COLONOSCOPY;  Surgeon: Jake Williamson MD;  Location: [facility];  Service: Endoscopy;  Laterality: N/A;    ESOPHAGOGASTRODUODENOSCOPY N/A 3/5/2021    Procedure: EGD (ESOPHAGOGASTRODUODENOSCOPY);  Surgeon: Jkae Williamson MD;  Location: [facility];  Service: Endoscopy;  Laterality: N/A;     Social History     Tobacco Use    Smoking status: Never    Smokeless tobacco: Never   Substance and Sexual Activity    Alcohol use: Not Currently    Drug use: Never    Sexual activity: Not on file     Medications  She has a current medication list which includes the following prescription(s): albuterol, buspirone, cetirizine, clobetasol 0.05%, diclofenac, dupixent syringe, dupixent syringe, fluticasone propionate, hydroxyzine hcl, metoprolol tartrate, omeprazole, ondansetron, sertraline, triamcinolone acetonide 0.1%, urea, and valacyclovir.    Review of patient's allergies indicates:  No Known Allergies  All medications, allergies, and past history have been reviewed.    Objective:      Vitals:  Vitals - 1 value per visit 2022  12/9/2022   SYSTOLIC - - -   DIASTOLIC - - -   Pulse - - -   Temp - - -   Resp - - 16   SPO2 - - -   Weight (lb) 116 - 119.05   Weight (kg) 52.617 - 54   Height 59 - 59   BMI (Calculated) 23.4 - 24   VISIT REPORT - - -   Pain Score  - 0 -       Body surface area is 1.5 meters squared.    Physical Exam  Constitutional:       General: She is not in acute distress.     Appearance: Normal appearance. She is not ill-appearing.   HENT:      Head: Normocephalic and atraumatic.      Right Ear: Tympanic membrane, ear canal and external ear normal.      Left Ear: Tympanic membrane, ear canal and external ear normal.      Ears:        Nose: Nose normal.      Mouth/Throat:      Lips: Pink. No lesions.      Mouth: Mucous membranes are moist. No oral lesions.      Tongue: No lesions.      Palate: No lesions.      Pharynx: Oropharynx is clear. Uvula midline. No pharyngeal swelling, oropharyngeal exudate, posterior oropharyngeal erythema or uvula swelling.   Eyes:      General:         Right eye: No discharge.         Left eye: No discharge.      Extraocular Movements: Extraocular movements intact.      Conjunctiva/sclera: Conjunctivae normal.   Pulmonary:      Effort: Pulmonary effort is normal.   Neurological:      General: No focal deficit present.      Mental Status: She is alert and oriented to person, place, and time. Mental status is at baseline.   Psychiatric:         Mood and Affect: Mood normal.         Behavior: Behavior normal.         Thought Content: Thought content normal.         Judgment: Judgment normal.     Labs:  WBC   Date Value Ref Range Status   05/28/2022 9.72 3.90 - 12.70 K/uL Final     Platelets   Date Value Ref Range Status   05/28/2022 320 150 - 450 K/uL Final     Creatinine   Date Value Ref Range Status   05/28/2022 0.9 0.5 - 1.4 mg/dL Final     TSH   Date Value Ref Range Status   04/16/2021 1.83 mIU/L Final     Comment:               Reference Range                         > or = 20 Years  0.40-4.50                               Pregnancy Ranges            First trimester    0.26-2.66            Second trimester   0.55-2.73            Third trimester    0.43-2.91       Glucose   Date Value Ref Range Status   05/28/2022 93 70 - 110 mg/dL Final     All lab results and imaging results have been reviewed.    Assessment:        ICD-10-CM ICD-9-CM   1. Posterior auricular lymphadenopathy  R59.0 785.6            Plan:      Benign appearing 2cm post-auricular circumferential growth soft and fluctuant to palpation found today in office. Pt advised that this appears to be benign. Pt reports that growth has been stable and not increased in size over the past 10 years. Possible lymph node vs benign cyst. Will have pt get US Soft Tissue Head to further assess. Will reach out to pt with results and recommendations of US head. If benign appearance on US, then pt advised that she can follow up in office as needed if growth increases in size or if she has issues/concerns.

## 2022-12-28 ENCOUNTER — SPECIALTY PHARMACY (OUTPATIENT)
Dept: PHARMACY | Facility: CLINIC | Age: 27
End: 2022-12-28
Payer: MEDICARE

## 2022-12-28 NOTE — TELEPHONE ENCOUNTER
Specialty Pharmacy - Refill Coordination    Specialty Medication Orders Linked to Encounter      Flowsheet Row Most Recent Value   Medication #1 DUPIXENT SYRINGE 300 mg/2 mL Syrg (Order#[id number], Rx#[id number])            Refill Questions - Documented Responses      Flowsheet Row Most Recent Value   Patient Availability and HIPAA Verification    Does patient want to proceed with activity? Yes   HIPAA/medical authority confirmed? Yes   Relationship to patient of person spoken to? Self   Refill Screening Questions    Changes to allergies? No   Changes to medications? No   New conditions since last clinic visit? No   Unplanned office visit, urgent care, ED, or hospital admission in the last 4 weeks? No   How does patient/caregiver feel medication is working? Good   Financial problems or insurance changes? No   How many doses of your specialty medications were missed in the last 4 weeks? 0   Would patient like to speak to a pharmacist? No   When does the patient need to receive the medication? 01/06/23   Refill Delivery Questions    How will the patient receive the medication? MEDRx   When does the patient need to receive the medication? 01/06/23   Shipping Address Prescription   Address in [facility] confirmed and updated if neccessary? Yes   Expected Copay ($) 0   Is the patient able to afford the medication copay? Yes   Payment Method zero copay   Days supply of Refill 28   Supplies needed? No supplies needed   Refill activity completed? Yes   Refill activity plan Refill scheduled   Shipment/Pickup Date: 01/03/22            Current Outpatient Medications   Medication Sig    albuterol (PROVENTIL/VENTOLIN HFA) 90 mcg/actuation inhaler Inhale 1 puff into the lungs every 4 (four) hours as needed.     busPIRone (BUSPAR) 10 MG tablet Take 10 mg by mouth 2 (two) times daily.    cetirizine (ZYRTEC) 10 MG tablet Take 10 mg by mouth daily as needed.     clobetasol 0.05% (TEMOVATE) 0.05 % Oint Apply topically 2 (two)  times daily.    diclofenac (VOLTAREN) 50 MG EC tablet Take 1 tablet (50 mg total) by mouth 3 (three) times daily as needed (pain).    DUPIXENT SYRINGE 300 mg/2 mL Syrg Inject 600mg (2 syringes) into the skin on day 1 then inject 300mg (1 syringe) every other week. (Patient not taking: Reported on 12/1/2022)    DUPIXENT SYRINGE 300 mg/2 mL Syrg Inject 300 mg (2 mL) into the skin every other week    fluticasone propionate (FLONASE) 50 mcg/actuation nasal spray 1 spray (50 mcg total) by Each Nostril route once daily.    hydrOXYzine HCL (ATARAX) 25 MG tablet Take 1 tablet (25 mg total) by mouth every evening. (Patient not taking: Reported on 12/1/2022)    metoprolol tartrate (LOPRESSOR) 25 MG tablet Take 1 tablet (25 mg total) by mouth 2 (two) times daily.    omeprazole (PRILOSEC) 40 MG capsule Take 1 capsule (40 mg total) by mouth once daily.    ondansetron (ZOFRAN-ODT) 4 MG TbDL Take 1 tablet (4 mg total) by mouth every 8 (eight) hours as needed (nausea/vomiting).    sertraline (ZOLOFT) 100 MG tablet Take 100 mg by mouth every evening.    triamcinolone acetonide 0.1% (KENALOG) 0.1 % cream Apply topically 2 (two) times daily.    urea 40 % Lotn lotion Apply topically once daily.    valACYclovir (VALTREX) 1000 MG tablet Take 1 tablet (1,000 mg total) by mouth every 12 (twelve) hours. for 7 days   Last reviewed on 12/9/2022  2:42 PM by Ra Finch PA-C    Review of patient's allergies indicates:  No Known Allergies Last reviewed on  12/9/2022 2:42 PM by Ra Finch      Tasks added this encounter   1/27/2023 - Refill Call (Auto Added)   Tasks due within next 3 months   3/10/2023 - Clinical - Follow Up Assesement (Annual)     Mirland[facility] Marie [facility]  [address] [place] [id number]  Phone: [phone]  Fax: [phone]

## 2023-01-13 DIAGNOSIS — D50.0 IRON DEFICIENCY ANEMIA DUE TO CHRONIC BLOOD LOSS: Primary | ICD-10-CM

## 2023-01-27 ENCOUNTER — SPECIALTY PHARMACY (OUTPATIENT)
Dept: PHARMACY | Facility: CLINIC | Age: 28
End: 2023-01-27
Payer: MEDICARE

## 2023-01-27 NOTE — TELEPHONE ENCOUNTER
Specialty Pharmacy - Refill Coordination    Specialty Medication Orders Linked to Encounter      Flowsheet Row Most Recent Value   Medication #1 DUPIXENT SYRINGE 300 mg/2 mL Syrg (Order#[id number], Rx#[id number])            Refill Questions - Documented Responses      Flowsheet Row Most Recent Value   Patient Availability and HIPAA Verification    Does patient want to proceed with activity? Yes   HIPAA/medical authority confirmed? Yes   Relationship to patient of person spoken to? Self   Refill Screening Questions    Changes to allergies? No   Changes to medications? No   New conditions since last clinic visit? No   Unplanned office visit, urgent care, ED, or hospital admission in the last 4 weeks? No   How does patient/caregiver feel medication is working? Good   Financial problems or insurance changes? No   How many doses of your specialty medications were missed in the last 4 weeks? 0   Would patient like to speak to a pharmacist? No   When does the patient need to receive the medication? 02/03/23   Refill Delivery Questions    How will the patient receive the medication? MEDRx   When does the patient need to receive the medication? 02/03/23   Shipping Address Home   Address in [facility] confirmed and updated if neccessary? Yes   Expected Copay ($) 0   Is the patient able to afford the medication copay? Yes   Payment Method zero copay   Days supply of Refill 28   Supplies needed? No supplies needed   Refill activity completed? Yes   Refill activity plan Refill scheduled   Shipment/Pickup Date: 01/31/23            Current Outpatient Medications   Medication Sig    albuterol (PROVENTIL/VENTOLIN HFA) 90 mcg/actuation inhaler Inhale 1 puff into the lungs every 4 (four) hours as needed.     busPIRone (BUSPAR) 10 MG tablet Take 10 mg by mouth 2 (two) times daily.    cetirizine (ZYRTEC) 10 MG tablet Take 10 mg by mouth daily as needed.     clobetasol 0.05% (TEMOVATE) 0.05 % Oint Apply topically 2 (two) times  daily.    diclofenac (VOLTAREN) 50 MG EC tablet Take 1 tablet (50 mg total) by mouth 3 (three) times daily as needed (pain).    DUPIXENT SYRINGE 300 mg/2 mL Syrg Inject 600mg (2 syringes) into the skin on day 1 then inject 300mg (1 syringe) every other week. (Patient not taking: Reported on 12/1/2022)    DUPIXENT SYRINGE 300 mg/2 mL Syrg Inject 300 mg (2 mL) into the skin every other week    fluticasone propionate (FLONASE) 50 mcg/actuation nasal spray 1 spray (50 mcg total) by Each Nostril route once daily.    hydrOXYzine HCL (ATARAX) 25 MG tablet Take 1 tablet (25 mg total) by mouth every evening. (Patient not taking: Reported on 12/1/2022)    metoprolol tartrate (LOPRESSOR) 25 MG tablet Take 1 tablet (25 mg total) by mouth 2 (two) times daily.    omeprazole (PRILOSEC) 40 MG capsule Take 1 capsule (40 mg total) by mouth once daily.    ondansetron (ZOFRAN-ODT) 4 MG TbDL Take 1 tablet (4 mg total) by mouth every 8 (eight) hours as needed (nausea/vomiting).    sertraline (ZOLOFT) 100 MG tablet Take 100 mg by mouth every evening.    triamcinolone acetonide 0.1% (KENALOG) 0.1 % cream Apply topically 2 (two) times daily.    urea 40 % Lotn lotion Apply topically once daily.    valACYclovir (VALTREX) 1000 MG tablet Take 1 tablet (1,000 mg total) by mouth every 12 (twelve) hours. for 7 days   Last reviewed on 12/9/2022  2:42 PM by Ra Finch PA-C    Review of patient's allergies indicates:  No Known Allergies Last reviewed on  12/9/2022 2:42 PM by Ra Finch      Tasks added this encounter   2/24/2023 - Refill Call (Auto Added)   Tasks due within next 3 months   3/10/2023 - Clinical - Follow Up Assesement (Annual)     Olya Joseph, Patient Care Assistant  [facility]  [address] [place] [id number]  Phone: [phone]  Fax: [phone]

## 2023-02-24 ENCOUNTER — SPECIALTY PHARMACY (OUTPATIENT)
Dept: PHARMACY | Facility: CLINIC | Age: 28
End: 2023-02-24
Payer: MEDICARE

## 2023-02-24 NOTE — TELEPHONE ENCOUNTER
Outgoing call regarding Dupixent refill, informed pt PA  on  22. Pt states her next injection is due 3/3 which pt does not have an injection for. Informed pt will follow up once PA is approved. Routed to assigned [facility]

## 2023-02-24 NOTE — TELEPHONE ENCOUNTER
Dupixent PA renewal submitted via [facility]. Key: [id number] - PA Case ID: [id number]    Dupixent PA approved. PA Case: [id number], Status: Approved, Coverage Starts on: 1/1/2023 12:00:00 AM, Coverage Ends on: 12/31/2023 12:00:00 AM. Questions? Contact [phone].

## 2023-02-27 NOTE — TELEPHONE ENCOUNTER
Specialty Pharmacy - Refill Coordination  Specialty Pharmacy - Medication/Referral Authorization    Specialty Medication Orders Linked to Encounter      Flowsheet Row Most Recent Value   Medication #1 DUPIXENT SYRINGE 300 mg/2 mL Syrg (Order#[id number], Rx#[id number])            Refill Questions - Documented Responses      Flowsheet Row Most Recent Value   Patient Availability and HIPAA Verification    Does patient want to proceed with activity? Yes   HIPAA/medical authority confirmed? Yes   Relationship to patient of person spoken to? Mother   Refill Screening Questions    Changes to allergies? No   Changes to medications? No   New conditions since last clinic visit? No   Unplanned office visit, urgent care, ED, or hospital admission in the last 4 weeks? No   How does patient/caregiver feel medication is working? Good   Financial problems or insurance changes? No   How many doses of your specialty medications were missed in the last 4 weeks? 0   Would patient like to speak to a pharmacist? No   When does the patient need to receive the medication? 03/03/23   Refill Delivery Questions    How will the patient receive the medication? MEDRx   When does the patient need to receive the medication? 03/03/23   Shipping Address Prescription   Address in [facility] confirmed and updated if neccessary? Yes   Expected Copay ($) 0   Is the patient able to afford the medication copay? Yes   Payment Method zero copay   Days supply of Refill 28   Supplies needed? No supplies needed   Refill activity completed? Yes   Refill activity plan Refill scheduled   Shipment/Pickup Date: 02/28/23            Current Outpatient Medications   Medication Sig    albuterol (PROVENTIL/VENTOLIN HFA) 90 mcg/actuation inhaler Inhale 1 puff into the lungs every 4 (four) hours as needed.     busPIRone (BUSPAR) 10 MG tablet Take 10 mg by mouth 2 (two) times daily.    cetirizine (ZYRTEC) 10 MG tablet Take 10 mg by mouth daily as needed.      clobetasol 0.05% (TEMOVATE) 0.05 % Oint Apply topically 2 (two) times daily.    diclofenac (VOLTAREN) 50 MG EC tablet Take 1 tablet (50 mg total) by mouth 3 (three) times daily as needed (pain).    DUPIXENT SYRINGE 300 mg/2 mL Syrg Inject 600mg (2 syringes) into the skin on day 1 then inject 300mg (1 syringe) every other week. (Patient not taking: Reported on 12/1/2022)    DUPIXENT SYRINGE 300 mg/2 mL Syrg Inject 300 mg (2 mL) into the skin every other week    fluticasone propionate (FLONASE) 50 mcg/actuation nasal spray 1 spray (50 mcg total) by Each Nostril route once daily.    hydrOXYzine HCL (ATARAX) 25 MG tablet Take 1 tablet (25 mg total) by mouth every evening. (Patient not taking: Reported on 12/1/2022)    metoprolol tartrate (LOPRESSOR) 25 MG tablet Take 1 tablet (25 mg total) by mouth 2 (two) times daily.    omeprazole (PRILOSEC) 40 MG capsule Take 1 capsule (40 mg total) by mouth once daily.    ondansetron (ZOFRAN-ODT) 4 MG TbDL Take 1 tablet (4 mg total) by mouth every 8 (eight) hours as needed (nausea/vomiting).    sertraline (ZOLOFT) 100 MG tablet Take 100 mg by mouth every evening.    triamcinolone acetonide 0.1% (KENALOG) 0.1 % cream Apply topically 2 (two) times daily.    urea 40 % Lotn lotion Apply topically once daily.    valACYclovir (VALTREX) 1000 MG tablet Take 1 tablet (1,000 mg total) by mouth every 12 (twelve) hours. for 7 days   Last reviewed on 12/9/2022  2:42 PM by Ra Finch PA-C    Review of patient's allergies indicates:  No Known Allergies Last reviewed on  12/9/2022 2:42 PM by Ra Finch      Tasks added this encounter   3/24/2023 - Refill Call (Auto Added)   Tasks due within next 3 months   3/10/2023 - Clinical - Follow Up Assesement (Annual)     Mirlande Marie [facility] - Specialty Pharmacy  [address] [address]  Phone: [phone]  Fax: [phone]

## 2023-03-03 ENCOUNTER — TELEPHONE (OUTPATIENT)
Dept: HEMATOLOGY/ONCOLOGY | Facility: CLINIC | Age: 28
End: 2023-03-03

## 2023-03-07 ENCOUNTER — LAB VISIT (OUTPATIENT)
Dept: LAB | Facility: HOSPITAL | Age: 28
End: 2023-03-07
Attending: INTERNAL MEDICINE
Payer: MEDICARE

## 2023-03-07 ENCOUNTER — TELEPHONE (OUTPATIENT)
Dept: HEMATOLOGY/ONCOLOGY | Facility: CLINIC | Age: 28
End: 2023-03-07

## 2023-03-07 DIAGNOSIS — D50.0 IRON DEFICIENCY ANEMIA DUE TO CHRONIC BLOOD LOSS: ICD-10-CM

## 2023-03-07 DIAGNOSIS — D50.0 IRON DEFICIENCY ANEMIA DUE TO CHRONIC BLOOD LOSS: Primary | ICD-10-CM

## 2023-03-07 LAB
BASOPHILS # BLD AUTO: 0.06 K/UL (ref 0–0.2)
BASOPHILS NFR BLD: 0.7 % (ref 0–1.9)
DIFFERENTIAL METHOD: ABNORMAL
EOSINOPHIL # BLD AUTO: 0.3 K/UL (ref 0–0.5)
EOSINOPHIL NFR BLD: 3.4 % (ref 0–8)
ERYTHROCYTE [DISTWIDTH] IN BLOOD BY AUTOMATED COUNT: 12.3 % (ref 11.5–14.5)
FERRITIN SERPL-MCNC: 138 NG/ML (ref 20–300)
HCT VFR BLD AUTO: 37.4 % (ref 37–48.5)
HGB BLD-MCNC: 12.7 G/DL (ref 12–16)
IMM GRANULOCYTES # BLD AUTO: 0.03 K/UL (ref 0–0.04)
IMM GRANULOCYTES NFR BLD AUTO: 0.4 % (ref 0–0.5)
LYMPHOCYTES # BLD AUTO: 2.2 K/UL (ref 1–4.8)
LYMPHOCYTES NFR BLD: 25.4 % (ref 18–48)
MCH RBC QN AUTO: 30 PG (ref 27–31)
MCHC RBC AUTO-ENTMCNC: 34 G/DL (ref 32–36)
MCV RBC AUTO: 88 FL (ref 82–98)
MONOCYTES # BLD AUTO: 0.7 K/UL (ref 0.3–1)
MONOCYTES NFR BLD: 7.6 % (ref 4–15)
NEUTROPHILS # BLD AUTO: 5.3 K/UL (ref 1.8–7.7)
NEUTROPHILS NFR BLD: 62.5 % (ref 38–73)
NRBC BLD-RTO: 0 /100 WBC
PLATELET # BLD AUTO: 327 K/UL (ref 150–450)
PMV BLD AUTO: 8.8 FL (ref 9.2–12.9)
RBC # BLD AUTO: 4.23 M/UL (ref 4–5.4)
WBC # BLD AUTO: 8.51 K/UL (ref 3.9–12.7)

## 2023-03-07 PROCEDURE — 85025 COMPLETE CBC W/AUTO DIFF WBC: CPT | Performed by: INTERNAL MEDICINE

## 2023-03-07 PROCEDURE — 36415 COLL VENOUS BLD VENIPUNCTURE: CPT | Performed by: INTERNAL MEDICINE

## 2023-03-07 PROCEDURE — 82728 ASSAY OF FERRITIN: CPT | Performed by: INTERNAL MEDICINE

## 2023-03-09 ENCOUNTER — OFFICE VISIT (OUTPATIENT)
Dept: HEMATOLOGY/ONCOLOGY | Facility: CLINIC | Age: 28
End: 2023-03-09
Payer: MEDICARE

## 2023-03-09 VITALS
HEIGHT: 59 IN | DIASTOLIC BLOOD PRESSURE: 76 MMHG | BODY MASS INDEX: 25.17 KG/M2 | WEIGHT: 124.88 LBS | HEART RATE: 78 BPM | SYSTOLIC BLOOD PRESSURE: 121 MMHG | RESPIRATION RATE: 18 BRPM | TEMPERATURE: 98 F

## 2023-03-09 DIAGNOSIS — D50.0 IRON DEFICIENCY ANEMIA DUE TO CHRONIC BLOOD LOSS: Primary | ICD-10-CM

## 2023-03-09 PROCEDURE — 99214 OFFICE O/P EST MOD 30 MIN: CPT | Mod: S$GLB,,, | Performed by: INTERNAL MEDICINE

## 2023-03-09 PROCEDURE — 3078F PR MOST RECENT DIASTOLIC BLOOD PRESSURE < 80 MM HG: ICD-10-PCS | Mod: CPTII,S$GLB,, | Performed by: INTERNAL MEDICINE

## 2023-03-09 PROCEDURE — 3008F PR BODY MASS INDEX (BMI) DOCUMENTED: ICD-10-PCS | Mod: CPTII,S$GLB,, | Performed by: INTERNAL MEDICINE

## 2023-03-09 PROCEDURE — 1159F PR MEDICATION LIST DOCUMENTED IN MEDICAL RECORD: ICD-10-PCS | Mod: CPTII,S$GLB,, | Performed by: INTERNAL MEDICINE

## 2023-03-09 PROCEDURE — 3074F SYST BP LT 130 MM HG: CPT | Mod: CPTII,S$GLB,, | Performed by: INTERNAL MEDICINE

## 2023-03-09 PROCEDURE — 3078F DIAST BP <80 MM HG: CPT | Mod: CPTII,S$GLB,, | Performed by: INTERNAL MEDICINE

## 2023-03-09 PROCEDURE — 1159F MED LIST DOCD IN RCRD: CPT | Mod: CPTII,S$GLB,, | Performed by: INTERNAL MEDICINE

## 2023-03-09 PROCEDURE — 3008F BODY MASS INDEX DOCD: CPT | Mod: CPTII,S$GLB,, | Performed by: INTERNAL MEDICINE

## 2023-03-09 PROCEDURE — 99214 PR OFFICE/OUTPT VISIT, EST, LEVL IV, 30-39 MIN: ICD-10-PCS | Mod: S$GLB,,, | Performed by: INTERNAL MEDICINE

## 2023-03-09 PROCEDURE — 3074F PR MOST RECENT SYSTOLIC BLOOD PRESSURE < 130 MM HG: ICD-10-PCS | Mod: CPTII,S$GLB,, | Performed by: INTERNAL MEDICINE

## 2023-03-12 NOTE — PROGRESS NOTES
[facility] On[facility] Subsequent  encounter note    3/9/23    Subjective:      Patient ID:   Sherry Burns  27 y.o. female  1995    [place]    Chief Complaint:   Anemia    HPI:  27 y.o. female   With intermittent anemia and easy bruisability.  She has been on iron pills in the past but has not been on B12 injections.  She denies history of jaundice or gallbladder disorder and has not been transfused.  Her mother admits to having easy bruisability also.      She has history of increased heart rate and is seen per Dr. Parson.  She has history of digestive problems of unclear nature and has had EGD and colonoscopy per Dr. Williamson.  Other history includes eczema and asthma.    She has not had previous surgeries.  She does not have history of drug allergies.  She does not smoke.  She does not drink alcohol.  She is not presently employed.    She had onset of menses at age 16, she describes her menses as normal to heavy, she does not have history of pregnancy.    Her mother has hypertension, diabetes, high cholesterol and admits to having easy bruising.  Her father is alive and well.  She has a brother with asthma.  She does not have children.    Last seen 4/2021.  Hx of Fe deficiency, on po Fe, but compliance is unclear.    She has 99Bill Medicare  Try for GYN referral on her plan, she says she will not use Dr. Irby's office.    Since May of 2022 I ordered iron infusion to replenish iron stores.  Energy is much improved, pallor has resolved.  She is in good spirits.  She places her energy at a 10/10.    Hgb 8.8 to 12.7.  Ferritin 6 to 128.      ROS:   GEN: normal without any fever, night sweats or weight loss  HEENT: normal with no HA's, sore throat, stiff neck, changes in vision  CV: normal with no CP, SOB, PND, RAMIREZ or orthopnea.  See HPI  PULM: normal with no SOB, cough, hemoptysis, sputum or pleuritic pain.  See HPI  GI: See HPI  : normal with no hematuria,  dysuria  BREAST: normal with no mass, discharge, pain  SKIN: See HPI.     Past Medical History:   Diagnosis Date    Anemia     Asthma     Eczema     Glaucoma      Past Surgical History:   Procedure Laterality Date    COLONOSCOPY N/A 3/5/2021    Procedure: COLONOSCOPY;  Surgeon: Jake Williamson MD;  Location: [facility];  Service: Endoscopy;  Laterality: N/A;    ESOPHAGOGASTRODUODENOSCOPY N/A 3/5/2021    Procedure: EGD (ESOPHAGOGASTRODUODENOSCOPY);  Surgeon: Jake Williamson MD;  Location: [facility];  Service: Endoscopy;  Laterality: N/A;       Review of patient's allergies indicates:  No Known Allergies  Social History     Socioeconomic History    Marital status: Single   Tobacco Use    Smoking status: Never    Smokeless tobacco: Never   Substance and Sexual Activity    Alcohol use: Not Currently    Drug use: Never         Current Outpatient Medications:     albuterol (PROVENTIL/VENTOLIN HFA) 90 mcg/actuation inhaler, Inhale 1 puff into the lungs every 4 (four) hours as needed. , Disp: , Rfl:     busPIRone (BUSPAR) 10 MG tablet, Take 10 mg by mouth 2 (two) times daily., Disp: , Rfl:     cetirizine (ZYRTEC) 10 MG tablet, Take 10 mg by mouth daily as needed. , Disp: , Rfl:     clobetasol 0.05% (TEMOVATE) 0.05 % Oint, Apply topically 2 (two) times daily., Disp: 60 g, Rfl: 1    diclofenac (VOLTAREN) 50 MG EC tablet, Take 1 tablet (50 mg total) by mouth 3 (three) times daily as needed (pain)., Disp: 15 tablet, Rfl: 0    DUPIXENT SYRINGE 300 mg/2 mL Syrg, Inject 300 mg (2 mL) into the skin every other week, Disp: 4 mL, Rfl: 5    fluticasone propionate (FLONASE) 50 mcg/actuation nasal spray, 1 spray (50 mcg total) by Each Nostril route once daily., Disp: 16 g, Rfl: 0    hydrOXYzine HCL (ATARAX) 25 MG tablet, Take 1 tablet (25 mg total) by mouth every evening., Disp: 30 tablet, Rfl: 1    metoprolol tartrate (LOPRESSOR) 25 MG tablet, Take 1 tablet (25 mg total) by mouth 2 (two) times daily., Disp: 90 tablet, Rfl: 3     "ondansetron (ZOFRAN-ODT) 4 MG TbDL, Take 1 tablet (4 mg total) by mouth every 8 (eight) hours as needed (nausea/vomiting)., Disp: 12 tablet, Rfl: 0    sertraline (ZOLOFT) 100 MG tablet, Take 100 mg by mouth every evening., Disp: , Rfl:     triamcinolone acetonide 0.1% (KENALOG) 0.1 % cream, Apply topically 2 (two) times daily., Disp: 454 g, Rfl: 2    urea 40 % Lotn lotion, Apply topically once daily., Disp: 226 g, Rfl: 5    DUPIXENT SYRINGE 300 mg/2 mL Syrg, Inject 600mg (2 syringes) into the skin on day 1 then inject 300mg (1 syringe) every other week. (Patient not taking: Reported on 12/1/2022), Disp: 8 mL, Rfl: 0    omeprazole (PRILOSEC) 40 MG capsule, Take 1 capsule (40 mg total) by mouth once daily., Disp: 30 capsule, Rfl: 0    valACYclovir (VALTREX) 1000 MG tablet, Take 1 tablet (1,000 mg total) by mouth every 12 (twelve) hours. for 7 days, Disp: 14 tablet, Rfl: 0          Objective:   Vitals:  Blood pressure 121/76, pulse 78, temperature 97.9 °F (36.6 °C), resp. rate 18, height 4' 11" (1.499 m), weight 56.7 kg (124 lb 14.4 oz).    Physical Examination:   GEN: no apparent distress, comfortable  HEAD: atraumatic and normocephalic  EYES: no pallor, no icterus, no jaundice  ENT:  no pharyngeal erythema, external ears WNL; no nasal discharge  NECK: no masses, thyroid normal, trachea midline, no LAD/LN's, supple  CV: RRR with no murmur; normal pulse  CHEST: Normal respiratory effort; CTAB; normal breath sounds; no wheeze or crackles  ABDOM: nontender and nondistended; soft; no rebound/guarding, L/S NP  MUSC/Skeletal: ROM normal; no crepitus; joints normal  EXTREM: no clubbing, cyanosis, inflammation or swelling  SKIN: no rashes, lesions, ulcers, petechiae.  She has 2 small ecchymoses on the anterior lower aspect of the right leg  : no cvat  NEURO: grossly intact; motor/sensory WNL; no tremors  PSYCH: normal mood, affect and behavior  LYMPH: normal cervical, supraclavicular, axillary and groin LN's  BREASTS:  Left " and right breast nontender, without discharge, without palpable mass.    Labs:   Lab Results   Component Value Date    WBC 8.51 03/07/2023    HGB 12.7 03/07/2023    HCT 37.4 03/07/2023    MCV 88 03/07/2023     03/07/2023    CMP  Sodium   Date Value Ref Range Status   05/28/2022 140 136 - 145 mmol/L Final     Potassium   Date Value Ref Range Status   05/28/2022 3.9 3.5 - 5.1 mmol/L Final     Chloride   Date Value Ref Range Status   05/28/2022 107 95 - 110 mmol/L Final     CO2   Date Value Ref Range Status   05/28/2022 23 23 - 29 mmol/L Final     Glucose   Date Value Ref Range Status   05/28/2022 93 70 - 110 mg/dL Final     BUN   Date Value Ref Range Status   05/28/2022 12 6 - 20 mg/dL Final     Creatinine   Date Value Ref Range Status   05/28/2022 0.9 0.5 - 1.4 mg/dL Final     Calcium   Date Value Ref Range Status   05/28/2022 9.6 8.7 - 10.5 mg/dL Final     Total Protein   Date Value Ref Range Status   05/28/2022 7.0 6.0 - 8.4 g/dL Final     Albumin   Date Value Ref Range Status   05/28/2022 4.2 3.5 - 5.2 g/dL Final     Total Bilirubin   Date Value Ref Range Status   05/28/2022 1.0 0.1 - 1.0 mg/dL Final     Comment:     For infants and newborns, interpretation of results should be based  on gestational age, weight and in agreement with clinical  observations.    Premature Infant recommended reference ranges:  Up to 24 hours.............<8.0 mg/dL  Up to 48 hours............<12.0 mg/dL  3-5 days..................<15.0 mg/dL  6-29 days.................<15.0 mg/dL       Alkaline Phosphatase   Date Value Ref Range Status   05/28/2022 61 55 - 135 U/L Final     AST   Date Value Ref Range Status   05/28/2022 16 10 - 40 U/L Final     ALT   Date Value Ref Range Status   05/28/2022 15 10 - 44 U/L Final     Anion Gap   Date Value Ref Range Status   05/28/2022 10 8 - 16 mmol/L Final     eGFR if    Date Value Ref Range Status   05/28/2022 >60 >60 mL/min/1.73 m^2 Final     eGFR if non    Date  Value Ref Range Status   05/28/2022 >60 >60 mL/min/1.73 m^2 Final     Comment:     Calculation used to obtain the estimated glomerular filtration  rate (eGFR) is the CKD-EPI equation.        Hemoglobin is 12.7.    Assessment:   (1) 27 y.o. female with history of easy bruisability.  Initial coagulation studies have been done thus far and have returned normal.  I have ordered additional studies to be completed at [facility].    These results have shown normal coag studies.  I am scheduling platelet aggregation studies at [facility] main campus.    (2) she appears to have a moderate microcytic anemia.  Hgb 8.  Of This may be nutritional or secondary to iron deficiency.    Ferritin returned low at 6.  Fe deficiency anemia 2nd heavy menses.  She is on oral FeSO4 daily.  I supplemented her with iron infusions given through the infusion center.  Hemoglobin is improved to 12.7, anemia has resolved.  Ferritin is now NL at 128.      RTC 6 months with CBC, Ferritin.  [facility].              I have explained and the patient understands all of  the current recommendation(s). I have answered all of their questions to the best of my ability and to their complete satisfaction.

## 2023-03-27 ENCOUNTER — SPECIALTY PHARMACY (OUTPATIENT)
Dept: PHARMACY | Facility: CLINIC | Age: 28
End: 2023-03-27
Payer: MEDICARE

## 2023-03-27 DIAGNOSIS — L20.9 ATOPIC DERMATITIS, UNSPECIFIED TYPE: Primary | ICD-10-CM

## 2023-03-27 NOTE — TELEPHONE ENCOUNTER
Specialty Pharmacy - Clinical Reassessment  Specialty Pharmacy - Refill Coordination    Specialty Medication Orders Linked to Encounter      Flowsheet Row Most Recent Value   Medication #1 DUPIXENT SYRINGE 300 mg/2 mL Syrg (Order#[id number], Rx#[id number])          Patient Diagnosis   L20.9 - Atopic dermatitis, unspecified type    Sherry Burns is a 27 y.o. female, who is followed by the specialty pharmacy service for management and education of her Dupixent.  She has been on therapy with Dupixent for 21 months.  I have reviewed her electronic medical record and current medication list and determined that specialty medication adjustment Is not needed at this time.    Patient has not experienced adverse events.  She Is adherent reporting 0 missed doses since last review.  Adherence has been encouraged with the following mechanism(s): Patient uses a calendar to keep track of doses.  She is meeting goals of therapy and will continue treatment.        3/27/2023 2/27/2023 1/27/2023 12/28/2022 12/6/2022 10/24/2022 9/16/2022   Follow Up Review   # of missed doses 0 0 0 0 0    0 0 0   New Medications? No No No No No    No No No   New Conditions? No No No No No    No No No   New Allergies? No No No No No    No No No   Med Effective? Excellent Good Good Good Good    Good Good Very good   Urgent Care? No No No No No    No No No   Requested Pharmacist? No No No No No    No No No       Multiple values from one day are sorted in reverse-chronological order         Therapy is appropriate to continue.    Therapy is effective: Yes  On scale of 1 to 10, how does patient rank quality of life? (10 - Best): 10  Recommendations: none at this time.  Review Method: Patient Contact    Tasks added this encounter   No tasks added.   Tasks due within next 3 months   3/10/2023 - Clinical - Follow Up Assesement (Annual)  3/24/2023 - Refill Call (Auto Added)     Val[facility] Hoff - Specialty Pharmacy  [address]  [address]  [place] [address]  Phone: [phone]  Fax: [phone]

## 2023-03-27 NOTE — TELEPHONE ENCOUNTER
Specialty Pharmacy - Clinical Reassessment  Specialty Pharmacy - Refill Coordination    Specialty Medication Orders Linked to Encounter      Flowsheet Row Most Recent Value   Medication #1 DUPIXENT SYRINGE 300 mg/2 mL Syrg (Order#[id number], Rx#[id number])            Refill Questions - Documented Responses      Flowsheet Row Most Recent Value   Patient Availability and HIPAA Verification    Does patient want to proceed with activity? Yes   HIPAA/medical authority confirmed? Yes   Relationship to patient of person spoken to? Self   Refill Screening Questions    Changes to allergies? No   Changes to medications? No   New conditions since last clinic visit? No   Unplanned office visit, urgent care, ED, or hospital admission in the last 4 weeks? No   How does patient/caregiver feel medication is working? Excellent   Financial problems or insurance changes? No   How many doses of your specialty medications were missed in the last 4 weeks? 0   Would patient like to speak to a pharmacist? No   When does the patient need to receive the medication? 03/31/23   Refill Delivery Questions    How will the patient receive the medication? MEDRx   When does the patient need to receive the medication? 03/31/23   Shipping Address Home   Address in [facility] confirmed and updated if neccessary? Yes   Expected Copay ($) 0   Is the patient able to afford the medication copay? Yes   Payment Method zero copay   Days supply of Refill 28   Supplies needed? No supplies needed   Refill activity completed? Yes   Refill activity plan Refill scheduled   Shipment/Pickup Date: 03/29/23            Current Outpatient Medications   Medication Sig    albuterol (PROVENTIL/VENTOLIN HFA) 90 mcg/actuation inhaler Inhale 1 puff into the lungs every 4 (four) hours as needed.     busPIRone (BUSPAR) 10 MG tablet Take 10 mg by mouth 2 (two) times daily.    cetirizine (ZYRTEC) 10 MG tablet Take 10 mg by mouth daily as needed.     clobetasol 0.05%  (TEMOVATE) 0.05 % Oint Apply topically 2 (two) times daily.    diclofenac (VOLTAREN) 50 MG EC tablet Take 1 tablet (50 mg total) by mouth 3 (three) times daily as needed (pain).    DUPIXENT SYRINGE 300 mg/2 mL Syrg Inject 600mg (2 syringes) into the skin on day 1 then inject 300mg (1 syringe) every other week. (Patient not taking: Reported on 12/1/2022)    DUPIXENT SYRINGE 300 mg/2 mL Syrg Inject 300 mg (2 mL) into the skin every other week    fluticasone propionate (FLONASE) 50 mcg/actuation nasal spray 1 spray (50 mcg total) by Each Nostril route once daily.    hydrOXYzine HCL (ATARAX) 25 MG tablet Take 1 tablet (25 mg total) by mouth every evening.    metoprolol tartrate (LOPRESSOR) 25 MG tablet Take 1 tablet (25 mg total) by mouth 2 (two) times daily.    omeprazole (PRILOSEC) 40 MG capsule Take 1 capsule (40 mg total) by mouth once daily.    ondansetron (ZOFRAN-ODT) 4 MG TbDL Take 1 tablet (4 mg total) by mouth every 8 (eight) hours as needed (nausea/vomiting).    sertraline (ZOLOFT) 100 MG tablet Take 100 mg by mouth every evening.    triamcinolone acetonide 0.1% (KENALOG) 0.1 % cream Apply topically 2 (two) times daily.    urea 40 % Lotn lotion Apply topically once daily.    valACYclovir (VALTREX) 1000 MG tablet Take 1 tablet (1,000 mg total) by mouth every 12 (twelve) hours. for 7 days   Last reviewed on 3/27/2023 11:39 AM by Luis Eduardo Cordero PharmD    Review of patient's allergies indicates:  No Known Allergies Last reviewed on  3/27/2023 11:39 AM by Luis Eduardo Cordero      Tasks added this encounter   No tasks added.   Tasks due within next 3 months   3/10/2023 - Clinical - Follow Up Assesement (Annual)  3/24/2023 - Refill Call (Auto Added)     [facility]  [facility]  [address] [place] [address]  Phone: [phone]  Fax: [phone]

## 2023-04-03 ENCOUNTER — OFFICE VISIT (OUTPATIENT)
Dept: DERMATOLOGY | Facility: CLINIC | Age: 28
End: 2023-04-03
Payer: MEDICARE

## 2023-04-03 VITALS — BODY MASS INDEX: 25 KG/M2 | HEIGHT: 59 IN | WEIGHT: 124 LBS

## 2023-04-03 DIAGNOSIS — L85.3 XEROSIS CUTIS: Primary | ICD-10-CM

## 2023-04-03 DIAGNOSIS — L20.84 INTRINSIC ATOPIC DERMATITIS: ICD-10-CM

## 2023-04-03 DIAGNOSIS — L29.9 PRURITUS: ICD-10-CM

## 2023-04-03 PROCEDURE — 99999 PR PBB SHADOW E&M-EST. PATIENT-LVL III: ICD-10-PCS | Mod: PBBFAC,,, | Performed by: STUDENT IN AN ORGANIZED HEALTH CARE EDUCATION/TRAINING PROGRAM

## 2023-04-03 PROCEDURE — 1159F MED LIST DOCD IN RCRD: CPT | Mod: CPTII,S$GLB,, | Performed by: STUDENT IN AN ORGANIZED HEALTH CARE EDUCATION/TRAINING PROGRAM

## 2023-04-03 PROCEDURE — 99213 OFFICE O/P EST LOW 20 MIN: CPT | Mod: S$GLB,,, | Performed by: STUDENT IN AN ORGANIZED HEALTH CARE EDUCATION/TRAINING PROGRAM

## 2023-04-03 PROCEDURE — 3008F BODY MASS INDEX DOCD: CPT | Mod: CPTII,S$GLB,, | Performed by: STUDENT IN AN ORGANIZED HEALTH CARE EDUCATION/TRAINING PROGRAM

## 2023-04-03 PROCEDURE — 99999 PR PBB SHADOW E&M-EST. PATIENT-LVL III: CPT | Mod: PBBFAC,,, | Performed by: STUDENT IN AN ORGANIZED HEALTH CARE EDUCATION/TRAINING PROGRAM

## 2023-04-03 PROCEDURE — 1159F PR MEDICATION LIST DOCUMENTED IN MEDICAL RECORD: ICD-10-PCS | Mod: CPTII,S$GLB,, | Performed by: STUDENT IN AN ORGANIZED HEALTH CARE EDUCATION/TRAINING PROGRAM

## 2023-04-03 PROCEDURE — 99213 PR OFFICE/OUTPT VISIT, EST, LEVL III, 20-29 MIN: ICD-10-PCS | Mod: S$GLB,,, | Performed by: STUDENT IN AN ORGANIZED HEALTH CARE EDUCATION/TRAINING PROGRAM

## 2023-04-03 PROCEDURE — 1160F RVW MEDS BY RX/DR IN RCRD: CPT | Mod: CPTII,S$GLB,, | Performed by: STUDENT IN AN ORGANIZED HEALTH CARE EDUCATION/TRAINING PROGRAM

## 2023-04-03 PROCEDURE — 3008F PR BODY MASS INDEX (BMI) DOCUMENTED: ICD-10-PCS | Mod: CPTII,S$GLB,, | Performed by: STUDENT IN AN ORGANIZED HEALTH CARE EDUCATION/TRAINING PROGRAM

## 2023-04-03 PROCEDURE — 1160F PR REVIEW ALL MEDS BY PRESCRIBER/CLIN PHARMACIST DOCUMENTED: ICD-10-PCS | Mod: CPTII,S$GLB,, | Performed by: STUDENT IN AN ORGANIZED HEALTH CARE EDUCATION/TRAINING PROGRAM

## 2023-04-03 RX ORDER — DUPILUMAB 300 MG/2ML
INJECTION, SOLUTION SUBCUTANEOUS
Qty: 4 ML | Refills: 11 | Status: SHIPPED | OUTPATIENT
Start: 2023-04-03 | End: 2023-04-03 | Stop reason: SDUPTHER

## 2023-04-03 RX ORDER — AMMONIUM LACTATE 12 G/100G
LOTION TOPICAL
Qty: 225 G | Refills: 6 | Status: SHIPPED | OUTPATIENT
Start: 2023-04-03

## 2023-04-03 RX ORDER — DUPILUMAB 300 MG/2ML
INJECTION, SOLUTION SUBCUTANEOUS
Qty: 4 ML | Refills: 11 | Status: SHIPPED | OUTPATIENT
Start: 2023-04-03 | End: 2024-01-08 | Stop reason: SDUPTHER

## 2023-04-03 NOTE — PROGRESS NOTES
"  Subjective:      Patient ID:  Sherry Burns is a 27 y.o. female who presents for   Chief Complaint   Patient presents with    medication follw up     No rash     LOV- 12/1/22    Here today for follow up on dupixent  Stated that she has no rash at the current time and no itching  Next injection is due this Friday 4/7  Has been using TAC cream daily   Using Triamcinolone for dryness but does not think it is working as she is "dry all over"    Derm Hx:  Denies Phx of NMSC  Denies Fhx of MM      Review of Systems   Constitutional:  Negative for fever, chills and fatigue.   Skin:  Positive for dry skin and activity-related sunscreen use. Negative for itching and rash.     Objective:   Physical Exam   Constitutional: She appears well-developed and well-nourished.   Neurological: She is alert and oriented to person, place, and time.   Psychiatric: She has a normal mood and affect.   Skin:   Areas Examined (abnormalities noted in diagram):   Head / Face Inspection Performed  Neck Inspection Performed  Chest / Axilla Inspection Performed  Abdomen Inspection Performed  Back Inspection Performed  RUE Inspected  LUE Inspection Performed  RLE Inspected  LLE Inspection Performed          Diagram Legend     Erythematous scaling macule/papule c/w actinic keratosis       Vascular papule c/w angioma      Pigmented verrucoid papule/plaque c/w seborrheic keratosis      Yellow umbilicated papule c/w sebaceous hyperplasia      Irregularly shaped tan macule c/w lentigo     1-2 mm smooth white papules consistent with Milia      Movable subcutaneous cyst with punctum c/w epidermal inclusion cyst      Subcutaneous movable cyst c/w pilar cyst      Firm pink to brown papule c/w dermatofibroma      Pedunculated fleshy papule(s) c/w skin tag(s)      Evenly pigmented macule c/w junctional nevus     Mildly variegated pigmented, slightly irregular-bordered macule c/w mildly atypical nevus      Flesh colored to evenly pigmented papule c/w " intradermal nevus       Pink pearly papule/plaque c/w basal cell carcinoma      Erythematous hyperkeratotic cursted plaque c/w SCC      Surgical scar with no sign of skin cancer recurrence      Open and closed comedones      Inflammatory papules and pustules      Verrucoid papule consistent consistent with wart     Erythematous eczematous patches and plaques     Dystrophic onycholytic nail with subungual debris c/w onychomycosis     Umbilicated papule    Erythematous-base heme-crusted tan verrucoid plaque consistent with inflamed seborrheic keratosis     Erythematous Silvery Scaling Plaque c/w Psoriasis     See annotation      Assessment / Plan:        Xerosis cutis  -     ammonium lactate (LAC-HYDRIN) 12 % lotion; Use on AA nightly  Dispense: 225 g; Refill: 6    Intrinsic atopic dermatitis  -     Discontinue: DUPIXENT SYRINGE 300 mg/2 mL Syrg; Inject 300 mg (2 mL) into the skin every other week  Dispense: 4 mL; Refill: 11  -     DUPIXENT SYRINGE 300 mg/2 mL Syrg; Inject 300 mg (2 mL) into the skin every other week  Dispense: 4 mL; Refill: 11  Cerave moisturizing or vanicream moisturizing cream   Good skin care regimen discussed including limiting to one bath or shower/day, using lukewarm water with mild soap and moisturizing cream to skin 1 - 2x/day.   Doing well, no active eczema today  Discussed  benefits and risks of Dupixent including but not limited to injection site reactions, Dupixent- induced facial dermatitis, increased risk of eye/eyelid irritation and inflammation as well as oral herpes infection and possible helminth infections.    Patient counseled to avoid live vaccines.    Dosing:  FDA approved for treatment of adult atopic dermatitis (4/2017) - 121K pts  Start Dupixent at 600mg SQ load then 300mg SQ qoweek   FDA approved for treatment of pediatric AD in ages 6 months+ (7/2022):  Dosing for 30 - 60kg: Start Dupixent at 400mg SC day 0 then 200mg SC q 2 weeks   Dosing for 15 - <30kg: Start Dupixent at  300mg SC q 4weeks  Dosing for 5 - <15kg: Start Dupixent at 200mg SC q 4 weeks     Both 200mg and 300mg available in autoinjector or pre-filled syringe     Pruritus  - improved today           No follow-ups on file.

## 2023-04-13 ENCOUNTER — OFFICE VISIT (OUTPATIENT)
Dept: URGENT CARE | Facility: CLINIC | Age: 28
End: 2023-04-13
Payer: MEDICARE

## 2023-04-13 VITALS
DIASTOLIC BLOOD PRESSURE: 83 MMHG | BODY MASS INDEX: 26.61 KG/M2 | WEIGHT: 132 LBS | HEART RATE: 97 BPM | OXYGEN SATURATION: 99 % | RESPIRATION RATE: 18 BRPM | HEIGHT: 59 IN | SYSTOLIC BLOOD PRESSURE: 117 MMHG | TEMPERATURE: 97 F

## 2023-04-13 DIAGNOSIS — R09.81 NASAL CONGESTION: ICD-10-CM

## 2023-04-13 DIAGNOSIS — J06.9 VIRAL URI: Primary | ICD-10-CM

## 2023-04-13 DIAGNOSIS — R05.9 COUGH, UNSPECIFIED TYPE: ICD-10-CM

## 2023-04-13 LAB
CTP QC/QA: YES
SARS-COV-2 AG RESP QL IA.RAPID: NEGATIVE

## 2023-04-13 PROCEDURE — 99214 OFFICE O/P EST MOD 30 MIN: CPT | Mod: S$GLB,,, | Performed by: NURSE PRACTITIONER

## 2023-04-13 PROCEDURE — 87811 SARS CORONAVIRUS 2 ANTIGEN POCT, MANUAL READ: ICD-10-PCS | Mod: QW,S$GLB,, | Performed by: NURSE PRACTITIONER

## 2023-04-13 PROCEDURE — 99214 PR OFFICE/OUTPT VISIT, EST, LEVL IV, 30-39 MIN: ICD-10-PCS | Mod: S$GLB,,, | Performed by: NURSE PRACTITIONER

## 2023-04-13 PROCEDURE — 87811 SARS-COV-2 COVID19 W/OPTIC: CPT | Mod: QW,S$GLB,, | Performed by: NURSE PRACTITIONER

## 2023-04-13 RX ORDER — ATOMOXETINE 40 MG/1
CAPSULE ORAL
COMMUNITY
Start: 2023-02-14 | End: 2024-02-15

## 2023-04-13 RX ORDER — ESCITALOPRAM OXALATE 10 MG/1
TABLET ORAL
COMMUNITY
Start: 2023-02-14

## 2023-04-13 RX ORDER — BENZONATATE 100 MG/1
100 CAPSULE ORAL 3 TIMES DAILY PRN
Qty: 30 CAPSULE | Refills: 0 | Status: SHIPPED | OUTPATIENT
Start: 2023-04-13 | End: 2023-04-23

## 2023-04-13 RX ORDER — FLUTICASONE PROPIONATE 50 MCG
1 SPRAY, SUSPENSION (ML) NASAL DAILY
Qty: 9.9 ML | Refills: 0 | Status: SHIPPED | OUTPATIENT
Start: 2023-04-13 | End: 2024-01-05 | Stop reason: SDUPTHER

## 2023-04-13 RX ORDER — MIRTAZAPINE 30 MG/1
TABLET, FILM COATED ORAL
COMMUNITY
Start: 2023-02-14 | End: 2024-01-05

## 2023-04-13 RX ORDER — PROMETHAZINE HYDROCHLORIDE 25 MG/1
TABLET ORAL
COMMUNITY
Start: 2023-03-13 | End: 2024-01-05

## 2023-04-13 RX ORDER — FAMOTIDINE 20 MG/1
TABLET, FILM COATED ORAL
COMMUNITY
Start: 2023-03-13 | End: 2023-08-03

## 2023-04-13 RX ORDER — ARIPIPRAZOLE 5 MG/1
TABLET ORAL
COMMUNITY
Start: 2023-03-15

## 2023-04-13 RX ORDER — PREDNISONE 20 MG/1
20 TABLET ORAL 2 TIMES DAILY
Qty: 10 TABLET | Refills: 0 | Status: SHIPPED | OUTPATIENT
Start: 2023-04-13 | End: 2023-04-18

## 2023-04-13 RX ORDER — ONDANSETRON HYDROCHLORIDE 8 MG/1
TABLET, FILM COATED ORAL 4 TIMES DAILY PRN
COMMUNITY
Start: 2023-03-17

## 2023-04-13 RX ORDER — AZELASTINE 1 MG/ML
SPRAY, METERED NASAL
COMMUNITY
Start: 2022-12-19

## 2023-04-13 RX ORDER — NORETHINDRONE ACETATE AND ETHINYL ESTRADIOL AND FERROUS FUMARATE 1MG-20(21)
KIT ORAL
COMMUNITY
Start: 2023-02-16 | End: 2024-01-05

## 2023-04-13 RX ORDER — BUSPIRONE HYDROCHLORIDE 15 MG/1
TABLET ORAL
COMMUNITY
Start: 2023-02-14

## 2023-04-13 RX ORDER — HYDROXYZINE HYDROCHLORIDE 10 MG/1
10 TABLET, FILM COATED ORAL
COMMUNITY

## 2023-04-13 NOTE — PROGRESS NOTES
"Subjective:      Patient ID: Sherry Burns is a 27 y.o. female.    Vitals:  height is 4' 11" (1.499 m) and weight is 59.9 kg (132 lb). Her oral temperature is 97.4 °F (36.3 °C). Her blood pressure is 117/83 and her pulse is 97. Her respiration is 18 and oxygen saturation is 99%.     Chief Complaint: Sinus Problem    Ms. Burns is a 27 year old female with a history of anxiety, seasonal allergies, and GERD who presents today with complaints of sinus congestion. It is moderate. It is associated with a cough, headache, fatigue, and sore throat. She denies fever, chills, N/V/D, chest pain or SOB. Mother is sick with similar symptoms.     Sinus Problem  The current episode started in the past 7 days (3 days). The problem is unchanged. There has been no fever. Her pain is at a severity of 0/10. She is experiencing no pain. Associated symptoms include congestion, coughing, ear pain, headaches, sneezing and a sore throat. Pertinent negatives include no shortness of breath. Treatments tried: nyquil. The treatment provided mild relief.     Constitution: Negative for appetite change and fever.   HENT:  Positive for ear pain, congestion and sore throat.    Neck: Negative for neck stiffness.   Cardiovascular:  Negative for chest pain.   Eyes:  Negative for blurred vision.   Respiratory:  Positive for cough. Negative for shortness of breath.    Gastrointestinal:  Negative for nausea, vomiting and diarrhea.   Genitourinary:  Negative for dysuria.   Musculoskeletal:  Negative for back pain.   Skin:  Negative for hives.   Allergic/Immunologic: Positive for sneezing. Negative for hives.   Neurological:  Positive for headaches. Negative for dizziness and light-headedness.    Objective:     Physical Exam   Constitutional: She is oriented to person, place, and time. She appears well-developed. She is cooperative.  Non-toxic appearance. She does not appear ill. No distress.   HENT:   Head: Normocephalic and atraumatic.   Ears: "   Right Ear: Hearing and external ear normal.   Left Ear: Hearing and external ear normal.   Nose: Congestion present. No mucosal edema or nasal deformity. No epistaxis. Right sinus exhibits no maxillary sinus tenderness and no frontal sinus tenderness. Left sinus exhibits no maxillary sinus tenderness and no frontal sinus tenderness.   Mouth/Throat: Uvula is midline and mucous membranes are normal. No trismus in the jaw. Normal dentition. No uvula swelling. Posterior oropharyngeal erythema present.      Comments: Mild posterior oropharyngeal erythema  Eyes: Conjunctivae and lids are normal. Right eye exhibits no discharge. Left eye exhibits no discharge. No scleral icterus.   Neck: Trachea normal and phonation normal. Neck supple.   Cardiovascular: Normal rate.   Pulmonary/Chest: Effort normal. No respiratory distress.   Abdominal: Normal appearance. She exhibits no distension and no pulsatile midline mass.   Musculoskeletal: Normal range of motion.         General: No deformity. Normal range of motion.   Neurological: She is alert and oriented to person, place, and time. She exhibits normal muscle tone. Coordination normal.   Skin: Skin is warm, dry, intact, not diaphoretic and not pale.   Psychiatric: Her speech is normal and behavior is normal. Judgment and thought content normal.   Nursing note and vitals reviewed.    Assessment:     1. Viral URI    2. Nasal congestion    3. Cough, unspecified type        Plan:       Viral URI  -     predniSONE (DELTASONE) 20 MG tablet; Take 1 tablet (20 mg total) by mouth 2 (two) times daily. for 5 days  Dispense: 10 tablet; Refill: 0    Nasal congestion  -     SARS Coronavirus 2 Antigen, POCT Manual Read  -     predniSONE (DELTASONE) 20 MG tablet; Take 1 tablet (20 mg total) by mouth 2 (two) times daily. for 5 days  Dispense: 10 tablet; Refill: 0  -     fluticasone propionate (FLONASE) 50 mcg/actuation nasal spray; 1 spray (50 mcg total) by Each Nostril route once daily.   Dispense: 9.9 mL; Refill: 0    Cough, unspecified type  -     benzonatate (TESSALON) 100 MG capsule; Take 1 capsule (100 mg total) by mouth 3 (three) times daily as needed for Cough.  Dispense: 30 capsule; Refill: 0      Rapid covid is negative. Recommend symptomatic management with steroids, cough suppressant, and flonase as symptoms have been present for about 2 days. Return prxn given, pt verbalizes understanding.

## 2023-04-13 NOTE — PATIENT INSTRUCTIONS
You may take AFRIN for only 3 days which you can purchase over the counter. The store brand works just as well.

## 2023-04-21 ENCOUNTER — SPECIALTY PHARMACY (OUTPATIENT)
Dept: PHARMACY | Facility: CLINIC | Age: 28
End: 2023-04-21
Payer: MEDICARE

## 2023-04-21 NOTE — TELEPHONE ENCOUNTER
Specialty Pharmacy - Refill Coordination    Specialty Medication Orders Linked to Encounter      Flowsheet Row Most Recent Value   Medication #1 DUPIXENT SYRINGE 300 mg/2 mL Syrg (Order#[id number], Rx#[id number])            Refill Questions - Documented Responses      Flowsheet Row Most Recent Value   Patient Availability and HIPAA Verification    Does patient want to proceed with activity? Yes   HIPAA/medical authority confirmed? Yes   Relationship to patient of person spoken to? Self   Refill Screening Questions    Changes to allergies? No   Changes to medications? No   New conditions since last clinic visit? No   Unplanned office visit, urgent care, ED, or hospital admission in the last 4 weeks? No   How does patient/caregiver feel medication is working? Good   Financial problems or insurance changes? No   How many doses of your specialty medications were missed in the last 4 weeks? 0   Would patient like to speak to a pharmacist? No   When does the patient need to receive the medication? 04/28/23   Refill Delivery Questions    How will the patient receive the medication? MEDRx   When does the patient need to receive the medication? 04/28/23   Shipping Address Prescription   Address in [facility] confirmed and updated if neccessary? Yes   Expected Copay ($) 0   Is the patient able to afford the medication copay? Yes   Payment Method zero copay   Days supply of Refill 28   Supplies needed? No supplies needed   Refill activity completed? Yes   Refill activity plan Refill scheduled   Shipment/Pickup Date: 04/25/23            Current Outpatient Medications   Medication Sig    albuterol (PROVENTIL/VENTOLIN HFA) 90 mcg/actuation inhaler Inhale 1 puff into the lungs every 4 (four) hours as needed.     ammonium lactate (LAC-HYDRIN) 12 % lotion Use on AA nightly    ARIPiprazole (ABILIFY) 5 MG Tab Take by mouth.    atomoxetine (STRATTERA) 40 MG capsule     azelastine (ASTELIN) 137 mcg (0.1 %) nasal spray      benzonatate (TESSALON) 100 MG capsule Take 1 capsule (100 mg total) by mouth 3 (three) times daily as needed for Cough.    busPIRone (BUSPAR) 15 MG tablet     cetirizine (ZYRTEC) 10 MG tablet Take 10 mg by mouth daily as needed.     clobetasol 0.05% (TEMOVATE) 0.05 % Oint Apply topically 2 (two) times daily.    diclofenac (VOLTAREN) 50 MG EC tablet Take 1 tablet (50 mg total) by mouth 3 (three) times daily as needed (pain).    DUPIXENT SYRINGE 300 mg/2 mL Syrg Inject 600mg (2 syringes) into the skin on day 1 then inject 300mg (1 syringe) every other week.    DUPIXENT SYRINGE 300 mg/2 mL Syrg Inject 300 mg (2 mL) into the skin every other week    EScitalopram oxalate (LEXAPRO) 10 MG tablet     famotidine (PEPCID) 20 MG tablet Take by mouth.    fluticasone propionate (FLONASE) 50 mcg/actuation nasal spray 1 spray (50 mcg total) by Each Nostril route once daily.    fluticasone propionate (FLONASE) 50 mcg/actuation nasal spray 1 spray (50 mcg total) by Each Nostril route once daily.    hydrOXYzine HCL (ATARAX) 10 MG Tab Take 10 mg by mouth 3 times daily as needed.    JUNEL FE 1/20, 28, 1 mg-20 mcg (21)/75 mg (7) per tablet     metoprolol tartrate (LOPRESSOR) 25 MG tablet Take 1 tablet (25 mg total) by mouth 2 (two) times daily.    mirtazapine (REMERON) 30 MG tablet     omeprazole (PRILOSEC) 40 MG capsule Take 1 capsule (40 mg total) by mouth once daily.    ondansetron (ZOFRAN) 8 MG tablet Take by mouth 4 (four) times daily as needed.    ondansetron (ZOFRAN-ODT) 4 MG TbDL Take 1 tablet (4 mg total) by mouth every 8 (eight) hours as needed (nausea/vomiting).    promethazine (PHENERGAN) 25 MG tablet Take by mouth.    sertraline (ZOLOFT) 100 MG tablet Take 100 mg by mouth every evening.    triamcinolone acetonide 0.1% (KENALOG) 0.1 % cream Apply topically 2 (two) times daily.    urea 40 % Lotn lotion Apply topically once daily.    valACYclovir (VALTREX) 1000 MG tablet Take 1 tablet (1,000 mg total) by mouth every 12  (twelve) hours. for 7 days   Last reviewed on 4/13/2023  2:11 PM by Ashley Hussein MA    Review of patient's allergies indicates:  No Known Allergies Last reviewed on  4/13/2023 2:09 PM by Ashley Hussein      Tasks added this encounter   5/19/2023 - Refill Call (Auto Added)   Tasks due within next 3 months   No tasks due.     [facility]  [address] [place] [id number]  Phone: [phone]  Fax: [phone]

## 2023-05-16 ENCOUNTER — SPECIALTY PHARMACY (OUTPATIENT)
Dept: PHARMACY | Facility: CLINIC | Age: 28
End: 2023-05-16
Payer: MEDICARE

## 2023-05-16 NOTE — TELEPHONE ENCOUNTER
Specialty Pharmacy - Refill Coordination    Specialty Medication Orders Linked to Encounter      Flowsheet Row Most Recent Value   Medication #1 DUPIXENT SYRINGE 300 mg/2 mL Syrg (Order#[id number], Rx#[id number])            Refill Questions - Documented Responses      Flowsheet Row Most Recent Value   Patient Availability and HIPAA Verification    Does patient want to proceed with activity? Yes   HIPAA/medical authority confirmed? Yes   Relationship to patient of person spoken to? Self   Refill Screening Questions    Changes to allergies? No   Changes to medications? No   New conditions since last clinic visit? No   Unplanned office visit, urgent care, ED, or hospital admission in the last 4 weeks? No   How does patient/caregiver feel medication is working? Good   Financial problems or insurance changes? No   How many doses of your specialty medications were missed in the last 4 weeks? 0   Would patient like to speak to a pharmacist? No   When does the patient need to receive the medication? 05/19/23   Refill Delivery Questions    How will the patient receive the medication? MEDRx   When does the patient need to receive the medication? 05/19/23   Shipping Address Prescription   Address in [facility] confirmed and updated if neccessary? Yes   Expected Copay ($) 0   Is the patient able to afford the medication copay? Yes   Payment Method zero copay   Days supply of Refill 28   Supplies needed? No supplies needed   Refill activity completed? Yes   Refill activity plan Refill scheduled   Shipment/Pickup Date: 05/17/23            Current Outpatient Medications   Medication Sig    albuterol (PROVENTIL/VENTOLIN HFA) 90 mcg/actuation inhaler Inhale 1 puff into the lungs every 4 (four) hours as needed.     ammonium lactate (LAC-HYDRIN) 12 % lotion Use on AA nightly    ARIPiprazole (ABILIFY) 5 MG Tab Take by mouth.    atomoxetine (STRATTERA) 40 MG capsule     azelastine (ASTELIN) 137 mcg (0.1 %) nasal spray      busPIRone (BUSPAR) 15 MG tablet     cetirizine (ZYRTEC) 10 MG tablet Take 10 mg by mouth daily as needed.     clobetasol 0.05% (TEMOVATE) 0.05 % Oint Apply topically 2 (two) times daily.    diclofenac (VOLTAREN) 50 MG EC tablet Take 1 tablet (50 mg total) by mouth 3 (three) times daily as needed (pain).    DUPIXENT SYRINGE 300 mg/2 mL Syrg Inject 600mg (2 syringes) into the skin on day 1 then inject 300mg (1 syringe) every other week.    DUPIXENT SYRINGE 300 mg/2 mL Syrg Inject 300 mg (2 mL) into the skin every other week    EScitalopram oxalate (LEXAPRO) 10 MG tablet     famotidine (PEPCID) 20 MG tablet Take by mouth.    fluticasone propionate (FLONASE) 50 mcg/actuation nasal spray 1 spray (50 mcg total) by Each Nostril route once daily.    fluticasone propionate (FLONASE) 50 mcg/actuation nasal spray 1 spray (50 mcg total) by Each Nostril route once daily.    hydrOXYzine HCL (ATARAX) 10 MG Tab Take 10 mg by mouth 3 times daily as needed.    JUNEL FE 1/20, 28, 1 mg-20 mcg (21)/75 mg (7) per tablet     metoprolol tartrate (LOPRESSOR) 25 MG tablet Take 1 tablet (25 mg total) by mouth 2 (two) times daily.    mirtazapine (REMERON) 30 MG tablet     omeprazole (PRILOSEC) 40 MG capsule Take 1 capsule (40 mg total) by mouth once daily.    ondansetron (ZOFRAN) 8 MG tablet Take by mouth 4 (four) times daily as needed.    ondansetron (ZOFRAN-ODT) 4 MG TbDL Take 1 tablet (4 mg total) by mouth every 8 (eight) hours as needed (nausea/vomiting).    promethazine (PHENERGAN) 25 MG tablet Take by mouth.    sertraline (ZOLOFT) 100 MG tablet Take 100 mg by mouth every evening.    triamcinolone acetonide 0.1% (KENALOG) 0.1 % cream Apply topically 2 (two) times daily.    urea 40 % Lotn lotion Apply topically once daily.    valACYclovir (VALTREX) 1000 MG tablet Take 1 tablet (1,000 mg total) by mouth every 12 (twelve) hours. for 7 days   Last reviewed on 4/13/2023  2:11 PM by Ashley Hussein MA    Review of patient's allergies  indicates:  No Known Allergies Last reviewed on  4/13/2023 2:09 PM by Ashley Hussein      Tasks added this encounter   No tasks added.   Tasks due within next 3 months   5/19/2023 - Refill Coordination Outreach (1 time occurrence)     Deepika Banuelos, PharmD  [facility]  [address] [place] [id number]  Phone: [phone]  Fax: [phone]

## 2023-06-07 ENCOUNTER — SPECIALTY PHARMACY (OUTPATIENT)
Dept: PHARMACY | Facility: CLINIC | Age: 28
End: 2023-06-07
Payer: MEDICARE

## 2023-06-07 NOTE — TELEPHONE ENCOUNTER
Specialty Pharmacy - Refill Coordination    Specialty Medication Orders Linked to Encounter      Flowsheet Row Most Recent Value   Medication #1 DUPIXENT SYRINGE 300 mg/2 mL Syrg (Order#[id number], Rx#[id number])            Refill Questions - Documented Responses      Flowsheet Row Most Recent Value   Patient Availability and HIPAA Verification    Does patient want to proceed with activity? Yes   HIPAA/medical authority confirmed? Yes   Relationship to patient of person spoken to? Mother   Refill Screening Questions    Changes to allergies? No   Changes to medications? No   New conditions since last clinic visit? No   Unplanned office visit, urgent care, ED, or hospital admission in the last 4 weeks? No   How does patient/caregiver feel medication is working? Good   Financial problems or insurance changes? No   How many doses of your specialty medications were missed in the last 4 weeks? 0   Would patient like to speak to a pharmacist? No   When does the patient need to receive the medication? 06/07/23   Refill Delivery Questions    When does the patient need to receive the medication? 06/07/23            Current Outpatient Medications   Medication Sig    albuterol (PROVENTIL/VENTOLIN HFA) 90 mcg/actuation inhaler Inhale 1 puff into the lungs every 4 (four) hours as needed.     ammonium lactate (LAC-HYDRIN) 12 % lotion Use on AA nightly    ARIPiprazole (ABILIFY) 5 MG Tab Take by mouth.    atomoxetine (STRATTERA) 40 MG capsule     azelastine (ASTELIN) 137 mcg (0.1 %) nasal spray     busPIRone (BUSPAR) 15 MG tablet     cetirizine (ZYRTEC) 10 MG tablet Take 10 mg by mouth daily as needed.     clobetasol 0.05% (TEMOVATE) 0.05 % Oint Apply topically 2 (two) times daily.    diclofenac (VOLTAREN) 50 MG EC tablet Take 1 tablet (50 mg total) by mouth 3 (three) times daily as needed (pain).    DUPIXENT SYRINGE 300 mg/2 mL Syrg Inject 600mg (2 syringes) into the skin on day 1 then inject 300mg (1 syringe) every other week.     DUPIXENT SYRINGE 300 mg/2 mL Syrg Inject 300 mg (2 mL) into the skin every other week    EScitalopram oxalate (LEXAPRO) 10 MG tablet     famotidine (PEPCID) 20 MG tablet Take by mouth.    fluticasone propionate (FLONASE) 50 mcg/actuation nasal spray 1 spray (50 mcg total) by Each Nostril route once daily.    fluticasone propionate (FLONASE) 50 mcg/actuation nasal spray 1 spray (50 mcg total) by Each Nostril route once daily.    hydrOXYzine HCL (ATARAX) 10 MG Tab Take 10 mg by mouth 3 times daily as needed.    JUNEL FE 1/20, 28, 1 mg-20 mcg (21)/75 mg (7) per tablet     metoprolol tartrate (LOPRESSOR) 25 MG tablet Take 1 tablet (25 mg total) by mouth 2 (two) times daily.    mirtazapine (REMERON) 30 MG tablet     omeprazole (PRILOSEC) 40 MG capsule Take 1 capsule (40 mg total) by mouth once daily.    ondansetron (ZOFRAN) 8 MG tablet Take by mouth 4 (four) times daily as needed.    ondansetron (ZOFRAN-ODT) 4 MG TbDL Take 1 tablet (4 mg total) by mouth every 8 (eight) hours as needed (nausea/vomiting).    promethazine (PHENERGAN) 25 MG tablet Take by mouth.    sertraline (ZOLOFT) 100 MG tablet Take 100 mg by mouth every evening.    triamcinolone acetonide 0.1% (KENALOG) 0.1 % cream Apply topically 2 (two) times daily.    urea 40 % Lotn lotion Apply topically once daily.    valACYclovir (VALTREX) 1000 MG tablet Take 1 tablet (1,000 mg total) by mouth every 12 (twelve) hours. for 7 days   Last reviewed on 4/13/2023  2:11 PM by Ashley Hussein MA    Review of patient's allergies indicates:  No Known Allergies Last reviewed on  4/13/2023 2:09 PM by Ashley Hussein      Tasks added this encounter   No tasks added.   Tasks due within next 3 months   6/7/2023 - Refill Coordination Outreach (1 time occurrence)     Destini [facility]  [address] [facility] [address]  Phone: [phone]  Fax: [phone]

## 2023-06-28 ENCOUNTER — PATIENT MESSAGE (OUTPATIENT)
Dept: PHARMACY | Facility: CLINIC | Age: 28
End: 2023-06-28
Payer: MEDICARE

## 2023-07-03 ENCOUNTER — PATIENT MESSAGE (OUTPATIENT)
Dept: PHARMACY | Facility: CLINIC | Age: 28
End: 2023-07-03
Payer: MEDICARE

## 2023-07-06 ENCOUNTER — SPECIALTY PHARMACY (OUTPATIENT)
Dept: PHARMACY | Facility: CLINIC | Age: 28
End: 2023-07-06
Payer: MEDICARE

## 2023-07-06 NOTE — TELEPHONE ENCOUNTER
Specialty Pharmacy - Refill Coordination    Specialty Medication Orders Linked to Encounter      Flowsheet Row Most Recent Value   Medication #1 DUPIXENT SYRINGE 300 mg/2 mL Syrg (Order#[id number], Rx#[id number])            Refill Questions - Documented Responses      Flowsheet Row Most Recent Value   Patient Availability and HIPAA Verification    Does patient want to proceed with activity? Yes   HIPAA/medical authority confirmed? Yes   Relationship to patient of person spoken to? Self   Refill Screening Questions    Changes to allergies? No   Changes to medications? No   New conditions since last clinic visit? No   Unplanned office visit, urgent care, ED, or hospital admission in the last 4 weeks? No   How does patient/caregiver feel medication is working? Very good   Financial problems or insurance changes? No   How many doses of your specialty medications were missed in the last 4 weeks? 0   Would patient like to speak to a pharmacist? No   When does the patient need to receive the medication? 07/21/23   Refill Delivery Questions    How will the patient receive the medication? MEDRx   When does the patient need to receive the medication? 07/21/23   Shipping Address Prescription   Address in [facility] confirmed and updated if neccessary? Yes   Expected Copay ($) 0   Is the patient able to afford the medication copay? Yes   Payment Method zero copay   Days supply of Refill 28   Supplies needed? No supplies needed   Refill activity completed? Yes   Refill activity plan Refill scheduled   Shipment/Pickup Date: 07/13/23            Current Outpatient Medications   Medication Sig    albuterol (PROVENTIL/VENTOLIN HFA) 90 mcg/actuation inhaler Inhale 1 puff into the lungs every 4 (four) hours as needed.     ammonium lactate (LAC-HYDRIN) 12 % lotion Use on AA nightly    ARIPiprazole (ABILIFY) 5 MG Tab Take by mouth.    atomoxetine (STRATTERA) 40 MG capsule     azelastine (ASTELIN) 137 mcg (0.1 %) nasal spray      busPIRone (BUSPAR) 15 MG tablet     cetirizine (ZYRTEC) 10 MG tablet Take 10 mg by mouth daily as needed.     clobetasol 0.05% (TEMOVATE) 0.05 % Oint Apply topically 2 (two) times daily.    diclofenac (VOLTAREN) 50 MG EC tablet Take 1 tablet (50 mg total) by mouth 3 (three) times daily as needed (pain).    DUPIXENT SYRINGE 300 mg/2 mL Syrg Inject 600mg (2 syringes) into the skin on day 1 then inject 300mg (1 syringe) every other week.    DUPIXENT SYRINGE 300 mg/2 mL Syrg Inject 300 mg (2 mL) into the skin every other week    EScitalopram oxalate (LEXAPRO) 10 MG tablet     famotidine (PEPCID) 20 MG tablet Take by mouth.    fluticasone propionate (FLONASE) 50 mcg/actuation nasal spray 1 spray (50 mcg total) by Each Nostril route once daily.    fluticasone propionate (FLONASE) 50 mcg/actuation nasal spray 1 spray (50 mcg total) by Each Nostril route once daily.    hydrOXYzine HCL (ATARAX) 10 MG Tab Take 10 mg by mouth 3 times daily as needed.    JUNEL FE 1/20, 28, 1 mg-20 mcg (21)/75 mg (7) per tablet     metoprolol tartrate (LOPRESSOR) 25 MG tablet Take 1 tablet (25 mg total) by mouth 2 (two) times daily.    mirtazapine (REMERON) 30 MG tablet     omeprazole (PRILOSEC) 40 MG capsule Take 1 capsule (40 mg total) by mouth once daily.    ondansetron (ZOFRAN) 8 MG tablet Take by mouth 4 (four) times daily as needed.    ondansetron (ZOFRAN-ODT) 4 MG TbDL Take 1 tablet (4 mg total) by mouth every 8 (eight) hours as needed (nausea/vomiting).    promethazine (PHENERGAN) 25 MG tablet Take by mouth.    sertraline (ZOLOFT) 100 MG tablet Take 100 mg by mouth every evening.    triamcinolone acetonide 0.1% (KENALOG) 0.1 % cream Apply topically 2 (two) times daily.    urea 40 % Lotn lotion Apply topically once daily.    valACYclovir (VALTREX) 1000 MG tablet Take 1 tablet (1,000 mg total) by mouth every 12 (twelve) hours. for 7 days   Last reviewed on 4/13/2023  2:11 PM by Ashley Hussein MA    Review of patient's allergies  indicates:  No Known Allergies Last reviewed on  4/13/2023 2:09 PM by Ashley Hussein      Tasks added this encounter   No tasks added.   Tasks due within next 3 months   7/6/2023 - Refill Coordination Outreach (1 time occurrence)     Luis Eduardo Cordero, PharmD  [facility]  [address] [place], [place] [address]  [facility] [address]  Phone: [phone]  Fax: [phone]

## 2023-07-18 ENCOUNTER — TELEPHONE (OUTPATIENT)
Dept: CARDIOLOGY | Facility: CLINIC | Age: 28
End: 2023-07-18

## 2023-07-18 NOTE — TELEPHONE ENCOUNTER
----- Message from Boris Little sent at 7/18/2023 11:32 AM CDT -----  Regarding: sooner appt  Contact: mom at [phone]  Type:  Sooner Appointment Request    Caller is requesting a sooner appointment.  Caller declined first available appointment listed below.  Caller will not accept being placed on the waitlist and is requesting a message be sent to doctor.    Name of Caller:  mom // Lotus    When is the first available appointment?  8/16/23    Symptoms:  chest pain    Best Call Back Number:  [phone]    Additional Information:  mom was asking for appt with LENY Vargas but pt has not seen her yet so I cannot schedule with NP.

## 2023-07-19 ENCOUNTER — OFFICE VISIT (OUTPATIENT)
Dept: CARDIOLOGY | Facility: CLINIC | Age: 28
End: 2023-07-19
Payer: MEDICARE

## 2023-07-19 VITALS
BODY MASS INDEX: 26.41 KG/M2 | HEIGHT: 59 IN | DIASTOLIC BLOOD PRESSURE: 72 MMHG | SYSTOLIC BLOOD PRESSURE: 112 MMHG | WEIGHT: 131 LBS

## 2023-07-19 DIAGNOSIS — R07.9 CHEST PAIN, UNSPECIFIED TYPE: Primary | ICD-10-CM

## 2023-07-19 DIAGNOSIS — R00.2 PALPITATIONS: ICD-10-CM

## 2023-07-19 PROCEDURE — 99999 PR PBB SHADOW E&M-EST. PATIENT-LVL IV: CPT | Mod: PBBFAC,,, | Performed by: NURSE PRACTITIONER

## 2023-07-19 PROCEDURE — 99214 OFFICE O/P EST MOD 30 MIN: CPT | Mod: S$GLB,,, | Performed by: NURSE PRACTITIONER

## 2023-07-19 PROCEDURE — 99214 PR OFFICE/OUTPT VISIT, EST, LEVL IV, 30-39 MIN: ICD-10-PCS | Mod: S$GLB,,, | Performed by: NURSE PRACTITIONER

## 2023-07-19 PROCEDURE — 3008F BODY MASS INDEX DOCD: CPT | Mod: CPTII,S$GLB,, | Performed by: NURSE PRACTITIONER

## 2023-07-19 PROCEDURE — 93000 EKG 12-LEAD: ICD-10-PCS | Mod: S$GLB,,, | Performed by: INTERNAL MEDICINE

## 2023-07-19 PROCEDURE — 93000 ELECTROCARDIOGRAM COMPLETE: CPT | Mod: S$GLB,,, | Performed by: INTERNAL MEDICINE

## 2023-07-19 PROCEDURE — 3078F DIAST BP <80 MM HG: CPT | Mod: CPTII,S$GLB,, | Performed by: NURSE PRACTITIONER

## 2023-07-19 PROCEDURE — 3078F PR MOST RECENT DIASTOLIC BLOOD PRESSURE < 80 MM HG: ICD-10-PCS | Mod: CPTII,S$GLB,, | Performed by: NURSE PRACTITIONER

## 2023-07-19 PROCEDURE — 3008F PR BODY MASS INDEX (BMI) DOCUMENTED: ICD-10-PCS | Mod: CPTII,S$GLB,, | Performed by: NURSE PRACTITIONER

## 2023-07-19 PROCEDURE — 3074F PR MOST RECENT SYSTOLIC BLOOD PRESSURE < 130 MM HG: ICD-10-PCS | Mod: CPTII,S$GLB,, | Performed by: NURSE PRACTITIONER

## 2023-07-19 PROCEDURE — 99999 PR PBB SHADOW E&M-EST. PATIENT-LVL IV: ICD-10-PCS | Mod: PBBFAC,,, | Performed by: NURSE PRACTITIONER

## 2023-07-19 PROCEDURE — 1159F MED LIST DOCD IN RCRD: CPT | Mod: CPTII,S$GLB,, | Performed by: NURSE PRACTITIONER

## 2023-07-19 PROCEDURE — 3074F SYST BP LT 130 MM HG: CPT | Mod: CPTII,S$GLB,, | Performed by: NURSE PRACTITIONER

## 2023-07-19 PROCEDURE — 1159F PR MEDICATION LIST DOCUMENTED IN MEDICAL RECORD: ICD-10-PCS | Mod: CPTII,S$GLB,, | Performed by: NURSE PRACTITIONER

## 2023-07-19 RX ORDER — METOPROLOL SUCCINATE 25 MG/1
25 TABLET, EXTENDED RELEASE ORAL NIGHTLY
Qty: 30 TABLET | Refills: 11 | Status: SHIPPED | OUTPATIENT
Start: 2023-07-19 | End: 2024-07-18

## 2023-07-19 NOTE — ASSESSMENT & PLAN NOTE
"Chest pain is atypical and likely related to anxiety over "abnormal" BP noted on home monitor. No exertional chest pain reported. EKG without significant change.     Have advised her to obtain a new home blood pressure log and to always double check when she has an abnormal reading.  We discussed this process.  "

## 2023-07-19 NOTE — PROGRESS NOTES
Subjective:    Patient ID:  Sherry Burns is a 28 y.o. female   Chief Complaint   Patient presents with    Chest Pain    Dizziness    Hypertension       HPI:  Patient seen today with complaints of feeling intermittently dizzy as well as having some chest tightness.  She reports that she has taken her blood pressure a couple times recently at home and has gotten abnormal readings.  At 1 time her blood pressure showed 132/123 and then another time it showed 60/30 however her only symptoms were feeling some chest discomfort which started after the blood pressure was taken.  Patient has another noted blood pressure check that was normal in the 110s over 70s.  And at today's visit her blood pressure is 112/72.  She does still feel mildly dizzy when she stands up quickly and has not been wearing compression stockings.  She also feels her heart racing slightly when she stands quickly.  No complaints of exertional chest discomfort.  Breathing is stable.    Review of patient's allergies indicates:  No Known Allergies    Past Medical History:   Diagnosis Date    Anemia     Asthma     Eczema     Glaucoma      Past Surgical History:   Procedure Laterality Date    COLONOSCOPY N/A 3/5/2021    Procedure: COLONOSCOPY;  Surgeon: Jake Williamson MD;  Location: [facility];  Service: Endoscopy;  Laterality: N/A;    ESOPHAGOGASTRODUODENOSCOPY N/A 3/5/2021    Procedure: EGD (ESOPHAGOGASTRODUODENOSCOPY);  Surgeon: Jake Williamson MD;  Location: [facility];  Service: Endoscopy;  Laterality: N/A;     Social History     Tobacco Use    Smoking status: Never    Smokeless tobacco: Never   Substance Use Topics    Alcohol use: Not Currently    Drug use: Never     Family History   Problem Relation Age of Onset    Melanoma Neg Hx         Review of Systems:   Per HPI     Objective:        Vitals:    07/19/23 0759   BP: 112/72       Lab Results   Component Value Date    WBC 8.51 03/07/2023    HGB 12.7 03/07/2023    HCT 37.4 03/07/2023      03/07/2023    CHOL 159 01/03/2020    TRIG 47 01/03/2020    HDL 58 01/03/2020    ALT 15 05/28/2022    AST 16 05/28/2022     05/28/2022    K 3.9 05/28/2022     05/28/2022    CREATININE 0.9 05/28/2022    BUN 12 05/28/2022    CO2 23 05/28/2022    TSH 1.83 04/16/2021        ECHOCARDIOGRAM RESULTS  No results found for this or any previous visit.        CURRENT/PREVIOUS VISIT EKG  Results for orders placed or performed in visit on 07/27/22   IN OFFICE EKG 12-LEAD (to [facility])    Collection Time: 07/27/22  5:06 PM    Narrative    Test Reason : R00.2,    Vent. Rate : 070 BPM     Atrial Rate : 070 BPM     P-R Int : 138 ms          QRS Dur : 076 ms      QT Int : 384 ms       P-R-T Axes : 056 068 049 degrees     QTc Int : 414 ms    Normal sinus rhythm  Normal ECG  When compared with ECG of 01-OCT-2019 15:09,  No significant change was found  Confirmed by Loi Young MD ([id number]) on 8/3/2022 4:37:13 PM    Referred By:  SUSSY           Confirmed By:Loi Young MD     No valid procedures specified.   No results found for this or any previous visit.      Physical Exam:  CONSTITUTIONAL: No fever, no chills  HEENT: Normocephalic, atraumatic,pupils reactive to light                 NECK:  No JVD no carotid bruit  CVS: S1S2+, RRR  LUNGS: Clear  ABDOMEN: Soft, NT, BS+  EXTREMITIES: No cyanosis, edema  : No casanova catheter  NEURO: AAO X 3  PSY: Normal affect      Medication List with Changes/Refills   New Medications    METOPROLOL SUCCINATE (TOPROL-XL) 25 MG 24 HR TABLET    Take 1 tablet (25 mg total) by mouth every evening.   Current Medications    ALBUTEROL (PROVENTIL/VENTOLIN HFA) 90 MCG/ACTUATION INHALER    Inhale 1 puff into the lungs every 4 (four) hours as needed.     AMMONIUM LACTATE (LAC-HYDRIN) 12 % LOTION    Use on AA nightly    ARIPIPRAZOLE (ABILIFY) 5 MG TAB    Take by mouth.    ATOMOXETINE (STRATTERA) 40 MG CAPSULE        AZELASTINE (ASTELIN) 137 MCG (0.1 %) NASAL SPRAY        BUSPIRONE (BUSPAR) 15 MG  TABLET        CETIRIZINE (ZYRTEC) 10 MG TABLET    Take 10 mg by mouth daily as needed.     CLOBETASOL 0.05% (TEMOVATE) 0.05 % OINT    Apply topically 2 (two) times daily.    DICLOFENAC (VOLTAREN) 50 MG EC TABLET    Take 1 tablet (50 mg total) by mouth 3 (three) times daily as needed (pain).    DUPIXENT SYRINGE 300 MG/2 ML SYRG    Inject 600mg (2 syringes) into the skin on day 1 then inject 300mg (1 syringe) every other week.    DUPIXENT SYRINGE 300 MG/2 ML SYRG    Inject 300 mg (2 mL) into the skin every other week    ESCITALOPRAM OXALATE (LEXAPRO) 10 MG TABLET        FAMOTIDINE (PEPCID) 20 MG TABLET    Take by mouth.    FLUTICASONE PROPIONATE (FLONASE) 50 MCG/ACTUATION NASAL SPRAY    1 spray (50 mcg total) by Each Nostril route once daily.    FLUTICASONE PROPIONATE (FLONASE) 50 MCG/ACTUATION NASAL SPRAY    1 spray (50 mcg total) by Each Nostril route once daily.    HYDROXYZINE HCL (ATARAX) 10 MG TAB    Take 10 mg by mouth 3 times daily as needed.    JUNEL FE 1/20, 28, 1 MG-20 MCG (21)/75 MG (7) PER TABLET        MIRTAZAPINE (REMERON) 30 MG TABLET        OMEPRAZOLE (PRILOSEC) 40 MG CAPSULE    Take 1 capsule (40 mg total) by mouth once daily.    ONDANSETRON (ZOFRAN) 8 MG TABLET    Take by mouth 4 (four) times daily as needed.    ONDANSETRON (ZOFRAN-ODT) 4 MG TBDL    Take 1 tablet (4 mg total) by mouth every 8 (eight) hours as needed (nausea/vomiting).    PROMETHAZINE (PHENERGAN) 25 MG TABLET    Take by mouth.    SERTRALINE (ZOLOFT) 100 MG TABLET    Take 100 mg by mouth every evening.    TRIAMCINOLONE ACETONIDE 0.1% (KENALOG) 0.1 % CREAM    Apply topically 2 (two) times daily.    UREA 40 % LOTN LOTION    Apply topically once daily.    VALACYCLOVIR (VALTREX) 1000 MG TABLET    Take 1 tablet (1,000 mg total) by mouth every 12 (twelve) hours. for 7 days   Discontinued Medications    METOPROLOL TARTRATE (LOPRESSOR) 25 MG TABLET    Take 1 tablet (25 mg total) by mouth 2 (two) times daily.             Assessment:       1.  "Chest pain, unspecified type    2. Palpitations         Plan:     Problem List Items Addressed This Visit          Unprioritized    Chest pain - Primary    Current Assessment & Plan     Chest pain is atypical and likely related to anxiety over "abnormal" BP noted on home monitor. No exertional chest pain reported. EKG without significant change.     Have advised her to obtain a new home blood pressure log and to always double check when she has an abnormal reading.  We discussed this process.         Relevant Orders    IN OFFICE EKG 12-LEAD (to Muse)    Palpitations    Current Assessment & Plan     She has not been taking her metoprolol. Will change to Toprol XL 25 mg po daily in the evening.             Follow up in about 6 weeks (around 8/30/2023).    "

## 2023-07-27 ENCOUNTER — PATIENT MESSAGE (OUTPATIENT)
Dept: DERMATOLOGY | Facility: CLINIC | Age: 28
End: 2023-07-27
Payer: MEDICARE

## 2023-07-28 ENCOUNTER — OFFICE VISIT (OUTPATIENT)
Dept: DERMATOLOGY | Facility: CLINIC | Age: 28
End: 2023-07-28
Payer: MEDICARE

## 2023-07-28 DIAGNOSIS — Q82.8 KERATODERMA: Primary | ICD-10-CM

## 2023-07-28 PROCEDURE — 1160F PR REVIEW ALL MEDS BY PRESCRIBER/CLIN PHARMACIST DOCUMENTED: ICD-10-PCS | Mod: CPTII,S$GLB,, | Performed by: STUDENT IN AN ORGANIZED HEALTH CARE EDUCATION/TRAINING PROGRAM

## 2023-07-28 PROCEDURE — 1159F MED LIST DOCD IN RCRD: CPT | Mod: CPTII,S$GLB,, | Performed by: STUDENT IN AN ORGANIZED HEALTH CARE EDUCATION/TRAINING PROGRAM

## 2023-07-28 PROCEDURE — 1160F RVW MEDS BY RX/DR IN RCRD: CPT | Mod: CPTII,S$GLB,, | Performed by: STUDENT IN AN ORGANIZED HEALTH CARE EDUCATION/TRAINING PROGRAM

## 2023-07-28 PROCEDURE — 99213 OFFICE O/P EST LOW 20 MIN: CPT | Mod: S$GLB,,, | Performed by: STUDENT IN AN ORGANIZED HEALTH CARE EDUCATION/TRAINING PROGRAM

## 2023-07-28 PROCEDURE — 99213 PR OFFICE/OUTPT VISIT, EST, LEVL III, 20-29 MIN: ICD-10-PCS | Mod: S$GLB,,, | Performed by: STUDENT IN AN ORGANIZED HEALTH CARE EDUCATION/TRAINING PROGRAM

## 2023-07-28 PROCEDURE — 1159F PR MEDICATION LIST DOCUMENTED IN MEDICAL RECORD: ICD-10-PCS | Mod: CPTII,S$GLB,, | Performed by: STUDENT IN AN ORGANIZED HEALTH CARE EDUCATION/TRAINING PROGRAM

## 2023-07-28 NOTE — PROGRESS NOTES
Subjective:      Patient ID:  Sherry Burns is a 28 y.o. female who presents for   Chief Complaint   Patient presents with    Dry Skin     Dry skin on both feet     LOV 04/03/2023    Patient here today for dry scaly feet for x yrs. States feet does hurt and has sores on both feet. Patient states she has been using Urea cream three times a day on both feet and does not seem to help any.       Patient on dupixent last dosage 7/27/2023  Patient still using Triamcinolone 1% cream states she is using cream on arms and legs  Patient states she is using Cerave moisturizing or vanicream moisturizing cream on both arms and both legs.       Derm Hx:  Denies Phx of NMSC  Denies Fhx of MM        Review of Systems   Constitutional:  Negative for fever, chills and fatigue.   Skin:  Positive for dry skin and activity-related sunscreen use. Negative for itching and rash.       Objective:   Physical Exam   Constitutional: She appears well-developed and well-nourished.   Neurological: She is alert and oriented to person, place, and time.   Psychiatric: She has a normal mood and affect.   Skin:   Areas Examined (abnormalities noted in diagram):   RLE Inspected  LLE Inspection Performed            Diagram Legend     Erythematous scaling macule/papule c/w actinic keratosis       Vascular papule c/w angioma      Pigmented verrucoid papule/plaque c/w seborrheic keratosis      Yellow umbilicated papule c/w sebaceous hyperplasia      Irregularly shaped tan macule c/w lentigo     1-2 mm smooth white papules consistent with Milia      Movable subcutaneous cyst with punctum c/w epidermal inclusion cyst      Subcutaneous movable cyst c/w pilar cyst      Firm pink to brown papule c/w dermatofibroma      Pedunculated fleshy papule(s) c/w skin tag(s)      Evenly pigmented macule c/w junctional nevus     Mildly variegated pigmented, slightly irregular-bordered macule c/w mildly atypical nevus      Flesh colored to evenly pigmented papule  c/w intradermal nevus       Pink pearly papule/plaque c/w basal cell carcinoma      Erythematous hyperkeratotic cursted plaque c/w SCC      Surgical scar with no sign of skin cancer recurrence      Open and closed comedones      Inflammatory papules and pustules      Verrucoid papule consistent consistent with wart     Erythematous eczematous patches and plaques     Dystrophic onycholytic nail with subungual debris c/w onychomycosis     Umbilicated papule    Erythematous-base heme-crusted tan verrucoid plaque consistent with inflamed seborrheic keratosis     Erythematous Silvery Scaling Plaque c/w Psoriasis     See annotation      Assessment / Plan:        Keratoderma  - change to OTC urea 40% + salicylic acid 2% cream BID under occlusion  - gentle pumice stone once a week  - no erythema           No follow-ups on file.

## 2023-08-02 ENCOUNTER — PATIENT MESSAGE (OUTPATIENT)
Dept: GASTROENTEROLOGY | Facility: CLINIC | Age: 28
End: 2023-08-02

## 2023-08-02 ENCOUNTER — OFFICE VISIT (OUTPATIENT)
Dept: GASTROENTEROLOGY | Facility: CLINIC | Age: 28
End: 2023-08-02
Payer: MEDICARE

## 2023-08-02 VITALS
WEIGHT: 132.06 LBS | HEIGHT: 59 IN | SYSTOLIC BLOOD PRESSURE: 107 MMHG | BODY MASS INDEX: 26.62 KG/M2 | DIASTOLIC BLOOD PRESSURE: 64 MMHG | HEART RATE: 96 BPM

## 2023-08-02 DIAGNOSIS — R12 WATERBRASH: ICD-10-CM

## 2023-08-02 DIAGNOSIS — R68.81 EARLY SATIETY: ICD-10-CM

## 2023-08-02 DIAGNOSIS — R11.2 NAUSEA AND VOMITING, UNSPECIFIED VOMITING TYPE: Primary | ICD-10-CM

## 2023-08-02 DIAGNOSIS — R14.2 BELCHING: ICD-10-CM

## 2023-08-02 DIAGNOSIS — R12 HEARTBURN: ICD-10-CM

## 2023-08-02 DIAGNOSIS — R19.7 INTERMITTENT DIARRHEA: ICD-10-CM

## 2023-08-02 DIAGNOSIS — K21.9 GASTROESOPHAGEAL REFLUX DISEASE, UNSPECIFIED WHETHER ESOPHAGITIS PRESENT: ICD-10-CM

## 2023-08-02 DIAGNOSIS — K59.00 CONSTIPATION, UNSPECIFIED CONSTIPATION TYPE: ICD-10-CM

## 2023-08-02 PROCEDURE — 99999 PR PBB SHADOW E&M-EST. PATIENT-LVL V: CPT | Mod: PBBFAC,,,

## 2023-08-02 PROCEDURE — 3008F BODY MASS INDEX DOCD: CPT | Mod: CPTII,S$GLB,,

## 2023-08-02 PROCEDURE — 1160F PR REVIEW ALL MEDS BY PRESCRIBER/CLIN PHARMACIST DOCUMENTED: ICD-10-PCS | Mod: CPTII,S$GLB,,

## 2023-08-02 PROCEDURE — 99214 OFFICE O/P EST MOD 30 MIN: CPT | Mod: S$GLB,,,

## 2023-08-02 PROCEDURE — 3074F PR MOST RECENT SYSTOLIC BLOOD PRESSURE < 130 MM HG: ICD-10-PCS | Mod: CPTII,S$GLB,,

## 2023-08-02 PROCEDURE — 3078F DIAST BP <80 MM HG: CPT | Mod: CPTII,S$GLB,,

## 2023-08-02 PROCEDURE — 99999 PR PBB SHADOW E&M-EST. PATIENT-LVL V: ICD-10-PCS | Mod: PBBFAC,,,

## 2023-08-02 PROCEDURE — 99214 PR OFFICE/OUTPT VISIT, EST, LEVL IV, 30-39 MIN: ICD-10-PCS | Mod: S$GLB,,,

## 2023-08-02 PROCEDURE — 1159F PR MEDICATION LIST DOCUMENTED IN MEDICAL RECORD: ICD-10-PCS | Mod: CPTII,S$GLB,,

## 2023-08-02 PROCEDURE — 3008F PR BODY MASS INDEX (BMI) DOCUMENTED: ICD-10-PCS | Mod: CPTII,S$GLB,,

## 2023-08-02 PROCEDURE — 1160F RVW MEDS BY RX/DR IN RCRD: CPT | Mod: CPTII,S$GLB,,

## 2023-08-02 PROCEDURE — 3074F SYST BP LT 130 MM HG: CPT | Mod: CPTII,S$GLB,,

## 2023-08-02 PROCEDURE — 3078F PR MOST RECENT DIASTOLIC BLOOD PRESSURE < 80 MM HG: ICD-10-PCS | Mod: CPTII,S$GLB,,

## 2023-08-02 PROCEDURE — 1159F MED LIST DOCD IN RCRD: CPT | Mod: CPTII,S$GLB,,

## 2023-08-02 RX ORDER — OMEPRAZOLE 40 MG/1
40 CAPSULE, DELAYED RELEASE ORAL DAILY
Qty: 90 CAPSULE | Refills: 1 | Status: SHIPPED | OUTPATIENT
Start: 2023-08-02 | End: 2023-08-03

## 2023-08-02 NOTE — PROGRESS NOTES
Subjective:       Patient ID: Sherry Burns is a 28 y.o. female Body mass index is 26.67 kg/m².    Chief Complaint: Nausea    This patient is new to me.  Referring Provider: Aaareferral Self for nausea and vomiting.  Established patient of Dr. Williamson GI.                GI Problem  The primary symptoms include nausea (Since July gets nauseous after eating fried food, spicy food and occured yesterday after eating subway, believes could be reflux causing nausea), vomiting (had 1 episode in july) and diarrhea (States in July started metoprolol and cause intermittent diarrhea). Primary symptoms do not include fever, weight loss, fatigue, abdominal pain, melena, hematemesis, jaundice, hematochezia or dysuria.   The nausea is associated with eating.   The illness is also significant for constipation (States has 3-4 BMs per week states would have a hard stool followed by soft stool rates stool on Hernando scale type 2-5, states will have type 5/6 looser stools if eats greasy or spicy foods). The illness does not include dysphagia. Associated symptoms comments: EGD AND COLONOSCOPY completed for iron-deficiency anemia.  Colonoscopy 03/05/2021 per Dr. Williamson RECOMMENDATION:- Repeat colonoscopy at age 45 for screening purposes  EGD 3/5/21 Per Dr. Williamson  IMPRESSION:  - Normal esophagus.    - Gastritis. Biopsied.  Negative H pylori per pathology report unremarkable.    - Normal examined duodenum. Biopsied.     . Significant associated medical issues include GERD.   Gastroesophageal Reflux  She complains of belching, early satiety, heartburn, nausea (Since July gets nauseous after eating fried food, spicy food and occured yesterday after eating subway, believes could be reflux causing nausea) and water brash. She reports no abdominal pain, no chest pain, no choking, no coughing or no dysphagia. The heartburn does not wake her from sleep. The symptoms are aggravated by certain foods. Pertinent negatives include no anemia,  fatigue, melena or weight loss. Risk factors include smoking/tobacco exposure and caffeine use (Feels heartburn after drinking coffee coffee and eating greasy or spicy foods, denies taking NSAIDs). She has tried a PPI and a histamine-2 antagonist (States is not take Pepcid, has tried omeprazole in the past) for the symptoms. Past procedures include an EGD.       Review of Systems   Constitutional:  Negative for fatigue, fever, unexpected weight change and weight loss.   Respiratory:  Negative for cough and choking.    Cardiovascular:  Negative for chest pain.   Gastrointestinal:  Positive for constipation (States has 3-4 BMs per week states would have a hard stool followed by soft stool rates stool on Huron scale type 2-5, states will have type 5/6 looser stools if eats greasy or spicy foods), diarrhea (States in July started metoprolol and cause intermittent diarrhea), heartburn, nausea (Since July gets nauseous after eating fried food, spicy food and occured yesterday after eating subway, believes could be reflux causing nausea) and vomiting (had 1 episode in july). Negative for abdominal distention, abdominal pain, anal bleeding, blood in stool, dysphagia, hematemesis, hematochezia, jaundice, melena and rectal pain.   Genitourinary:  Negative for dysuria.         No LMP recorded.  Past Medical History:   Diagnosis Date    Anemia     Asthma     Eczema     GERD (gastroesophageal reflux disease)     Glaucoma      Past Surgical History:   Procedure Laterality Date    COLONOSCOPY N/A 03/05/2021    Procedure: COLONOSCOPY;  Surgeon: Jake Williamson MD;  Location: [facility];  Service: Endoscopy;  Laterality: N/A;    ESOPHAGOGASTRODUODENOSCOPY N/A 03/05/2021    Procedure: EGD (ESOPHAGOGASTRODUODENOSCOPY);  Surgeon: Jake Williamson MD;  Location: [facility];  Service: Endoscopy;  Laterality: N/A;    UPPER GASTROINTESTINAL ENDOSCOPY       Family History   Problem Relation Age of Onset    Colon polyps Mother     Melanoma  Neg Hx     Colon cancer Neg Hx     Crohn's disease Neg Hx     Esophageal cancer Neg Hx     Liver cancer Neg Hx     Rectal cancer Neg Hx     Stomach cancer Neg Hx     Ulcerative colitis Neg Hx      Social History     Tobacco Use    Smoking status: Every Day     Current packs/day: 0.00     Types: Cigarettes    Smokeless tobacco: Never    Tobacco comments:     States 1 pack of cigarettes last 2 days   Substance Use Topics    Alcohol use: Not Currently    Drug use: Never     Wt Readings from Last 10 Encounters:   08/02/23 59.9 kg (132 lb 0.9 oz)   07/19/23 59.4 kg (131 lb)   04/13/23 59.9 kg (132 lb)   04/03/23 56.2 kg (124 lb)   03/09/23 56.7 kg (124 lb 14.4 oz)   12/09/22 54 kg (119 lb 0.8 oz)   12/01/22 52.6 kg (116 lb)   09/06/22 52.7 kg (116 lb 1.6 oz)   07/29/22 52.5 kg (115 lb 12.8 oz)   07/27/22 54.1 kg (119 lb 2.5 oz)     Lab Results   Component Value Date    WBC 8.51 03/07/2023    HGB 12.7 03/07/2023    HCT 37.4 03/07/2023    MCV 88 03/07/2023     03/07/2023     CMP  Sodium   Date Value Ref Range Status   05/28/2022 140 136 - 145 mmol/L Final     Potassium   Date Value Ref Range Status   05/28/2022 3.9 3.5 - 5.1 mmol/L Final     Chloride   Date Value Ref Range Status   05/28/2022 107 95 - 110 mmol/L Final     CO2   Date Value Ref Range Status   05/28/2022 23 23 - 29 mmol/L Final     Glucose   Date Value Ref Range Status   05/28/2022 93 70 - 110 mg/dL Final     BUN   Date Value Ref Range Status   05/28/2022 12 6 - 20 mg/dL Final     Creatinine   Date Value Ref Range Status   05/28/2022 0.9 0.5 - 1.4 mg/dL Final     Calcium   Date Value Ref Range Status   05/28/2022 9.6 8.7 - 10.5 mg/dL Final     Total Protein   Date Value Ref Range Status   05/28/2022 7.0 6.0 - 8.4 g/dL Final     Albumin   Date Value Ref Range Status   05/28/2022 4.2 3.5 - 5.2 g/dL Final     Total Bilirubin   Date Value Ref Range Status   05/28/2022 1.0 0.1 - 1.0 mg/dL Final     Comment:     For infants and newborns, interpretation of  "results should be based  on gestational age, weight and in agreement with clinical  observations.    Premature Infant recommended reference ranges:  Up to 24 hours.............<8.0 mg/dL  Up to 48 hours............<12.0 mg/dL  3-5 days..................<15.0 mg/dL  6-29 days.................<15.0 mg/dL       Alkaline Phosphatase   Date Value Ref Range Status   05/28/2022 61 55 - 135 U/L Final     AST   Date Value Ref Range Status   05/28/2022 16 10 - 40 U/L Final     ALT   Date Value Ref Range Status   05/28/2022 15 10 - 44 U/L Final     Anion Gap   Date Value Ref Range Status   05/28/2022 10 8 - 16 mmol/L Final     eGFR if    Date Value Ref Range Status   05/28/2022 >60 >60 mL/min/1.73 m^2 Final     eGFR if non    Date Value Ref Range Status   05/28/2022 >60 >60 mL/min/1.73 m^2 Final     Comment:     Calculation used to obtain the estimated glomerular filtration  rate (eGFR) is the CKD-EPI equation.        Lab Results   Component Value Date    LIPASE 23 05/28/2022     No results found for: "LIPASERES"  Lab Results   Component Value Date    TSH 1.83 04/16/2021       Reviewed prior medical records including radiology report of CT abdomen pelvis 05/18/2022, CT abdomen pelvis 05/28/2021, CT abdomen pelvis 05/14/2021 & all endoscopy history reviewed  (see surgical history/procedures).    Objective:      Physical Exam  Vitals and nursing note reviewed.   Constitutional:       Appearance: Normal appearance. She is normal weight.   Cardiovascular:      Rate and Rhythm: Normal rate and regular rhythm.      Heart sounds: Normal heart sounds.   Pulmonary:      Breath sounds: Normal breath sounds.   Abdominal:      General: Bowel sounds are normal.      Palpations: Abdomen is soft.   Skin:     General: Skin is warm and dry.      Coloration: Skin is not jaundiced.   Neurological:      Mental Status: She is alert and oriented to person, place, and time.   Psychiatric:         Mood and Affect: Mood " normal.         Behavior: Behavior normal.         Assessment:       1. Nausea and vomiting, unspecified vomiting type    2. Early satiety    3. Gastroesophageal reflux disease, unspecified whether esophagitis present    4. Belching    5. Heartburn    6. Waterbrash    7. Intermittent diarrhea    8. Constipation, unspecified constipation type        Plan:       Nausea and vomiting, unspecified vomiting type, Early satiety  -     Comprehensive Metabolic Panel; Future; Expected date: 08/02/2023  -     US Abdomen Complete; Future; Expected date: 08/02/202  -Consider EGD and/or gastric emptying study  -consider HIDA scan  -start Prilosec 40 mg p.o. daily, Take PPI 30min-1hr before eating breakfast  -continue Zofran p.o. p.r.n. as prescribed    Gastroesophageal reflux disease, unspecified whether esophagitis present, Belching, Heartburn, Water brash  -   START  omeprazole (PRILOSEC) 40 MG capsule; Take 1 capsule (40 mg total) by mouth Daily.  Dispense: 90 capsule; Refill: 1, Take PPI 30min-1hr before eating breakfast  -discussed about the different types of medications used to treat reflux and how to use them, antacids can be used PRN for breakthrough heartburn symptoms by reducing stomach acid that is already produced, H2 blockers work by limiting the amount acid production, & PPI's work to block acid production and are taken daily, patient verbalized understanding.  -Educated patient on lifestyle modifications to help control/reduce reflux/abdominal pain including: avoid large meals, avoid eating within 2-3 hours of bedtime (avoid late night eating & lying down soon after eating), elevate head of bed if nocturnal symptoms are present, smoking cessation (if current smoker), & weight loss (if overweight).   -Educated to avoid known foods which trigger reflux symptoms & to minimize/avoid high-fat foods, chocolate, caffeine, citrus, alcohol, & tomato products.  -Advised to avoid/limit use of NSAID's, since they can cause GI  upset, bleeding, and/or ulcers. If needed, take with food.     Intermittent diarrhea    -Consider stool studies if issue continues or reoccurs  - recommend OTC probiotic, such as Florastor or Culturelle, taken as directed on packaging  - avoid lactose, alcohol, & caffeine  - avoid known triggers    Constipation, unspecified constipation type   Recommend daily exercise as tolerated, adequate water intake (six 8-oz glasses of water daily), and high fiber diet. OTC fiber supplements are recommended if diet does not reach daily fiber goal (20-30 grams daily), such as Metamucil, Citrucel, or FiberCon (take as directed, separate from other oral medications by >2 hours).  -Recommend taking an OTC stool softener such as Colace as directed to avoid hard stools and straining with bowel movements PRN  -Recommend trying OTC MiraLax once daily (17g PO) as directed  - If no improvement with above recommendations, try intermittently dosed Dulcolax OTC as directed (every 3-4  days) PRN to facilitate bowel movements  -If still no improvement with these measures, call/follow-up               Follow up in about 4 weeks (around 8/30/2023), or if symptoms worsen or fail to improve.      If no improvement in symptoms or symptoms worsen, call/follow-up at clinic or go to ER.       [facility] - GASTROENTEROLOGY  [facility], [place]     Dictation software program was used for this note. Please expect some simple typographical  errors in this note.    Encounter includes face to face time and non-face to face time preparing to see the patient (eg, review of tests), obtaining and/or reviewing separately obtained history, documenting clinical information in the electronic or other health record, independently interpreting results (not separately reported) and communicating results to the patient/family/caregiver, or care coordination (not separately reported).

## 2023-08-03 ENCOUNTER — SPECIALTY PHARMACY (OUTPATIENT)
Dept: PHARMACY | Facility: CLINIC | Age: 28
End: 2023-08-03
Payer: MEDICARE

## 2023-08-03 ENCOUNTER — TELEPHONE (OUTPATIENT)
Dept: GASTROENTEROLOGY | Facility: CLINIC | Age: 28
End: 2023-08-03
Payer: MEDICARE

## 2023-08-03 DIAGNOSIS — K21.9 GASTROESOPHAGEAL REFLUX DISEASE, UNSPECIFIED WHETHER ESOPHAGITIS PRESENT: Primary | ICD-10-CM

## 2023-08-03 RX ORDER — PANTOPRAZOLE SODIUM 40 MG/1
40 TABLET, DELAYED RELEASE ORAL DAILY
Qty: 90 TABLET | Refills: 1 | Status: SHIPPED | OUTPATIENT
Start: 2023-08-03 | End: 2024-02-15

## 2023-08-03 NOTE — TELEPHONE ENCOUNTER
Specialty Pharmacy - Refill Coordination    Specialty Medication Orders Linked to Encounter      Flowsheet Row Most Recent Value   Medication #1 DUPIXENT SYRINGE 300 mg/2 mL Syrg (Order#[id number], Rx#[id number])            Refill Questions - Documented Responses      Flowsheet Row Most Recent Value   Patient Availability and HIPAA Verification    Does patient want to proceed with activity? Yes   HIPAA/medical authority confirmed? Yes   Relationship to patient of person spoken to? Self   Refill Screening Questions    Changes to allergies? No   Changes to medications? No   New conditions since last clinic visit? No   Unplanned office visit, urgent care, ED, or hospital admission in the last 4 weeks? No   How does patient/caregiver feel medication is working? Good   Financial problems or insurance changes? No   How many doses of your specialty medications were missed in the last 4 weeks? 0   Would patient like to speak to a pharmacist? No   When does the patient need to receive the medication? 08/04/23   Refill Delivery Questions    How will the patient receive the medication? [facility]   When does the patient need to receive the medication? 08/04/23   Shipping Address Prescription   Address in [facility] confirmed and updated if neccessary? Yes   Expected Copay ($) 0   Payment Method zero copay   Days supply of Refill 28   Refill activity completed? Yes   Refill activity plan Refill scheduled   Shipment/Pickup Date: 08/03/23            Current Outpatient Medications   Medication Sig    albuterol (PROVENTIL/VENTOLIN HFA) 90 mcg/actuation inhaler Inhale 1 puff into the lungs every 4 (four) hours as needed.     ammonium lactate (LAC-HYDRIN) 12 % lotion Use on AA nightly    ARIPiprazole (ABILIFY) 5 MG Tab Take by mouth.    atomoxetine (STRATTERA) 40 MG capsule     azelastine (ASTELIN) 137 mcg (0.1 %) nasal spray     busPIRone (BUSPAR) 15 MG tablet     cetirizine (ZYRTEC) 10 MG tablet Take 10 mg by mouth daily as  needed.     clobetasol 0.05% (TEMOVATE) 0.05 % Oint Apply topically 2 (two) times daily.    diclofenac (VOLTAREN) 50 MG EC tablet Take 1 tablet (50 mg total) by mouth 3 (three) times daily as needed (pain).    DUPIXENT SYRINGE 300 mg/2 mL Syrg Inject 600mg (2 syringes) into the skin on day 1 then inject 300mg (1 syringe) every other week.    DUPIXENT SYRINGE 300 mg/2 mL Syrg Inject 300 mg (2 mL) into the skin every other week    EScitalopram oxalate (LEXAPRO) 10 MG tablet     famotidine (PEPCID) 20 MG tablet Take by mouth.    fluticasone propionate (FLONASE) 50 mcg/actuation nasal spray 1 spray (50 mcg total) by Each Nostril route once daily.    fluticasone propionate (FLONASE) 50 mcg/actuation nasal spray 1 spray (50 mcg total) by Each Nostril route once daily.    hydrOXYzine HCL (ATARAX) 10 MG Tab Take 10 mg by mouth 3 times daily as needed.    JUNEL FE 1/20, 28, 1 mg-20 mcg (21)/75 mg (7) per tablet     metoprolol succinate (TOPROL-XL) 25 MG 24 hr tablet Take 1 tablet (25 mg total) by mouth every evening.    mirtazapine (REMERON) 30 MG tablet     omeprazole (PRILOSEC) 40 MG capsule Take 1 capsule (40 mg total) by mouth Daily.    ondansetron (ZOFRAN) 8 MG tablet Take by mouth 4 (four) times daily as needed.    ondansetron (ZOFRAN-ODT) 4 MG TbDL Take 1 tablet (4 mg total) by mouth every 8 (eight) hours as needed (nausea/vomiting).    promethazine (PHENERGAN) 25 MG tablet Take by mouth.    sertraline (ZOLOFT) 100 MG tablet Take 100 mg by mouth every evening.    triamcinolone acetonide 0.1% (KENALOG) 0.1 % cream Apply topically 2 (two) times daily.    urea 40 % Lotn lotion Apply topically once daily.    valACYclovir (VALTREX) 1000 MG tablet Take 1 tablet (1,000 mg total) by mouth every 12 (twelve) hours. for 7 days   Last reviewed on 8/2/2023  9:16 AM by Mackenzie Howell, NP    Review of patient's allergies indicates:  No Known Allergies Last reviewed on  8/2/2023 9:16 AM by Mackenzie Howell      Tasks added this  encounter   No tasks added.   Tasks due within next 3 months   No tasks due.     [facility]  [address]  [facility] [address]  Phone: [phone]  Fax: [phone]

## 2023-08-03 NOTE — TELEPHONE ENCOUNTER
Patient mom states she has already tried omeprazole and did not work please send in something else?

## 2023-08-03 NOTE — TELEPHONE ENCOUNTER
----- Message from Whitney Jakub sent at 8/3/2023  9:43 AM CDT -----  Regarding: advice  Contact: Lotus Marshall  Type: Needs Medical Advice  Who Called:  Lotus mother  Symptoms (please be specific):    How long has patient had these symptoms:    Pharmacy name and phone #:    [id number] - [place], [place] - [address]  [address] [place] [address]  Phone: [phone] Fax: [phone]    [facility] Call Back Number: [phone] (home)     Additional Information: mother states patient saw doctor 08/02/23 and doctor gave her the same medication as before.  This medication does not help the patient.  Please call patient to advise. Thanks!

## 2023-08-04 ENCOUNTER — LAB VISIT (OUTPATIENT)
Dept: LAB | Facility: HOSPITAL | Age: 28
End: 2023-08-04
Payer: MEDICARE

## 2023-08-04 ENCOUNTER — TELEPHONE (OUTPATIENT)
Dept: GASTROENTEROLOGY | Facility: CLINIC | Age: 28
End: 2023-08-04
Payer: MEDICARE

## 2023-08-04 DIAGNOSIS — R11.2 NAUSEA AND VOMITING, UNSPECIFIED VOMITING TYPE: ICD-10-CM

## 2023-08-04 LAB
ALBUMIN SERPL BCP-MCNC: 4 G/DL (ref 3.5–5.2)
ALP SERPL-CCNC: 96 U/L (ref 55–135)
ALT SERPL W/O P-5'-P-CCNC: 34 U/L (ref 10–44)
ANION GAP SERPL CALC-SCNC: 2 MMOL/L (ref 8–16)
AST SERPL-CCNC: 21 U/L (ref 10–40)
BILIRUB SERPL-MCNC: 0.9 MG/DL (ref 0.1–1)
BUN SERPL-MCNC: 18 MG/DL (ref 6–20)
CALCIUM SERPL-MCNC: 9.1 MG/DL (ref 8.7–10.5)
CHLORIDE SERPL-SCNC: 106 MMOL/L (ref 95–110)
CO2 SERPL-SCNC: 29 MMOL/L (ref 23–29)
CREAT SERPL-MCNC: 1 MG/DL (ref 0.5–1.4)
EST. GFR  (NO RACE VARIABLE): >60 ML/MIN/1.73 M^2
GLUCOSE SERPL-MCNC: 113 MG/DL (ref 70–110)
POTASSIUM SERPL-SCNC: 3.8 MMOL/L (ref 3.5–5.1)
PROT SERPL-MCNC: 7.3 G/DL (ref 6–8.4)
SODIUM SERPL-SCNC: 137 MMOL/L (ref 136–145)

## 2023-08-04 PROCEDURE — 36415 COLL VENOUS BLD VENIPUNCTURE: CPT

## 2023-08-04 PROCEDURE — 80053 COMPREHEN METABOLIC PANEL: CPT

## 2023-08-04 NOTE — TELEPHONE ENCOUNTER
Pt has been contacted, and medication sent to the pharmacy per MERON Howell NP. See tele encounter from 8/3/23

## 2023-08-04 NOTE — TELEPHONE ENCOUNTER
----- Message from Mackenzie Howell NP sent at 8/2/2023  4:24 PM CDT -----  Contact: self  Felix Farrell, I attempted to call the patient earlier and just now but no answer. I would like to know exact medication that works so it can be send to pharmacy.    Thanks,  DUC Bryson  ----- Message -----  From: Jami Strickland LPN  Sent: 8/2/2023   3:07 PM CDT  To: Mackenzie Howell NP      ----- Message -----  From: Magy Ward  Sent: 8/2/2023   2:50 PM CDT  To: Dante Mann Staff    Type:  Patient Returning Call    Who Called:  pt  Who Left Message for Patient:  n/a  Does the patient know what this is regarding?:  yes  Best Call Back Number:  [phone] (home)    Additional Information:  Thank you

## 2023-08-07 ENCOUNTER — HOSPITAL ENCOUNTER (OUTPATIENT)
Dept: RADIOLOGY | Facility: HOSPITAL | Age: 28
Discharge: HOME | End: 2023-08-07
Attending: NEUROLOGICAL SURGERY
Payer: MEDICARE

## 2023-08-07 DIAGNOSIS — M54.16 LUMBAR RADICULOPATHY: ICD-10-CM

## 2023-08-07 DIAGNOSIS — M54.50 LOW BACK PAIN: ICD-10-CM

## 2023-08-07 PROCEDURE — 72114 XR LUMBAR SPINE 5 VIEW WITH FLEX AND EXT: ICD-10-PCS | Mod: 26,,, | Performed by: RADIOLOGY

## 2023-08-07 PROCEDURE — 72114 X-RAY EXAM L-S SPINE BENDING: CPT | Mod: TC,FY,PO

## 2023-08-07 PROCEDURE — 72114 X-RAY EXAM L-S SPINE BENDING: CPT | Mod: 26,,, | Performed by: RADIOLOGY

## 2023-08-15 ENCOUNTER — HOSPITAL ENCOUNTER (OUTPATIENT)
Dept: RADIOLOGY | Facility: HOSPITAL | Age: 28
Discharge: HOME | End: 2023-08-15
Attending: NEUROLOGICAL SURGERY
Payer: MEDICARE

## 2023-08-15 PROCEDURE — 72148 MRI LUMBAR SPINE W/O DYE: CPT | Mod: 26,,, | Performed by: RADIOLOGY

## 2023-08-15 PROCEDURE — 72148 MRI LUMBAR SPINE WITHOUT CONTRAST: ICD-10-PCS | Mod: 26,,, | Performed by: RADIOLOGY

## 2023-08-15 PROCEDURE — 72148 MRI LUMBAR SPINE W/O DYE: CPT | Mod: TC

## 2023-08-17 ENCOUNTER — TELEPHONE (OUTPATIENT)
Dept: PULMONOLOGY | Facility: CLINIC | Age: 28
End: 2023-08-17
Payer: MEDICARE

## 2023-08-17 ENCOUNTER — OFFICE VISIT (OUTPATIENT)
Dept: PULMONOLOGY | Facility: CLINIC | Age: 28
End: 2023-08-17
Payer: MEDICARE

## 2023-08-17 VITALS
SYSTOLIC BLOOD PRESSURE: 130 MMHG | BODY MASS INDEX: 26.49 KG/M2 | DIASTOLIC BLOOD PRESSURE: 77 MMHG | OXYGEN SATURATION: 98 % | WEIGHT: 131.19 LBS | HEART RATE: 112 BPM

## 2023-08-17 DIAGNOSIS — J45.909 UNCOMPLICATED ASTHMA, UNSPECIFIED ASTHMA SEVERITY, UNSPECIFIED WHETHER PERSISTENT: Primary | ICD-10-CM

## 2023-08-17 PROCEDURE — 99204 PR OFFICE/OUTPT VISIT, NEW, LEVL IV, 45-59 MIN: ICD-10-PCS | Mod: S$GLB,,, | Performed by: INTERNAL MEDICINE

## 2023-08-17 PROCEDURE — 3078F PR MOST RECENT DIASTOLIC BLOOD PRESSURE < 80 MM HG: ICD-10-PCS | Mod: CPTII,S$GLB,, | Performed by: INTERNAL MEDICINE

## 2023-08-17 PROCEDURE — 3075F SYST BP GE 130 - 139MM HG: CPT | Mod: CPTII,S$GLB,, | Performed by: INTERNAL MEDICINE

## 2023-08-17 PROCEDURE — 3008F BODY MASS INDEX DOCD: CPT | Mod: CPTII,S$GLB,, | Performed by: INTERNAL MEDICINE

## 2023-08-17 PROCEDURE — 3075F PR MOST RECENT SYSTOLIC BLOOD PRESS GE 130-139MM HG: ICD-10-PCS | Mod: CPTII,S$GLB,, | Performed by: INTERNAL MEDICINE

## 2023-08-17 PROCEDURE — 1159F PR MEDICATION LIST DOCUMENTED IN MEDICAL RECORD: ICD-10-PCS | Mod: CPTII,S$GLB,, | Performed by: INTERNAL MEDICINE

## 2023-08-17 PROCEDURE — 99204 OFFICE O/P NEW MOD 45 MIN: CPT | Mod: S$GLB,,, | Performed by: INTERNAL MEDICINE

## 2023-08-17 PROCEDURE — 99999 PR PBB SHADOW E&M-EST. PATIENT-LVL IV: CPT | Mod: PBBFAC,,, | Performed by: INTERNAL MEDICINE

## 2023-08-17 PROCEDURE — 99999 PR PBB SHADOW E&M-EST. PATIENT-LVL IV: ICD-10-PCS | Mod: PBBFAC,,, | Performed by: INTERNAL MEDICINE

## 2023-08-17 PROCEDURE — 1159F MED LIST DOCD IN RCRD: CPT | Mod: CPTII,S$GLB,, | Performed by: INTERNAL MEDICINE

## 2023-08-17 PROCEDURE — 3078F DIAST BP <80 MM HG: CPT | Mod: CPTII,S$GLB,, | Performed by: INTERNAL MEDICINE

## 2023-08-17 PROCEDURE — 3008F PR BODY MASS INDEX (BMI) DOCUMENTED: ICD-10-PCS | Mod: CPTII,S$GLB,, | Performed by: INTERNAL MEDICINE

## 2023-08-17 RX ORDER — ALBUTEROL SULFATE 90 UG/1
1 AEROSOL, METERED RESPIRATORY (INHALATION) EVERY 4 HOURS PRN
Qty: 18 G | Refills: 11 | Status: SHIPPED | OUTPATIENT
Start: 2023-08-17

## 2023-08-17 NOTE — TELEPHONE ENCOUNTER
----- Message from Nate Dias sent at 8/17/2023 10:14 AM CDT -----  Type:  Patient Returning Call    Who Called:  Mother/ Lotus Burns  Who Left Message for Patient:  NA  Does the patient know what this is regarding?:  Earlier appointment time  Best Call Back Number:  [phone]  Additional Information:

## 2023-08-17 NOTE — PROGRESS NOTES
"Pulmonary Clinic New Patient    HISTORY OF PRESENT ILLNESS   Sherry Burns is a 28 y.o. female with a PMH of childhood asthma on whom we have been consulted for shortness of breath.    Shortness of breath even when walking to the car. This has been going on for 2 years unchanged. No other symptoms. Upon further questioning, she does not feel as though she can't catch her breath but rather that she is tired. She has heard herself "wheezing" previously.      SMOKING HISTORY  1-2 cigarettes a day x 15 years.    EXPOSURE HISTORY  Lives with her mother who is a heavy smoker.  No family history of lung cancer.    REVIEW OF SYSTEMS  GENERAL: Feeling well. No fevers, chills, or night sweats.  EYES: Vision is good.  ENT: No sinusitis or pharyngitis.   HEART: No chest pain or palpitations.  LUNGS: No cough, sputum, or wheezing.  GI: No abdominal pain, nausea, vomiting, or diarrhea.  : No dysuria, urgency, or frequency.  SKIN: No lesions or rashes.  MUSCULOSKELETAL: No joint pain or myalgias.  NEURO: No headaches or neuropathy.  LYMPH: No edema or adenopathy.  PSYCH: No anxiety or depression.  ENDO: No unexpected weight change.    FAMILY HISTORY  Mother has COPD and lung nodules.    PFSH:  Past Medical History:   Diagnosis Date    Anemia     Asthma     Eczema     GERD (gastroesophageal reflux disease)     Glaucoma          Past Surgical History:   Procedure Laterality Date    COLONOSCOPY N/A 03/05/2021    Procedure: COLONOSCOPY;  Surgeon: Jake Williamson MD;  Location: [facility];  Service: Endoscopy;  Laterality: N/A;    ESOPHAGOGASTRODUODENOSCOPY N/A 03/05/2021    Procedure: EGD (ESOPHAGOGASTRODUODENOSCOPY);  Surgeon: Jake Williamson MD;  Location: [facility];  Service: Endoscopy;  Laterality: N/A;    UPPER GASTROINTESTINAL ENDOSCOPY       Social History     Tobacco Use    Smoking status: Every Day     Current packs/day: 0.00     Types: Cigarettes    Smokeless tobacco: Never    Tobacco comments:     States 1 pack of " cigarettes last 2 days   Substance Use Topics    Alcohol use: Not Currently    Drug use: Never     Family History   Problem Relation Age of Onset    Colon polyps Mother     Melanoma Neg Hx     Colon cancer Neg Hx     Crohn's disease Neg Hx     Esophageal cancer Neg Hx     Liver cancer Neg Hx     Rectal cancer Neg Hx     Stomach cancer Neg Hx     Ulcerative colitis Neg Hx      Review of patient's allergies indicates:  No Known Allergies      VITAL SIGNS (MOST RECENT)  Pulse: (!) 112 (08/17/23 1328)  BP: 130/77 (08/17/23 1328)  SpO2: 98 % (08/17/23 1328)    PHYSICAL EXAM  GENERAL: NAD.  HEENT: Pupils equal and reactive. Extraocular movements intact.   NECK: Supple.   HEART: Regular rate and rhythm. No murmur or gallop auscultated.  LUNGS: Clear to auscultation and percussion. Lung excursion symmetrical.  ABDOMEN: Bowel sounds present. Non-tender, no masses palpated.  EXTREMITIES: Normal muscle tone and joint movement, no cyanosis or clubbing.   LYMPHATICS: No adenopathy palpated, no edema.  SKIN: Dry, intact, no lesions.   NEURO: No gross cognitive or motor deficits.  PSYCH: Appropriate affect.    Lab Results   Component Value Date    WBC 8.51 03/07/2023    HGB 12.7 03/07/2023    HCT 37.4 03/07/2023     03/07/2023    CHOL 159 01/03/2020    TRIG 47 01/03/2020    HDL 58 01/03/2020    ALT 34 08/04/2023    AST 21 08/04/2023     08/04/2023    K 3.8 08/04/2023     08/04/2023    CREATININE 1.0 08/04/2023    BUN 18 08/04/2023    CO2 29 08/04/2023    TSH 1.83 04/16/2021       LAST ARTERIAL BLOOD GAS  @LABRCNTIP[PH,PO2,PCO2,HCO3,BE@      LAST 7 DAYS MICROBIOLOGY   Microbiology Results (last 7 days)       ** No results found for the last 168 hours. **            MOST RECENT IMAGING  MRI Lumbar Spine Without Contrast  Narrative: EXAMINATION:  MRI LUMBAR SPINE WITHOUT CONTRAST    CLINICAL HISTORY:  Radiculopathy, lumbar region    TECHNIQUE:  Multiplanar, multisequence MR images were acquired from the  "thoracolumbar junction to the sacrum without the administration of contrast.    COMPARISON:  Radiographs 08/07/2023..    FINDINGS:  Morphology: There are 6 non rib-bearing lumbar type vertebra..  Vertebral body heights are preserved marrow signal is within normal limits.    Alignment: Sagittal alignment is within normal limits.    Cord: Normal in contour, caliber, and signal intensity.  Conus positioning is within normal limits.  Mild fatty infiltration of the filum.    Disc levels:    L1-L2: Within normal limits.    L2-L3: Within normal limits.    L3-L4: Within normal limits.    L4-L5: Within normal limits.    L5-L6: Within normal limits.    L6-S1: Mild disc height loss and shallow annular bulging.  The spinal canal and foramina are patent.  Slight tapering of the thecal sac secondary to prominence of the epidural fat.    Other: Visualized paraspinal soft tissues are within normal limits.  Impression: 1. There are 6 non rib-bearing lumbar type vertebra.  2. No significant degenerative changes.  The spinal canal and foramina are patent throughout.  No disc herniation or selective nerve impingement.    Electronically signed by: Reuben Cummings  Date:    08/15/2023  Time:    15:52      CURRENT VISIT EKG  No results found for this visit on 08/17/23.    ECHOCARDIOGRAM RESULTS  No results found for this or any previous visit.      Pulmonary Function Tests  No results found for: "FEV1", "FVC", "EIP1QQC", "TLC", "DLCO"    CXR 2019: normal    CT abd pelvis 5/2022: lung bases normal    CT head and neck 6/29/22: Lung apices and visible portions of mid-lungs are normal.      ASSESSMENT & PLAN   Sherry Burns is a 28 y.o. female with a PMH of PMH of childhood asthma on whom we have been consulted for shortness of breath.    #Shortness of breath  #Fatigue  #Childhood asthma  #Smoking  CBC and CMP from 8/16/23 is normal (from [facility] reviewed on the patient's phone).  - smoking cessation recommended  - trial albuterol " inhaler  - PFTs ordered  - methacholine challenge ordered  - CXR ordered  - TTE ordered    Follow up in 1 month.    Discussed with mother. I have personally reviewed recent labs and ABGs, and I have viewed and interpreted the images of recent chest imaging, as above.     Jamarcus Lundy MD  Pulmonary and Critical Care Medicine  [facility]  Date of Service: 08/17/2023  1:51 PM

## 2023-08-17 NOTE — TELEPHONE ENCOUNTER
Spoke to patient's parent.  They will be in the office for 1:30 pm for their appt.  Noted on schedule.

## 2023-08-31 ENCOUNTER — TELEPHONE (OUTPATIENT)
Dept: HEMATOLOGY/ONCOLOGY | Facility: CLINIC | Age: 28
End: 2023-08-31

## 2023-08-31 NOTE — TELEPHONE ENCOUNTER
I spoke with pt and reminded her to have labs done prior to appt here on 9/7/23 pt verbalized understanding.

## 2023-09-06 ENCOUNTER — TELEPHONE (OUTPATIENT)
Dept: HEMATOLOGY/ONCOLOGY | Facility: CLINIC | Age: 28
End: 2023-09-06

## 2023-09-06 NOTE — TELEPHONE ENCOUNTER
I spoke with pt mother, chong and reminded her pt needs labs done for appt tomorrow she states that she will let her know.

## 2023-09-07 ENCOUNTER — TELEPHONE (OUTPATIENT)
Dept: PULMONOLOGY | Facility: CLINIC | Age: 28
End: 2023-09-07
Payer: MEDICARE

## 2023-09-07 ENCOUNTER — OFFICE VISIT (OUTPATIENT)
Dept: HEMATOLOGY/ONCOLOGY | Facility: CLINIC | Age: 28
End: 2023-09-07
Payer: MEDICARE

## 2023-09-07 VITALS
WEIGHT: 132 LBS | RESPIRATION RATE: 16 BRPM | HEART RATE: 96 BPM | BODY MASS INDEX: 26.61 KG/M2 | HEIGHT: 59 IN | SYSTOLIC BLOOD PRESSURE: 119 MMHG | TEMPERATURE: 97 F | DIASTOLIC BLOOD PRESSURE: 72 MMHG

## 2023-09-07 DIAGNOSIS — D50.0 IRON DEFICIENCY ANEMIA DUE TO CHRONIC BLOOD LOSS: Primary | ICD-10-CM

## 2023-09-07 LAB
BASOPHILS # BLD AUTO: 61 CELLS/UL (ref 0–200)
BASOPHILS NFR BLD AUTO: 0.9 %
EOSINOPHIL # BLD AUTO: 190 CELLS/UL (ref 15–500)
EOSINOPHIL NFR BLD AUTO: 2.8 %
ERYTHROCYTE [DISTWIDTH] IN BLOOD BY AUTOMATED COUNT: 12.7 % (ref 11–15)
FERRITIN SERPL-MCNC: 88 NG/ML (ref 16–154)
HCT VFR BLD AUTO: 40.8 % (ref 35–45)
HGB BLD-MCNC: 13.9 G/DL (ref 11.7–15.5)
LYMPHOCYTES # BLD AUTO: 1666 CELLS/UL (ref 850–3900)
LYMPHOCYTES NFR BLD AUTO: 24.5 %
MCH RBC QN AUTO: 29.7 PG (ref 27–33)
MCHC RBC AUTO-ENTMCNC: 34.1 G/DL (ref 32–36)
MCV RBC AUTO: 87.2 FL (ref 80–100)
MONOCYTES # BLD AUTO: 660 CELLS/UL (ref 200–950)
MONOCYTES NFR BLD AUTO: 9.7 %
NEUTROPHILS # BLD AUTO: 4223 CELLS/UL (ref 1500–7800)
NEUTROPHILS NFR BLD AUTO: 62.1 %
PLATELET # BLD AUTO: 316 THOUSAND/UL (ref 140–400)
PMV BLD REES-ECKER: 9.2 FL (ref 7.5–12.5)
RBC # BLD AUTO: 4.68 MILLION/UL (ref 3.8–5.1)
WBC # BLD AUTO: 6.8 THOUSAND/UL (ref 3.8–10.8)

## 2023-09-07 PROCEDURE — 3008F PR BODY MASS INDEX (BMI) DOCUMENTED: ICD-10-PCS | Mod: CPTII,S$GLB,, | Performed by: INTERNAL MEDICINE

## 2023-09-07 PROCEDURE — 3008F BODY MASS INDEX DOCD: CPT | Mod: CPTII,S$GLB,, | Performed by: INTERNAL MEDICINE

## 2023-09-07 PROCEDURE — 1159F PR MEDICATION LIST DOCUMENTED IN MEDICAL RECORD: ICD-10-PCS | Mod: CPTII,S$GLB,, | Performed by: INTERNAL MEDICINE

## 2023-09-07 PROCEDURE — 3074F PR MOST RECENT SYSTOLIC BLOOD PRESSURE < 130 MM HG: ICD-10-PCS | Mod: CPTII,S$GLB,, | Performed by: INTERNAL MEDICINE

## 2023-09-07 PROCEDURE — 3078F DIAST BP <80 MM HG: CPT | Mod: CPTII,S$GLB,, | Performed by: INTERNAL MEDICINE

## 2023-09-07 PROCEDURE — 1159F MED LIST DOCD IN RCRD: CPT | Mod: CPTII,S$GLB,, | Performed by: INTERNAL MEDICINE

## 2023-09-07 PROCEDURE — 3078F PR MOST RECENT DIASTOLIC BLOOD PRESSURE < 80 MM HG: ICD-10-PCS | Mod: CPTII,S$GLB,, | Performed by: INTERNAL MEDICINE

## 2023-09-07 PROCEDURE — 3074F SYST BP LT 130 MM HG: CPT | Mod: CPTII,S$GLB,, | Performed by: INTERNAL MEDICINE

## 2023-09-07 PROCEDURE — 99214 PR OFFICE/OUTPT VISIT, EST, LEVL IV, 30-39 MIN: ICD-10-PCS | Mod: S$GLB,,, | Performed by: INTERNAL MEDICINE

## 2023-09-07 PROCEDURE — 99214 OFFICE O/P EST MOD 30 MIN: CPT | Mod: S$GLB,,, | Performed by: INTERNAL MEDICINE

## 2023-09-07 NOTE — TELEPHONE ENCOUNTER
----- Message from Lotus Villa sent at 8/31/2023 10:30 AM CDT -----  Regarding: Unable to Contact - ###  We have made several attempts to contact this patient to schedule their Complete PFT w/ bronchodilator  and have been unable to reach them. We will be removing this order from our work queue but wanted to make you aware in case you wanted to reach out to the patient.     Thanks,   [facility] Centralized Scheduling

## 2023-09-11 NOTE — PROGRESS NOTES
[facility] in office hematology Oncology Subsequent  encounter note    9/7/23    Subjective:      Patient ID:   Sherry Burns  28 y.o. female  1995  [place]    Chief Complaint:   Anemia    HPI:  28 y.o. female   With intermittent anemia and easy bruisability.  She has been on iron pills in the past but has not been on B12 injections.  She denies history of jaundice or gallbladder disorder and has not been transfused.  Her mother admits to having easy bruisability also.      She has history of increased heart rate and is seen per Dr. Parson.  She has history of digestive problems of unclear nature and has had EGD and colonoscopy per Dr. Williamson.  Other history includes eczema and asthma.    She has not had previous surgeries.  She does not have history of drug allergies.  She does not smoke.  She does not drink alcohol.  She is not presently employed.    She had onset of menses at age 16, she describes her menses as normal to heavy, she does not have history of pregnancy.    Her mother has hypertension, diabetes, high cholesterol and admits to having easy bruising.  Her father is alive and well.  She has a brother with asthma.  She does not have children.    Hx of Fe deficiency, on po Fe, but compliance is unclear.    She has [employer]  Try for GYN referral on her plan, she says she will not use Dr. Irby's office.    Since May of 2022 I ordered iron infusion to replenish iron stores.  Energy is much improved, pallor has resolved.  She is in good spirits.  She places her energy at a 8/10.    Hgb 8.8 to 12.7. to 13.9.   Ferritin 6 to 128. To 88.      ROS:   GEN: normal without any fever, night sweats or weight loss  HEENT: normal with no HA's, sore throat, stiff neck, changes in vision  CV: normal with no CP, SOB, PND, RAMIREZ or orthopnea.  See HPI  PULM: normal with no SOB, cough, hemoptysis, sputum or pleuritic pain.  See HPI  GI: See HPI  : normal with no hematuria, dysuria  BREAST:  normal with no mass, discharge, pain  SKIN: See HPI.     Past Medical History:   Diagnosis Date    Anemia     Asthma     Eczema     GERD (gastroesophageal reflux disease)     Glaucoma      Past Surgical History:   Procedure Laterality Date    COLONOSCOPY N/A 03/05/2021    Procedure: COLONOSCOPY;  Surgeon: Jake Williamson MD;  Location: [facility];  Service: Endoscopy;  Laterality: N/A;    ESOPHAGOGASTRODUODENOSCOPY N/A 03/05/2021    Procedure: EGD (ESOPHAGOGASTRODUODENOSCOPY);  Surgeon: Jake Williamson MD;  Location: [facility];  Service: Endoscopy;  Laterality: N/A;    UPPER GASTROINTESTINAL ENDOSCOPY         Review of patient's allergies indicates:  No Known Allergies  Social History     Socioeconomic History    Marital status: Single   Tobacco Use    Smoking status: Every Day     Types: Cigarettes    Smokeless tobacco: Never    Tobacco comments:     States 1 pack of cigarettes last 2 days   Substance and Sexual Activity    Alcohol use: Not Currently    Drug use: Never         Current Outpatient Medications:     albuterol (PROVENTIL/VENTOLIN HFA) 90 mcg/actuation inhaler, Inhale 1 puff into the lungs every 4 (four) hours as needed for Shortness of Breath., Disp: 18 g, Rfl: 11    ammonium lactate (LAC-HYDRIN) 12 % lotion, Use on AA nightly, Disp: 225 g, Rfl: 6    ARIPiprazole (ABILIFY) 5 MG Tab, Take by mouth., Disp: , Rfl:     atomoxetine (STRATTERA) 40 MG capsule, , Disp: , Rfl:     azelastine (ASTELIN) 137 mcg (0.1 %) nasal spray, , Disp: , Rfl:     busPIRone (BUSPAR) 15 MG tablet, , Disp: , Rfl:     cetirizine (ZYRTEC) 10 MG tablet, Take 10 mg by mouth daily as needed. , Disp: , Rfl:     clobetasol 0.05% (TEMOVATE) 0.05 % Oint, Apply topically 2 (two) times daily., Disp: 60 g, Rfl: 1    diclofenac (VOLTAREN) 50 MG EC tablet, Take 1 tablet (50 mg total) by mouth 3 (three) times daily as needed (pain)., Disp: 15 tablet, Rfl: 0    DUPIXENT SYRINGE 300 mg/2 mL Syrg, Inject 600mg (2 syringes) into the skin on day 1  "then inject 300mg (1 syringe) every other week., Disp: 8 mL, Rfl: 0    DUPIXENT SYRINGE 300 mg/2 mL Syrg, Inject 300 mg (2 mL) into the skin every other week, Disp: 4 mL, Rfl: 11    EScitalopram oxalate (LEXAPRO) 10 MG tablet, , Disp: , Rfl:     fluticasone propionate (FLONASE) 50 mcg/actuation nasal spray, 1 spray (50 mcg total) by Each Nostril route once daily., Disp: 16 g, Rfl: 0    fluticasone propionate (FLONASE) 50 mcg/actuation nasal spray, 1 spray (50 mcg total) by Each Nostril route once daily., Disp: 9.9 mL, Rfl: 0    hydrOXYzine HCL (ATARAX) 10 MG Tab, Take 10 mg by mouth 3 times daily as needed., Disp: , Rfl:     JUNEL FE 1/20, 28, 1 mg-20 mcg (21)/75 mg (7) per tablet, , Disp: , Rfl:     metoprolol succinate (TOPROL-XL) 25 MG 24 hr tablet, Take 1 tablet (25 mg total) by mouth every evening., Disp: 30 tablet, Rfl: 11    mirtazapine (REMERON) 30 MG tablet, , Disp: , Rfl:     ondansetron (ZOFRAN) 8 MG tablet, Take by mouth 4 (four) times daily as needed., Disp: , Rfl:     ondansetron (ZOFRAN-ODT) 4 MG TbDL, Take 1 tablet (4 mg total) by mouth every 8 (eight) hours as needed (nausea/vomiting)., Disp: 12 tablet, Rfl: 0    pantoprazole (PROTONIX) 40 MG tablet, Take 1 tablet (40 mg total) by mouth once daily., Disp: 90 tablet, Rfl: 1    promethazine (PHENERGAN) 25 MG tablet, Take by mouth., Disp: , Rfl:     sertraline (ZOLOFT) 100 MG tablet, Take 100 mg by mouth every evening., Disp: , Rfl:     triamcinolone acetonide 0.1% (KENALOG) 0.1 % cream, Apply topically 2 (two) times daily., Disp: 454 g, Rfl: 2    urea 40 % Lotn lotion, Apply topically once daily., Disp: 226 g, Rfl: 5    valACYclovir (VALTREX) 1000 MG tablet, Take 1 tablet (1,000 mg total) by mouth every 12 (twelve) hours. for 7 days, Disp: 14 tablet, Rfl: 0          Objective:   Vitals:  Blood pressure 119/72, pulse 96, temperature 97 °F (36.1 °C), resp. rate 16, height 4' 11" (1.499 m), weight 59.9 kg (132 lb).    Physical Examination:   GEN: no " apparent distress, comfortable  HEAD: atraumatic and normocephalic  EYES: no pallor, no icterus, no jaundice  ENT:  no pharyngeal erythema, external ears WNL; no nasal discharge  NECK: no masses, thyroid normal, trachea midline, no LAD/LN's, supple  CV: RRR with no murmur; normal pulse  CHEST: Normal respiratory effort; CTAB; normal breath sounds; no wheeze or crackles  ABDOM: nontender and nondistended; soft; no rebound/guarding, L/S NP  MUSC/Skeletal: ROM normal; no crepitus; joints normal  EXTREM: no clubbing, cyanosis, inflammation or swelling  SKIN: no rashes, lesions, ulcers, petechiae.  She has 2 small ecchymoses on the anterior lower aspect of the right leg  : no cvat  NEURO: grossly intact; motor/sensory WNL; no tremors  PSYCH: normal mood, affect and behavior  LYMPH: normal cervical, supraclavicular, axillary and groin LN's  BREASTS:  Left and right breast nontender, without discharge, without palpable mass.    Labs:   Lab Results   Component Value Date    WBC 6.8 09/06/2023    HGB 13.9 09/06/2023    HCT 40.8 09/06/2023    MCV 87.2 09/06/2023     09/06/2023    CMP  Sodium   Date Value Ref Range Status   08/04/2023 137 136 - 145 mmol/L Final     Potassium   Date Value Ref Range Status   08/04/2023 3.8 3.5 - 5.1 mmol/L Final     Chloride   Date Value Ref Range Status   08/04/2023 106 95 - 110 mmol/L Final     CO2   Date Value Ref Range Status   08/04/2023 29 23 - 29 mmol/L Final     Glucose   Date Value Ref Range Status   08/04/2023 113 (H) 70 - 110 mg/dL Final     BUN   Date Value Ref Range Status   08/04/2023 18 6 - 20 mg/dL Final     Creatinine   Date Value Ref Range Status   08/04/2023 1.0 0.5 - 1.4 mg/dL Final     Calcium   Date Value Ref Range Status   08/04/2023 9.1 8.7 - 10.5 mg/dL Final     Total Protein   Date Value Ref Range Status   08/04/2023 7.3 6.0 - 8.4 g/dL Final     Albumin   Date Value Ref Range Status   08/04/2023 4.0 3.5 - 5.2 g/dL Final     Total Bilirubin   Date Value Ref  Range Status   08/04/2023 0.9 0.1 - 1.0 mg/dL Final     Comment:     For infants and newborns, interpretation of results should be based  on gestational age, weight and in agreement with clinical  observations.    Premature Infant recommended reference ranges:  Up to 24 hours.............<8.0 mg/dL  Up to 48 hours............<12.0 mg/dL  3-5 days..................<15.0 mg/dL  6-29 days.................<15.0 mg/dL       Alkaline Phosphatase   Date Value Ref Range Status   08/04/2023 96 55 - 135 U/L Final     AST   Date Value Ref Range Status   08/04/2023 21 10 - 40 U/L Final     ALT   Date Value Ref Range Status   08/04/2023 34 10 - 44 U/L Final     Anion Gap   Date Value Ref Range Status   08/04/2023 2 (L) 8 - 16 mmol/L Final     eGFR if    Date Value Ref Range Status   05/28/2022 >60 >60 mL/min/1.73 m^2 Final     eGFR if non    Date Value Ref Range Status   05/28/2022 >60 >60 mL/min/1.73 m^2 Final     Comment:     Calculation used to obtain the estimated glomerular filtration  rate (eGFR) is the CKD-EPI equation.        Hemoglobin is 13.9, Ferritin 88.    Assessment:   (1) 28 y.o. female with history of easy bruisability.  Initial coagulation studies have been done thus far and have returned normal.  I have ordered additional studies to be completed at [facility].    These results have shown normal coag studies.  I am scheduling platelet aggregation studies at [facility].    (2) she appears to have a moderate microcytic anemia.  Hgb 8.  Of This may be nutritional or secondary to iron deficiency.    Ferritin returned low at 6.  Fe deficiency anemia 2nd heavy menses.  She is on oral FeSO4 daily.  I supplemented her with iron infusions given through the infusion center.  Hemoglobin is improved to 13.9, anemia has resolved.  Ferritin is now NL at 88.    RTC 6 months with CBC, Ferritin.  [facility].

## 2023-12-06 LAB
PAP RECOMMENDATION EXT: NORMAL
PAP SMEAR: NORMAL

## 2023-12-07 ENCOUNTER — TELEPHONE (OUTPATIENT)
Dept: DERMATOLOGY | Facility: CLINIC | Age: 28
End: 2023-12-07
Payer: MEDICARE

## 2023-12-07 NOTE — TELEPHONE ENCOUNTER
Patient scheduled.     ----- Message from Jami Richey sent at 12/7/2023  7:58 AM CST -----  Name of Who is Calling:CHUCKY WEI [[id number]]        What is the request in detail:Pt mom would like a callback from the office to schedule appt for check up and med refill. Please advise thank you.      Can the clinic reply by [facility]:NO         What Number to Call Back if not in [facility]:.Telephone Information:  Mobile          [phone]

## 2023-12-18 ENCOUNTER — HOSPITAL ENCOUNTER (EMERGENCY)
Facility: HOSPITAL | Age: 28
Discharge: HOME | End: 2023-12-18
Attending: EMERGENCY MEDICINE
Payer: MEDICARE

## 2023-12-18 VITALS
HEART RATE: 79 BPM | TEMPERATURE: 97 F | OXYGEN SATURATION: 100 % | DIASTOLIC BLOOD PRESSURE: 69 MMHG | RESPIRATION RATE: 20 BRPM | SYSTOLIC BLOOD PRESSURE: 119 MMHG | HEIGHT: 59 IN | WEIGHT: 132 LBS | BODY MASS INDEX: 26.61 KG/M2

## 2023-12-18 DIAGNOSIS — A08.4 VIRAL GASTROENTERITIS: Primary | ICD-10-CM

## 2023-12-18 LAB
ALBUMIN SERPL BCP-MCNC: 4 G/DL (ref 3.5–5.2)
ALP SERPL-CCNC: 101 U/L (ref 55–135)
ALT SERPL W/O P-5'-P-CCNC: 46 U/L (ref 10–44)
ANION GAP SERPL CALC-SCNC: 10 MMOL/L (ref 8–16)
AST SERPL-CCNC: 22 U/L (ref 10–40)
B-HCG UR QL: NEGATIVE
BASOPHILS # BLD AUTO: 0.11 K/UL (ref 0–0.2)
BASOPHILS NFR BLD: 1 % (ref 0–1.9)
BILIRUB SERPL-MCNC: 0.4 MG/DL (ref 0.1–1)
BILIRUB UR QL STRIP: NEGATIVE
BUN SERPL-MCNC: 15 MG/DL (ref 6–20)
CALCIUM SERPL-MCNC: 9.5 MG/DL (ref 8.7–10.5)
CHLORIDE SERPL-SCNC: 106 MMOL/L (ref 95–110)
CLARITY UR: ABNORMAL
CO2 SERPL-SCNC: 22 MMOL/L (ref 23–29)
COLOR UR: YELLOW
CREAT SERPL-MCNC: 0.8 MG/DL (ref 0.5–1.4)
CTP QC/QA: YES
DIFFERENTIAL METHOD: ABNORMAL
EOSINOPHIL # BLD AUTO: 0.3 K/UL (ref 0–0.5)
EOSINOPHIL NFR BLD: 2.7 % (ref 0–8)
ERYTHROCYTE [DISTWIDTH] IN BLOOD BY AUTOMATED COUNT: 12.8 % (ref 11.5–14.5)
EST. GFR  (NO RACE VARIABLE): >60 ML/MIN/1.73 M^2
GLUCOSE SERPL-MCNC: 93 MG/DL (ref 70–110)
GLUCOSE UR QL STRIP: NEGATIVE
HCT VFR BLD AUTO: 40.7 % (ref 37–48.5)
HGB BLD-MCNC: 13.4 G/DL (ref 12–16)
HGB UR QL STRIP: NEGATIVE
IMM GRANULOCYTES # BLD AUTO: 0.05 K/UL (ref 0–0.04)
IMM GRANULOCYTES NFR BLD AUTO: 0.5 % (ref 0–0.5)
KETONES UR QL STRIP: NEGATIVE
LEUKOCYTE ESTERASE UR QL STRIP: NEGATIVE
LIPASE SERPL-CCNC: 20 U/L (ref 4–60)
LYMPHOCYTES # BLD AUTO: 2.4 K/UL (ref 1–4.8)
LYMPHOCYTES NFR BLD: 21.6 % (ref 18–48)
MCH RBC QN AUTO: 29.9 PG (ref 27–31)
MCHC RBC AUTO-ENTMCNC: 32.9 G/DL (ref 32–36)
MCV RBC AUTO: 91 FL (ref 82–98)
MONOCYTES # BLD AUTO: 0.9 K/UL (ref 0.3–1)
MONOCYTES NFR BLD: 8.3 % (ref 4–15)
NEUTROPHILS # BLD AUTO: 7.2 K/UL (ref 1.8–7.7)
NEUTROPHILS NFR BLD: 65.9 % (ref 38–73)
NITRITE UR QL STRIP: NEGATIVE
NRBC BLD-RTO: 0 /100 WBC
PH UR STRIP: 6 [PH] (ref 5–8)
PLATELET # BLD AUTO: 356 K/UL (ref 150–450)
PMV BLD AUTO: 9.3 FL (ref 9.2–12.9)
POTASSIUM SERPL-SCNC: 4.4 MMOL/L (ref 3.5–5.1)
PROT SERPL-MCNC: 7.2 G/DL (ref 6–8.4)
PROT UR QL STRIP: NEGATIVE
RBC # BLD AUTO: 4.48 M/UL (ref 4–5.4)
SODIUM SERPL-SCNC: 138 MMOL/L (ref 136–145)
SP GR UR STRIP: 1.01 (ref 1–1.03)
URN SPEC COLLECT METH UR: ABNORMAL
UROBILINOGEN UR STRIP-ACNC: NEGATIVE EU/DL
WBC # BLD AUTO: 10.93 K/UL (ref 3.9–12.7)

## 2023-12-18 PROCEDURE — 99285 EMERGENCY DEPT VISIT HI MDM: CPT | Mod: 25

## 2023-12-18 PROCEDURE — 81003 URINALYSIS AUTO W/O SCOPE: CPT | Performed by: PHYSICIAN ASSISTANT

## 2023-12-18 PROCEDURE — 83690 ASSAY OF LIPASE: CPT | Performed by: PHYSICIAN ASSISTANT

## 2023-12-18 PROCEDURE — 96361 HYDRATE IV INFUSION ADD-ON: CPT

## 2023-12-18 PROCEDURE — 36415 COLL VENOUS BLD VENIPUNCTURE: CPT | Performed by: PHYSICIAN ASSISTANT

## 2023-12-18 PROCEDURE — 96374 THER/PROPH/DIAG INJ IV PUSH: CPT | Mod: 59

## 2023-12-18 PROCEDURE — 63600175 PHARM REV CODE 636 W HCPCS: Performed by: PHYSICIAN ASSISTANT

## 2023-12-18 PROCEDURE — 25500020 PHARM REV CODE 255

## 2023-12-18 PROCEDURE — 80053 COMPREHEN METABOLIC PANEL: CPT | Performed by: PHYSICIAN ASSISTANT

## 2023-12-18 PROCEDURE — 25000003 PHARM REV CODE 250: Performed by: PHYSICIAN ASSISTANT

## 2023-12-18 PROCEDURE — 85025 COMPLETE CBC W/AUTO DIFF WBC: CPT | Performed by: PHYSICIAN ASSISTANT

## 2023-12-18 PROCEDURE — 81025 URINE PREGNANCY TEST: CPT | Performed by: PHYSICIAN ASSISTANT

## 2023-12-18 RX ORDER — ONDANSETRON 4 MG/1
4 TABLET, ORALLY DISINTEGRATING ORAL 2 TIMES DAILY
Qty: 12 TABLET | Refills: 0 | Status: SHIPPED | OUTPATIENT
Start: 2023-12-18 | End: 2024-01-05

## 2023-12-18 RX ORDER — ONDANSETRON 2 MG/ML
4 INJECTION INTRAMUSCULAR; INTRAVENOUS
Status: COMPLETED | OUTPATIENT
Start: 2023-12-18 | End: 2023-12-18

## 2023-12-18 RX ADMIN — SODIUM CHLORIDE 1000 ML: 0.9 INJECTION, SOLUTION INTRAVENOUS at 01:12

## 2023-12-18 RX ADMIN — IOHEXOL 75 ML: 350 INJECTION, SOLUTION INTRAVENOUS at 03:12

## 2023-12-18 RX ADMIN — ONDANSETRON 4 MG: 2 INJECTION INTRAMUSCULAR; INTRAVENOUS at 01:12

## 2023-12-18 NOTE — FIRST PROVIDER EVALUATION
Emergency Department TeleTriage Encounter Note      CHIEF COMPLAINT    Chief Complaint   Patient presents with    Vomiting     X 2 days        VITAL SIGNS   Initial Vitals [12/18/23 1217]   BP Pulse Resp Temp SpO2   119/69 79 20 97.3 °F (36.3 °C) 100 %      MAP       --            ALLERGIES    Review of patient's allergies indicates:  No Known Allergies    PROVIDER TRIAGE NOTE    28 year old female presenting to the ED for evaluation of  vomiting x 2 days. Reports lower abdominal pain. No fever or uri symptoms      PE:  Patient alert and oriented. No acute distress    Initial orders will be placed and care will be transferred to an alternate provider when patient is roomed for a full evaluation. Any additional orders and the final disposition will be determined by that provider.        ORDERS  Labs Reviewed - No data to display    ED Orders (720h ago, onward)      None              Virtual Visit Note: The provider triage portion of this emergency department evaluation and documentation was performed via [employer], a HIPAA-compliant telemedicine application, in concert with a tele-presenter in the room. A face to face patient evaluation with one of my colleagues will occur once the patient is placed in an emergency department room.      DISCLAIMER: This note was prepared with [employer] voice recognition transcription software. Garbled syntax, mangled pronouns, and other bizarre constructions may be attributed to that software system.

## 2023-12-18 NOTE — ED PROVIDER NOTES
Encounter Date: 12/18/2023       History     Chief Complaint   Patient presents with    Vomiting     X 2 days      HPI 28-year-old woman presents emergency department for evaluation of 2 days of emesis with associated loose bowels.  She does endorse some right-sided lower abdominal pain intermittently.  No associated fever.  Review of patient's allergies indicates:  No Known Allergies  Past Medical History:   Diagnosis Date    Anemia     Asthma     Eczema     GERD (gastroesophageal reflux disease)     Glaucoma      Past Surgical History:   Procedure Laterality Date    COLONOSCOPY N/A 03/05/2021    Procedure: COLONOSCOPY;  Surgeon: Jake Williamson MD;  Location: [facility];  Service: Endoscopy;  Laterality: N/A;    ESOPHAGOGASTRODUODENOSCOPY N/A 03/05/2021    Procedure: EGD (ESOPHAGOGASTRODUODENOSCOPY);  Surgeon: Jake Williamson MD;  Location: [facility];  Service: Endoscopy;  Laterality: N/A;    UPPER GASTROINTESTINAL ENDOSCOPY       Family History   Problem Relation Age of Onset    Colon polyps Mother     Melanoma Neg Hx     Colon cancer Neg Hx     Crohn's disease Neg Hx     Esophageal cancer Neg Hx     Liver cancer Neg Hx     Rectal cancer Neg Hx     Stomach cancer Neg Hx     Ulcerative colitis Neg Hx      Social History     Tobacco Use    Smoking status: Every Day     Types: Cigarettes    Smokeless tobacco: Never    Tobacco comments:     States 1 pack of cigarettes last 2 days   Substance Use Topics    Alcohol use: Not Currently    Drug use: Never     Review of Systems   Constitutional:  Negative for fever.   HENT:  Negative for sore throat.    Respiratory:  Negative for shortness of breath.    Cardiovascular:  Negative for chest pain.   Gastrointestinal:  Positive for abdominal distention, diarrhea and vomiting. Negative for nausea.   Genitourinary:  Negative for dysuria.   Musculoskeletal:  Negative for back pain.   Skin:  Negative for rash.   Neurological:  Negative for weakness.   Hematological:  Does not  bruise/bleed easily.       Physical Exam     Initial Vitals [12/18/23 1217]   BP Pulse Resp Temp SpO2   119/69 79 20 97.3 °F (36.3 °C) 100 %      MAP       --         Physical Exam    Constitutional: Vital signs are normal. She appears well-developed and well-nourished.  Non-toxic appearance. No distress.   HENT:   Head: Normocephalic and atraumatic.   Eyes: EOM are normal. Pupils are equal, round, and reactive to light.   Neck: Neck supple. No JVD present.   Normal range of motion.  Cardiovascular:  Normal rate, regular rhythm, normal heart sounds and intact distal pulses.     Exam reveals no gallop and no friction rub.       No murmur heard.  Pulmonary/Chest: Breath sounds normal. She has no wheezes. She has no rhonchi. She has no rales.   Abdominal: Abdomen is soft. Bowel sounds are normal. There is abdominal tenderness in the right lower quadrant. There is no rebound and no guarding.   Musculoskeletal:         General: Normal range of motion.      Cervical back: Normal range of motion and neck supple. No rigidity.     Neurological: She is alert and oriented to person, place, and time. She has normal strength and normal reflexes. No cranial nerve deficit or sensory deficit. She exhibits normal muscle tone. Coordination normal. GCS eye subscore is 4. GCS verbal subscore is 5. GCS motor subscore is 6.   Skin: Skin is warm and dry.   Psychiatric: She has a normal mood and affect. Her speech is normal and behavior is normal. She is not actively hallucinating.         ED Course   Procedures  Labs Reviewed   CBC W/ AUTO DIFFERENTIAL - Abnormal; Notable for the following components:       Result Value    Immature Grans (Abs) 0.05 (*)     All other components within normal limits   COMPREHENSIVE METABOLIC PANEL - Abnormal; Notable for the following components:    CO2 22 (*)     ALT 46 (*)     All other components within normal limits   URINALYSIS, REFLEX TO URINE CULTURE - Abnormal; Notable for the following components:     Appearance, UA Hazy (*)     All other components within normal limits    Narrative:     Specimen Source->Urine   LIPASE   POCT URINE PREGNANCY          Imaging Results              CT Abdomen Pelvis With IV Contrast NO Oral Contrast (Final result)  Result time 12/18/23 16:17:01      Final result by Albert Montana MD (12/18/23 16:17:01)                   Impression:      Normal appendix.    Nonspecific pelvic free fluid, also noted previously.      Electronically signed by: Albert Montana  Date:    12/18/2023  Time:    16:17               Narrative:    EXAMINATION:  CT ABDOMEN PELVIS WITH IV CONTRAST    CLINICAL HISTORY:  RLQ abdominal pain (Age >= 14y);    TECHNIQUE:  Low dose axial images, sagittal and coronal reformations were obtained from the lung bases to the pubic symphysis following the IV administration of 75 mL of Omnipaque 350 .  Oral contrast was not given.    COMPARISON:  05/18/2022    FINDINGS:  3 mm nodule left lower lobe series 2, image 10 unchanged from prior and favored benign given length of stability.  Normal size heart.  Unremarkable gallbladder.    Solid abdominal organs including liver spleen pancreas adrenal glands and kidneys are on remarkable.    There is no enteric contrast which limits bowel assessment.  No dilated bowel loops.  Normal appendix.  No bowel wall thickening.    Query an umbilical skin piercing.  Small volume pelvic free fluid along both adnexa in the posterior cul-de-sac.    No acute osseous abnormality.                                       Medications   sodium chloride 0.9% bolus 1,000 mL 1,000 mL (0 mLs Intravenous Stopped 12/18/23 1512)   ondansetron injection 4 mg (4 mg Intravenous Given 12/18/23 1328)   iohexoL (OMNIPAQUE 350) 350 mg iodine/mL injection (75 mLs Intravenous Given 12/18/23 1522)     Medical Decision Making   28-year-old woman presents emergency department for evaluation of 2 days of emesis with associated loose bowels.  She does endorse some  right-sided lower abdominal pain intermittently.  No associated fever.  Differential diagnosis includes viral gastroenteritis, appendicitis, pancreatitis, colitis  Mild right lower quadrant tenderness without rebound or guarding on examination therefore CT is performed to rule out appendicitis shows no evidence of appendicitis .  She has nonspecific pelvic free fluid which is also noticed previously.  Urinalysis nitrite leukocyte negative, she has no evidence of UTI.  Lipase is normal at 20, no evidence of pancreatitis.  White blood cell count is 10 K she is afebrile.  She has no electrolyte abnormalities potassium is 4.4.  Patient is given fluid and antiemetics with resolution of symptoms.  Likely has viral gastroenteritis.  Return precautions discussed.  Discharged in no acute distress with a prescription for Zofran.     Amount and/or Complexity of Data Reviewed  Radiology: ordered.    Risk  Prescription drug management.                                      Clinical Impression:  Final diagnoses:  [A08.4] Viral gastroenteritis (Primary)          ED Disposition Condition    Discharge Stable          ED Prescriptions       Medication Sig Dispense Start Date End Date Auth. Provider    ondansetron (ZOFRAN-ODT) 4 MG TbDL Take 1 tablet (4 mg total) by mouth 2 (two) times daily. 12 tablet 12/18/2023 -- Juan David Lucero MD          Follow-up Information       Follow up With Specialties Details Why Contact Info Additional Information    [facility] - ED Emergency Medicine  As needed [address] [place] [address] [address] [place] [address] 1st floor    Kleber Odonnell MD Internal Medicine   [address] [place] [address] [address] [place] [address]  [phone]                Juan David Lucero MD  12/18/23 2151

## 2023-12-21 ENCOUNTER — OFFICE VISIT (OUTPATIENT)
Dept: CARDIOLOGY | Facility: CLINIC | Age: 28
End: 2023-12-21
Payer: MEDICARE

## 2023-12-21 VITALS
BODY MASS INDEX: 29.03 KG/M2 | WEIGHT: 144 LBS | DIASTOLIC BLOOD PRESSURE: 78 MMHG | HEIGHT: 59 IN | SYSTOLIC BLOOD PRESSURE: 130 MMHG

## 2023-12-21 DIAGNOSIS — R07.9 CHEST PAIN, UNSPECIFIED TYPE: Primary | ICD-10-CM

## 2023-12-21 DIAGNOSIS — R01.1 HEART MURMUR PREVIOUSLY UNDIAGNOSED: ICD-10-CM

## 2023-12-21 PROCEDURE — 3075F PR MOST RECENT SYSTOLIC BLOOD PRESS GE 130-139MM HG: ICD-10-PCS | Mod: CPTII,S$GLB,, | Performed by: NURSE PRACTITIONER

## 2023-12-21 PROCEDURE — 93005 ELECTROCARDIOGRAM TRACING: CPT | Mod: S$GLB,,, | Performed by: NURSE PRACTITIONER

## 2023-12-21 PROCEDURE — 1159F MED LIST DOCD IN RCRD: CPT | Mod: CPTII,S$GLB,, | Performed by: NURSE PRACTITIONER

## 2023-12-21 PROCEDURE — 3008F PR BODY MASS INDEX (BMI) DOCUMENTED: ICD-10-PCS | Mod: CPTII,S$GLB,, | Performed by: NURSE PRACTITIONER

## 2023-12-21 PROCEDURE — 99999 PR PBB SHADOW E&M-EST. PATIENT-LVL V: CPT | Mod: PBBFAC,,, | Performed by: NURSE PRACTITIONER

## 2023-12-21 PROCEDURE — 99999 PR PBB SHADOW E&M-EST. PATIENT-LVL V: ICD-10-PCS | Mod: PBBFAC,,, | Performed by: NURSE PRACTITIONER

## 2023-12-21 PROCEDURE — 1160F RVW MEDS BY RX/DR IN RCRD: CPT | Mod: CPTII,S$GLB,, | Performed by: NURSE PRACTITIONER

## 2023-12-21 PROCEDURE — 93005 EKG 12-LEAD: ICD-10-PCS | Mod: S$GLB,,, | Performed by: NURSE PRACTITIONER

## 2023-12-21 PROCEDURE — 99214 PR OFFICE/OUTPT VISIT, EST, LEVL IV, 30-39 MIN: ICD-10-PCS | Mod: S$GLB,,, | Performed by: NURSE PRACTITIONER

## 2023-12-21 PROCEDURE — 1160F PR REVIEW ALL MEDS BY PRESCRIBER/CLIN PHARMACIST DOCUMENTED: ICD-10-PCS | Mod: CPTII,S$GLB,, | Performed by: NURSE PRACTITIONER

## 2023-12-21 PROCEDURE — 3078F DIAST BP <80 MM HG: CPT | Mod: CPTII,S$GLB,, | Performed by: NURSE PRACTITIONER

## 2023-12-21 PROCEDURE — 3078F PR MOST RECENT DIASTOLIC BLOOD PRESSURE < 80 MM HG: ICD-10-PCS | Mod: CPTII,S$GLB,, | Performed by: NURSE PRACTITIONER

## 2023-12-21 PROCEDURE — 99214 OFFICE O/P EST MOD 30 MIN: CPT | Mod: S$GLB,,, | Performed by: NURSE PRACTITIONER

## 2023-12-21 PROCEDURE — 1159F PR MEDICATION LIST DOCUMENTED IN MEDICAL RECORD: ICD-10-PCS | Mod: CPTII,S$GLB,, | Performed by: NURSE PRACTITIONER

## 2023-12-21 PROCEDURE — 93010 EKG 12-LEAD: ICD-10-PCS | Mod: S$GLB,,, | Performed by: INTERNAL MEDICINE

## 2023-12-21 PROCEDURE — 93010 ELECTROCARDIOGRAM REPORT: CPT | Mod: S$GLB,,, | Performed by: INTERNAL MEDICINE

## 2023-12-21 PROCEDURE — 3075F SYST BP GE 130 - 139MM HG: CPT | Mod: CPTII,S$GLB,, | Performed by: NURSE PRACTITIONER

## 2023-12-21 PROCEDURE — 3008F BODY MASS INDEX DOCD: CPT | Mod: CPTII,S$GLB,, | Performed by: NURSE PRACTITIONER

## 2023-12-21 RX ORDER — IBUPROFEN 600 MG/1
600 TABLET ORAL EVERY 8 HOURS PRN
Qty: 60 TABLET | Refills: 0 | Status: SHIPPED | OUTPATIENT
Start: 2023-12-21 | End: 2024-02-15

## 2023-12-21 NOTE — PROGRESS NOTES
Subjective:    Patient ID:  Sherry Burns is a 28 y.o. female   Chief Complaint   Patient presents with    Follow-up    Chest Pain     When active     Shortness of Breath     When active        HPI:  Patient seen today for follow up appointment. She reports having some ongoing chest pain which is worsened with exertion as well as shortness of breath.     Review of patient's allergies indicates:  No Known Allergies    Past Medical History:   Diagnosis Date    Anemia     Asthma     Eczema     GERD (gastroesophageal reflux disease)     Glaucoma      Past Surgical History:   Procedure Laterality Date    COLONOSCOPY N/A 03/05/2021    Procedure: COLONOSCOPY;  Surgeon: Jake Williamson MD;  Location: [facility];  Service: Endoscopy;  Laterality: N/A;    ESOPHAGOGASTRODUODENOSCOPY N/A 03/05/2021    Procedure: EGD (ESOPHAGOGASTRODUODENOSCOPY);  Surgeon: Jake Williamson MD;  Location: [facility];  Service: Endoscopy;  Laterality: N/A;    UPPER GASTROINTESTINAL ENDOSCOPY       Social History     Tobacco Use    Smoking status: Every Day     Types: Cigarettes    Smokeless tobacco: Never    Tobacco comments:     States 1 pack of cigarettes last 2 days   Substance Use Topics    Alcohol use: Not Currently    Drug use: Never     Family History   Problem Relation Age of Onset    Colon polyps Mother     Melanoma Neg Hx     Colon cancer Neg Hx     Crohn's disease Neg Hx     Esophageal cancer Neg Hx     Liver cancer Neg Hx     Rectal cancer Neg Hx     Stomach cancer Neg Hx     Ulcerative colitis Neg Hx         Review of Systems:   Constitution: Negative for diaphoresis and fever.   HEENT: Negative for nosebleeds.    Cardiovascular: + for chest pain       + dyspnea on exertion       No leg swelling        No palpitations  Respiratory: Negative for shortness of breath and wheezing.    Hematologic/Lymphatic: Negative for bleeding problem. Does not bruise/bleed easily.   Skin: Negative for color change and rash.   Musculoskeletal:  Negative for falls and myalgias.   Gastrointestinal: Negative for hematemesis and hematochezia.   Genitourinary: Negative for hematuria.   Neurological: Negative for dizziness and light-headedness.   Psychiatric/Behavioral: Negative for altered mental status and memory loss.          Objective:        Vitals:    12/21/23 1430   BP: 130/78       Lab Results   Component Value Date    WBC 10.93 12/18/2023    HGB 13.4 12/18/2023    HCT 40.7 12/18/2023     12/18/2023    CHOL 159 01/03/2020    TRIG 47 01/03/2020    HDL 58 01/03/2020    ALT 46 (H) 12/18/2023    AST 22 12/18/2023     12/18/2023    K 4.4 12/18/2023     12/18/2023    CREATININE 0.8 12/18/2023    BUN 15 12/18/2023    CO2 22 (L) 12/18/2023    TSH 1.83 04/16/2021        ECHOCARDIOGRAM RESULTS  No results found for this or any previous visit.        CURRENT/PREVIOUS VISIT EKG  Results for orders placed or performed in visit on 07/19/23   IN OFFICE EKG 12-LEAD (to [place])    Collection Time: 07/19/23  8:03 AM    Narrative    Test Reason : R07.9,    Vent. Rate : 085 BPM     Atrial Rate : 085 BPM     P-R Int : 136 ms          QRS Dur : 072 ms      QT Int : 342 ms       P-R-T Axes : 049 094 041 degrees     QTc Int : 406 ms    Normal sinus rhythm  Rightward axis  Borderline Abnormal ECG  When compared with ECG of 27-JUL-2022 17:06,  No significant change was found  Confirmed by Pool Mcnamara MD ([id number]) on 8/23/2023 1:06:30 PM    Referred By:  RG           Confirmed By:Pool Mcnamara MD     No valid procedures specified.   No results found for this or any previous visit.      Physical Exam:  CONSTITUTIONAL: No fever, no chills  HEENT: Normocephalic, atraumatic,pupils reactive to light                 NECK:  No JVD no carotid bruit  CVS: S1S2+, RRR  + chest soreness with palpitation    LUNGS: Clear  ABDOMEN: Soft, NT, BS+  EXTREMITIES: No cyanosis, edema  : No casanova catheter  NEURO: AAO X 3  PSY: Normal affect      Medication List with Changes/Refills    New Medications    IBUPROFEN (ADVIL,MOTRIN) 600 MG TABLET    Take 1 tablet (600 mg total) by mouth every 8 (eight) hours as needed for Pain (as needed for chest pain).   Current Medications    ALBUTEROL (PROVENTIL/VENTOLIN HFA) 90 MCG/ACTUATION INHALER    Inhale 1 puff into the lungs every 4 (four) hours as needed for Shortness of Breath.    AMMONIUM LACTATE (LAC-HYDRIN) 12 % LOTION    Use on AA nightly    ARIPIPRAZOLE (ABILIFY) 5 MG TAB    Take by mouth.    ATOMOXETINE (STRATTERA) 40 MG CAPSULE        AZELASTINE (ASTELIN) 137 MCG (0.1 %) NASAL SPRAY        BUSPIRONE (BUSPAR) 15 MG TABLET        CETIRIZINE (ZYRTEC) 10 MG TABLET    Take 10 mg by mouth daily as needed.     CLOBETASOL 0.05% (TEMOVATE) 0.05 % OINT    Apply topically 2 (two) times daily.    DICLOFENAC (VOLTAREN) 50 MG EC TABLET    Take 1 tablet (50 mg total) by mouth 3 (three) times daily as needed (pain).    DUPIXENT SYRINGE 300 MG/2 ML SYRG    Inject 600mg (2 syringes) into the skin on day 1 then inject 300mg (1 syringe) every other week.    DUPIXENT SYRINGE 300 MG/2 ML SYRG    Inject 300 mg (2 mL) into the skin every other week    ESCITALOPRAM OXALATE (LEXAPRO) 10 MG TABLET        FLUTICASONE PROPIONATE (FLONASE) 50 MCG/ACTUATION NASAL SPRAY    1 spray (50 mcg total) by Each Nostril route once daily.    FLUTICASONE PROPIONATE (FLONASE) 50 MCG/ACTUATION NASAL SPRAY    1 spray (50 mcg total) by Each Nostril route once daily.    HYDROXYZINE HCL (ATARAX) 10 MG TAB    Take 10 mg by mouth 3 times daily as needed.    JUNEL FE 1/20, 28, 1 MG-20 MCG (21)/75 MG (7) PER TABLET        METOPROLOL SUCCINATE (TOPROL-XL) 25 MG 24 HR TABLET    Take 1 tablet (25 mg total) by mouth every evening.    MIRTAZAPINE (REMERON) 30 MG TABLET        ONDANSETRON (ZOFRAN) 8 MG TABLET    Take by mouth 4 (four) times daily as needed.    ONDANSETRON (ZOFRAN-ODT) 4 MG TBDL    Take 1 tablet (4 mg total) by mouth 2 (two) times daily.    PANTOPRAZOLE (PROTONIX) 40 MG TABLET    Take 1  tablet (40 mg total) by mouth once daily.    PROMETHAZINE (PHENERGAN) 25 MG TABLET    Take by mouth.    SERTRALINE (ZOLOFT) 100 MG TABLET    Take 100 mg by mouth every evening.    TRIAMCINOLONE ACETONIDE 0.1% (KENALOG) 0.1 % CREAM    Apply topically 2 (two) times daily.    UREA 40 % LOTN LOTION    Apply topically once daily.    VALACYCLOVIR (VALTREX) 1000 MG TABLET    Take 1 tablet (1,000 mg total) by mouth every 12 (twelve) hours. for 7 days             Assessment:       1. Chest pain, unspecified type    2. Heart murmur previously undiagnosed         Plan:     Problem List Items Addressed This Visit          Unprioritized    Chest pain - Primary    Current Assessment & Plan     Chest pain and SOB reported during exertion. Chest pain is reproducible with palpation. Suspect some underlying costochondritis. Advised on taking ibuprofen 600 mg po Q8 hours PRN pain. Also advised on using OTC pain topical creams.   She has been taking metoprolol succinate 25 mg po daily at night. Continue the same.   EKG today shows sinus rhythm with no ST-T wave changes noted.  Since the pain is exertional and affects daily activities, Will obtain a nuclear stress test to evaluate for reversible ischemia. Will also obtain an echocardiogram to evaluate LV function and valves.          Relevant Orders    IN OFFICE EKG 12-LEAD (to [place])    Echo    Nuclear Stress - Cardiology Interpreted    Heart murmur previously undiagnosed    Relevant Orders    Echo    Nuclear Stress - Cardiology Interpreted       Update 1/3/24:  Echocardiogram has been approved, but the nuclear stress test was denied by her insurance. Will order a regular exercise stress test instead.   Follow up in about 3 months (around 3/21/2024).

## 2023-12-21 NOTE — ASSESSMENT & PLAN NOTE
Chest pain and SOB reported during exertion. Chest pain is reproducible with palpation. Suspect some underlying costochondritis. Advised on taking ibuprofen 600 mg po Q8 hours PRN pain. Also advised on using OTC pain topical creams.   She has been taking metoprolol succinate 25 mg po daily at night. Continue the same.   EKG today shows sinus rhythm with no ST-T wave changes noted.  Since the pain is exertional and affects daily activities, Will obtain a nuclear stress test to evaluate for reversible ischemia. Will also obtain an echocardiogram to evaluate LV function and valves.

## 2023-12-29 ENCOUNTER — TELEPHONE (OUTPATIENT)
Dept: CARDIOLOGY | Facility: CLINIC | Age: 28
End: 2023-12-29
Payer: MEDICARE

## 2023-12-29 NOTE — TELEPHONE ENCOUNTER
----- Message from Nate Dias sent at 12/29/2023 11:14 AM CST -----  Type: Needs Medical Advice  Who Called:  Yoke    Best Call Back Number: [phone]  Additional Information: Caller states that she would like a callback regarding scheduling a Peer to Peer regarding the mutual patient   Jhon # [id number]  Scheduling needs to be done prior to 01/05/24

## 2024-01-05 ENCOUNTER — OFFICE VISIT (OUTPATIENT)
Dept: FAMILY MEDICINE | Facility: CLINIC | Age: 29
End: 2024-01-05
Payer: MEDICARE

## 2024-01-05 VITALS
TEMPERATURE: 98 F | HEIGHT: 59 IN | RESPIRATION RATE: 18 BRPM | OXYGEN SATURATION: 99 % | HEART RATE: 78 BPM | BODY MASS INDEX: 29.31 KG/M2 | DIASTOLIC BLOOD PRESSURE: 80 MMHG | SYSTOLIC BLOOD PRESSURE: 112 MMHG | WEIGHT: 145.38 LBS

## 2024-01-05 DIAGNOSIS — Z72.0 TOBACCO ABUSE: ICD-10-CM

## 2024-01-05 DIAGNOSIS — E78.00 ELEVATED CHOLESTEROL: ICD-10-CM

## 2024-01-05 DIAGNOSIS — J30.2 SEASONAL ALLERGIES: ICD-10-CM

## 2024-01-05 DIAGNOSIS — F32.A DEPRESSION, UNSPECIFIED DEPRESSION TYPE: ICD-10-CM

## 2024-01-05 DIAGNOSIS — F31.9 BIPOLAR AFFECTIVE DISORDER, REMISSION STATUS UNSPECIFIED: ICD-10-CM

## 2024-01-05 DIAGNOSIS — F41.9 ANXIETY: ICD-10-CM

## 2024-01-05 DIAGNOSIS — R53.83 FATIGUE, UNSPECIFIED TYPE: ICD-10-CM

## 2024-01-05 DIAGNOSIS — J45.909 CHILDHOOD ASTHMA, UNSPECIFIED ASTHMA SEVERITY, UNSPECIFIED WHETHER COMPLICATED, UNSPECIFIED WHETHER PERSISTENT: ICD-10-CM

## 2024-01-05 DIAGNOSIS — G43.909 MIGRAINE WITHOUT STATUS MIGRAINOSUS, NOT INTRACTABLE, UNSPECIFIED MIGRAINE TYPE: ICD-10-CM

## 2024-01-05 DIAGNOSIS — D50.9 IRON DEFICIENCY ANEMIA, UNSPECIFIED IRON DEFICIENCY ANEMIA TYPE: ICD-10-CM

## 2024-01-05 DIAGNOSIS — R73.03 PREDIABETES: Primary | ICD-10-CM

## 2024-01-05 DIAGNOSIS — F98.8 ATTENTION DEFICIT DISORDER, UNSPECIFIED HYPERACTIVITY PRESENCE: ICD-10-CM

## 2024-01-05 DIAGNOSIS — R11.0 NAUSEA: ICD-10-CM

## 2024-01-05 DIAGNOSIS — R01.1 HEART MURMUR PREVIOUSLY UNDIAGNOSED: ICD-10-CM

## 2024-01-05 DIAGNOSIS — L30.9 ECZEMA, UNSPECIFIED TYPE: ICD-10-CM

## 2024-01-05 DIAGNOSIS — R00.2 PALPITATIONS: ICD-10-CM

## 2024-01-05 DIAGNOSIS — K21.9 GASTROESOPHAGEAL REFLUX DISEASE, UNSPECIFIED WHETHER ESOPHAGITIS PRESENT: ICD-10-CM

## 2024-01-05 PROCEDURE — 3074F SYST BP LT 130 MM HG: CPT | Mod: CPTII,S$GLB,,

## 2024-01-05 PROCEDURE — 3079F DIAST BP 80-89 MM HG: CPT | Mod: CPTII,S$GLB,,

## 2024-01-05 PROCEDURE — 99214 OFFICE O/P EST MOD 30 MIN: CPT | Mod: 25,S$GLB,,

## 2024-01-05 PROCEDURE — 99999 PR PBB SHADOW E&M-EST. PATIENT-LVL V: CPT | Mod: PBBFAC,,,

## 2024-01-05 PROCEDURE — 3008F BODY MASS INDEX DOCD: CPT | Mod: CPTII,S$GLB,,

## 2024-01-05 PROCEDURE — 69209 REMOVE IMPACTED EAR WAX UNI: CPT | Mod: 50,S$GLB,,

## 2024-01-05 PROCEDURE — 1160F RVW MEDS BY RX/DR IN RCRD: CPT | Mod: CPTII,S$GLB,,

## 2024-01-05 PROCEDURE — 1159F MED LIST DOCD IN RCRD: CPT | Mod: CPTII,S$GLB,,

## 2024-01-05 RX ORDER — UBROGEPANT 100 MG/1
100 TABLET ORAL
COMMUNITY

## 2024-01-06 NOTE — PROGRESS NOTES
Subjective:       Patient ID: Sherry Burns is a 28 y.o. female.    Chief Complaint: Hyperlipidemia    Sherry Burns is a 28-year-old female patient who is new to me that presents to clinic today to establish care and with concerns of high cholesterol.  Patient has previously been a patient of Dr. Odonnell.  She states she has had high cholesterol readings and is concerned because she has never been put on medication.   Patient has some lab results that she is able to access from her phone with Last lipids showing: :, Tri HDL:  52, LDL:  Slightly high at 118.  Patient states she had Pap smear last month with a nurse practitioner at Dr. Flood's office.  She exercises daily doing squats and sit-ups.      PMHx  Neuro   Migraine headaches:  Taking Ubrelvy.  Has recently been having a lot of headaches.  Is followed by Dr. Meza with Neurology.  Planning on scheduling an appointment soon.    Psychiatric  Followed by Dr. Baca  Bipolar:  Currently taking Abilify and doing well.  Anxiety:  Taking BuSpar which helps quite a bit.    Depression: Taking Lexapro.  Denies thoughts of suicide or homicide.    ENT   Seasonal allergies:  Takes hydroxyzine p.r.n..  Takes Zyrtec and Flonase daily.  Doing well currently.    Dermatology   Followed by Dr. Skyla Yeager  Eczema:  Currently on Dupixent injections.  Also using triamcinolone topical.    Pulmonary   Followed by Dr. Lundy  Asthma as a child.  Was referred to pulmonology for shortness a breath.  PFTs and methacholine challenge ordered in 2023, however I do not see these results.  She has albuterol inhaler and only uses occasional.  Current everyday smoker.  Smokes 2-3 cigarettes a day.  Lives in home with mother who is a heavy smoker.    Cardiac  Followed by Елена Liz NP with Cardiology  Heart murmur has echocardiogram scheduled  Recent episode of chest pain.  Pain was reproducible with palpation with suspected costochondritis.  Patient  was instructed to take ibuprofen which seems to be helping some.  She does have a stress test ordered.  Palpitations: Taking metoprolol.  Today she has no chest pain, palpitations or shortness a breath.    Oncology/hematology  Followed by Dr. Vogt  Iron-deficiency anemia.  Has had iron infusions in the past.  Last H & H 13.4/40.7.    Endocrine   Prediabetes:  H A1c noted on patient's phone of 5.7.  Her fast glucose was 110.     GI   Followed by Mackenzie Howell NP  GERD controlled well with pantoprazole.  Has frequent nausea and is currently taking Zofran daily.    Surgical history   Colonoscopy and EGD.    Family history   Mother (alive) hypertension, hyperlipidemia, colon polyps, heart attack under age 50  Father () drug overdose  Brother (alive) arthritis  Paternal grandmother () glaucoma, dementia  Mother is adopted and does not know her family history.    Social   Tobacco:  Smokes 2-3 cigarettes a day.  Sometimes a half a pack a day.  She has been smoking for greater than 10 years.  ETOH:  Never            Review of patient's allergies indicates:  No Known Allergies  Social Determinants of Health     Tobacco Use: High Risk (2024)    Patient History     Smoking Tobacco Use: Every Day     Smokeless Tobacco Use: Never     Passive Exposure: Not on file   Alcohol Use: Not At Risk (2023)    AUDIT-C     Frequency of Alcohol Consumption: Never     Average Number of Drinks: Patient does not drink     Frequency of Binge Drinking: Never   Financial Resource Strain: Medium Risk (2023)    Overall Financial Resource Strain (CARDIA)     Difficulty of Paying Living Expenses: Somewhat hard   Food Insecurity: Food Insecurity Present (2023)    Hunger Vital Sign     Worried About Running Out of Food in the Last Year: Sometimes true     Ran Out of Food in the Last Year: Sometimes true   Transportation Needs: No Transportation Needs (2023)    PRAPARE - Transportation     Lack of  Transportation (Medical): No     Lack of Transportation (Non-Medical): No   Physical Activity: Inactive (12/21/2023)    Exercise Vital Sign     Days of Exercise per Week: 0 days     Minutes of Exercise per Session: 0 min   Stress: No Stress Concern Present (12/21/2023)    [employer] - Occupational Stress Questionnaire     Feeling of Stress : Only a little   Social Connections: Unknown (12/21/2023)    Social Connection and Isolation Panel [NHANES]     Frequency of Communication with Friends and Family: Never     Frequency of Social Gatherings with Friends and Family: Never     Attends Hinduism Services: Not on file     Active Member of Clubs or Organizations: No     Attends Club or Organization Meetings: Never     Marital Status: Never    Housing Stability: Low Risk  (12/21/2023)    Housing Stability Vital Sign     Unable to Pay for Housing in the Last Year: No     Number of Places Lived in the Last Year: 2     Unstable Housing in the Last Year: No   Depression: Low Risk  (9/7/2023)    Depression     Last PHQ-4: Flowsheet Data: 0      Past Medical History:   Diagnosis Date    Anemia     Anxiety disorder, unspecified     Asthma     Bipolar disorder, unspecified     Depression     Eczema     GERD (gastroesophageal reflux disease)     Glaucoma     Heart murmur       Past Surgical History:   Procedure Laterality Date    COLONOSCOPY N/A 03/05/2021    Procedure: COLONOSCOPY;  Surgeon: Jake Williamson MD;  Location: [facility] [place];  Service: Endoscopy;  Laterality: N/A;    ESOPHAGOGASTRODUODENOSCOPY N/A 03/05/2021    Procedure: EGD (ESOPHAGOGASTRODUODENOSCOPY);  Surgeon: Jake Williamson MD;  Location: [facility] [place];  Service: Endoscopy;  Laterality: N/A;    UPPER GASTROINTESTINAL ENDOSCOPY        Social History     Socioeconomic History    Marital status: Single         Current Outpatient Medications:     albuterol (PROVENTIL/VENTOLIN HFA) 90 mcg/actuation inhaler, Inhale 1 puff into the lungs  every 4 (four) hours as needed for Shortness of Breath., Disp: 18 g, Rfl: 11    ammonium lactate (LAC-HYDRIN) 12 % lotion, Use on AA nightly, Disp: 225 g, Rfl: 6    ARIPiprazole (ABILIFY) 5 MG Tab, Take by mouth., Disp: , Rfl:     atomoxetine (STRATTERA) 40 MG capsule, , Disp: , Rfl:     azelastine (ASTELIN) 137 mcg (0.1 %) nasal spray, , Disp: , Rfl:     busPIRone (BUSPAR) 15 MG tablet, , Disp: , Rfl:     cetirizine (ZYRTEC) 10 MG tablet, Take 10 mg by mouth daily as needed. , Disp: , Rfl:     clobetasol 0.05% (TEMOVATE) 0.05 % Oint, Apply topically 2 (two) times daily., Disp: 60 g, Rfl: 1    DUPIXENT SYRINGE 300 mg/2 mL Syrg, Inject 600mg (2 syringes) into the skin on day 1 then inject 300mg (1 syringe) every other week., Disp: 8 mL, Rfl: 0    DUPIXENT SYRINGE 300 mg/2 mL Syrg, Inject 300 mg (2 mL) into the skin every other week, Disp: 4 mL, Rfl: 11    EScitalopram oxalate (LEXAPRO) 10 MG tablet, , Disp: , Rfl:     fluticasone propionate (FLONASE) 50 mcg/actuation nasal spray, 1 spray (50 mcg total) by Each Nostril route once daily., Disp: 16 g, Rfl: 0    hydrOXYzine HCL (ATARAX) 10 MG Tab, Take 10 mg by mouth as needed., Disp: , Rfl:     ibuprofen (ADVIL,MOTRIN) 600 MG tablet, Take 1 tablet (600 mg total) by mouth every 8 (eight) hours as needed for Pain (as needed for chest pain)., Disp: 60 tablet, Rfl: 0    metoprolol succinate (TOPROL-XL) 25 MG 24 hr tablet, Take 1 tablet (25 mg total) by mouth every evening., Disp: 30 tablet, Rfl: 11    ondansetron (ZOFRAN) 8 MG tablet, Take by mouth 4 (four) times daily as needed., Disp: , Rfl:     pantoprazole (PROTONIX) 40 MG tablet, Take 1 tablet (40 mg total) by mouth once daily., Disp: 90 tablet, Rfl: 1    triamcinolone acetonide 0.1% (KENALOG) 0.1 % cream, Apply topically 2 (two) times daily., Disp: 454 g, Rfl: 2    UBRELVY 100 mg tablet, Take 100 mg by mouth as needed for Migraine., Disp: , Rfl:     urea 40 % Lotn lotion, Apply topically once daily., Disp: 226 g, Rfl:  5    Lab Results   Component Value Date    WBC 10.93 12/18/2023    HGB 13.4 12/18/2023    HCT 40.7 12/18/2023     12/18/2023    CHOL 159 01/03/2020    TRIG 47 01/03/2020    HDL 58 01/03/2020    ALT 46 (H) 12/18/2023    AST 22 12/18/2023     12/18/2023    K 4.4 12/18/2023     12/18/2023    CREATININE 0.8 12/18/2023    BUN 15 12/18/2023    CO2 22 (L) 12/18/2023    TSH 1.83 04/16/2021       Review of Systems   Constitutional:  Negative for chills and fever.   HENT: Negative.     Respiratory:  Positive for wheezing. Negative for chest tightness and shortness of breath.         Occasional wheezing   Cardiovascular:  Negative for chest pain and palpitations.   Gastrointestinal:  Positive for nausea. Negative for abdominal pain and vomiting.   Genitourinary: Negative.    Neurological: Negative.    Psychiatric/Behavioral:  Negative for suicidal ideas. The patient is nervous/anxious.        Objective:      Physical Exam  Vitals reviewed.   Constitutional:       Appearance: Normal appearance.   HENT:      Head: Normocephalic and atraumatic.      Right Ear: Tympanic membrane normal. There is impacted cerumen.      Left Ear: Tympanic membrane normal. There is impacted cerumen.      Ears:      Comments: Bilateral cerumen impaction removed with lavage.  Patient tolerated well.     Nose: Nose normal.      Mouth/Throat:      Mouth: Mucous membranes are moist.   Eyes:      Pupils: Pupils are equal, round, and reactive to light.   Cardiovascular:      Rate and Rhythm: Normal rate and regular rhythm.      Pulses: Normal pulses.           Radial pulses are 2+ on the right side and 2+ on the left side.        Dorsalis pedis pulses are 2+ on the right side and 2+ on the left side.      Heart sounds: Normal heart sounds, S1 normal and S2 normal.   Pulmonary:      Effort: Pulmonary effort is normal.      Breath sounds: Normal breath sounds.   Abdominal:      General: Abdomen is flat. Bowel sounds are normal.       Palpations: Abdomen is soft.      Tenderness: There is no abdominal tenderness.   Musculoskeletal:         General: Normal range of motion.      Right lower leg: No edema.      Left lower leg: No edema.   Skin:     General: Skin is warm and dry.      Capillary Refill: Capillary refill takes less than 2 seconds.   Neurological:      Mental Status: She is alert and oriented to person, place, and time.   Psychiatric:         Attention and Perception: Attention normal.         Mood and Affect: Mood and affect normal.         Speech: Speech normal.         Behavior: Behavior normal. Behavior is cooperative.         Thought Content: Thought content normal. Thought content does not include homicidal or suicidal ideation.         Judgment: Judgment normal.         Assessment:       1. Prediabetes    2. Iron deficiency anemia, unspecified iron deficiency anemia type    3. Tobacco abuse    4. Elevated cholesterol    5. Fatigue, unspecified type    6. Palpitations    7. Heart murmur previously undiagnosed    8. Bipolar affective disorder, remission status unspecified    9. Anxiety    10. Depression, unspecified depression type    11. Gastroesophageal reflux disease, unspecified whether esophagitis present    12. Migraine without status migrainosus, not intractable, unspecified migraine type    13. Seasonal allergies    14. Attention deficit disorder, unspecified hyperactivity presence    15. Childhood asthma, unspecified asthma severity, unspecified whether complicated, unspecified whether persistent    16. Eczema, unspecified type    17. Nausea        Plan:       Sherry was seen today for hyperlipidemia.    Diagnoses and all orders for this visit:    Prediabetes  -     Hemoglobin A1C; Future    Iron deficiency anemia, unspecified iron deficiency anemia type  -     CBC Auto Differential; Future    Tobacco abuse  -     Ambulatory referral/consult to Smoking Cessation Program; Future    Elevated cholesterol  -     Lipid Panel;  Future    Fatigue, unspecified type  -     CBC Auto Differential; Future  -     TSH; Future    Palpitations  -     CBC Auto Differential; Future  -     Comprehensive Metabolic Panel; Future    Heart murmur previously undiagnosed  -     CBC Auto Differential; Future  -     Comprehensive Metabolic Panel; Future    Bipolar affective disorder, remission status unspecified    Anxiety    Depression, unspecified depression type    Gastroesophageal reflux disease, unspecified whether esophagitis present    Migraine without status migrainosus, not intractable, unspecified migraine type    Seasonal allergies    Attention deficit disorder, unspecified hyperactivity presence    Childhood asthma, unspecified asthma severity, unspecified whether complicated, unspecified whether persistent    Eczema, unspecified type    Nausea  -     CBC Auto Differential; Future  -     Comprehensive Metabolic Panel; Future    Have fasting labs.  Referral to smoking cessation.    Hyperlipidemia   - I recommend a heart healthy diet rich in fiber, fresh vegetables and fruit and low in saturated fats (fried foods, red meat, etc.).  I also recommend regular exercise.  - continue daily squats and sit-ups with the addition of cardio.  - smoking cessation  - no need for medication at this time.  Explained this to mom and patient with verbal understanding received.    We will request recent Pap smear from Dr. Flood.  Recommend flu and COVID vaccinations.  Patient declines.  No medication changes today.  Follow-up with cardiology as scheduled for a stress test and echocardiogram.  Follow-up with GI for continued nausea.  Follow-up with dermatology for eczema management.  Follow-up with hematology.  Follow-up with psychiatry.  Follow-up in clinic in 6 months with Dr. Bhardwaj to establish care.

## 2024-01-07 PROBLEM — J45.909 CHILDHOOD ASTHMA: Status: ACTIVE | Noted: 2024-01-07

## 2024-01-07 PROBLEM — R11.0 NAUSEA: Status: ACTIVE | Noted: 2024-01-07

## 2024-01-07 PROBLEM — L30.9 ECZEMA: Status: ACTIVE | Noted: 2024-01-07

## 2024-01-08 ENCOUNTER — OFFICE VISIT (OUTPATIENT)
Dept: DERMATOLOGY | Facility: CLINIC | Age: 29
End: 2024-01-08
Payer: MEDICARE

## 2024-01-08 ENCOUNTER — TELEPHONE (OUTPATIENT)
Dept: CARDIOLOGY | Facility: HOSPITAL | Age: 29
End: 2024-01-08

## 2024-01-08 DIAGNOSIS — Q80.9 ICHTHYOSIS: ICD-10-CM

## 2024-01-08 DIAGNOSIS — Q82.8 KERATODERMA: ICD-10-CM

## 2024-01-08 DIAGNOSIS — L20.84 INTRINSIC ATOPIC DERMATITIS: Primary | ICD-10-CM

## 2024-01-08 PROCEDURE — 99213 OFFICE O/P EST LOW 20 MIN: CPT | Mod: S$GLB,,, | Performed by: STUDENT IN AN ORGANIZED HEALTH CARE EDUCATION/TRAINING PROGRAM

## 2024-01-08 PROCEDURE — 1159F MED LIST DOCD IN RCRD: CPT | Mod: CPTII,S$GLB,, | Performed by: STUDENT IN AN ORGANIZED HEALTH CARE EDUCATION/TRAINING PROGRAM

## 2024-01-08 PROCEDURE — 1160F RVW MEDS BY RX/DR IN RCRD: CPT | Mod: CPTII,S$GLB,, | Performed by: STUDENT IN AN ORGANIZED HEALTH CARE EDUCATION/TRAINING PROGRAM

## 2024-01-08 RX ORDER — DUPILUMAB 300 MG/2ML
INJECTION, SOLUTION SUBCUTANEOUS
Qty: 4 ML | Refills: 11 | Status: ACTIVE | OUTPATIENT
Start: 2024-01-08

## 2024-01-08 RX ORDER — TRIAMCINOLONE ACETONIDE 1 MG/G
CREAM TOPICAL 2 TIMES DAILY
Qty: 454 G | Refills: 2 | Status: SHIPPED | OUTPATIENT
Start: 2024-01-08

## 2024-01-08 NOTE — PROGRESS NOTES
Subjective:      Patient ID:  Sherry Burns is a 28 y.o. female who presents for   Chief Complaint   Patient presents with    Atopic Dermatitis     LOV 7/28/23    Patient here today for f/u on atopic derm. Patient states she has been off Dupixent for approx 2 months. Skin is becoming very dry. Not using any topicals or moisturizers.    Patient states keratoderma on feet has improved some. She states she uses urea lotion daily. Uses pumice.    Derm Hx:  Denies Phx of NMSC  Denies Fhx of MM          Review of Systems   Constitutional:  Negative for fever, chills and fatigue.   Respiratory:  Negative for cough and shortness of breath.    Gastrointestinal:  Negative for nausea and vomiting.   Skin:  Positive for dry skin.       Objective:   Physical Exam   Constitutional: She appears well-developed and well-nourished.   Neurological: She is alert and oriented to person, place, and time.   Psychiatric: She has a normal mood and affect.   Skin:   Areas Examined (abnormalities noted in diagram):   Head / Face Inspection Performed  Neck Inspection Performed  Chest / Axilla Inspection Performed  Back Inspection Performed  RUE Inspected  LUE Inspection Performed  RLE Inspected  LLE Inspection Performed            Diagram Legend     Erythematous scaling macule/papule c/w actinic keratosis       Vascular papule c/w angioma      Pigmented verrucoid papule/plaque c/w seborrheic keratosis      Yellow umbilicated papule c/w sebaceous hyperplasia      Irregularly shaped tan macule c/w lentigo     1-2 mm smooth white papules consistent with Milia      Movable subcutaneous cyst with punctum c/w epidermal inclusion cyst      Subcutaneous movable cyst c/w pilar cyst      Firm pink to brown papule c/w dermatofibroma      Pedunculated fleshy papule(s) c/w skin tag(s)      Evenly pigmented macule c/w junctional nevus     Mildly variegated pigmented, slightly irregular-bordered macule c/w mildly atypical nevus      Flesh colored to  evenly pigmented papule c/w intradermal nevus       Pink pearly papule/plaque c/w basal cell carcinoma      Erythematous hyperkeratotic cursted plaque c/w SCC      Surgical scar with no sign of skin cancer recurrence      Open and closed comedones      Inflammatory papules and pustules      Verrucoid papule consistent consistent with wart     Erythematous eczematous patches and plaques     Dystrophic onycholytic nail with subungual debris c/w onychomycosis     Umbilicated papule    Erythematous-base heme-crusted tan verrucoid plaque consistent with inflamed seborrheic keratosis     Erythematous Silvery Scaling Plaque c/w Psoriasis     See annotation      Assessment / Plan:        Intrinsic atopic dermatitis  Ichthyosis  -     DUPIXENT SYRINGE 300 mg/2 mL Syrg; Inject 300 mg (2 mL) into the skin every other week  Dispense: 4 mL; Refill: 11  -     triamcinolone acetonide 0.1% (KENALOG) 0.1 % cream; Apply topically 2 (two) times daily.  Dispense: 454 g; Refill: 2  Restart dupixent Q 2 weeks  Discussed  benefits and risks of Dupixent including but not limited to injection site reactions, Dupixent- induced facial dermatitis, increased risk of eye/eyelid irritation and inflammation as well as oral herpes infection and possible helminth infections.    Patient counseled to avoid live vaccines.    Will check baseline CBC and CMP.     Dosing:  FDA approved for treatment of adult atopic dermatitis (4/2017) - 121K pts  Start Dupixent at 600mg SQ load then 300mg SQ qoweek   FDA approved for treatment of pediatric AD in ages 6 months+ (7/2022):  Dosing for 30 - 60kg: Start Dupixent at 400mg SC day 0 then 200mg SC q 2 weeks   Dosing for 15 - <30kg: Start Dupixent at 300mg SC q 4weeks  Dosing for 5 - <15kg: Start Dupixent at 200mg SC q 4 weeks     Both 200mg and 300mg available in autoinjector or pre-filled syringe    - patient counseled to use topical steroids for specified period of time and on locations discussed to prevent side  effects. Reviewed side effects of long-term use of topical steroids which include thinning and lightening of skin    Keratoderma  -continue OTC urea 40% + salicylic acid 2% cream BID under occlusion  - gentle pumice stone once a week  - no erythema           No follow-ups on file.

## 2024-01-08 NOTE — TELEPHONE ENCOUNTER
Patient advised, test will be at [facility] ([address]   Will need to register on the first floor at the main entrance.   Patient advised that arrival time is 8:45am.  Patient advised, may take her medications prior to testing if you need to.  Patient should HOLD Metoprolol.   Advised if she needs to eat to take her medications, please keep it light, like toast and juice.    Patient advised to avoid all caffeine 12 hours prior to testing.  This includes decaf tea and coffee.    Wear comfortable clothing.   No lotions, oils, or powders to the upper chest area. May wear deodorant.    Patient verbalizes understanding of instructions.

## 2024-01-22 ENCOUNTER — PATIENT MESSAGE (OUTPATIENT)
Dept: NEUROSURGERY | Facility: CLINIC | Age: 29
End: 2024-01-22
Payer: MEDICARE

## 2024-02-05 ENCOUNTER — TELEPHONE (OUTPATIENT)
Dept: HEMATOLOGY/ONCOLOGY | Facility: CLINIC | Age: 29
End: 2024-02-05

## 2024-02-09 ENCOUNTER — TELEPHONE (OUTPATIENT)
Dept: HEMATOLOGY/ONCOLOGY | Facility: CLINIC | Age: 29
End: 2024-02-09

## 2024-02-15 PROBLEM — L20.84 INTRINSIC ATOPIC DERMATITIS: Status: ACTIVE | Noted: 2024-02-15

## 2024-02-21 NOTE — PROGRESS NOTES
Date of service: 2/21/2024  Referring provider: No ref. provider found    Subjective:      Chief complaint: Headache       Patient ID: Sherry Burns is a 28 y.o. with PMH of childhood asthma, bipolar disorder, depression, heart murmur, BEATRIZ, migraine, glaucoma (?). She presents today to establish care for migraine.        History of Present Illness  ORIGINAL HEADACHE HISTORY - Previously seen at [facility] for migraines.   Age at onset and course over time: 2 years ago, 2022, without known cause.   Location:  frontal   Quality:  [x] pressure [] tight [] throbbing [x] sharp [x] stabbing   Severity: 8/10.   Duration: hours    Frequency: multiple times per day    Headaches awaken at night?:  no   Worst time of day: evening   Associated with: [] photophobia [x]  phonophobia [] osmophobia [] blurred vision  [] double vision [] loss of appetite [] nausea [] vomiting [x] dizziness [] vertigo  [] tinnitus [x] irritability [] sinus pressure [] problems with concentration   [] neck tightness   Alleviated by:  [] sleep [] darkness [] massage [] heat [] ice [] medication [x] menses   Exacerbated by:  [] fatigue [] light [] noise [] smells [] coughing [] sneezing  [] bending over [] ovulation [] menses [] alcohol [] change in weather [x]  stress  Ipsilateral autonomic: [] nasal congestion [] lacrimation [] ptosis [] injection [] edema [] foreign body sensation [] ear fullness   ICP:  [] transient visual obscurations  [] tinnitus   [] positional headache  [] non-positional     Sleep habits: trouble staying asleep   Water intake: zero water daily   Caffeine intake: 3x/day, 1-2 cups coffee day, 3 cokes per day    Family History of migraines: Mother and aunt   Gyn status (if female): regular cycle   History of asthma, cancer, glaucoma, kidney stones, CVA and osteoporosis: asthma, glaucoma (?)     HIT 6: 56    Current acute treatment:  Motrin- twice a day x 1 year   Ubrelvy- previously worked, ran out of prescription 3 months  ago     Current prevention:  Metoprolol   Lexapro     Previously tried/failed acute treatment:  Avoid Triptans due to palpitations and chest pain - followed by Cardiology     Previously tried/failed preventative treatment:    Avoid Topamax due to history of glaucoma (?)   Avoid Mood stabilizers- taking Abilify     Considerations:   Avoid beta blockers due to history of childhood asthma and chest pain- currently on Metoprolol from Cardiology   Avoid steroids in the future for acute treatment due to bipolar disorder       Review of patient's allergies indicates:  No Known Allergies  Current Outpatient Medications   Medication Sig Dispense Refill    albuterol (PROVENTIL/VENTOLIN HFA) 90 mcg/actuation inhaler Inhale 1 puff into the lungs every 4 (four) hours as needed for Shortness of Breath. 18 g 11    ammonium lactate (LAC-HYDRIN) 12 % lotion Use on AA nightly 225 g 6    ARIPiprazole (ABILIFY) 5 MG Tab Take by mouth.      azelastine (ASTELIN) 137 mcg (0.1 %) nasal spray       busPIRone (BUSPAR) 15 MG tablet       cetirizine (ZYRTEC) 10 MG tablet Take 10 mg by mouth daily as needed.       clobetasol 0.05% (TEMOVATE) 0.05 % Oint Apply topically 2 (two) times daily. 60 g 1    doxepin (SINEQUAN) 50 MG capsule Take  mg by mouth every evening.      DUPIXENT SYRINGE 300 mg/2 mL Syrg Inject 300 mg (2 mL) into the skin every other week 4 mL 11    EScitalopram oxalate (LEXAPRO) 10 MG tablet       hydrOXYzine HCL (ATARAX) 10 MG Tab Take 10 mg by mouth as needed.      latanoprost 0.005 % ophthalmic solution Place into both eyes.      metoprolol succinate (TOPROL-XL) 25 MG 24 hr tablet Take 1 tablet (25 mg total) by mouth every evening. 30 tablet 11    ondansetron (ZOFRAN) 8 MG tablet Take by mouth 4 (four) times daily as needed.      TRELEGY ELLIPTA 200-62.5-25 mcg inhaler 1 puff.      triamcinolone acetonide 0.1% (KENALOG) 0.1 % cream Apply topically 2 (two) times daily. 454 g 2    UBRELVY 100 mg tablet Take 100 mg by  mouth as needed for Migraine.      urea 40 % Lotn lotion Apply topically once daily. 226 g 5    VRAYLAR 1.5 mg Cap Take 1.5 mg by mouth.      DUPIXENT SYRINGE 300 mg/2 mL Syrg Inject 600mg (2 syringes) into the skin on day 1 then inject 300mg (1 syringe) every other week. (Patient not taking: Reported on 3/4/2024) 8 mL 0    galcanezumab-gnlm (EMGALITY PEN) 120 mg/mL PnIj Inject 1 mL (120 mg total) into the skin every 28 days. 1 each 11    galcanezumab-gnlm 120 mg/mL PnIj Inject 1 mL (120 mg total) into the skin every 28 days. maintenance dose 1 mL 0    ubrogepant (UBRELVY) 100 mg tablet Take 1 tablet (100 mg total) by mouth as needed for Migraine (1 tablet at onset of migraine, can repeat 2 hours later if needed. No more than 2 tablets every 24 hours). If symptoms persist or return, may repeat dose after 2 hours. Maximum: 200 mg per 24 hours 16 tablet 11     No current facility-administered medications for this visit.       Past Medical History  Past Medical History:   Diagnosis Date    Anemia     Anxiety disorder, unspecified     Asthma     Bipolar disorder, unspecified     Depression     Eczema     GERD (gastroesophageal reflux disease)     Glaucoma     Headache     Heart murmur        Past Surgical History  Past Surgical History:   Procedure Laterality Date    COLONOSCOPY N/A 03/05/2021    Procedure: COLONOSCOPY;  Surgeon: Jake Williamson MD;  Location: [facility];  Service: Endoscopy;  Laterality: N/A;    ESOPHAGOGASTRODUODENOSCOPY N/A 03/05/2021    Procedure: EGD (ESOPHAGOGASTRODUODENOSCOPY);  Surgeon: Jake Williamson MD;  Location: [facility];  Service: Endoscopy;  Laterality: N/A;    UPPER GASTROINTESTINAL ENDOSCOPY         Family History  Family History   Problem Relation Age of Onset    Hypertension Mother     Hyperlipidemia Mother     Colon polyps Mother     Heart attacks under age 50 Mother     Drug abuse Father     Arthritis Brother     Glaucoma Paternal Grandmother     Dementia Paternal Grandmother      Melanoma Neg Hx     Colon cancer Neg Hx     Crohn's disease Neg Hx     Esophageal cancer Neg Hx     Liver cancer Neg Hx     Rectal cancer Neg Hx     Stomach cancer Neg Hx     Ulcerative colitis Neg Hx        Social History  Social History     Socioeconomic History    Marital status: Single   Tobacco Use    Smoking status: Every Day     Current packs/day: 0.50     Average packs/day: 0.5 packs/day for 10.2 years (5.1 ttl pk-yrs)     Types: Cigarettes     Start date: 12/3/2013    Smokeless tobacco: Never   Substance and Sexual Activity    Alcohol use: Not Currently    Drug use: Never     Social Determinants of Health     Financial Resource Strain: Medium Risk (12/21/2023)    Overall Financial Resource Strain (CARDIA)     Difficulty of Paying Living Expenses: Somewhat hard   Food Insecurity: Food Insecurity Present (12/21/2023)    Hunger Vital Sign     Worried About Running Out of Food in the Last Year: Sometimes true     Ran Out of Food in the Last Year: Sometimes true   Transportation Needs: No Transportation Needs (12/21/2023)    PRAPARE - Transportation     Lack of Transportation (Medical): No     Lack of Transportation (Non-Medical): No   Physical Activity: Inactive (12/21/2023)    Exercise Vital Sign     Days of Exercise per Week: 0 days     Minutes of Exercise per Session: 0 min   Stress: No Stress Concern Present (12/21/2023)    [employer] - Occupational Stress Questionnaire     Feeling of Stress : Only a little   Social Connections: Unknown (12/21/2023)    Social Connection and Isolation Panel [NHANES]     Frequency of Communication with Friends and Family: Never     Frequency of Social Gatherings with Friends and Family: Never     Active Member of Clubs or Organizations: No     Attends Club or Organization Meetings: Never     Marital Status: Never    Housing Stability: Low Risk  (12/21/2023)    Housing Stability Vital Sign     Unable to Pay for Housing in the Last Year: No      Number of Places Lived in the Last Year: 2     Unstable Housing in the Last Year: No        Review of Systems  14-point review of systems as follows:   No check harmeet indicates NEGATIVE response   Constitutional: [] weight loss, [] change to appetite   Eyes: [x] change in vision, [] double vision   Ears, nose, mouth, throat: [] frequent nose bleeds, [] ringing in the ears   Respiratory: [] cough, [] wheezing   Cardiovascular: [] chest pain, [] palpitations   Gastrointestinal: [] jaundice, [] nausea/vomiting   Genitourinary: [] incontinence, [] burning with urination   Hematologic/lymphatic: [] easy bruising/bleeding, [] night sweats   Neurological: [] numbness, [] weakness   Endocrine: [] fatigue, [] heat/cold intolerance   Allergy/Immunologic: [] fevers, [] chills   Musculoskeletal: [] muscle pain, [] joint pain   Psychiatric: [] thoughts of harming self/others, [] depression   Integumentary: [] rashes, [] sores that do not heal     Objective:        Vitals:    03/04/24 0740   BP: 114/76   Pulse: 101   Resp: 17   Temp: 97.9 °F (36.6 °C)     Body mass index is 30.63 kg/m².    Constitutional: appears in no acute distress, well-developed, well-nourished     Eyes: normal conjunctiva, PERRLA    Ears, nose, mouth, throat: external appearance of ears and nose normal, hearing intact     Cardiovascular: regular rate and rhythm, no murmurs appreciated    Respiratory: unlabored respirations, breath sounds normal bilaterally    Gastrointestinal: no visible abdominal masses, no guarding, no visible hernia    Musculoskeletal: normal tone in all four extremities. No abnormal movements. No pronator drift. No orbit. Symmetric finger tapping. Normal station. Normal regular gait. Normal tandem gait.      Spine:   CERVICAL SPINE:  ROM: normal   MUSCLE SPASM: no   FACET LOADING: no   SPURLING: no  KYM / JONATAN tender: no     Psychiatric: normal judgment and insight. Oriented to person, place, and time.     Neurologic:   Cortical  functions: recent and remote memory intact, normal attention span and concentration, speech fluent, adequate fund of knowledge   Cranial nerves: visual fields full, PERRLA, EOMI, symmetric facial strength, hearing intact, palate elevates symmetrically, shoulder shrug 5/5, tongue protrudes midline   Reflexes: 2+ in the upper and lower extremities, no Andrews  Sensation: intact to temperature throughout   Coordination: normal finger to nose, heel to shin, tandem gait     Data Review:     I have personally reviewed the referring provider's notes, labs, & imaging made available to me today.      RADIOLOGY STUDIES:  I have personally reviewed the pertinent images performed.       No results found for this or any previous visit.    Lab Results   Component Value Date    BNP 22 10/01/2019     12/18/2023    K 4.4 12/18/2023     12/18/2023    CO2 22 (L) 12/18/2023    BUN 15 12/18/2023    CREATININE 0.8 12/18/2023    GLU 93 12/18/2023    AST 22 12/18/2023    ALT 46 (H) 12/18/2023    ALBUMIN 4.0 12/18/2023    PROT 7.2 12/18/2023    BILITOT 0.4 12/18/2023    CHOL 159 01/03/2020    HDL 58 01/03/2020    LDLCALC 91.6 01/03/2020    TRIG 47 01/03/2020       Lab Results   Component Value Date    WBC 10.93 12/18/2023    HGB 13.4 12/18/2023    HCT 40.7 12/18/2023    MCV 91 12/18/2023     12/18/2023       Lab Results   Component Value Date    TSH 1.83 04/16/2021           Assessment & Plan:       Problem List Items Addressed This Visit          Neuro    Migraine without status migrainosus, not intractable - Primary    Relevant Medications    galcanezumab-gnlm 120 mg/mL PnIj    galcanezumab-gnlm (EMGALITY PEN) 120 mg/mL PnIj    ubrogepant (UBRELVY) 100 mg tablet    Medication overuse headache       Psychiatric    Anxiety    Depression       Cardiac/Vascular    Palpitations    Overview     Avoid triptans for migraine abortive therapy              Please call our clinic at [phone] or send a message on the [facility]  portal if there are any changes to the plan described below, for example,if you are not contacted for the requested tests, referral(s) within one week, if you are unable to receive the medications prescribed, or if you feel you need to change the treatment course for any reason.     TESTING:   Gradual progression pattern, lack of red flag features on history, and normal neurological exam are reassuring for primary as opposed to secondary etiology of headaches thus imaging will not be pursued for this history and this exam at this time.    REFERRALS: none at this time     PREVENTION (use daily regardless of headache):  Start Emgality once monthly for migraine prevention   Start Magnesium once daily for migraine prevention     AS-NEEDED TREATMENT (use total no more than 10 days per month unless otherwise stated):  Start Indomethacin 25 mg (3 tablets, 3 times a day then 2 tablets, 2 times a day, then 1 tablet, 1 time a day then stop) as taper in order to wean off over the counter medication   Ok to take 1 tablet as needed in the future for abortive therapy   Continue Ubrelvy 100 mg prn for abortive therapy. Ok to repeat dose 2 hours later as needed. No more than 2 tablets every 24 hours     Increase daily water, decrease daily coke intake      PAVITHRA AguilarC

## 2024-02-22 ENCOUNTER — PATIENT MESSAGE (OUTPATIENT)
Dept: CARDIOLOGY | Facility: CLINIC | Age: 29
End: 2024-02-22
Payer: MEDICARE

## 2024-02-28 ENCOUNTER — TELEPHONE (OUTPATIENT)
Dept: CARDIOLOGY | Facility: HOSPITAL | Age: 29
End: 2024-02-28

## 2024-02-28 NOTE — TELEPHONE ENCOUNTER
Patient advised, test will be at [facility] ([address]   Will need to register on the first floor at the main entrance.   Patient advised that arrival time is 8:15am.  Patient advised, may take most of her medications prior to testing if you need to.  Patient should HOLD Metoprolol.   Advised if she needs to eat to take her medications, please keep it light, like toast and juice.    Patient advised to avoid all caffeine 12 hours prior to testing.  This includes decaf tea and coffee.    Wear comfortable clothing.   No lotions, oils, or powders to the upper chest area. May wear deodorant.    Patient verbalizes understanding of instructions.

## 2024-02-29 ENCOUNTER — PATIENT MESSAGE (OUTPATIENT)
Dept: CARDIOLOGY | Facility: CLINIC | Age: 29
End: 2024-02-29

## 2024-02-29 ENCOUNTER — HOSPITAL ENCOUNTER (OUTPATIENT)
Dept: CARDIOLOGY | Facility: HOSPITAL | Age: 29
Discharge: HOME | End: 2024-02-29
Attending: NURSE PRACTITIONER
Payer: MEDICARE

## 2024-02-29 VITALS — WEIGHT: 145 LBS | BODY MASS INDEX: 29.23 KG/M2 | HEIGHT: 59 IN

## 2024-02-29 DIAGNOSIS — R07.9 CHEST PAIN, UNSPECIFIED TYPE: ICD-10-CM

## 2024-02-29 DIAGNOSIS — R01.1 HEART MURMUR PREVIOUSLY UNDIAGNOSED: ICD-10-CM

## 2024-02-29 LAB
CV STRESS BASE HR: 93 BPM
DIASTOLIC BLOOD PRESSURE: 80 MMHG
OHS CV CPX 1 MINUTE RECOVERY HEART RATE: 153 BPM
OHS CV CPX 85 PERCENT MAX PREDICTED HEART RATE MALE: 163
OHS CV CPX ESTIMATED METS: 7
OHS CV CPX MAX PREDICTED HEART RATE: 192
OHS CV CPX PATIENT IS FEMALE: 1
OHS CV CPX PATIENT IS MALE: 0
OHS CV CPX PEAK DIASTOLIC BLOOD PRESSURE: 86 MMHG
OHS CV CPX PEAK HEAR RATE: 168 BPM
OHS CV CPX PEAK RATE PRESSURE PRODUCT: NORMAL
OHS CV CPX PEAK SYSTOLIC BLOOD PRESSURE: 144 MMHG
OHS CV CPX PERCENT MAX PREDICTED HEART RATE ACHIEVED: 93
OHS CV CPX RATE PRESSURE PRODUCT PRESENTING: NORMAL
STRESS ECHO POST EXERCISE DUR MIN: 4 MINUTES
STRESS ECHO POST EXERCISE DUR SEC: 54 SECONDS
STRESS ST DEPRESSION: 1.2 MM
SYSTOLIC BLOOD PRESSURE: 122 MMHG

## 2024-02-29 PROCEDURE — 93018 CV STRESS TEST I&R ONLY: CPT | Mod: ,,, | Performed by: INTERNAL MEDICINE

## 2024-02-29 PROCEDURE — 93306 TTE W/DOPPLER COMPLETE: CPT | Mod: 26,,, | Performed by: GENERAL PRACTICE

## 2024-02-29 PROCEDURE — 93306 TTE W/DOPPLER COMPLETE: CPT

## 2024-02-29 PROCEDURE — 93017 CV STRESS TEST TRACING ONLY: CPT

## 2024-02-29 PROCEDURE — 93016 CV STRESS TEST SUPVJ ONLY: CPT | Mod: ,,, | Performed by: NURSE PRACTITIONER

## 2024-03-01 ENCOUNTER — TELEPHONE (OUTPATIENT)
Dept: CARDIOLOGY | Facility: CLINIC | Age: 29
End: 2024-03-01
Payer: MEDICARE

## 2024-03-01 LAB
AORTIC ROOT ANNULUS: 2.4 CM
AORTIC VALVE CUSP SEPERATION: 1.6 CM
AV INDEX (PROSTH): 0.91
AV MEAN GRADIENT: 5 MMHG
AV PEAK GRADIENT: 8 MMHG
AV VALVE AREA BY VELOCITY RATIO: 2.83 CM²
AV VALVE AREA: 2.85 CM²
AV VELOCITY RATIO: 0.9
BSA FOR ECHO PROCEDURE: 1.65 M2
CV ECHO LV RWT: 0.48 CM
DOP CALC AO PEAK VEL: 1.4 M/S
DOP CALC AO VTI: 25.6 CM
DOP CALC LVOT AREA: 3.1 CM2
DOP CALC LVOT DIAMETER: 2 CM
DOP CALC LVOT PEAK VEL: 1.26 M/S
DOP CALC LVOT STROKE VOLUME: 72.85 CM3
DOP CALCLVOT PEAK VEL VTI: 23.2 CM
E WAVE DECELERATION TIME: 185 MSEC
E/A RATIO: 1.62
E/E' RATIO: 7.92 M/S
ECHO LV POSTERIOR WALL: 0.89 CM (ref 0.6–1.1)
FRACTIONAL SHORTENING: 40 % (ref 28–44)
INTERVENTRICULAR SEPTUM: 0.89 CM (ref 0.6–1.1)
IVRT: 106 MSEC
LEFT ATRIUM SIZE: 2.9 CM
LEFT INTERNAL DIMENSION IN SYSTOLE: 2.25 CM (ref 2.1–4)
LEFT VENTRICLE DIASTOLIC VOLUME INDEX: 36.83 ML/M2
LEFT VENTRICLE DIASTOLIC VOLUME: 59.3 ML
LEFT VENTRICLE MASS INDEX: 60 G/M2
LEFT VENTRICLE SYSTOLIC VOLUME INDEX: 10.6 ML/M2
LEFT VENTRICLE SYSTOLIC VOLUME: 17.1 ML
LEFT VENTRICULAR INTERNAL DIMENSION IN DIASTOLE: 3.73 CM (ref 3.5–6)
LEFT VENTRICULAR MASS: 96.6 G
LV LATERAL E/E' RATIO: 7.07 M/S
LV SEPTAL E/E' RATIO: 9 M/S
LVOT MG: 3 MMHG
LVOT MV: 0.84 CM/S
MV PEAK A VEL: 0.61 M/S
MV PEAK E VEL: 0.99 M/S
MV STENOSIS PRESSURE HALF TIME: 42 MS
MV VALVE AREA P 1/2 METHOD: 5.24 CM2
PV MV: 0.88 M/S
PV PEAK GRADIENT: 7 MMHG
PV PEAK VELOCITY: 1.28 M/S
RA PRESSURE ESTIMATED: 3 MMHG
RIGHT VENTRICULAR END-DIASTOLIC DIMENSION: 2.33 CM
TDI LATERAL: 0.14 M/S
TDI SEPTAL: 0.11 M/S
TDI: 0.13 M/S
Z-SCORE OF LEFT VENTRICULAR DIMENSION IN END DIASTOLE: -2.03
Z-SCORE OF LEFT VENTRICULAR DIMENSION IN END SYSTOLE: -1.82

## 2024-03-01 NOTE — TELEPHONE ENCOUNTER
----- Message from Aneta Rodrigues, Patient Care Assistant sent at 3/1/2024  2:18 PM CST -----  Regarding: advice  Contact: pt's mother Lotus  Type: Needs Medical Advice    Who Called:  pt's mother Lotus    Best Call Back Number:      Additional Information: pt's mother Lotus states she would like a callback regarding an sooner appointment due to the previous conversation with nurse Thornton. Pt states she would like a callback from an different nurse. Please call to advise. Thanks!

## 2024-03-01 NOTE — TELEPHONE ENCOUNTER
S/w patient about her request for sooner appt. I explained to the patient that Елена Liz did not have any sooner slots and the not available would be on 04/02/24

## 2024-03-01 NOTE — TELEPHONE ENCOUNTER
----- Message from Cielo Zavaleta sent at 3/1/2024  1:23 PM CST -----  Contact: Self  Type:  Sooner Appointment Request    Caller is requesting a sooner appointment.  Caller declined first available appointment listed below.  Caller will not accept being placed on the waitlist and is requesting a message be sent to doctor.    Name of Caller:  Patient  When is the first available appointment?  03/21 (scheduled)  Symptoms:  Pt having chest pains (3 mo f/u)  Would the patient rather a call back or a response via [facility]? Call back  Best Call Back Number:  [phone]  Additional Information:  Pt is calling to see if there is anyway we can work her in sooner since she has been having chest pains. Can we please check on this and call back to advise. Thank You.

## 2024-03-04 ENCOUNTER — OFFICE VISIT (OUTPATIENT)
Dept: NEUROLOGY | Facility: CLINIC | Age: 29
End: 2024-03-04
Payer: MEDICARE

## 2024-03-04 VITALS
DIASTOLIC BLOOD PRESSURE: 76 MMHG | SYSTOLIC BLOOD PRESSURE: 114 MMHG | WEIGHT: 151.69 LBS | RESPIRATION RATE: 17 BRPM | TEMPERATURE: 98 F | HEART RATE: 101 BPM | HEIGHT: 59 IN | BODY MASS INDEX: 30.58 KG/M2

## 2024-03-04 DIAGNOSIS — R00.2 PALPITATIONS: ICD-10-CM

## 2024-03-04 DIAGNOSIS — G43.709 CHRONIC MIGRAINE WITHOUT AURA WITHOUT STATUS MIGRAINOSUS, NOT INTRACTABLE: Primary | ICD-10-CM

## 2024-03-04 DIAGNOSIS — R94.39 ABNORMAL STRESS ECG: Primary | ICD-10-CM

## 2024-03-04 DIAGNOSIS — G44.40 MEDICATION OVERUSE HEADACHE: ICD-10-CM

## 2024-03-04 DIAGNOSIS — F32.A DEPRESSION, UNSPECIFIED DEPRESSION TYPE: ICD-10-CM

## 2024-03-04 DIAGNOSIS — R94.31 ABNORMAL ELECTROCARDIOGRAM (ECG) (EKG): ICD-10-CM

## 2024-03-04 DIAGNOSIS — F41.9 ANXIETY: ICD-10-CM

## 2024-03-04 PROCEDURE — 1159F MED LIST DOCD IN RCRD: CPT | Mod: CPTII,S$GLB,, | Performed by: NURSE PRACTITIONER

## 2024-03-04 PROCEDURE — 99999 PR PBB SHADOW E&M-EST. PATIENT-LVL V: CPT | Mod: PBBFAC,,, | Performed by: NURSE PRACTITIONER

## 2024-03-04 PROCEDURE — 3008F BODY MASS INDEX DOCD: CPT | Mod: CPTII,S$GLB,, | Performed by: NURSE PRACTITIONER

## 2024-03-04 PROCEDURE — 3078F DIAST BP <80 MM HG: CPT | Mod: CPTII,S$GLB,, | Performed by: NURSE PRACTITIONER

## 2024-03-04 PROCEDURE — 3074F SYST BP LT 130 MM HG: CPT | Mod: CPTII,S$GLB,, | Performed by: NURSE PRACTITIONER

## 2024-03-04 PROCEDURE — 99214 OFFICE O/P EST MOD 30 MIN: CPT | Mod: S$GLB,,, | Performed by: NURSE PRACTITIONER

## 2024-03-04 RX ORDER — DOXEPIN HYDROCHLORIDE 50 MG/1
50-100 CAPSULE ORAL NIGHTLY
COMMUNITY
Start: 2024-02-07

## 2024-03-04 RX ORDER — LATANOPROST 50 UG/ML
SOLUTION/ DROPS OPHTHALMIC
COMMUNITY
Start: 2024-02-14

## 2024-03-04 RX ORDER — GALCANEZUMAB 120 MG/ML
120 INJECTION, SOLUTION SUBCUTANEOUS
Qty: 1 ML | Refills: 11 | Status: SHIPPED | OUTPATIENT
Start: 2024-03-04

## 2024-03-04 RX ORDER — CARIPRAZINE 1.5 MG/1
1.5 CAPSULE, GELATIN COATED ORAL
COMMUNITY
Start: 2024-03-02

## 2024-03-04 RX ORDER — INDOMETHACIN 25 MG/1
25 CAPSULE ORAL 3 TIMES DAILY
Qty: 30 CAPSULE | Refills: 0 | OUTPATIENT
Start: 2024-03-04

## 2024-03-04 RX ORDER — UBROGEPANT 100 MG/1
100 TABLET ORAL
Qty: 16 TABLET | Refills: 11 | Status: SHIPPED | OUTPATIENT
Start: 2024-03-04 | End: 2024-03-08 | Stop reason: SDUPTHER

## 2024-03-04 RX ORDER — FLUTICASONE FUROATE, UMECLIDINIUM BROMIDE AND VILANTEROL TRIFENATATE 200; 62.5; 25 UG/1; UG/1; UG/1
1 POWDER RESPIRATORY (INHALATION)
COMMUNITY
Start: 2024-02-12

## 2024-03-04 NOTE — PATIENT INSTRUCTIONS
Please call our clinic at [phone] or send a message on the [employer] portal if there are any changes to the plan described below, for example,if you are not contacted for the requested tests, referral(s) within one week, if you are unable to receive the medications prescribed, or if you feel you need to change the treatment course for any reason.     TESTING:   Gradual progression pattern, lack of red flag features on history, and normal neurological exam are reassuring for primary as opposed to secondary etiology of headaches thus imaging will not be pursued for this history and this exam at this time.    REFERRALS: none at this time     PREVENTION (use daily regardless of headache):  Start Emgality once monthly for migraine prevention    Start Magnesium glycinate 400mg daily (hardest to find, look online, but most bowel-neutral, best absorbed)     AS-NEEDED TREATMENT (use total no more than 10 days per month unless otherwise stated):  Start Indomethacin 25 mg (3 tablets, 3 times a day then 2 tablets, 2 times a day, then 1 tablet, 1 time a day then stop) - as a taper in order to wean off over the counter medication   Ok to take 1 tablet as needed in the future for abortive therapy   Continue Ubrelvy 100 mg prn for abortive therapy. Ok to repeat dose 2 hours later as needed. No more than 2 tablets every 24 hours     Increase daily water, decrease daily coke intake

## 2024-03-05 ENCOUNTER — TELEPHONE (OUTPATIENT)
Dept: CARDIOLOGY | Facility: CLINIC | Age: 29
End: 2024-03-05
Payer: MEDICARE

## 2024-03-05 RX ORDER — GALCANEZUMAB 120 MG/ML
INJECTION, SOLUTION SUBCUTANEOUS
Qty: 2 ML | Refills: 0 | Status: SHIPPED | OUTPATIENT
Start: 2024-03-05 | End: 2024-04-02

## 2024-03-05 NOTE — TELEPHONE ENCOUNTER
----- Message from Елена Liz NP sent at 3/4/2024  3:30 PM CST -----  Stress test does have abnormality. I recommend nuclear stress test to further evaluate. I will place the order.

## 2024-03-08 ENCOUNTER — OFFICE VISIT (OUTPATIENT)
Dept: CARDIOLOGY | Facility: CLINIC | Age: 29
End: 2024-03-08
Payer: MEDICARE

## 2024-03-08 ENCOUNTER — TELEPHONE (OUTPATIENT)
Dept: CARDIOLOGY | Facility: CLINIC | Age: 29
End: 2024-03-08

## 2024-03-08 VITALS
OXYGEN SATURATION: 98 % | BODY MASS INDEX: 30.8 KG/M2 | WEIGHT: 152.81 LBS | HEART RATE: 90 BPM | DIASTOLIC BLOOD PRESSURE: 78 MMHG | HEIGHT: 59 IN | SYSTOLIC BLOOD PRESSURE: 130 MMHG

## 2024-03-08 DIAGNOSIS — R07.9 CHEST PAIN, UNSPECIFIED TYPE: Primary | ICD-10-CM

## 2024-03-08 DIAGNOSIS — R01.1 HEART MURMUR PREVIOUSLY UNDIAGNOSED: ICD-10-CM

## 2024-03-08 DIAGNOSIS — J45.909 CHILDHOOD ASTHMA, UNSPECIFIED ASTHMA SEVERITY, UNSPECIFIED WHETHER COMPLICATED, UNSPECIFIED WHETHER PERSISTENT: ICD-10-CM

## 2024-03-08 DIAGNOSIS — R06.02 SHORTNESS OF BREATH: ICD-10-CM

## 2024-03-08 DIAGNOSIS — R94.39 ABNORMAL STRESS ECG: ICD-10-CM

## 2024-03-08 DIAGNOSIS — R42 DIZZINESS: ICD-10-CM

## 2024-03-08 DIAGNOSIS — R00.2 PALPITATIONS: ICD-10-CM

## 2024-03-08 DIAGNOSIS — R00.0 TACHYCARDIA: ICD-10-CM

## 2024-03-08 PROCEDURE — 1159F MED LIST DOCD IN RCRD: CPT | Mod: CPTII,S$GLB,, | Performed by: NURSE PRACTITIONER

## 2024-03-08 PROCEDURE — 93010 ELECTROCARDIOGRAM REPORT: CPT | Mod: S$GLB,,, | Performed by: GENERAL PRACTICE

## 2024-03-08 PROCEDURE — 1160F RVW MEDS BY RX/DR IN RCRD: CPT | Mod: CPTII,S$GLB,, | Performed by: NURSE PRACTITIONER

## 2024-03-08 PROCEDURE — 99999 PR PBB SHADOW E&M-EST. PATIENT-LVL IV: CPT | Mod: PBBFAC,,, | Performed by: NURSE PRACTITIONER

## 2024-03-08 PROCEDURE — 3008F BODY MASS INDEX DOCD: CPT | Mod: CPTII,S$GLB,, | Performed by: NURSE PRACTITIONER

## 2024-03-08 PROCEDURE — 3078F DIAST BP <80 MM HG: CPT | Mod: CPTII,S$GLB,, | Performed by: NURSE PRACTITIONER

## 2024-03-08 PROCEDURE — 3075F SYST BP GE 130 - 139MM HG: CPT | Mod: CPTII,S$GLB,, | Performed by: NURSE PRACTITIONER

## 2024-03-08 PROCEDURE — 93005 ELECTROCARDIOGRAM TRACING: CPT | Mod: S$GLB,,, | Performed by: NURSE PRACTITIONER

## 2024-03-08 PROCEDURE — 99214 OFFICE O/P EST MOD 30 MIN: CPT | Mod: S$GLB,,, | Performed by: NURSE PRACTITIONER

## 2024-03-08 RX ORDER — LANOLIN ALCOHOL/MO/W.PET/CERES
400 CREAM (GRAM) TOPICAL DAILY
Qty: 90 TABLET | Refills: 3 | Status: SHIPPED | OUTPATIENT
Start: 2024-03-08

## 2024-03-08 NOTE — ASSESSMENT & PLAN NOTE
Continue metoprolol succinate 25 mg QHS  Add Magnesium oxide 400 mg QHS  Cardiac event monitor ordered  Resting HR 90s  EKG showed NSR   Advised caffeine reduction

## 2024-03-08 NOTE — ADDENDUM NOTE
Addended by: EMMANUEL CHAVEZ on: 3/8/2024 12:50 PM     Modules accepted: Orders, Level of Service

## 2024-03-08 NOTE — PROGRESS NOTES
Subjective:    Patient ID:  Sherry Burns is a 28 y.o. female patient here for evaluation Chest Pain (Pt states thia hhas been occurring since the stress test on 3/13/24)    History of Present Illness:     Pt had walking stress test that was positive for ischemia  Has nuclear stress ordered next week  Echo completed, results discussed  Pt c/o racing heart beat, with concurrent shortness of breath and dizziness when she is active such as vacuuming  Was started on metoprolol at last visit, she is taking this at night w/ no complaints  PMH: prediabetes, asthma, high cholesterol, anemia, asthma, bipolar  M Alive 59 yo, hx HTN, DM, cholesterol  D  37 from drug abuse complications          Most Recent Echocardiogram Results  Results for orders placed during the hospital encounter of 24    Echo    Interpretation Summary    Left Ventricle: There is normal systolic function with a visually estimated ejection fraction of 55 - 70%. There is normal diastolic function. E/A ratio is 1.62.    Right Ventricle: Normal right ventricular cavity size. Wall thickness is normal. Right ventricle wall motion  is normal. Systolic function is normal.    Pulmonic Valve: There is mild regurgitation.    IVC/SVC: Normal venous pressure at 3 mmHg.      Most Recent Nuclear Stress Test Results  Results for orders placed during the hospital encounter of 24    Exercise Stress - EKG    Interpretation Summary    The patient exercised for 4 minutes 54 seconds on a Roman protocol, corresponding to a functional capacity of 7 METS, achieving a peak heart rate of 168 bpm, which is 93 % of the age predicted maximum heart rate.    The ECG portion of the study is positive for ischemia.    The patient reported no chest pain during the stress test.    Consider stress echocardiography to enhance the specificity and sensitivity of this test      Most Recent Cardiac PET Stress Test Results  No results found for this or any previous  visit.      Most Recent Cardiovascular Angiogram results  No results found for this or any previous visit.      Other Most Recent Cardiology Results  No results found for this or any previous visit.      REVIEW OF SYSTEMS: As noted in HPI       Past Medical History:   Diagnosis Date    Anemia     Anxiety disorder, unspecified     Asthma     Bipolar disorder, unspecified     Depression     Eczema     GERD (gastroesophageal reflux disease)     Glaucoma     Headache     Heart murmur      Past Surgical History:   Procedure Laterality Date    COLONOSCOPY N/A 03/05/2021    Procedure: COLONOSCOPY;  Surgeon: Jake Williamson MD;  Location: [facility];  Service: Endoscopy;  Laterality: N/A;    ESOPHAGOGASTRODUODENOSCOPY N/A 03/05/2021    Procedure: EGD (ESOPHAGOGASTRODUODENOSCOPY);  Surgeon: Jake Williamson MD;  Location: [facility];  Service: Endoscopy;  Laterality: N/A;    UPPER GASTROINTESTINAL ENDOSCOPY       Social History     Tobacco Use    Smoking status: Every Day     Current packs/day: 0.50     Average packs/day: 0.5 packs/day for 10.3 years (5.1 ttl pk-yrs)     Types: Cigarettes     Start date: 12/3/2013    Smokeless tobacco: Never   Substance Use Topics    Alcohol use: Not Currently    Drug use: Never         Objective      Vitals:    03/08/24 0947   BP: 130/78   Pulse: 90       LAST EKG  Results for orders placed or performed in visit on 12/21/23   IN OFFICE EKG 12-LEAD (to [place])    Collection Time: 12/21/23  2:40 PM    Narrative    Test Reason : R07.9,    Vent. Rate : 097 BPM     Atrial Rate : 097 BPM     P-R Int : 132 ms          QRS Dur : 070 ms      QT Int : 334 ms       P-R-T Axes : 044 082 053 degrees     QTc Int : 424 ms    Normal sinus rhythm  Normal ECG  When compared with ECG of 19-JUL-2023 08:03,  No significant change was found  Confirmed by Marybel ZAMBRANO, Garett Best ([id number]) on 1/18/2024 5:14:33 PM    Referred By:  MARCI           Confirmed By:Garett No MD     LIPIDS - LAST 2   Lab Results   Component  "Value Date    CHOL 159 01/03/2020    HDL 58 01/03/2020    LDLCALC 91.6 01/03/2020    TRIG 47 01/03/2020    CHOLHDL 36.5 01/03/2020     CARDIAC PROFILE - LAST 2  Lab Results   Component Value Date    BNP 22 10/01/2019    TROPONINI <0.006 10/01/2019      CBC - LAST 2  Lab Results   Component Value Date    WBC 10.93 12/18/2023    WBC 6.8 09/06/2023    RBC 4.48 12/18/2023    RBC 4.68 09/06/2023    HGB 13.4 12/18/2023    HGB 13.9 09/06/2023    HCT 40.7 12/18/2023    HCT 40.8 09/06/2023     12/18/2023     09/06/2023     No results found for: "LABPT", "INR", "APTT"  CHEMISTRY - LAST 2  Lab Results   Component Value Date     12/18/2023     08/04/2023    K 4.4 12/18/2023    K 3.8 08/04/2023     12/18/2023     08/04/2023    CO2 22 (L) 12/18/2023    CO2 29 08/04/2023    ANIONGAP 10 12/18/2023    ANIONGAP 2 (L) 08/04/2023    BUN 15 12/18/2023    BUN 18 08/04/2023    CREATININE 0.8 12/18/2023    CREATININE 1.0 08/04/2023    GLU 93 12/18/2023     (H) 08/04/2023    CALCIUM 9.5 12/18/2023    CALCIUM 9.1 08/04/2023    ALBUMIN 4.0 12/18/2023    ALBUMIN 4.0 08/04/2023    PROT 7.2 12/18/2023    PROT 7.3 08/04/2023    ALKPHOS 101 12/18/2023    ALKPHOS 96 08/04/2023    ALT 46 (H) 12/18/2023    ALT 34 08/04/2023    AST 22 12/18/2023    AST 21 08/04/2023    BILITOT 0.4 12/18/2023    BILITOT 0.9 08/04/2023      ENDOCRINE - LAST 2  Lab Results   Component Value Date    TSH 1.83 04/16/2021    TSH 1.890 01/03/2020        PHYSICAL EXAM  CONSTITUTIONAL: Well built, well nourished young adult female breathing comfortably in no apparent distress  NECK: no carotid bruit, no JVD  LUNGS: CTA  CHEST WALL: no tenderness  HEART: regular rate and rhythm, S1, S2 normal, + murmur  ABDOMEN: soft, obese, non-tender; bowel sounds normal; no masses  EXTREMITIES: Extremities normal, no edema, no calf tenderness noted  NEURO: AAO X 3, speech clear, memory clear    I HAVE REVIEWED :    The vital signs, most recent " cardiac testing, and most recent pertinent non-cardiology provider notes.    Current Outpatient Medications   Medication Instructions    albuterol (PROVENTIL/VENTOLIN HFA) 90 mcg/actuation inhaler 1 puff, Inhalation, Every 4 hours PRN    ammonium lactate (LAC-HYDRIN) 12 % lotion Use on AA nightly    ARIPiprazole (ABILIFY) 5 MG Tab Oral    azelastine (ASTELIN) 137 mcg (0.1 %) nasal spray No dose, route, or frequency recorded.    busPIRone (BUSPAR) 15 MG tablet No dose, route, or frequency recorded.    cetirizine (ZYRTEC) 10 mg, Oral, Daily PRN    clobetasol 0.05% (TEMOVATE) 0.05 % Oint Topical (Top), 2 times daily    doxepin (SINEQUAN)  mg, Oral, Nightly    DUPIXENT SYRINGE 300 mg/2 mL Syrg Inject 300 mg (2 mL) into the skin every other week    EMGALITY  mg/mL PnIj Inject 240mg (2 injections) subcutaneously at separate sites, once (loading dose). Start maintenance dose 28 days later.    EMGALITY  mg, Subcutaneous, Every 28 days    EScitalopram oxalate (LEXAPRO) 10 MG tablet No dose, route, or frequency recorded.    hydrOXYzine HCL (ATARAX) 10 mg, Oral, As needed (PRN)    indomethacin (INDOCIN) 25 mg, Oral, 3 times daily    latanoprost 0.005 % ophthalmic solution Both Eyes    magnesium oxide (MAG-OX) 400 mg, Oral, Daily    metoprolol succinate (TOPROL-XL) 25 mg, Oral, Nightly    ondansetron (ZOFRAN) 8 MG tablet Oral, 4 times daily PRN    TRELEGY ELLIPTA 200-62.5-25 mcg inhaler 1 puff    triamcinolone acetonide 0.1% (KENALOG) 0.1 % cream Topical (Top), 2 times daily    UBRELVY 100 mg, Oral, As needed (PRN)    UBRELVY 100 mg, Oral, As needed (PRN), If symptoms persist or return, may repeat dose after 2 hours. Maximum: 200 mg per 24 hours    urea 40 % Lotn lotion Topical (Top), Daily    VRAYLAR 1.5 mg, Oral      Assessment & Plan     Childhood asthma  No wheezing  Discussed albuterol effect on HR    Palpitations  Continue metoprolol succinate 25 mg QHS  Add Magnesium oxide 400 mg QHS  Cardiac event  monitor ordered  Resting HR 90s  EKG showed NSR   Advised caffeine reduction    Heart murmur previously undiagnosed  Echo reviewed w/ pt and her mom        Left Ventricle: There is normal systolic function with a visually estimated ejection fraction of 55 - 70%. There is normal diastolic function. E/A ratio is 1.62.    Right Ventricle: Normal right ventricular cavity size. Wall thickness is normal. Right ventricle wall motion  is normal. Systolic function is normal.    Pulmonic Valve: There is mild regurgitation.    IVC/SVC: Normal venous pressure at 3 mmHg.    BMI 30.0-30.9,adult  BMI stable    Abnormal stress ECG  Nuclear stress test next week    Chest pain  +walking stress  Obtaining nuclear stress test next week  Continue toprol  Motrin prn pain    F/up after event monitor and stress test to discuss results             UDC Jones

## 2024-03-08 NOTE — ASSESSMENT & PLAN NOTE
Echo reviewed w/ pt and her mom        Left Ventricle: There is normal systolic function with a visually estimated ejection fraction of 55 - 70%. There is normal diastolic function. E/A ratio is 1.62.    Right Ventricle: Normal right ventricular cavity size. Wall thickness is normal. Right ventricle wall motion  is normal. Systolic function is normal.    Pulmonic Valve: There is mild regurgitation.    IVC/SVC: Normal venous pressure at 3 mmHg.

## 2024-03-09 ENCOUNTER — PATIENT MESSAGE (OUTPATIENT)
Dept: ADMINISTRATIVE | Facility: OTHER | Age: 29
End: 2024-03-09
Payer: MEDICARE

## 2024-03-09 ENCOUNTER — PATIENT MESSAGE (OUTPATIENT)
Dept: HEMATOLOGY/ONCOLOGY | Facility: CLINIC | Age: 29
End: 2024-03-09

## 2024-03-12 ENCOUNTER — TELEPHONE (OUTPATIENT)
Dept: CARDIOLOGY | Facility: HOSPITAL | Age: 29
End: 2024-03-12

## 2024-03-12 ENCOUNTER — PATIENT MESSAGE (OUTPATIENT)
Dept: CARDIOLOGY | Facility: CLINIC | Age: 29
End: 2024-03-12
Payer: MEDICARE

## 2024-03-12 NOTE — TELEPHONE ENCOUNTER
Patient advised, [facility]   Will need to register on the first floor at the main entrance.   Patient advised that arrival time is 7:20am.  Patient advised that she may be here about 3.5-4 hours, and may want to bring something to occupy their time, as there will be periods of waiting.    Patient advised, may take her medications prior to testing if you need to.   Advised if she needs to eat to take her medications, please keep it light, like toast and juice.    Patient advised to avoid all caffeine 12 hours prior to testing.  This includes decaf tea and coffee.    Will provide peanut butter crackers for a snack after stress test.  If patient would prefer something else, please bring a snack from home.    Wear comfortable clothing.   No lotions, oils, or powders to the upper chest area. May wear deodorant.    No metal jewelry, buttons, or zippers to the upper body.  Patient verbalizes understanding of instructions.

## 2024-03-13 ENCOUNTER — HOSPITAL ENCOUNTER (OUTPATIENT)
Dept: CARDIOLOGY | Facility: CLINIC | Age: 29
Discharge: HOME | End: 2024-03-13
Attending: NURSE PRACTITIONER
Payer: MEDICARE

## 2024-03-13 DIAGNOSIS — R07.9 CHEST PAIN, UNSPECIFIED TYPE: ICD-10-CM

## 2024-03-13 DIAGNOSIS — R42 DIZZINESS: ICD-10-CM

## 2024-03-13 DIAGNOSIS — R06.02 SHORTNESS OF BREATH: ICD-10-CM

## 2024-03-13 DIAGNOSIS — R00.0 TACHYCARDIA: ICD-10-CM

## 2024-03-13 PROCEDURE — 93242 EXT ECG>48HR<7D RECORDING: CPT | Mod: ,,, | Performed by: INTERNAL MEDICINE

## 2024-03-13 PROCEDURE — 93244 EXT ECG>48HR<7D REV&INTERPJ: CPT | Mod: ,,, | Performed by: INTERNAL MEDICINE

## 2024-03-14 ENCOUNTER — PATIENT MESSAGE (OUTPATIENT)
Dept: CARDIOLOGY | Facility: CLINIC | Age: 29
End: 2024-03-14
Payer: MEDICARE

## 2024-03-15 ENCOUNTER — PATIENT MESSAGE (OUTPATIENT)
Dept: CARDIOLOGY | Facility: CLINIC | Age: 29
End: 2024-03-15
Payer: MEDICARE

## 2024-03-18 ENCOUNTER — TELEPHONE (OUTPATIENT)
Dept: HEMATOLOGY/ONCOLOGY | Facility: CLINIC | Age: 29
End: 2024-03-18

## 2024-03-21 ENCOUNTER — TELEPHONE (OUTPATIENT)
Dept: CARDIOLOGY | Facility: CLINIC | Age: 29
End: 2024-03-21
Payer: MEDICARE

## 2024-03-25 LAB
OHS CV EVENT MONITOR DAY: 6
OHS CV HOLTER HOOKUP DATE: NORMAL
OHS CV HOLTER HOOKUP TIME: NORMAL
OHS CV HOLTER LENGTH DECIMAL HOURS: 144
OHS CV HOLTER LENGTH HOURS: 0
OHS CV HOLTER LENGTH MINUTES: 0
OHS CV HOLTER SCAN DATE: NORMAL
OHS CV HOLTER SINUS AVERAGE HR: 77 BPM
OHS CV HOLTER SINUS MAX HR: 135 BPM
OHS CV HOLTER SINUS MIN HR: 53 BPM
OHS CV HOLTER STUDY END DATE: NORMAL
OHS CV HOLTER STUDY END TIME: NORMAL

## 2024-04-02 ENCOUNTER — TELEPHONE (OUTPATIENT)
Dept: CARDIOLOGY | Facility: CLINIC | Age: 29
End: 2024-04-02
Payer: MEDICARE

## 2024-04-02 DIAGNOSIS — R94.39 ABNORMAL STRESS ECG WITH TREADMILL: Primary | ICD-10-CM

## 2024-04-02 NOTE — TELEPHONE ENCOUNTER
Sw/p informed the patient that the provider  ( Елена Liz NP) order a Nuclear stress test instead of the Echo Test she suggested first and someone will be  calling  to schedule that Stress test

## 2024-04-15 ENCOUNTER — TELEPHONE (OUTPATIENT)
Dept: CARDIOLOGY | Facility: HOSPITAL | Age: 29
End: 2024-04-15

## 2024-05-06 ENCOUNTER — TELEPHONE (OUTPATIENT)
Dept: CARDIOLOGY | Facility: HOSPITAL | Age: 29
End: 2024-05-06

## 2024-05-07 ENCOUNTER — CLINICAL SUPPORT (OUTPATIENT)
Dept: CARDIOLOGY | Facility: HOSPITAL | Age: 29
End: 2024-05-07
Attending: NURSE PRACTITIONER
Payer: MEDICARE

## 2024-05-07 ENCOUNTER — PATIENT MESSAGE (OUTPATIENT)
Dept: ADMINISTRATIVE | Facility: OTHER | Age: 29
End: 2024-05-07
Payer: MEDICARE

## 2024-05-07 DIAGNOSIS — R94.39 ABNORMAL STRESS ECG WITH TREADMILL: ICD-10-CM

## 2024-05-07 PROCEDURE — 93351 STRESS TTE COMPLETE: CPT

## 2024-05-07 PROCEDURE — 93351 STRESS TTE COMPLETE: CPT | Mod: 26,,, | Performed by: INTERNAL MEDICINE

## 2024-05-08 LAB
CV STRESS BASE HR: 88 BPM
DIASTOLIC BLOOD PRESSURE: 73 MMHG
OHS CV CPX 1 MINUTE RECOVERY HEART RATE: 140 BPM
OHS CV CPX 85 PERCENT MAX PREDICTED HEART RATE MALE: 163
OHS CV CPX ESTIMATED METS: 8.3
OHS CV CPX MAX PREDICTED HEART RATE: 192
OHS CV CPX PATIENT IS FEMALE: 1
OHS CV CPX PATIENT IS MALE: 0
OHS CV CPX PEAK DIASTOLIC BLOOD PRESSURE: 83 MMHG
OHS CV CPX PEAK HEAR RATE: 167 BPM
OHS CV CPX PEAK RATE PRESSURE PRODUCT: NORMAL
OHS CV CPX PEAK SYSTOLIC BLOOD PRESSURE: 173 MMHG
OHS CV CPX PERCENT MAX PREDICTED HEART RATE ACHIEVED: 92
OHS CV CPX RATE PRESSURE PRODUCT PRESENTING: NORMAL
STRESS ECHO POST EXERCISE DUR MIN: 6 MINUTES
STRESS ECHO POST EXERCISE DUR SEC: 40 SECONDS
SYSTOLIC BLOOD PRESSURE: 131 MMHG

## 2024-05-10 LAB
OHS QRS DURATION: 78 MS
OHS QTC CALCULATION: 423 MS

## 2024-05-14 ENCOUNTER — PATIENT MESSAGE (OUTPATIENT)
Dept: CARDIOLOGY | Facility: CLINIC | Age: 29
End: 2024-05-14
Payer: MEDICARE

## 2024-05-22 ENCOUNTER — PATIENT MESSAGE (OUTPATIENT)
Dept: CARDIOLOGY | Facility: CLINIC | Age: 29
End: 2024-05-22
Payer: MEDICARE

## 2024-05-22 NOTE — TELEPHONE ENCOUNTER
----- Message from Andreea Sellers sent at 5/22/2024  3:12 PM CDT -----  Regarding: Needs Medical Advice  Contact: mom at [phone]  Type: Needs Medical Advice  Who Called:  mom at [phone]    Additional Information: calling to discuss the patient's medication. Please call and advise. Thank you      metoprolol succinate (TOPROL-XL) 25 MG 24 hr tablet 30 tablet 11 7/19/2023 7/18/2024   Sig - Route: Take 1 tablet (25 mg total) by mouth every evening. - Oral   Sent to pharmacy as: metoprolol succinate (TOPROL-XL) 25 MG 24 hr tablet   Notes to Pharmacy: .   E-Prescribing Status: Receipt confirmed by pharmacy (7/19/2023  8:34 AM CDT)

## 2024-05-29 ENCOUNTER — PATIENT MESSAGE (OUTPATIENT)
Dept: ADMINISTRATIVE | Facility: OTHER | Age: 29
End: 2024-05-29
Payer: MEDICARE

## 2024-07-02 ENCOUNTER — PATIENT MESSAGE (OUTPATIENT)
Dept: GASTROENTEROLOGY | Facility: CLINIC | Age: 29
End: 2024-07-02
Payer: MEDICARE

## 2024-07-02 ENCOUNTER — PATIENT MESSAGE (OUTPATIENT)
Dept: ADMINISTRATIVE | Facility: OTHER | Age: 29
End: 2024-07-02
Payer: MEDICARE

## 2024-07-03 ENCOUNTER — OFFICE VISIT (OUTPATIENT)
Dept: CARDIOLOGY | Facility: CLINIC | Age: 29
End: 2024-07-03
Payer: MEDICARE

## 2024-07-03 VITALS
SYSTOLIC BLOOD PRESSURE: 133 MMHG | OXYGEN SATURATION: 98 % | BODY MASS INDEX: 32.33 KG/M2 | HEIGHT: 59 IN | DIASTOLIC BLOOD PRESSURE: 78 MMHG | WEIGHT: 160.38 LBS | HEART RATE: 105 BPM

## 2024-07-03 DIAGNOSIS — R06.02 SOBOE (SHORTNESS OF BREATH ON EXERTION): Primary | ICD-10-CM

## 2024-07-03 DIAGNOSIS — R07.9 CHEST PAIN, UNSPECIFIED TYPE: ICD-10-CM

## 2024-07-03 PROCEDURE — 3075F SYST BP GE 130 - 139MM HG: CPT | Mod: CPTII,S$GLB,, | Performed by: NURSE PRACTITIONER

## 2024-07-03 PROCEDURE — 99214 OFFICE O/P EST MOD 30 MIN: CPT | Mod: S$GLB,,, | Performed by: NURSE PRACTITIONER

## 2024-07-03 PROCEDURE — 99999 PR PBB SHADOW E&M-EST. PATIENT-LVL IV: CPT | Mod: PBBFAC,,, | Performed by: NURSE PRACTITIONER

## 2024-07-03 PROCEDURE — 3078F DIAST BP <80 MM HG: CPT | Mod: CPTII,S$GLB,, | Performed by: NURSE PRACTITIONER

## 2024-07-03 PROCEDURE — 3008F BODY MASS INDEX DOCD: CPT | Mod: CPTII,S$GLB,, | Performed by: NURSE PRACTITIONER

## 2024-07-03 PROCEDURE — 1159F MED LIST DOCD IN RCRD: CPT | Mod: CPTII,S$GLB,, | Performed by: NURSE PRACTITIONER

## 2024-07-03 RX ORDER — METOPROLOL SUCCINATE 25 MG/1
25 TABLET, EXTENDED RELEASE ORAL 2 TIMES DAILY
Qty: 60 TABLET | Refills: 11 | Status: SHIPPED | OUTPATIENT
Start: 2024-07-03 | End: 2025-07-03

## 2024-07-03 NOTE — ASSESSMENT & PLAN NOTE
Atypical and low likelihood to be cardiac in nature as she has had testing that has been negative for any ischemia and chest comfort is noted as being tightness associated with shortness of breath and could also be pulmonary in nature.  EKG shows sinus rhythm with no ST or T-wave abnormalities.

## 2024-07-03 NOTE — ASSESSMENT & PLAN NOTE
Patient has had stress testing which was negative for reversible ischemia.  She is exposed to secondhand smoke and also has a history of childhood asthma.  Recommend follow-up with Pulmonary.  She may need a PFT if this has not already been done.  I have noted that there is a chest x-ray that was ordered by Pulmonary that she has not had done yet.  Encouraged her to get this done.

## 2024-07-03 NOTE — PROGRESS NOTES
Subjective:    Patient ID:  Sherry Burns is a 28 y.o. female.  Chief Complaint   Patient presents with    Shortness of Breath    Follow-up     SOB patient states being going for 2wk       HPI:  Patient presents today for follow-up appointment.  She continues to have some shortness of breath at times and also has some atypical chest pain which occurs with or without regard to exertion.  Patient has had stress testing which was negative for reversible ischemia.  She is exposed to secondhand smoke and also has a history of childhood asthma.    Review of patient's allergies indicates:  No Known Allergies    Past Medical History:   Diagnosis Date    Anemia     Anxiety disorder, unspecified     Asthma     Bipolar disorder, unspecified     Depression     Eczema     GERD (gastroesophageal reflux disease)     Glaucoma     Headache     Heart murmur      Past Surgical History:   Procedure Laterality Date    COLONOSCOPY N/A 03/05/2021    Procedure: COLONOSCOPY;  Surgeon: Jake Williamson MD;  Location: [facility];  Service: Endoscopy;  Laterality: N/A;    ESOPHAGOGASTRODUODENOSCOPY N/A 03/05/2021    Procedure: EGD (ESOPHAGOGASTRODUODENOSCOPY);  Surgeon: Jake Williamson MD;  Location: [facility];  Service: Endoscopy;  Laterality: N/A;    UPPER GASTROINTESTINAL ENDOSCOPY       Social History     Tobacco Use    Smoking status: Every Day     Current packs/day: 0.50     Average packs/day: 0.5 packs/day for 10.6 years (5.3 ttl pk-yrs)     Types: Cigarettes     Start date: 12/3/2013    Smokeless tobacco: Never   Substance Use Topics    Alcohol use: Not Currently    Drug use: Never     Family History   Problem Relation Name Age of Onset    Hypertension Mother      Hyperlipidemia Mother      Colon polyps Mother      Heart attacks under age 50 Mother      Drug abuse Father      Arthritis Brother      Glaucoma Paternal Grandmother      Dementia Paternal Grandmother      Melanoma Neg Hx      Colon cancer Neg Hx      Crohn's disease  Neg Hx      Esophageal cancer Neg Hx      Liver cancer Neg Hx      Rectal cancer Neg Hx      Stomach cancer Neg Hx      Ulcerative colitis Neg Hx          Review of Systems:   Per HPI         Objective:        Vitals:    07/03/24 1329   BP: 133/78   Pulse: 105       Lab Results   Component Value Date    WBC 10.93 12/18/2023    HGB 13.4 12/18/2023    HCT 40.7 12/18/2023     12/18/2023    CHOL 159 01/03/2020    TRIG 47 01/03/2020    HDL 58 01/03/2020    ALT 46 (H) 12/18/2023    AST 22 12/18/2023     12/18/2023    K 4.4 12/18/2023     12/18/2023    CREATININE 0.8 12/18/2023    BUN 15 12/18/2023    CO2 22 (L) 12/18/2023    TSH 1.83 04/16/2021        ECHOCARDIOGRAM RESULTS  Results for orders placed in visit on 05/07/24    Stress Echo Which stress agent will be used? Treadmill Exercise; Color Flow Doppler? No    Interpretation Summary    Left Ventricle: The left ventricle is normal in size. There is normal systolic function with a visually estimated ejection fraction of 55 - 60%.    Stress Protocol: The patient exercised for 6 minutes 40 seconds on a Roman protocol, corresponding to a functional capacity of 8.3METS, achieving a peak heart rate of 167 bpm, which is 92% of the age predicted maximum heart rate. The patient reported no symptoms during the stress test. The test was stopped because the patient experienced fatigue.    Baseline ECG: The Baseline ECG reveals sinus rhythm. The axis is normal. The ST segments are normal.    Stress ECG: There are no ST segment deviation identified during the protocol. There are no arrhythmias during stress. There is normal blood pressure response with stress.    ECG Conclusion: The ECG portion of the study is negative for ischemia.    Post-stress Echo: The left ventricle systolic function is normal.    Post-stress        CURRENT/PREVIOUS VISIT EKG  Results for orders placed or performed in visit on 03/08/24   IN OFFICE EKG 12-LEAD (to [place])    Collection Time:  03/08/24  9:52 AM   Result Value Ref Range    QRS Duration 78 ms    OHS QTC Calculation 423 ms    Narrative    Test Reason : R07.9,    Vent. Rate : 091 BPM     Atrial Rate : 091 BPM     P-R Int : 136 ms          QRS Dur : 078 ms      QT Int : 344 ms       P-R-T Axes : 054 062 049 degrees     QTc Int : 423 ms    Normal sinus rhythm  Possible Left atrial enlargement  Borderline Abnormal ECG  When compared with ECG of 21-DEC-2023 14:40,  T wave inversion now evident in Anterior leads  Confirmed by Juliet ZAMBRANO, Stanton ARZOLA ([id number]) on 5/10/2024 8:14:46 PM    Referred By:             Confirmed By:Stanton Chung MD     No valid procedures specified.   Results for orders placed during the hospital encounter of 02/29/24    Exercise Stress - EKG    Interpretation Summary    The patient exercised for 4 minutes 54 seconds on a Roman protocol, corresponding to a functional capacity of 7 METS, achieving a peak heart rate of 168 bpm, which is 93 % of the age predicted maximum heart rate.    The ECG portion of the study is positive for ischemia.    The patient reported no chest pain during the stress test.    Consider stress echocardiography to enhance the specificity and sensitivity of this test      Physical Exam:  CONSTITUTIONAL: No fever, no chills  HEENT: Normocephalic, atraumatic,pupils reactive to light                 NECK:  No JVD no carotid bruit  CVS: S1S2+, RRR, no murmurs,   LUNGS: Clear  ABDOMEN: Soft, NT, BS+  EXTREMITIES: No cyanosis, edema  : No casanova catheter  NEURO: AAO X 3  PSY: Normal affect      Medication List with Changes/Refills   Current Medications    ALBUTEROL (PROVENTIL/VENTOLIN HFA) 90 MCG/ACTUATION INHALER    Inhale 1 puff into the lungs every 4 (four) hours as needed for Shortness of Breath.    AMMONIUM LACTATE (LAC-HYDRIN) 12 % LOTION    Use on AA nightly    ARIPIPRAZOLE (ABILIFY) 5 MG TAB    Take by mouth.    AZELASTINE (ASTELIN) 137 MCG (0.1 %) NASAL SPRAY        BUSPIRONE (BUSPAR) 15 MG TABLET         CETIRIZINE (ZYRTEC) 10 MG TABLET    Take 10 mg by mouth daily as needed.     CLOBETASOL 0.05% (TEMOVATE) 0.05 % OINT    Apply topically 2 (two) times daily.    DOXEPIN (SINEQUAN) 50 MG CAPSULE    Take  mg by mouth every evening.    DUPIXENT SYRINGE 300 MG/2 ML SYRG    Inject 300 mg (2 mL) into the skin every other week    ESCITALOPRAM OXALATE (LEXAPRO) 10 MG TABLET        GALCANEZUMAB-GNLM (EMGALITY PEN) 120 MG/ML PNIJ    Inject 1 mL (120 mg total) into the skin every 28 days.    HYDROXYZINE HCL (ATARAX) 10 MG TAB    Take 10 mg by mouth as needed.    INDOMETHACIN (INDOCIN) 25 MG CAPSULE    Take 1 capsule (25 mg total) by mouth 3 (three) times daily.    LATANOPROST 0.005 % OPHTHALMIC SOLUTION    Place into both eyes.    MAGNESIUM OXIDE (MAG-OX) 400 MG (241.3 MG MAGNESIUM) TABLET    Take 1 tablet (400 mg total) by mouth once daily.    ONDANSETRON (ZOFRAN) 8 MG TABLET    Take by mouth 4 (four) times daily as needed.    TRELEGY ELLIPTA 200-62.5-25 MCG INHALER    1 puff.    TRIAMCINOLONE ACETONIDE 0.1% (KENALOG) 0.1 % CREAM    Apply topically 2 (two) times daily.    UBRELVY 100 MG TABLET    Take 100 mg by mouth as needed for Migraine.    UREA 40 % LOTN LOTION    Apply topically once daily.    VRAYLAR 1.5 MG CAP    Take 1.5 mg by mouth.   Changed and/or Refilled Medications    Modified Medication Previous Medication    METOPROLOL SUCCINATE (TOPROL-XL) 25 MG 24 HR TABLET metoprolol succinate (TOPROL-XL) 25 MG 24 hr tablet       Take 1 tablet (25 mg total) by mouth 2 (two) times daily.    Take 1 tablet (25 mg total) by mouth every evening.             Assessment:       1. SOBOE (shortness of breath on exertion)    2. Chest pain, unspecified type         Plan:     Problem List Items Addressed This Visit          Unprioritized    Chest pain    Current Assessment & Plan     Atypical and low likelihood to be cardiac in nature as she has had testing that has been negative for any ischemia and chest comfort is noted as  being tightness associated with shortness of breath and could also be pulmonary in nature.  EKG shows sinus rhythm with no ST or T-wave abnormalities.         SOBOE (shortness of breath on exertion) - Primary    Current Assessment & Plan     Patient has had stress testing which was negative for reversible ischemia.  She is exposed to secondhand smoke and also has a history of childhood asthma.  Recommend follow-up with Pulmonary.  She may need a PFT if this has not already been done.  I have noted that there is a chest x-ray that was ordered by Pulmonary that she has not had done yet.  Encouraged her to get this done.         Relevant Orders    IN OFFICE EKG 12-LEAD (to [place])       Follow up in about 3 months (around 10/3/2024).

## 2024-07-05 LAB
OHS QRS DURATION: 82 MS
OHS QTC CALCULATION: 415 MS

## 2024-07-08 ENCOUNTER — OFFICE VISIT (OUTPATIENT)
Dept: GASTROENTEROLOGY | Facility: CLINIC | Age: 29
End: 2024-07-08
Payer: MEDICARE

## 2024-07-08 ENCOUNTER — HOSPITAL ENCOUNTER (OUTPATIENT)
Dept: RADIOLOGY | Facility: HOSPITAL | Age: 29
Discharge: HOME | End: 2024-07-08
Payer: MEDICARE

## 2024-07-08 VITALS
HEART RATE: 125 BPM | SYSTOLIC BLOOD PRESSURE: 130 MMHG | WEIGHT: 162.06 LBS | BODY MASS INDEX: 32.67 KG/M2 | HEIGHT: 59 IN | DIASTOLIC BLOOD PRESSURE: 73 MMHG

## 2024-07-08 DIAGNOSIS — K59.09 CHRONIC CONSTIPATION: ICD-10-CM

## 2024-07-08 DIAGNOSIS — K21.9 GASTROESOPHAGEAL REFLUX DISEASE, UNSPECIFIED WHETHER ESOPHAGITIS PRESENT: ICD-10-CM

## 2024-07-08 DIAGNOSIS — R12 WATERBRASH: ICD-10-CM

## 2024-07-08 DIAGNOSIS — K64.4 EXTERNAL HEMORRHOIDS: ICD-10-CM

## 2024-07-08 DIAGNOSIS — R12 HEARTBURN: ICD-10-CM

## 2024-07-08 DIAGNOSIS — R10.30 LOWER ABDOMINAL PAIN: ICD-10-CM

## 2024-07-08 DIAGNOSIS — R14.0 BLOATING: ICD-10-CM

## 2024-07-08 DIAGNOSIS — R13.10 DYSPHAGIA, UNSPECIFIED TYPE: ICD-10-CM

## 2024-07-08 DIAGNOSIS — K62.5 RECTAL BLEEDING: ICD-10-CM

## 2024-07-08 DIAGNOSIS — R11.2 NAUSEA AND VOMITING, UNSPECIFIED VOMITING TYPE: Primary | ICD-10-CM

## 2024-07-08 PROCEDURE — 3008F BODY MASS INDEX DOCD: CPT | Mod: CPTII,S$GLB,,

## 2024-07-08 PROCEDURE — 1160F RVW MEDS BY RX/DR IN RCRD: CPT | Mod: CPTII,S$GLB,,

## 2024-07-08 PROCEDURE — 99213 OFFICE O/P EST LOW 20 MIN: CPT | Mod: S$GLB,,,

## 2024-07-08 PROCEDURE — 3075F SYST BP GE 130 - 139MM HG: CPT | Mod: CPTII,S$GLB,,

## 2024-07-08 PROCEDURE — 3078F DIAST BP <80 MM HG: CPT | Mod: CPTII,S$GLB,,

## 2024-07-08 PROCEDURE — 74018 RADEX ABDOMEN 1 VIEW: CPT | Mod: TC

## 2024-07-08 PROCEDURE — 74018 RADEX ABDOMEN 1 VIEW: CPT | Mod: 26,,, | Performed by: RADIOLOGY

## 2024-07-08 PROCEDURE — 1159F MED LIST DOCD IN RCRD: CPT | Mod: CPTII,S$GLB,,

## 2024-07-08 PROCEDURE — 99999 PR PBB SHADOW E&M-EST. PATIENT-LVL V: CPT | Mod: PBBFAC,,,

## 2024-07-08 RX ORDER — ONDANSETRON 4 MG/1
4 TABLET, ORALLY DISINTEGRATING ORAL EVERY 12 HOURS PRN
Qty: 60 TABLET | Refills: 0 | Status: SHIPPED | OUTPATIENT
Start: 2024-07-08 | End: 2024-08-07

## 2024-07-08 RX ORDER — OMEPRAZOLE 40 MG/1
40 CAPSULE, DELAYED RELEASE ORAL DAILY
Qty: 90 CAPSULE | Refills: 1 | Status: SHIPPED | OUTPATIENT
Start: 2024-07-08 | End: 2025-01-04

## 2024-07-08 NOTE — PATIENT INSTRUCTIONS
..  Instructions for Outpatient Endoscopy    PROCEDURE DATE: 9/13/24 at 12:30 pm, Arrive at 11:30 am  REPORT TO [facility] ([address] [place] [address] [facility]. [id number]) ONE HOUR BEFORE PROCEDURE      NO BLOOD THINNERS/NO ASPIRIN OR MEDICATIONS CONTAINING ASPIRIN, MOTRIN, IBUPROFEN, ALEVE OR ANY ANTI-INFLAMMATORY MEDICATIONS FOR 7 DAYS PRIOR TO PROCEDURE. TYLENOL IS OK.      Eat a light evening meal the night before your Endoscopy          (Soup, Salad, [place], or grilled chicken)     Nothing by mouth after midnight or the morning of EGD.                 Ok to take morning medications with small sip water by 5:30am (except no Diabetic medications)              SINCE SEDATION IS USED, YOU MUST HAVE SOMEONE TO DRIVE YOU HOME/NO TAXI

## 2024-07-08 NOTE — PROGRESS NOTES
Subjective:       Patient ID: Sherry Burns is a 28 y.o. female Body mass index is 32.73 kg/m².    Chief Complaint: Emesis    This patient is new to me.  Referring Provider: No ref. provider found for GERD,constipation.  Established patient of Dr. Williamson.     GI Problem  The primary symptoms include abdominal pain (reports lower abdominal pain worsened by constipation), nausea (reports daily nausea with occasional vomiting after eating acidic foods (spicy, greasy)), vomiting (will vomit like 2x/week vomits up non bloody no bile) and hematochezia (states will have intermittent rectal bleeding last episode 2 weeks ago quarter size on tissue no blood in stool no blood in tissue no bleeding since then hx of constipation, straining, and has external hemorrhoids no rectal pain). Primary symptoms do not include fever, weight loss, fatigue, diarrhea, melena, hematemesis, jaundice, dysuria, myalgias or arthralgias.   The abdominal pain is located in the LLQ and RLQ. The severity of the abdominal pain is 0/10 (not in current pain). The abdominal pain is relieved by bowel movements.   The illness is also significant for dysphagia (started noticing dyshagia a month ago w/ meats no issues with pills or liquids feels as if it becomes stuck in throat area and will comes back up no hx of EGD with dilation), bloating (reports feeling very gassy and bloated hx of constipation and GERD) and constipation (reports chronic constipation, has a bm 3x/week strains reports occasional soft stools and will have looser stools at times, does not take anything for her bowels does not feel empty states her last BM was 2 weeks ago). Associated symptoms comments: Has had EGD AND COLONOSCOPY completed for iron-deficiency anemia.  Colonoscopy 03/05/2021 per Dr. Williamson Impression:Endoscopy unremarkable other than small hemorrhoids  RECOMMENDATION:- Repeat colonoscopy at age 45 for screening purposes  EGD 3/5/21 Per Dr. Williamson  IMPRESSION:  -  Normal esophagus.    - Gastritis. Biopsied.  Negative H pylori per pathology report unremarkable.    - Normal examined duodenum. Biopsied.   -denies any family history of colon cancer. Significant associated medical issues include GERD. Associated medical issues do not include inflammatory bowel disease, gallstones, liver disease, alcohol abuse, PUD, bowel resection, irritable bowel syndrome, hemorrhoids or diverticulitis.   Gastroesophageal Reflux  She complains of abdominal pain (reports lower abdominal pain worsened by constipation), dysphagia (started noticing dyshagia a month ago w/ meats no issues with pills or liquids feels as if it becomes stuck in throat area and will comes back up no hx of EGD with dilation), heartburn, nausea (reports daily nausea with occasional vomiting after eating acidic foods (spicy, greasy)) and water brash. She reports no belching, no chest pain, no choking, no coughing, no early satiety, no globus sensation or no hoarse voice. This is a recurrent problem. The current episode started more than 1 year ago. The problem has been waxing and waning. The heartburn is located in the substernum. The heartburn does not wake her from sleep. The heartburn does not limit her activity. The heartburn doesn't change with position. The symptoms are aggravated by certain foods and caffeine. Pertinent negatives include no anemia, fatigue, melena, muscle weakness, orthopnea or weight loss. Risk factors include smoking/tobacco exposure. She has tried an antacid (has been taking TUMS otc) for the symptoms. The treatment provided moderate relief. Past procedures include an EGD. Past procedures do not include an abdominal ultrasound, esophageal manometry, esophageal pH monitoring, H. pylori antibody titer or a UGI. Past invasive treatments do not include gastroplasty, gastroplication or reflux surgery.       Review of Systems   Constitutional:  Negative for fatigue, fever and weight loss.   HENT:   Negative for hoarse voice.    Respiratory:  Negative for cough and choking.    Cardiovascular:  Negative for chest pain.   Gastrointestinal:  Positive for abdominal pain (reports lower abdominal pain worsened by constipation), anal bleeding, bloating (reports feeling very gassy and bloated hx of constipation and GERD), constipation (reports chronic constipation, has a bm 3x/week strains reports occasional soft stools and will have looser stools at times, does not take anything for her bowels does not feel empty states her last BM was 2 weeks ago), dysphagia (started noticing dyshagia a month ago w/ meats no issues with pills or liquids feels as if it becomes stuck in throat area and will comes back up no hx of EGD with dilation), heartburn, hematochezia (states will have intermittent rectal bleeding last episode 2 weeks ago quarter size on tissue no blood in stool no blood in tissue no bleeding since then hx of constipation, straining, and has external hemorrhoids no rectal pain), nausea (reports daily nausea with occasional vomiting after eating acidic foods (spicy, greasy)) and vomiting (will vomit like 2x/week vomits up non bloody no bile). Negative for abdominal distention, blood in stool, diarrhea, hematemesis, jaundice, melena and rectal pain.   Genitourinary:  Negative for dysuria.   Musculoskeletal:  Negative for arthralgias, myalgias and muscle weakness.         Patient's last menstrual period was 07/02/2024 (exact date).  Past Medical History:   Diagnosis Date    Anemia     Anxiety disorder, unspecified     Asthma     Bipolar disorder, unspecified     Depression     Eczema     GERD (gastroesophageal reflux disease)     Glaucoma     Headache     Heart murmur      Past Surgical History:   Procedure Laterality Date    COLONOSCOPY N/A 03/05/2021    Procedure: COLONOSCOPY;  Surgeon: Jake Williamson MD;  Location: [facility];  Service: Endoscopy;  Laterality: N/A;    ESOPHAGOGASTRODUODENOSCOPY N/A 03/05/2021     Procedure: EGD (ESOPHAGOGASTRODUODENOSCOPY);  Surgeon: Jake Williamson MD;  Location: [facility];  Service: Endoscopy;  Laterality: N/A;    UPPER GASTROINTESTINAL ENDOSCOPY       Family History   Problem Relation Name Age of Onset    Hypertension Mother      Hyperlipidemia Mother      Colon polyps Mother      Heart attacks under age 50 Mother      Drug abuse Father      Arthritis Brother      Glaucoma Paternal Grandmother      Dementia Paternal Grandmother      Melanoma Neg Hx      Colon cancer Neg Hx      Crohn's disease Neg Hx      Esophageal cancer Neg Hx      Liver cancer Neg Hx      Rectal cancer Neg Hx      Stomach cancer Neg Hx      Ulcerative colitis Neg Hx       Social History     Tobacco Use    Smoking status: Every Day     Current packs/day: 0.50     Average packs/day: 0.5 packs/day for 10.6 years (5.3 ttl pk-yrs)     Types: Cigarettes     Start date: 12/3/2013    Smokeless tobacco: Never   Substance Use Topics    Alcohol use: Not Currently    Drug use: Never     Wt Readings from Last 10 Encounters:   07/08/24 73.5 kg (162 lb 0.6 oz)   07/03/24 72.8 kg (160 lb 6.4 oz)   03/08/24 69.3 kg (152 lb 12.8 oz)   03/04/24 68.8 kg (151 lb 10.8 oz)   02/29/24 65.8 kg (145 lb)   01/05/24 65.9 kg (145 lb 6.3 oz)   12/21/23 65.3 kg (144 lb)   12/18/23 59.9 kg (132 lb)   09/07/23 59.9 kg (132 lb)   08/17/23 59.5 kg (131 lb 2.8 oz)     Lab Results   Component Value Date    WBC 10.93 12/18/2023    HGB 13.4 12/18/2023    HCT 40.7 12/18/2023    MCV 91 12/18/2023     12/18/2023     CMP  Sodium   Date Value Ref Range Status   12/18/2023 138 136 - 145 mmol/L Final     Potassium   Date Value Ref Range Status   12/18/2023 4.4 3.5 - 5.1 mmol/L Final     Chloride   Date Value Ref Range Status   12/18/2023 106 95 - 110 mmol/L Final     CO2   Date Value Ref Range Status   12/18/2023 22 (L) 23 - 29 mmol/L Final     Glucose   Date Value Ref Range Status   12/18/2023 93 70 - 110 mg/dL Final     BUN   Date Value Ref Range Status  "  12/18/2023 15 6 - 20 mg/dL Final     Creatinine   Date Value Ref Range Status   12/18/2023 0.8 0.5 - 1.4 mg/dL Final     Calcium   Date Value Ref Range Status   12/18/2023 9.5 8.7 - 10.5 mg/dL Final     Total Protein   Date Value Ref Range Status   12/18/2023 7.2 6.0 - 8.4 g/dL Final     Albumin   Date Value Ref Range Status   12/18/2023 4.0 3.5 - 5.2 g/dL Final     Total Bilirubin   Date Value Ref Range Status   12/18/2023 0.4 0.1 - 1.0 mg/dL Final     Comment:     For infants and newborns, interpretation of results should be based  on gestational age, weight and in agreement with clinical  observations.    Premature Infant recommended reference ranges:  Up to 24 hours.............<8.0 mg/dL  Up to 48 hours............<12.0 mg/dL  3-5 days..................<15.0 mg/dL  6-29 days.................<15.0 mg/dL       Alkaline Phosphatase   Date Value Ref Range Status   12/18/2023 101 55 - 135 U/L Final     AST   Date Value Ref Range Status   12/18/2023 22 10 - 40 U/L Final     ALT   Date Value Ref Range Status   12/18/2023 46 (H) 10 - 44 U/L Final     Anion Gap   Date Value Ref Range Status   12/18/2023 10 8 - 16 mmol/L Final     eGFR if    Date Value Ref Range Status   05/28/2022 >60 >60 mL/min/1.73 m^2 Final     eGFR if non    Date Value Ref Range Status   05/28/2022 >60 >60 mL/min/1.73 m^2 Final     Comment:     Calculation used to obtain the estimated glomerular filtration  rate (eGFR) is the CKD-EPI equation.        Lab Results   Component Value Date    LIPASE 20 12/18/2023     No results found for: "LIPASERES"  Lab Results   Component Value Date    TSH 1.83 04/16/2021       Reviewed prior medical records including radiology report of CT abdomen pelvis 12/18/2023 & endoscopy history (see surgical history/procedures).    Objective:      Physical Exam  Vitals and nursing note reviewed.   Constitutional:       Appearance: Normal appearance. She is normal weight.   Cardiovascular:      " Rate and Rhythm: Normal rate and regular rhythm.      Heart sounds: Normal heart sounds.   Pulmonary:      Breath sounds: Normal breath sounds.   Abdominal:      General: Bowel sounds are normal.      Palpations: Abdomen is soft.      Tenderness: There is no abdominal tenderness.   Skin:     General: Skin is warm and dry.      Coloration: Skin is not jaundiced.   Neurological:      Mental Status: She is alert and oriented to person, place, and time.   Psychiatric:         Mood and Affect: Mood normal.         Behavior: Behavior normal.         Assessment:       1. Nausea and vomiting, unspecified vomiting type    2. Gastroesophageal reflux disease, unspecified whether esophagitis present    3. Waterbrash    4. Heartburn    5. Dysphagia, unspecified type    6. Bloating    7. Chronic constipation    8. Lower abdominal pain    9. Rectal bleeding    10. External hemorrhoids        Plan:       Nausea and vomiting, unspecified vomiting type   - schedule EGD, discussed procedure with patient, including risks and benefits, patient verbalized understanding  -     US Abdomen Complete; Future; Expected date: 07/08/2024  - Start on Prilosec as prescribed  - Start Zofran 4 mg b.i.d. as needed for nausea  -follow GERD lifestyle modifications  -Consider GES    Gastroesophageal reflux disease, unspecified whether esophagitis present, Waterbrash, Heartburn   - schedule EGD, discussed procedure with patient, including risks and benefits, patient verbalized understanding  -  START   omeprazole (PRILOSEC) 40 MG capsule; Take 1 capsule (40 mg total) by mouth Daily.  Dispense: 90 capsule; Refill: 1  -Take PPI 30min-1hr before eating breakfast  -Educated patient on lifestyle modifications to help control/reduce reflux/abdominal pain including: avoid large meals, avoid eating within 2-3 hours of bedtime (avoid late night eating & lying down soon after eating), elevate head of bed if nocturnal symptoms are present, smoking cessation (if  current smoker), & weight loss (if overweight).   -Educated to avoid known foods which trigger reflux symptoms & to minimize/avoid high-fat foods, chocolate, caffeine, citrus, alcohol, & tomato products.  -Advised to avoid/limit use of NSAID's, since they can cause GI upset, bleeding, and/or ulcers. If needed, take with food.     Dysphagia, unspecified type   - schedule EGD, discussed procedure with patient and possible esophageal dilation may be performed during procedure if indicated, patient verbalized understanding  - recommend to eat smaller more frequent meals and to eat slowly and advised to eat a soft diet. Take medications one at a time with a full glass of water.  - possible UGI/esophagram/esophageal manometry if symptoms persist    Bloating   - recommend OTC simethicone as directed, such as Phazyme or Gas-x  - recommend low gas diet: Reduce or eliminate these foods from your diet: Broccoli, Cauliflower, [place] sprouts, Cabbage, Cooked dried beans, Carbonated beverages (sparkling water, soda, beer, champagne)  Other Causes Of Excess Gas Include:   1) EATING TOO FAST or TALKING WHILE YOU CHEW may cause you to swallow air. This increases the amount of gas in the stomach and may worsen your symptoms.  --> Chew each mouthful completely before swallowing. Take your time.  2) OVEREATING may increase the feeling of being bloated and cause more gas.  --> When you are full, stop eating.  3) CONSTIPATION can increase the amount of normal intestinal gas.  --> Avoid constipation by increasing the amount of fiber in your diet by including whole cereal grains, fresh vegetables (except those in the above list) and fresh fruits. High-fiber foods absorb water and carry it out of the body. When increasing the amount of fiber in your diet, you also need to increase the amount of water that you drink. You should drink at least eight 8-ounce glasses of water (two quarts) per day.    Chronic constipation  -     X-Ray Abdomen  AP 1 View; Future; Expected date: 07/08/2024   -XRAY STAT   -After xray is reviewed Can try one time dose of OTC magnesium citrate (295ml- take as directed) to help clear the stool out of the colon. Otherwise, continue previous recommendations as discussed.   -Recommend daily exercise as tolerated, adequate water intake (six 8-oz glasses of water daily), and high fiber diet. OTC fiber supplements are recommended if diet does not reach daily fiber goal (20-30 grams daily), such as Metamucil, Citrucel, or FiberCon (take as directed, separate from other oral medications by >2 hours).  -Recommend trying OTC MiraLax once daily (17g PO) as directed  -If no improvement with above recommendations, try intermittently dosed Dulcolax OTC as directed (every 3-4 days) PRN to facilitate bowel movements  -If no relief with this, consider adding a emollient laxative (castor oil or mineral oil) +/- enema  -If still no improvement with these measures, call/follow-up   -consider linzess    Lower abdominal pain   -start on constipation regimen   -Xray stat of abdomen ordered    Rectal bleeding, External Hemorrhoids   - avoid constipation and straining with bowel movements; try using an OTC stool softener as directed and increase fiber in diet (20-30 grams daily)/OTC fiber supplement such as metamucil (take as directed)  - recommend SITZ baths  - possible referral to colorectal surgery if symptoms persist  -consider repeating colonoscopy if symptom continues or worsens        Follow up if symptoms worsen or fail to improve.      If no improvement in symptoms or symptoms worsen, call/follow-up at clinic or go to ER.       [facility] - GASTROENTEROLOGY  [facility], [place]     Dictation software program was used for this note. Please expect some simple typographical  errors in this note.    Encounter includes face to face time and non-face to face time preparing to see the patient (eg, review of tests),  obtaining and/or reviewing separately obtained history, documenting clinical information in the electronic or other health record, independently interpreting results (not separately reported) and communicating results to the patient/family/caregiver, or care coordination (not separately reported).

## 2024-07-11 ENCOUNTER — PATIENT MESSAGE (OUTPATIENT)
Dept: GASTROENTEROLOGY | Facility: CLINIC | Age: 29
End: 2024-07-11
Payer: MEDICARE

## 2024-07-11 DIAGNOSIS — R11.2 NAUSEA AND VOMITING, UNSPECIFIED VOMITING TYPE: Primary | ICD-10-CM

## 2024-07-11 DIAGNOSIS — N64.4 BREAST PAIN, LEFT: Primary | ICD-10-CM

## 2024-07-12 ENCOUNTER — HOSPITAL ENCOUNTER (OUTPATIENT)
Dept: RADIOLOGY | Facility: HOSPITAL | Age: 29
Discharge: HOME | End: 2024-07-12
Payer: MEDICARE

## 2024-07-12 DIAGNOSIS — R11.2 NAUSEA AND VOMITING, UNSPECIFIED VOMITING TYPE: ICD-10-CM

## 2024-07-12 PROCEDURE — 76700 US EXAM ABDOM COMPLETE: CPT | Mod: 26,,, | Performed by: RADIOLOGY

## 2024-07-12 PROCEDURE — 76700 US EXAM ABDOM COMPLETE: CPT | Mod: TC

## 2024-07-13 ENCOUNTER — OFFICE VISIT (OUTPATIENT)
Dept: URGENT CARE | Facility: CLINIC | Age: 29
End: 2024-07-13
Payer: MEDICARE

## 2024-07-13 VITALS
DIASTOLIC BLOOD PRESSURE: 87 MMHG | OXYGEN SATURATION: 99 % | TEMPERATURE: 97 F | RESPIRATION RATE: 16 BRPM | WEIGHT: 161 LBS | BODY MASS INDEX: 31.61 KG/M2 | SYSTOLIC BLOOD PRESSURE: 125 MMHG | HEIGHT: 60 IN | HEART RATE: 110 BPM

## 2024-07-13 DIAGNOSIS — K21.9 GASTROESOPHAGEAL REFLUX DISEASE, UNSPECIFIED WHETHER ESOPHAGITIS PRESENT: ICD-10-CM

## 2024-07-13 DIAGNOSIS — J02.9 SORE THROAT: Primary | ICD-10-CM

## 2024-07-13 LAB
CTP QC/QA: YES
S PYO RRNA THROAT QL PROBE: NEGATIVE

## 2024-07-13 PROCEDURE — 87880 STREP A ASSAY W/OPTIC: CPT | Mod: QW,,, | Performed by: NURSE PRACTITIONER

## 2024-07-13 PROCEDURE — 99214 OFFICE O/P EST MOD 30 MIN: CPT | Mod: S$GLB,,, | Performed by: NURSE PRACTITIONER

## 2024-07-13 NOTE — PROGRESS NOTES
Subjective:      Patient ID: Sherry Burns is a 29 y.o. female.    Vitals:  height is 5' (1.524 m) and weight is 73 kg (161 lb). Her temperature is 97.3 °F (36.3 °C). Her blood pressure is 125/87 and her pulse is 110. Her respiration is 16 and oxygen saturation is 99%.     Chief Complaint: Sore Throat    Patient complains of sore throat today and nausea x 1 week. Patient denies any other symptoms.          Constitution: Negative for fever.   Gastrointestinal:  Positive for nausea and heartburn. Negative for abdominal pain, vomiting and diarrhea.   Genitourinary:  Negative for dysuria.      Objective:     Physical Exam   Constitutional: She is oriented to person, place, and time.   HENT:   Head: Normocephalic.   Eyes: Conjunctivae are normal.   Cardiovascular: Normal rate.   Pulmonary/Chest: Effort normal.   Abdominal: Normal appearance. She exhibits no distension. There is no abdominal tenderness. There is no rebound and no guarding.   Musculoskeletal: Normal range of motion.         General: Normal range of motion.   Neurological: She is alert and oriented to person, place, and time.   Skin: Skin is no rash.   Psychiatric: Her behavior is normal. Mood, judgment and thought content normal.   Nursing note and vitals reviewed.      Assessment:     1. Sore throat    2. Gastroesophageal reflux disease, unspecified whether esophagitis present        Plan:       Sore throat  -     POCT rapid strep A    Gastroesophageal reflux disease, unspecified whether esophagitis present    Pt presents with one week nausea and sore throat starting recently. She is strep (-). She states her nausea is a burning consistent with her GERD and not relieved by her Omeprazole medication. Discussed MOM and Follow up with treating physician. Return precautions discussed.

## 2024-07-15 ENCOUNTER — PATIENT MESSAGE (OUTPATIENT)
Dept: GASTROENTEROLOGY | Facility: CLINIC | Age: 29
End: 2024-07-15
Payer: MEDICARE

## 2024-07-16 ENCOUNTER — HOSPITAL ENCOUNTER (OUTPATIENT)
Dept: RADIOLOGY | Facility: HOSPITAL | Age: 29
Discharge: HOME | End: 2024-07-16
Attending: NURSE PRACTITIONER
Payer: MEDICARE

## 2024-07-16 DIAGNOSIS — M25.562 LEFT KNEE PAIN, UNSPECIFIED CHRONICITY: Primary | ICD-10-CM

## 2024-07-16 DIAGNOSIS — M25.562 LEFT KNEE PAIN, UNSPECIFIED CHRONICITY: ICD-10-CM

## 2024-07-16 LAB
CHOLEST SERPL-MSCNC: 180 MG/DL (ref 0–200)
EGFR: 105
HBA1C MFR BLD: 5.9 % (ref 4–6)
HDLC SERPL-MCNC: 35 MG/DL (ref 35–70)
LDLC SERPL CALC-MCNC: 121 MG/DL (ref 0–160)
TRIGL SERPL-MCNC: 135 MG/DL (ref 40–160)

## 2024-07-16 PROCEDURE — 73560 X-RAY EXAM OF KNEE 1 OR 2: CPT | Mod: TC,FY,59,PO,RT

## 2024-07-16 PROCEDURE — 73562 X-RAY EXAM OF KNEE 3: CPT | Mod: TC,FY,PO,LT

## 2024-07-16 PROCEDURE — 73560 X-RAY EXAM OF KNEE 1 OR 2: CPT | Mod: 26,59,RT, | Performed by: RADIOLOGY

## 2024-07-16 PROCEDURE — 73562 X-RAY EXAM OF KNEE 3: CPT | Mod: 26,LT,, | Performed by: RADIOLOGY

## 2024-07-17 ENCOUNTER — TELEPHONE (OUTPATIENT)
Dept: GASTROENTEROLOGY | Facility: CLINIC | Age: 29
End: 2024-07-17
Payer: MEDICARE

## 2024-07-17 ENCOUNTER — PATIENT MESSAGE (OUTPATIENT)
Dept: GASTROENTEROLOGY | Facility: CLINIC | Age: 29
End: 2024-07-17
Payer: MEDICARE

## 2024-07-17 NOTE — TELEPHONE ENCOUNTER
Received patient's [facility] labs from patient via portal message Saisei. Printed and forwarded labs on to provider LENY Howell for review.

## 2024-07-18 ENCOUNTER — TELEPHONE (OUTPATIENT)
Dept: GASTROENTEROLOGY | Facility: CLINIC | Age: 29
End: 2024-07-18
Payer: MEDICARE

## 2024-07-18 ENCOUNTER — PATIENT MESSAGE (OUTPATIENT)
Dept: GASTROENTEROLOGY | Facility: CLINIC | Age: 29
End: 2024-07-18
Payer: MEDICARE

## 2024-07-18 ENCOUNTER — TREATMENT PLANNING (OUTPATIENT)
Dept: GASTROENTEROLOGY | Facility: CLINIC | Age: 29
End: 2024-07-18
Payer: MEDICARE

## 2024-07-18 DIAGNOSIS — R74.8 ELEVATED LIVER ENZYMES: Primary | ICD-10-CM

## 2024-07-18 NOTE — TELEPHONE ENCOUNTER
----- Message from Mackenzie Howell NP sent at 7/18/2024  9:07 AM CDT -----  Please let the patient know I have reviewed her recent blood work sent in by [facility]  Her liver enzymes are slightly elevated her recent ultrasound of the abdomen like mentioned previously showing a fatty liver, For fatty liver recommend: low fat, low cholesterol diet, maintain good control of blood sugars and cholesterol levels, exercise, weight loss (if overweight), minimize/avoid alcohol and tylenol products, I will order a hepatic panel to look at the liver enzymes in a month to assure they are decreasing.  Please follow up with your primary care for further evaluation and management abnormal lipid panel and hemoglobin A1c.    Thank you for allowing us to participate in your care,  NELL Mann

## 2024-07-18 NOTE — TELEPHONE ENCOUNTER
Attempted to call patient to notify; however, had to leave vmm as well sent portal message regarding test result/recommendations.

## 2024-07-18 NOTE — PROGRESS NOTES
Reviewed medical records received from [facility], summarized below and in medical & surgical history (endoscopies, etc), copies made & given to nurse to be scanned into system:     Date collected: 07/16/2024    CBC with differential/platelet: Normal results    Comprehensive metabolic panel:  -glucose high: 107  -alkaline phosphatase high 145  -AST normal 26  -ALT high 54    Lipid panel:  HDL low 35  LDL high 121    Hemoglobin A1c:  High 5.9    Recommendations:  Recent ultrasound of gallbladder consistent with fatty liver  For fatty liver recommend: low fat, low cholesterol diet, maintain good control of blood sugars and cholesterol levels, exercise, weight loss (if overweight), minimize/avoid alcohol and tylenol products  -Trend LFT's in a month  -please continue follow up with PCP for further evaluation management of abnormal lipid panel and hemoglobin A1c      -DUC Bryson

## 2024-07-26 ENCOUNTER — OFFICE VISIT (OUTPATIENT)
Dept: FAMILY MEDICINE | Facility: CLINIC | Age: 29
End: 2024-07-26
Payer: MEDICARE

## 2024-07-26 ENCOUNTER — LAB VISIT (OUTPATIENT)
Dept: LAB | Facility: HOSPITAL | Age: 29
End: 2024-07-26
Attending: PHYSICIAN ASSISTANT
Payer: MEDICARE

## 2024-07-26 VITALS
HEIGHT: 60 IN | DIASTOLIC BLOOD PRESSURE: 78 MMHG | HEART RATE: 96 BPM | SYSTOLIC BLOOD PRESSURE: 120 MMHG | BODY MASS INDEX: 30.81 KG/M2 | WEIGHT: 156.94 LBS | OXYGEN SATURATION: 99 %

## 2024-07-26 DIAGNOSIS — R73.03 PREDIABETES: ICD-10-CM

## 2024-07-26 DIAGNOSIS — Z00.00 PREVENTATIVE HEALTH CARE: ICD-10-CM

## 2024-07-26 DIAGNOSIS — J30.2 SEASONAL ALLERGIES: ICD-10-CM

## 2024-07-26 DIAGNOSIS — E66.9 CLASS 1 OBESITY WITH SERIOUS COMORBIDITY AND BODY MASS INDEX (BMI) OF 30.0 TO 30.9 IN ADULT, UNSPECIFIED OBESITY TYPE: ICD-10-CM

## 2024-07-26 DIAGNOSIS — F41.9 ANXIETY: ICD-10-CM

## 2024-07-26 DIAGNOSIS — K76.0 HEPATIC STEATOSIS: ICD-10-CM

## 2024-07-26 DIAGNOSIS — G43.709 CHRONIC MIGRAINE WITHOUT AURA WITHOUT STATUS MIGRAINOSUS, NOT INTRACTABLE: ICD-10-CM

## 2024-07-26 DIAGNOSIS — R79.9 ABNORMAL FINDING OF BLOOD CHEMISTRY, UNSPECIFIED: ICD-10-CM

## 2024-07-26 DIAGNOSIS — F32.A DEPRESSION, UNSPECIFIED DEPRESSION TYPE: ICD-10-CM

## 2024-07-26 DIAGNOSIS — J45.40 MODERATE PERSISTENT ASTHMA WITHOUT COMPLICATION: ICD-10-CM

## 2024-07-26 DIAGNOSIS — F17.210 CIGARETTE NICOTINE DEPENDENCE WITHOUT COMPLICATION: ICD-10-CM

## 2024-07-26 DIAGNOSIS — R68.89 TEMPERATURE INTOLERANCE: ICD-10-CM

## 2024-07-26 DIAGNOSIS — L20.84 INTRINSIC ATOPIC DERMATITIS: ICD-10-CM

## 2024-07-26 DIAGNOSIS — F31.9 BIPOLAR AFFECTIVE DISORDER, REMISSION STATUS UNSPECIFIED: ICD-10-CM

## 2024-07-26 DIAGNOSIS — Z00.00 PREVENTATIVE HEALTH CARE: Primary | ICD-10-CM

## 2024-07-26 PROBLEM — L30.9 ECZEMA: Status: RESOLVED | Noted: 2024-01-07 | Resolved: 2024-07-26

## 2024-07-26 PROBLEM — R11.0 NAUSEA: Status: RESOLVED | Noted: 2024-01-07 | Resolved: 2024-07-26

## 2024-07-26 PROBLEM — R07.9 CHEST PAIN: Status: RESOLVED | Noted: 2023-07-19 | Resolved: 2024-07-26

## 2024-07-26 LAB — TSH SERPL DL<=0.005 MIU/L-ACNC: 2.31 UIU/ML (ref 0.4–4)

## 2024-07-26 PROCEDURE — 36415 COLL VENOUS BLD VENIPUNCTURE: CPT | Mod: PO | Performed by: PHYSICIAN ASSISTANT

## 2024-07-26 PROCEDURE — 84443 ASSAY THYROID STIM HORMONE: CPT | Performed by: PHYSICIAN ASSISTANT

## 2024-07-26 PROCEDURE — 99999 PR PBB SHADOW E&M-EST. PATIENT-LVL V: CPT | Mod: PBBFAC,,, | Performed by: PHYSICIAN ASSISTANT

## 2024-07-26 RX ORDER — METFORMIN HYDROCHLORIDE 500 MG/1
500 TABLET, EXTENDED RELEASE ORAL
Qty: 90 TABLET | Refills: 1 | Status: SHIPPED | OUTPATIENT
Start: 2024-07-26 | End: 2025-07-26

## 2024-07-26 RX ORDER — HYDROXYZINE HYDROCHLORIDE 10 MG/1
10 TABLET, FILM COATED ORAL NIGHTLY
COMMUNITY

## 2024-07-26 RX ORDER — CARIPRAZINE 3 MG/1
3 CAPSULE, GELATIN COATED ORAL
COMMUNITY

## 2024-07-26 RX ORDER — BUSPIRONE HYDROCHLORIDE 30 MG/1
30 TABLET ORAL 2 TIMES DAILY
COMMUNITY

## 2024-07-26 NOTE — PROGRESS NOTES
Subjective     Patient ID: Sherry Burns is a 29 y.o. female.    Chief Complaint: Health Maintenance and Blood Sugar Problem      HPI      Pt is new to me, PCP not established.     Pt is a 29 year old female with migraines, BPD, ADD, anxiety, atopic dermatitis, asthma, GERD, tobacco abuse. She presents today to establish care. Pt has large team of specialists, mostly in [place]. See below.     Psychiatrist- Dr. Destini Baca  Gastroenterologist- Mackenzie Howell NP  Neuro, headache- Indira Toussaint MD and Cristal Villarreal  Cardiology- Елена Liz NP  GYN- Dr. Flood, scheduled for left breast ultrasound for a left breast lump, UTD on pap smear, states had last year. Will request records.   Dermatology- Skyla Yeager    Pt shows me blood work from an outside doctor obtained earlier in the month, shows mildly elevated alk phos and ALT, along with A1c of 5.9, otherwise no abnormalities. TSH not checked, and pt does complain of temperature intolerance. Pt reports sweating daily and nightly for the last 3 years. States she noticed this after starting her medications for BPD. Has also gained significant weight in the last 3-5 years on her medications. Weight around this time last year was 131, currently 156. Had recent abdominal ultrasound showing hepatic steatosis.     Pt is a smoker, but states has not had cigarette in 3 months. Not very physically active.     Review of Systems   All other systems reviewed and are negative.         Objective     Physical Exam  Vitals and nursing note reviewed.   Constitutional:       General: She is not in acute distress.     Appearance: Normal appearance. She is obese. She is not ill-appearing, toxic-appearing or diaphoretic.      Comments: flushed   HENT:      Head: Normocephalic and atraumatic.      Right Ear: Tympanic membrane, ear canal and external ear normal. There is no impacted cerumen.      Left Ear: Tympanic membrane, ear canal and external ear normal. There  is no impacted cerumen.      Nose: No congestion or rhinorrhea.      Mouth/Throat:      Pharynx: No oropharyngeal exudate or posterior oropharyngeal erythema.   Eyes:      General: No scleral icterus.        Right eye: No discharge.         Left eye: No discharge.      Conjunctiva/sclera: Conjunctivae normal.      Pupils: Pupils are equal, round, and reactive to light.   Neck:      Thyroid: No thyroid mass, thyromegaly or thyroid tenderness.   Cardiovascular:      Rate and Rhythm: Normal rate and regular rhythm.      Pulses: Normal pulses.      Heart sounds: Normal heart sounds. No murmur heard.     No friction rub. No gallop.   Pulmonary:      Effort: Pulmonary effort is normal. No respiratory distress.      Breath sounds: Normal breath sounds. No stridor. No wheezing, rhonchi or rales.   Abdominal:      General: Abdomen is flat. Bowel sounds are normal. There is no distension.      Palpations: Abdomen is soft. There is no mass.      Tenderness: There is no abdominal tenderness. There is no right CVA tenderness, left CVA tenderness, guarding or rebound.      Hernia: No hernia is present.   Musculoskeletal:         General: No deformity or signs of injury.      Cervical back: Normal range of motion and neck supple.      Right lower leg: No edema.      Left lower leg: No edema.   Lymphadenopathy:      Cervical: No cervical adenopathy.   Skin:     General: Skin is warm.      Coloration: Skin is not jaundiced or pale.      Findings: No lesion or rash.   Neurological:      General: No focal deficit present.      Mental Status: She is alert and oriented to person, place, and time. Mental status is at baseline.   Psychiatric:         Mood and Affect: Mood normal.         Behavior: Behavior normal.         Thought Content: Thought content normal.         Judgment: Judgment normal.       1. Preventative health care  -discussed diet and exercise recommendations.     2. Hepatic steatosis  -again, discussed lifestyle  modification    3. Temperature intolerance  - TSH; Future    4. BMI 30.0-30.9,adult    5. Class 1 obesity with serious comorbidity and body mass index (BMI) of 30.0 to 30.9 in adult, unspecified obesity type    6. Prediabetes  - metFORMIN (GLUCOPHAGE-XR) 500 MG ER 24hr tablet; Take 1 tablet (500 mg total) by mouth daily with breakfast.  Dispense: 90 tablet; Refill: 1    7. Chronic migraine without aura without status migrainosus, not intractable  -continue current med regimen    8. Depression, unspecified depression type  9. Bipolar affective disorder, remission status unspecified  10. Anxiety  -followed by psych    11. Seasonal allergies  -continue daily antihistamine    12. Intrinsic atopic dermatitis  -followed by Derm, topical therapy + dupixent    13. Moderate persistent asthma without complication  -has appt to establish with pulm    14. Cigarette nicotine dependence without complication  -encouraged continued abstinence    RTC/ER precautions given. F/U 3 months to establish with PCP    Babita Espinal PA-C

## 2024-07-26 NOTE — PATIENT INSTRUCTIONS
A few reminders from today:    Lab today  Follow up 3 months to establish care with a PCP  Start metformin  Start aerobic exercise for 30 minutes each day  Follow a low cholesterol /low fat diet where you limit red meat, pork, greasy/fatty foods, highly processed foods, egg yolks.       Please go to ER/urgent care if after hours or symptoms persist/worsen.     Do not hesitate to get in touch with me should you have any further questions.     Thank you for trusting me with your care!  I wish you health and happiness.    -Babita Espinal PA-C

## 2024-07-30 ENCOUNTER — PATIENT OUTREACH (OUTPATIENT)
Dept: ADMINISTRATIVE | Facility: HOSPITAL | Age: 29
End: 2024-07-30
Payer: MEDICARE

## 2024-07-30 NOTE — PROGRESS NOTES
Population Health Chart Review & Patient Outreach Details      Additional Pop Health Notes:               Updates Requested / Reviewed:      Updated Care Coordination Note, , External Sources: [facility] and [facility], and Immunizations Reconciliation Completed or Queried: [place]         Health Maintenance Topics Overdue:      VBHM Score: 0     Patient is not due for any topics at this time.                       Health Maintenance Topic(s) Outreach Outcomes & Actions Taken:    Cervical Cancer Screening - Outreach Outcomes & Actions Taken  : External Records Uploaded & Care Team Updated if Applicable    Lab(s) - Outreach Outcomes & Actions Taken  : External Records Uploaded & Care Team Updated if Applicable

## 2024-08-12 ENCOUNTER — OFFICE VISIT (OUTPATIENT)
Dept: PULMONOLOGY | Facility: CLINIC | Age: 29
End: 2024-08-12
Payer: MEDICARE

## 2024-08-12 VITALS
SYSTOLIC BLOOD PRESSURE: 93 MMHG | HEIGHT: 60 IN | OXYGEN SATURATION: 99 % | WEIGHT: 159.38 LBS | DIASTOLIC BLOOD PRESSURE: 68 MMHG | HEART RATE: 84 BPM | BODY MASS INDEX: 31.29 KG/M2

## 2024-08-12 DIAGNOSIS — J45.40 MODERATE PERSISTENT ASTHMA WITHOUT COMPLICATION: Primary | ICD-10-CM

## 2024-08-12 PROCEDURE — 3044F HG A1C LEVEL LT 7.0%: CPT | Mod: CPTII,S$GLB,, | Performed by: INTERNAL MEDICINE

## 2024-08-12 PROCEDURE — 3078F DIAST BP <80 MM HG: CPT | Mod: CPTII,S$GLB,, | Performed by: INTERNAL MEDICINE

## 2024-08-12 PROCEDURE — 1159F MED LIST DOCD IN RCRD: CPT | Mod: CPTII,S$GLB,, | Performed by: INTERNAL MEDICINE

## 2024-08-12 PROCEDURE — 99214 OFFICE O/P EST MOD 30 MIN: CPT | Mod: S$GLB,,, | Performed by: INTERNAL MEDICINE

## 2024-08-12 PROCEDURE — 3008F BODY MASS INDEX DOCD: CPT | Mod: CPTII,S$GLB,, | Performed by: INTERNAL MEDICINE

## 2024-08-12 PROCEDURE — 3074F SYST BP LT 130 MM HG: CPT | Mod: CPTII,S$GLB,, | Performed by: INTERNAL MEDICINE

## 2024-08-12 PROCEDURE — 99999 PR PBB SHADOW E&M-EST. PATIENT-LVL III: CPT | Mod: PBBFAC,,, | Performed by: INTERNAL MEDICINE

## 2024-08-12 RX ORDER — FLUTICASONE FUROATE, UMECLIDINIUM BROMIDE AND VILANTEROL TRIFENATATE 200; 62.5; 25 UG/1; UG/1; UG/1
1 POWDER RESPIRATORY (INHALATION) DAILY
Qty: 60 EACH | Refills: 11 | Status: SHIPPED | OUTPATIENT
Start: 2024-08-12

## 2024-08-12 RX ORDER — LEVALBUTEROL INHALATION SOLUTION 1.25 MG/3ML
1 SOLUTION RESPIRATORY (INHALATION) EVERY 4 HOURS PRN
Qty: 75 ML | Refills: 11 | Status: SHIPPED | OUTPATIENT
Start: 2024-08-12 | End: 2025-08-12

## 2024-08-12 RX ORDER — LEVALBUTEROL TARTRATE 45 UG/1
1-2 AEROSOL, METERED ORAL EVERY 4 HOURS PRN
Qty: 15 G | Refills: 11 | Status: SHIPPED | OUTPATIENT
Start: 2024-08-12 | End: 2025-08-12

## 2024-08-12 NOTE — PROGRESS NOTES
8/12/2024    Sherry Burns  Follow up    Chief Complaint   Patient presents with    Shortness of Breath       HPI:08/12/2024- Pt saw dr thomas last yr for asthma, SOB. Recommendations at that time:   - smoking cessation recommended  - trial albuterol inhaler  - PFTs ordered  - methacholine challenge ordered  - CXR ordered  - TTE ordered    PFTs and methacholine were not done. She c/o wheezing and feels out of breath with walking to the car. She notices this since she has gained weight- about 70# she attributes to medicine side effects.   For asthma she uses trelegy 200 with benefit. She still wheezes and uses rescue inhaler 2x/day. Denies nocturnal arousals. She gets exacerbations requiring steroid about 2x/yr, last 2 mos ago. Rarely she uses night time albuterol nebs. As a child her asthma was worse. She also has eczema and takes dupixent shots which has helped her asthma.  She sees cardiology for fast heart rhythm- triggered by exercise- takes metoprolol.  She still gets episodes of this. Albuterol often triggers tachycardia. She has not tried xopenex.  She used to smoke, quit 3 mos ago.    The chief complaint problem is New to me    PFSH:  Past Medical History:   Diagnosis Date    Anemia     Anxiety disorder, unspecified     Asthma     Bipolar disorder, unspecified     Depression     Eczema     GERD (gastroesophageal reflux disease)     Glaucoma     Headache     Heart murmur          Past Surgical History:   Procedure Laterality Date    COLONOSCOPY N/A 03/05/2021    Procedure: COLONOSCOPY;  Surgeon: Jake Williamson MD;  Location: [facility];  Service: Endoscopy;  Laterality: N/A;    ESOPHAGOGASTRODUODENOSCOPY N/A 03/05/2021    Procedure: EGD (ESOPHAGOGASTRODUODENOSCOPY);  Surgeon: Jake Williamson MD;  Location: [facility];  Service: Endoscopy;  Laterality: N/A;    UPPER GASTROINTESTINAL ENDOSCOPY       Social History     Tobacco Use    Smoking status: Some Days     Current packs/day: 0.50     Average  packs/day: 0.5 packs/day for 10.7 years (5.3 ttl pk-yrs)     Types: Cigarettes     Start date: 12/3/2013    Smokeless tobacco: Never   Substance Use Topics    Alcohol use: Not Currently    Drug use: Never     Family History   Problem Relation Name Age of Onset    Hypertension Mother      Hyperlipidemia Mother      Colon polyps Mother      Heart attacks under age 50 Mother      Drug abuse Father      Arthritis Brother      Glaucoma Paternal Grandmother      Dementia Paternal Grandmother      Melanoma Neg Hx      Colon cancer Neg Hx      Crohn's disease Neg Hx      Esophageal cancer Neg Hx      Liver cancer Neg Hx      Rectal cancer Neg Hx      Stomach cancer Neg Hx      Ulcerative colitis Neg Hx       Review of patient's allergies indicates:  No Known Allergies    Performance Status:The patient's activity level is functions out of house.      Review of Systems:  a review of eleven systems covering constitutional, Eye, HEENT, Psych, Respiratory, Cardiac, GI, , Musculoskeletal, Endocrine, Dermatologic was negative except for pertinent findings as listed ABOVE and below:  headaches     Exam:Comprehensive exam done. BP 93/68 (BP Location: Right arm, Patient Position: Sitting, BP Method: Medium (Automatic))   Pulse 84   Ht 5' (1.524 m)   Wt 72.3 kg (159 lb 6.3 oz)   SpO2 99%   BMI 31.13 kg/m²   Exam included Vitals as listed, and patient's appearance and affect and alertness and mood, oral exam for yeast and hygiene and pharynx lesions and Mallapatti (M) score, neck with inspection for jvd and masses and thyroid abnormalities and lymph nodes (supraclavicular and infraclavicular nodes and axillary also examined and noted if abn), chest exam included symmetry and effort and fremitus and percussion and auscultation, cardiac exam included rhythm and gallops and murmur and rubs and jvd and edema, abdominal exam for mass and hepatosplenomegaly and tenderness and hernias and bowel sounds, Musculoskeletal exam with muscle  tone and posture and mobility/gait and  strength, and skin for rashes and cyanosis and pallor and turgor, extremity for clubbing.  Findings were normal except for pertinent findings listed below:  M3, oropharynx clear  HR regular  Breath sounds clear bilaterally  No edema/clubbing    Radiographs (ct chest and cxr) reviewed: view by direct vision and interpretation as below   CXR 2019- clear lungs    Labs reviewed     Lab Results   Component Value Date    WBC 10.93 12/18/2023    HGB 13.4 12/18/2023    HCT 40.7 12/18/2023    MCV 91 12/18/2023     12/18/2023      Latest Reference Range & Units 05/18/22 09:34 05/28/22 18:10 03/07/23 14:49 09/06/23 12:45 12/18/23 13:22   Eos # 0.0 - 0.5 K/uL 0.4 0.2 0.3 190 0.3     CMP  Sodium   Date Value Ref Range Status   12/18/2023 138 136 - 145 mmol/L Final     Potassium   Date Value Ref Range Status   12/18/2023 4.4 3.5 - 5.1 mmol/L Final     Chloride   Date Value Ref Range Status   12/18/2023 106 95 - 110 mmol/L Final     CO2   Date Value Ref Range Status   12/18/2023 22 (L) 23 - 29 mmol/L Final     Glucose   Date Value Ref Range Status   12/18/2023 93 70 - 110 mg/dL Final     BUN   Date Value Ref Range Status   12/18/2023 15 6 - 20 mg/dL Final     Creatinine   Date Value Ref Range Status   12/18/2023 0.8 0.5 - 1.4 mg/dL Final     Calcium   Date Value Ref Range Status   12/18/2023 9.5 8.7 - 10.5 mg/dL Final     Total Protein   Date Value Ref Range Status   12/18/2023 7.2 6.0 - 8.4 g/dL Final     Albumin   Date Value Ref Range Status   12/18/2023 4.0 3.5 - 5.2 g/dL Final     Total Bilirubin   Date Value Ref Range Status   12/18/2023 0.4 0.1 - 1.0 mg/dL Final     Comment:     For infants and newborns, interpretation of results should be based  on gestational age, weight and in agreement with clinical  observations.    Premature Infant recommended reference ranges:  Up to 24 hours.............<8.0 mg/dL  Up to 48 hours............<12.0 mg/dL  3-5  days..................<15.0 mg/dL  6-29 days.................<15.0 mg/dL       Alkaline Phosphatase   Date Value Ref Range Status   12/18/2023 101 55 - 135 U/L Final     AST   Date Value Ref Range Status   12/18/2023 22 10 - 40 U/L Final     ALT   Date Value Ref Range Status   12/18/2023 46 (H) 10 - 44 U/L Final     Anion Gap   Date Value Ref Range Status   12/18/2023 10 8 - 16 mmol/L Final     eGFR   Date Value Ref Range Status   07/16/2024 105  Final         PFT will be done and results to be reviewed      Plan:  Clinical impression is apparently straight forward and impression with management as below.  Asthmatic on dupixent, here for follow up. Not in exacerbation, tends to exacerbate frequently    Problem List Items Addressed This Visit          Pulmonary    Moderate persistent asthma without complication - Primary    Relevant Medications    fluticasone-umeclidin-vilanter (TRELEGY ELLIPTA) 200-62.5-25 mcg inhaler    levalbuterol (XOPENEX) 1.25 mg/3 mL nebulizer solution    levalbuterol (XOPENEX HFA) 45 mcg/actuation inhaler    Other Relevant Orders    Complete PFT with bronchodilator       Follow up in about 3 months (around 11/12/2024), or with NP.    Discussed with patient above for education the following:      Patient Instructions   Pulmonary function tests  Keep trelegy inhaler and switch rescue medicine to xopenex due to fast heart rate  Xopenex in nebulizer as needed  Continue to avoid smoking

## 2024-08-12 NOTE — PATIENT INSTRUCTIONS
Pulmonary function tests  Keep trelegy inhaler and switch rescue medicine to xopenex due to fast heart rate  Xopenex in nebulizer as needed  Continue to avoid smoking

## 2024-08-15 ENCOUNTER — PATIENT MESSAGE (OUTPATIENT)
Dept: PULMONOLOGY | Facility: CLINIC | Age: 29
End: 2024-08-15
Payer: MEDICARE

## 2024-08-20 ENCOUNTER — PATIENT MESSAGE (OUTPATIENT)
Dept: DERMATOLOGY | Facility: CLINIC | Age: 29
End: 2024-08-20
Payer: MEDICARE

## 2024-08-21 ENCOUNTER — OFFICE VISIT (OUTPATIENT)
Dept: DERMATOLOGY | Facility: CLINIC | Age: 29
End: 2024-08-21
Payer: MEDICARE

## 2024-08-21 ENCOUNTER — OFFICE VISIT (OUTPATIENT)
Dept: GASTROENTEROLOGY | Facility: CLINIC | Age: 29
End: 2024-08-21
Payer: MEDICARE

## 2024-08-21 ENCOUNTER — PATIENT MESSAGE (OUTPATIENT)
Dept: DERMATOLOGY | Facility: CLINIC | Age: 29
End: 2024-08-21

## 2024-08-21 VITALS
WEIGHT: 159.38 LBS | DIASTOLIC BLOOD PRESSURE: 62 MMHG | HEIGHT: 60 IN | SYSTOLIC BLOOD PRESSURE: 129 MMHG | BODY MASS INDEX: 31.29 KG/M2 | HEART RATE: 101 BPM

## 2024-08-21 VITALS — BODY MASS INDEX: 31.29 KG/M2 | WEIGHT: 159.38 LBS | HEIGHT: 60 IN

## 2024-08-21 DIAGNOSIS — R11.0 CHRONIC NAUSEA: ICD-10-CM

## 2024-08-21 DIAGNOSIS — Q80.9 ICHTHYOSIS: ICD-10-CM

## 2024-08-21 DIAGNOSIS — K21.9 GASTROESOPHAGEAL REFLUX DISEASE, UNSPECIFIED WHETHER ESOPHAGITIS PRESENT: ICD-10-CM

## 2024-08-21 DIAGNOSIS — K59.04 CHRONIC IDIOPATHIC CONSTIPATION: Primary | ICD-10-CM

## 2024-08-21 DIAGNOSIS — L20.84 INTRINSIC ECZEMA: ICD-10-CM

## 2024-08-21 DIAGNOSIS — L20.84 INTRINSIC ATOPIC DERMATITIS: Primary | ICD-10-CM

## 2024-08-21 DIAGNOSIS — K64.4 EXTERNAL HEMORRHOIDS: ICD-10-CM

## 2024-08-21 DIAGNOSIS — K62.5 RECTAL BLEEDING: ICD-10-CM

## 2024-08-21 PROCEDURE — 1159F MED LIST DOCD IN RCRD: CPT | Mod: CPTII,S$GLB,, | Performed by: DERMATOLOGY

## 2024-08-21 PROCEDURE — 3078F DIAST BP <80 MM HG: CPT | Mod: CPTII,S$GLB,,

## 2024-08-21 PROCEDURE — 99214 OFFICE O/P EST MOD 30 MIN: CPT | Mod: S$GLB,,, | Performed by: DERMATOLOGY

## 2024-08-21 PROCEDURE — 3074F SYST BP LT 130 MM HG: CPT | Mod: CPTII,S$GLB,,

## 2024-08-21 PROCEDURE — 1160F RVW MEDS BY RX/DR IN RCRD: CPT | Mod: CPTII,S$GLB,, | Performed by: DERMATOLOGY

## 2024-08-21 PROCEDURE — 3008F BODY MASS INDEX DOCD: CPT | Mod: CPTII,S$GLB,, | Performed by: DERMATOLOGY

## 2024-08-21 PROCEDURE — 99213 OFFICE O/P EST LOW 20 MIN: CPT | Mod: S$GLB,,,

## 2024-08-21 PROCEDURE — 99999 PR PBB SHADOW E&M-EST. PATIENT-LVL IV: CPT | Mod: PBBFAC,,,

## 2024-08-21 PROCEDURE — 3008F BODY MASS INDEX DOCD: CPT | Mod: CPTII,S$GLB,,

## 2024-08-21 PROCEDURE — 3044F HG A1C LEVEL LT 7.0%: CPT | Mod: CPTII,S$GLB,,

## 2024-08-21 PROCEDURE — 3044F HG A1C LEVEL LT 7.0%: CPT | Mod: CPTII,S$GLB,, | Performed by: DERMATOLOGY

## 2024-08-21 RX ORDER — CLOBETASOL PROPIONATE 0.5 MG/G
OINTMENT TOPICAL 2 TIMES DAILY
Qty: 30 G | Refills: 1 | Status: SHIPPED | OUTPATIENT
Start: 2024-08-21

## 2024-08-21 RX ORDER — DUPILUMAB 300 MG/2ML
INJECTION, SOLUTION SUBCUTANEOUS
Qty: 4 ML | Refills: 11 | Status: SHIPPED | OUTPATIENT
Start: 2024-08-21 | End: 2024-08-21 | Stop reason: SDUPTHER

## 2024-08-21 RX ORDER — DUPILUMAB 300 MG/2ML
INJECTION, SOLUTION SUBCUTANEOUS
Qty: 4 ML | Refills: 11 | Status: ACTIVE | OUTPATIENT
Start: 2024-08-21

## 2024-08-21 NOTE — PROGRESS NOTES
Subjective:       Patient ID: Sherry Burns is a 29 y.o. female Body mass index is 31.13 kg/m².    Chief Complaint: Constipation    This patient is new to me.  Referring Provider: No ref. provider found for Constipation.  Established patient of Dr. Williamson.     Constipation  This is a chronic problem. The current episode started more than 1 year ago. The problem has been waxing and waning since onset. Her stool frequency is 2 to 3 times per week (reports straining with bowels). The stool is described as firm and formed. The patient is on a high fiber diet. She Does not exercise regularly. There has Been adequate water intake. Associated symptoms include hematochezia (reports intermittent rectal bleeding sees spots of BRPBR on tissue no blood in stool hx of constipation, straining, and has external hemorrhoids colonoscopy 2021 normal except external hemorrhoids, no rectal pain), hemorrhoids and nausea (reports nausea has improved with starting on Prilosec daily and controlling GERD symptoms zofran helps as well denies vomiting, US-fattyliver, scheduled for EGD on 9/13/24). Pertinent negatives include no abdominal pain, anorexia, back pain, bloating, diarrhea, difficulty urinating, fecal incontinence, fever, flatus, melena, rectal pain, vomiting or weight loss. Associated symptoms comments: 7/8/2024  XR ABDOMEN AP 1 VIEW  Impression:  Mildly prominent amount of stool throughout the colon.  Mild dilatation of small bowel loops right lower quadrant of the abdomen not nonspecific and nonobstructive in appearance. She has tried diet changes, fiber and stool softeners (has been taking fiber and miralax daily with mild relief) for the symptoms. Her past medical history is significant for psychiatric history. There is no history of abdominal surgery, endocrine disease, inflammatory bowel disease, irritable bowel syndrome, metabolic disease, neurologic disease, neuromuscular disease or radiation treatment.       Review  of Systems   Constitutional:  Negative for fever and weight loss.   Gastrointestinal:  Positive for constipation, hematochezia (reports intermittent rectal bleeding sees spots of BRPBR on tissue no blood in stool hx of constipation, straining, and has external hemorrhoids colonoscopy 2021 normal except external hemorrhoids, no rectal pain), hemorrhoids and nausea (reports nausea has improved with starting on Prilosec daily and controlling GERD symptoms zofran helps as well denies vomiting, US-fattyliver, scheduled for EGD on 9/13/24). Negative for abdominal distention, abdominal pain, anal bleeding, anorexia, bloating, blood in stool, diarrhea, flatus, melena, rectal pain and vomiting.   Genitourinary:  Negative for difficulty urinating.   Musculoskeletal:  Negative for back pain.         No LMP recorded.  Past Medical History:   Diagnosis Date    Anemia     Anxiety disorder, unspecified     Asthma     Bipolar disorder, unspecified     Depression     Eczema     GERD (gastroesophageal reflux disease)     Glaucoma     Headache     Heart murmur      Past Surgical History:   Procedure Laterality Date    COLONOSCOPY N/A 03/05/2021    Procedure: COLONOSCOPY;  Surgeon: Jake Williamson MD;  Location: [facility];  Service: Endoscopy;  Laterality: N/A;    ESOPHAGOGASTRODUODENOSCOPY N/A 03/05/2021    Procedure: EGD (ESOPHAGOGASTRODUODENOSCOPY);  Surgeon: Jake Williamson MD;  Location: [facility];  Service: Endoscopy;  Laterality: N/A;    UPPER GASTROINTESTINAL ENDOSCOPY       Family History   Problem Relation Name Age of Onset    Hypertension Mother      Hyperlipidemia Mother      Colon polyps Mother      Heart attacks under age 50 Mother      Drug abuse Father      Arthritis Brother      Glaucoma Paternal Grandmother      Dementia Paternal Grandmother      Melanoma Neg Hx      Colon cancer Neg Hx      Crohn's disease Neg Hx      Esophageal cancer Neg Hx      Liver cancer Neg Hx      Rectal cancer Neg Hx      Stomach cancer Neg  Hx      Ulcerative colitis Neg Hx       Social History     Tobacco Use    Smoking status: Some Days     Current packs/day: 0.50     Average packs/day: 0.5 packs/day for 10.7 years (5.4 ttl pk-yrs)     Types: Cigarettes     Start date: 12/3/2013    Smokeless tobacco: Never   Substance Use Topics    Alcohol use: Not Currently    Drug use: Never     Wt Readings from Last 10 Encounters:   08/21/24 72.3 kg (159 lb 6.3 oz)   08/21/24 72.3 kg (159 lb 6.3 oz)   08/12/24 72.3 kg (159 lb 6.3 oz)   07/26/24 71.2 kg (156 lb 15.5 oz)   07/13/24 73 kg (161 lb)   07/08/24 73.5 kg (162 lb 0.6 oz)   07/03/24 72.8 kg (160 lb 6.4 oz)   03/08/24 69.3 kg (152 lb 12.8 oz)   03/04/24 68.8 kg (151 lb 10.8 oz)   02/29/24 65.8 kg (145 lb)     Lab Results   Component Value Date    WBC 10.93 12/18/2023    HGB 13.4 12/18/2023    HCT 40.7 12/18/2023    MCV 91 12/18/2023     12/18/2023     CMP  Sodium   Date Value Ref Range Status   12/18/2023 138 136 - 145 mmol/L Final     Potassium   Date Value Ref Range Status   12/18/2023 4.4 3.5 - 5.1 mmol/L Final     Chloride   Date Value Ref Range Status   12/18/2023 106 95 - 110 mmol/L Final     CO2   Date Value Ref Range Status   12/18/2023 22 (L) 23 - 29 mmol/L Final     Glucose   Date Value Ref Range Status   12/18/2023 93 70 - 110 mg/dL Final     BUN   Date Value Ref Range Status   12/18/2023 15 6 - 20 mg/dL Final     Creatinine   Date Value Ref Range Status   12/18/2023 0.8 0.5 - 1.4 mg/dL Final     Calcium   Date Value Ref Range Status   12/18/2023 9.5 8.7 - 10.5 mg/dL Final     Total Protein   Date Value Ref Range Status   12/18/2023 7.2 6.0 - 8.4 g/dL Final     Albumin   Date Value Ref Range Status   12/18/2023 4.0 3.5 - 5.2 g/dL Final     Total Bilirubin   Date Value Ref Range Status   12/18/2023 0.4 0.1 - 1.0 mg/dL Final     Comment:     For infants and newborns, interpretation of results should be based  on gestational age, weight and in agreement with  "clinical  observations.    Premature Infant recommended reference ranges:  Up to 24 hours.............<8.0 mg/dL  Up to 48 hours............<12.0 mg/dL  3-5 days..................<15.0 mg/dL  6-29 days.................<15.0 mg/dL       Alkaline Phosphatase   Date Value Ref Range Status   12/18/2023 101 55 - 135 U/L Final     AST   Date Value Ref Range Status   12/18/2023 22 10 - 40 U/L Final     ALT   Date Value Ref Range Status   12/18/2023 46 (H) 10 - 44 U/L Final     Anion Gap   Date Value Ref Range Status   12/18/2023 10 8 - 16 mmol/L Final     eGFR if    Date Value Ref Range Status   05/28/2022 >60 >60 mL/min/1.73 m^2 Final     eGFR if non    Date Value Ref Range Status   05/28/2022 >60 >60 mL/min/1.73 m^2 Final     Comment:     Calculation used to obtain the estimated glomerular filtration  rate (eGFR) is the CKD-EPI equation.        Lab Results   Component Value Date    LIPASE 20 12/18/2023     No results found for: "LIPASERES"  Lab Results   Component Value Date    TSH 2.306 07/26/2024       Reviewed prior medical records including radiology report of xray abdomen 7/8/24 & endoscopy history (see surgical history/procedures).    Objective:      Physical Exam  Vitals and nursing note reviewed.   Constitutional:       Appearance: Normal appearance. She is normal weight.   Cardiovascular:      Rate and Rhythm: Normal rate and regular rhythm.      Heart sounds: Normal heart sounds.   Pulmonary:      Breath sounds: Normal breath sounds.   Abdominal:      General: Bowel sounds are normal. There is no distension.      Palpations: Abdomen is soft.      Tenderness: There is no abdominal tenderness.   Skin:     General: Skin is warm and dry.      Coloration: Skin is not jaundiced.   Neurological:      Mental Status: She is alert and oriented to person, place, and time.   Psychiatric:         Mood and Affect: Mood normal.         Behavior: Behavior normal.         Assessment:       1. " Chronic idiopathic constipation    2. Rectal bleeding    3. External hemorrhoids    4. Gastroesophageal reflux disease, unspecified whether esophagitis present    5. Chronic nausea        Plan:       Chronic idiopathic constipation  -     linaCLOtide (LINZESS) 145 mcg Cap capsule; Take 1 capsule (145 mcg total) by mouth once daily.  Dispense: 30 capsule; Refill: 2   -Recommend daily exercise as tolerated, adequate water intake (six 8-oz glasses of water daily), and high fiber diet. OTC fiber supplements are recommended if diet does not reach daily fiber goal (20-30 grams daily), such as Metamucil, Citrucel, or FiberCon (take as directed, separate from other oral medications by >2 hours).  -If still no improvement with these measures, call/follow-up    Rectal bleeding, External hemorrhoids    - avoid constipation and straining with bowel movements; try using an OTC stool softener as directed and increase fiber in diet (20-30 grams daily)/OTC fiber supplement such as metamucil (take as directed)  - recommend SITZ baths  - possible referral to colorectal surgery if symptoms persist  -consider repeating colonoscopy if symptom continues or worsens    Gastroesophageal reflux disease, unspecified whether esophagitis present   Continue Prilosec 40 mg orally daily   Continue with scheduled EGD   -Take PPI 30min-1hr before eating breakfast  -Educated patient on lifestyle modifications to help control/reduce reflux/abdominal pain including: avoid large meals, avoid eating within 2-3 hours of bedtime (avoid late night eating & lying down soon after eating), elevate head of bed if nocturnal symptoms are present, smoking cessation (if current smoker), & weight loss (if overweight).   -Educated to avoid known foods which trigger reflux symptoms & to minimize/avoid high-fat foods, chocolate, caffeine, citrus, alcohol, & tomato products.  -Advised to avoid/limit use of NSAID's, since they can cause GI upset, bleeding, and/or ulcers.  If needed, take with food.     Chronic nausea   -Continue Prilosec 40 mg orally daily   -Continue with scheduled EGD   -Follow GERD lifestyle modifications   -continue Zofran as needed for nausea   -consider GES    Follow up if symptoms worsen or fail to improve.      If no improvement in symptoms or symptoms worsen, call/follow-up at clinic or go to ER.       [facility]  [facility]  [facility], [place]     Dictation software program was used for this note. Please expect some simple typographical  errors in this note.    Encounter includes face to face time and non-face to face time preparing to see the patient (eg, review of tests), obtaining and/or reviewing separately obtained history, documenting clinical information in the electronic or other health record, independently interpreting results (not separately reported) and communicating results to the patient/family/caregiver, or care coordination (not separately reported).

## 2024-08-21 NOTE — PROGRESS NOTES
"  Subjective:      Patient ID:  Sherry Burns is a 29 y.o. female who presents for   Chief Complaint   Patient presents with    Rash     Under arms, legs      LOV 1/8/24 ([place]) Intrinsic Atopic Dermatitis  On dupixent since 5/2021 with frequent tx interruption ("forgets")    Patient here today for rash to under arms and legs x 1 week.   Recent AC issues with inability to cool home, led to flare  Pt states she was diagnosed with Eczema, controlling with Dupixent, last dose 8/16/24.  Pt states she does not think this flare is associated with her Eczema.   Applying Triamcinolone to areas, no resolution.     2021:  Skin, left back, punch biopsy:   - FEATURES SUGGESTIVE OF ICHTHYOSIS VULGARIS (SEE COMMENT).   COMMENT:  These histologic features are relatively subtle and non-specific.   However, the thickened layer of compact orthokeratosis overlying a diminished   granular layer in the absence of any significant epidermal spongiosis or   dermal inflammation are features that are consistent with this diagnosis.  A   resolving eczematous process cannot be entirely excluded.     Previous derm hx:     -born bright red with scaling on whole body. She was treated with PO steroids for > 1 year as a child. Her rash has lessened in severity over time but it is still present.       Derm Hx:  Denies Phx of NMSC  Denies Fhx of MM    Current Outpatient Medications:   ·  albuterol (PROVENTIL/VENTOLIN HFA) 90 mcg/actuation inhaler, Inhale 1 puff into the lungs every 4 (four) hours as needed for Shortness of Breath., Disp: 18 g, Rfl: 11  ·  ammonium lactate (LAC-HYDRIN) 12 % lotion, Use on AA nightly, Disp: 225 g, Rfl: 6  ·  azelastine (ASTELIN) 137 mcg (0.1 %) nasal spray, , Disp: , Rfl:   ·  busPIRone (BUSPAR) 30 MG Tab, Take 30 mg by mouth 2 (two) times daily., Disp: , Rfl:   ·  cariprazine (VRAYLAR) 3 mg Cap, Take 3 mg by mouth., Disp: , Rfl:   ·  cetirizine (ZYRTEC) 10 MG tablet, Take 10 mg by mouth daily as needed. , Disp: , " Rfl:   ·  clobetasol 0.05% (TEMOVATE) 0.05 % Oint, Apply topically 2 (two) times daily., Disp: 60 g, Rfl: 1  ·  DUPIXENT SYRINGE 300 mg/2 mL Syrg, Inject 300 mg (2 mL) into the skin every other week, Disp: 4 mL, Rfl: 11  ·  EScitalopram oxalate (LEXAPRO) 10 MG tablet, , Disp: , Rfl:   ·  fluticasone-umeclidin-vilanter (TRELEGY ELLIPTA) 200-62.5-25 mcg inhaler, Inhale 1 puff into the lungs once daily., Disp: 60 each, Rfl: 11  ·  galcanezumab-gnlm (EMGALITY PEN) 120 mg/mL PnIj, Inject 1 mL (120 mg total) into the skin every 28 days., Disp: 1 mL, Rfl: 11  ·  hydrOXYzine HCL (ATARAX) 10 MG Tab, Take 10 mg by mouth nightly., Disp: , Rfl:   ·  levalbuterol (XOPENEX HFA) 45 mcg/actuation inhaler, Inhale 1-2 puffs into the lungs every 4 (four) hours as needed for Wheezing. Rescue, Disp: 15 g, Rfl: 11  ·  levalbuterol (XOPENEX) 1.25 mg/3 mL nebulizer solution, Take 3 mLs (1.25 mg total) by nebulization every 4 (four) hours as needed for Wheezing. Rescue, Disp: 75 mL, Rfl: 11  ·  magnesium oxide (MAG-OX) 400 mg (241.3 mg magnesium) tablet, Take 1 tablet (400 mg total) by mouth once daily., Disp: 90 tablet, Rfl: 3  ·  metFORMIN (GLUCOPHAGE-XR) 500 MG ER 24hr tablet, Take 1 tablet (500 mg total) by mouth daily with breakfast., Disp: 90 tablet, Rfl: 1  ·  metoprolol succinate (TOPROL-XL) 25 MG 24 hr tablet, Take 1 tablet (25 mg total) by mouth 2 (two) times daily., Disp: 60 tablet, Rfl: 11  ·  omeprazole (PRILOSEC) 40 MG capsule, Take 1 capsule (40 mg total) by mouth Daily., Disp: 90 capsule, Rfl: 1  ·  triamcinolone acetonide 0.1% (KENALOG) 0.1 % cream, Apply topically 2 (two) times daily., Disp: 454 g, Rfl: 2  ·  UBRELVY 100 mg tablet, Take 100 mg by mouth as needed for Migraine., Disp: , Rfl:   ·  urea 40 % Lotn lotion, Apply topically once daily., Disp: 226 g, Rfl: 5        Review of Systems   Constitutional:  Negative for fever, chills and fatigue.   Respiratory:  Negative for cough and shortness of breath.     Gastrointestinal:  Negative for nausea and vomiting.   Skin:  Positive for itching and rash. Negative for dry skin, daily sunscreen use and activity-related sunscreen use.       Objective:   Physical Exam   Constitutional: She appears well-developed and well-nourished.   Neurological: She is alert and oriented to person, place, and time.   Psychiatric: She has a normal mood and affect.   Skin:   Areas Examined (abnormalities noted in diagram):   Head / Face Inspection Performed  Neck Inspection Performed  Chest / Axilla Inspection Performed  Back Inspection Performed  RUE Inspected  LUE Inspection Performed  RLE Inspected  LLE Inspection Performed                Diagram Legend     Erythematous scaling macule/papule c/w actinic keratosis       Vascular papule c/w angioma      Pigmented verrucoid papule/plaque c/w seborrheic keratosis      Yellow umbilicated papule c/w sebaceous hyperplasia      Irregularly shaped tan macule c/w lentigo     1-2 mm smooth white papules consistent with Milia      Movable subcutaneous cyst with punctum c/w epidermal inclusion cyst      Subcutaneous movable cyst c/w pilar cyst      Firm pink to brown papule c/w dermatofibroma      Pedunculated fleshy papule(s) c/w skin tag(s)      Evenly pigmented macule c/w junctional nevus     Mildly variegated pigmented, slightly irregular-bordered macule c/w mildly atypical nevus      Flesh colored to evenly pigmented papule c/w intradermal nevus       Pink pearly papule/plaque c/w basal cell carcinoma      Erythematous hyperkeratotic cursted plaque c/w SCC      Surgical scar with no sign of skin cancer recurrence      Open and closed comedones      Inflammatory papules and pustules      Verrucoid papule consistent consistent with wart     Erythematous eczematous patches and plaques     Dystrophic onycholytic nail with subungual debris c/w onychomycosis     Umbilicated papule    Erythematous-base heme-crusted tan verrucoid plaque consistent with  inflamed seborrheic keratosis     Erythematous Silvery Scaling Plaque c/w Psoriasis     See annotation      Assessment / Plan:        Intrinsic atopic dermatitis, recently flaring  Ichthyosis  -     Discontinue: DUPIXENT SYRINGE 300 mg/2 mL Syrg; Inject 300 mg (2 mL) into the skin every other week  Dispense: 4 mL; Refill: 11  -     DUPIXENT SYRINGE 300 mg/2 mL Syrg; Inject 300 mg (2 mL) into the skin every other week  Dispense: 4 mL; Refill: 11    Doing very well on dupixent when compliant, advised not to interrupt tx  Recent flare in folds, tac helped control, wean off  Chose unscented clear gel anti-perspirant    Discussed  benefits and risks of Dupixent including but not limited to injection site reactions, Dupixent- induced facial dermatitis, increased risk of eye/eyelid irritation and inflammation as well as oral herpes infection and possible helminth infections.    Aggressive moisturizer use (cerave)                        No follow-ups on file.

## 2024-08-21 NOTE — PROGRESS NOTES
Subjective:       Patient ID: Sherry Burns is a 29 y.o. female There is no height or weight on file to calculate BMI.    Chief Complaint: No chief complaint on file.    This patient is new to me.  Referring Provider: No ref. provider found for GERD,constipation.  Established patient of Dr. Williamson.     Twice a week  Has been taking fiber and miralax   Straining does not feel empty    Spots     GI Problem  The primary symptoms include abdominal pain (reports lower abdominal pain worsened by constipation), nausea (reports daily nausea with occasional vomiting after eating acidic foods (spicy, greasy)) and hematochezia (states will have intermittent rectal bleeding last episode 2 weeks ago quarter size on tissue no blood in stool no blood in tissue no bleeding since then hx of constipation, straining, and has external hemorrhoids no rectal pain). Primary symptoms do not include fever, weight loss, fatigue, vomiting (will vomit like 2x/week vomits up non bloody no bile), diarrhea, melena, hematemesis, jaundice, dysuria, myalgias or arthralgias.   The abdominal pain is located in the LLQ and RLQ. The severity of the abdominal pain is 0/10 (not in current pain). The abdominal pain is relieved by bowel movements.   The illness is also significant for dysphagia (started noticing dyshagia a month ago w/ meats no issues with pills or liquids feels as if it becomes stuck in throat area and will comes back up no hx of EGD with dilation), bloating (reports feeling very gassy and bloated hx of constipation and GERD) and constipation (reports chronic constipation, has a bm 3x/week strains reports occasional soft stools and will have looser stools at times, does not take anything for her bowels does not feel empty states her last BM was 2 weeks ago). Associated symptoms comments: Has had EGD AND COLONOSCOPY completed for iron-deficiency anemia.  Colonoscopy 03/05/2021 per Dr. Williamson Impression:Endoscopy unremarkable other  than small hemorrhoids  RECOMMENDATION:- Repeat colonoscopy at age 45 for screening purposes  EGD 3/5/21 Per Dr. Williamson  IMPRESSION:  - Normal esophagus.    - Gastritis. Biopsied.  Negative H pylori per pathology report unremarkable.    - Normal examined duodenum. Biopsied.   -denies any family history of colon cancer. Significant associated medical issues include GERD. Associated medical issues do not include inflammatory bowel disease, gallstones, liver disease, alcohol abuse, PUD, bowel resection, irritable bowel syndrome, hemorrhoids or diverticulitis.   Gastroesophageal Reflux  She complains of abdominal pain (reports lower abdominal pain worsened by constipation), dysphagia (started noticing dyshagia a month ago w/ meats no issues with pills or liquids feels as if it becomes stuck in throat area and will comes back up no hx of EGD with dilation), heartburn, nausea (reports daily nausea with occasional vomiting after eating acidic foods (spicy, greasy)) and water brash. She reports no belching, no chest pain, no choking, no coughing, no early satiety, no globus sensation or no hoarse voice. This is a recurrent problem. The current episode started more than 1 year ago. The problem has been waxing and waning. The heartburn is located in the substernum. The heartburn does not wake her from sleep. The heartburn does not limit her activity. The heartburn doesn't change with position. The symptoms are aggravated by certain foods and caffeine. Pertinent negatives include no anemia, fatigue, melena, muscle weakness, orthopnea or weight loss. Risk factors include smoking/tobacco exposure. She has tried an antacid (has been taking TUMS otc) for the symptoms. The treatment provided moderate relief. Past procedures include an EGD. Past procedures do not include an abdominal ultrasound, esophageal manometry, esophageal pH monitoring, H. pylori antibody titer or a UGI. Past invasive treatments do not include gastroplasty,  gastroplication or reflux surgery.       Review of Systems   Constitutional:  Negative for fatigue, fever and weight loss.   HENT:  Negative for hoarse voice.    Respiratory:  Negative for cough and choking.    Cardiovascular:  Negative for chest pain.   Gastrointestinal:  Positive for abdominal pain (reports lower abdominal pain worsened by constipation), anal bleeding, bloating (reports feeling very gassy and bloated hx of constipation and GERD), constipation (reports chronic constipation, has a bm 3x/week strains reports occasional soft stools and will have looser stools at times, does not take anything for her bowels does not feel empty states her last BM was 2 weeks ago), dysphagia (started noticing dyshagia a month ago w/ meats no issues with pills or liquids feels as if it becomes stuck in throat area and will comes back up no hx of EGD with dilation), heartburn, hematochezia (states will have intermittent rectal bleeding last episode 2 weeks ago quarter size on tissue no blood in stool no blood in tissue no bleeding since then hx of constipation, straining, and has external hemorrhoids no rectal pain) and nausea (reports daily nausea with occasional vomiting after eating acidic foods (spicy, greasy)). Negative for abdominal distention, blood in stool, diarrhea, hematemesis, jaundice, melena, rectal pain and vomiting (will vomit like 2x/week vomits up non bloody no bile).   Genitourinary:  Negative for dysuria.   Musculoskeletal:  Negative for arthralgias, myalgias and muscle weakness.         No LMP recorded.  Past Medical History:   Diagnosis Date    Anemia     Anxiety disorder, unspecified     Asthma     Bipolar disorder, unspecified     Depression     Eczema     GERD (gastroesophageal reflux disease)     Glaucoma     Headache     Heart murmur      Past Surgical History:   Procedure Laterality Date    COLONOSCOPY N/A 03/05/2021    Procedure: COLONOSCOPY;  Surgeon: Jake Williamson MD;  Location: [facility];   Service: Endoscopy;  Laterality: N/A;    ESOPHAGOGASTRODUODENOSCOPY N/A 03/05/2021    Procedure: EGD (ESOPHAGOGASTRODUODENOSCOPY);  Surgeon: Jake Williamson MD;  Location: [facility];  Service: Endoscopy;  Laterality: N/A;    UPPER GASTROINTESTINAL ENDOSCOPY       Family History   Problem Relation Name Age of Onset    Hypertension Mother      Hyperlipidemia Mother      Colon polyps Mother      Heart attacks under age 50 Mother      Drug abuse Father      Arthritis Brother      Glaucoma Paternal Grandmother      Dementia Paternal Grandmother      Melanoma Neg Hx      Colon cancer Neg Hx      Crohn's disease Neg Hx      Esophageal cancer Neg Hx      Liver cancer Neg Hx      Rectal cancer Neg Hx      Stomach cancer Neg Hx      Ulcerative colitis Neg Hx       Social History     Tobacco Use    Smoking status: Some Days     Current packs/day: 0.50     Average packs/day: 0.5 packs/day for 10.7 years (5.4 ttl pk-yrs)     Types: Cigarettes     Start date: 12/3/2013    Smokeless tobacco: Never   Substance Use Topics    Alcohol use: Not Currently    Drug use: Never     Wt Readings from Last 10 Encounters:   08/21/24 72.3 kg (159 lb 6.3 oz)   08/12/24 72.3 kg (159 lb 6.3 oz)   07/26/24 71.2 kg (156 lb 15.5 oz)   07/13/24 73 kg (161 lb)   07/08/24 73.5 kg (162 lb 0.6 oz)   07/03/24 72.8 kg (160 lb 6.4 oz)   03/08/24 69.3 kg (152 lb 12.8 oz)   03/04/24 68.8 kg (151 lb 10.8 oz)   02/29/24 65.8 kg (145 lb)   01/05/24 65.9 kg (145 lb 6.3 oz)     Lab Results   Component Value Date    WBC 10.93 12/18/2023    HGB 13.4 12/18/2023    HCT 40.7 12/18/2023    MCV 91 12/18/2023     12/18/2023     CMP  Sodium   Date Value Ref Range Status   12/18/2023 138 136 - 145 mmol/L Final     Potassium   Date Value Ref Range Status   12/18/2023 4.4 3.5 - 5.1 mmol/L Final     Chloride   Date Value Ref Range Status   12/18/2023 106 95 - 110 mmol/L Final     CO2   Date Value Ref Range Status   12/18/2023 22 (L) 23 - 29 mmol/L Final     Glucose   Date  "Value Ref Range Status   12/18/2023 93 70 - 110 mg/dL Final     BUN   Date Value Ref Range Status   12/18/2023 15 6 - 20 mg/dL Final     Creatinine   Date Value Ref Range Status   12/18/2023 0.8 0.5 - 1.4 mg/dL Final     Calcium   Date Value Ref Range Status   12/18/2023 9.5 8.7 - 10.5 mg/dL Final     Total Protein   Date Value Ref Range Status   12/18/2023 7.2 6.0 - 8.4 g/dL Final     Albumin   Date Value Ref Range Status   12/18/2023 4.0 3.5 - 5.2 g/dL Final     Total Bilirubin   Date Value Ref Range Status   12/18/2023 0.4 0.1 - 1.0 mg/dL Final     Comment:     For infants and newborns, interpretation of results should be based  on gestational age, weight and in agreement with clinical  observations.    Premature Infant recommended reference ranges:  Up to 24 hours.............<8.0 mg/dL  Up to 48 hours............<12.0 mg/dL  3-5 days..................<15.0 mg/dL  6-29 days.................<15.0 mg/dL       Alkaline Phosphatase   Date Value Ref Range Status   12/18/2023 101 55 - 135 U/L Final     AST   Date Value Ref Range Status   12/18/2023 22 10 - 40 U/L Final     ALT   Date Value Ref Range Status   12/18/2023 46 (H) 10 - 44 U/L Final     Anion Gap   Date Value Ref Range Status   12/18/2023 10 8 - 16 mmol/L Final     eGFR if    Date Value Ref Range Status   05/28/2022 >60 >60 mL/min/1.73 m^2 Final     eGFR if non    Date Value Ref Range Status   05/28/2022 >60 >60 mL/min/1.73 m^2 Final     Comment:     Calculation used to obtain the estimated glomerular filtration  rate (eGFR) is the CKD-EPI equation.        Lab Results   Component Value Date    LIPASE 20 12/18/2023     No results found for: "LIPASERES"  Lab Results   Component Value Date    TSH 2.306 07/26/2024       Reviewed prior medical records including radiology report of CT abdomen pelvis 12/18/2023 & endoscopy history (see surgical history/procedures).    Objective:      Physical Exam  Vitals and nursing note reviewed. "   Constitutional:       Appearance: Normal appearance. She is normal weight.   Cardiovascular:      Rate and Rhythm: Normal rate and regular rhythm.      Heart sounds: Normal heart sounds.   Pulmonary:      Breath sounds: Normal breath sounds.   Abdominal:      General: Bowel sounds are normal.      Palpations: Abdomen is soft.      Tenderness: There is no abdominal tenderness.   Skin:     General: Skin is warm and dry.      Coloration: Skin is not jaundiced.   Neurological:      Mental Status: She is alert and oriented to person, place, and time.   Psychiatric:         Mood and Affect: Mood normal.         Behavior: Behavior normal.         Assessment:       No diagnosis found.      Plan:       Nausea and vomiting, unspecified vomiting type   - schedule EGD, discussed procedure with patient, including risks and benefits, patient verbalized understanding  -     US Abdomen Complete; Future; Expected date: 07/08/2024  - Start on Prilosec as prescribed  - Start Zofran 4 mg b.i.d. as needed for nausea  -follow GERD lifestyle modifications  -Consider GES    Gastroesophageal reflux disease, unspecified whether esophagitis present, Waterbrash, Heartburn   - schedule EGD, discussed procedure with patient, including risks and benefits, patient verbalized understanding  -  START   omeprazole (PRILOSEC) 40 MG capsule; Take 1 capsule (40 mg total) by mouth Daily.  Dispense: 90 capsule; Refill: 1  -Take PPI 30min-1hr before eating breakfast  -Educated patient on lifestyle modifications to help control/reduce reflux/abdominal pain including: avoid large meals, avoid eating within 2-3 hours of bedtime (avoid late night eating & lying down soon after eating), elevate head of bed if nocturnal symptoms are present, smoking cessation (if current smoker), & weight loss (if overweight).   -Educated to avoid known foods which trigger reflux symptoms & to minimize/avoid high-fat foods, chocolate, caffeine, citrus, alcohol, & tomato  products.  -Advised to avoid/limit use of NSAID's, since they can cause GI upset, bleeding, and/or ulcers. If needed, take with food.     Dysphagia, unspecified type   - schedule EGD, discussed procedure with patient and possible esophageal dilation may be performed during procedure if indicated, patient verbalized understanding  - recommend to eat smaller more frequent meals and to eat slowly and advised to eat a soft diet. Take medications one at a time with a full glass of water.  - possible UGI/esophagram/esophageal manometry if symptoms persist    Bloating   - recommend OTC simethicone as directed, such as Phazyme or Gas-x  - recommend low gas diet: Reduce or eliminate these foods from your diet: Broccoli, Cauliflower, [place] sprouts, Cabbage, Cooked dried beans, Carbonated beverages (sparkling water, soda, beer, champagne)  Other Causes Of Excess Gas Include:   1) EATING TOO FAST or TALKING WHILE YOU CHEW may cause you to swallow air. This increases the amount of gas in the stomach and may worsen your symptoms.  --> Chew each mouthful completely before swallowing. Take your time.  2) OVEREATING may increase the feeling of being bloated and cause more gas.  --> When you are full, stop eating.  3) CONSTIPATION can increase the amount of normal intestinal gas.  --> Avoid constipation by increasing the amount of fiber in your diet by including whole cereal grains, fresh vegetables (except those in the above list) and fresh fruits. High-fiber foods absorb water and carry it out of the body. When increasing the amount of fiber in your diet, you also need to increase the amount of water that you drink. You should drink at least eight 8-ounce glasses of water (two quarts) per day.    Chronic constipation  -     X-Ray Abdomen AP 1 View; Future; Expected date: 07/08/2024   -XRAY STAT   -After xray is reviewed Can try one time dose of OTC magnesium citrate (295ml- take as directed) to help clear the stool out of the  colon. Otherwise, continue previous recommendations as discussed.   -Recommend daily exercise as tolerated, adequate water intake (six 8-oz glasses of water daily), and high fiber diet. OTC fiber supplements are recommended if diet does not reach daily fiber goal (20-30 grams daily), such as Metamucil, Citrucel, or FiberCon (take as directed, separate from other oral medications by >2 hours).  -Recommend trying OTC MiraLax once daily (17g PO) as directed  -If no improvement with above recommendations, try intermittently dosed Dulcolax OTC as directed (every 3-4 days) PRN to facilitate bowel movements  -If no relief with this, consider adding a emollient laxative (castor oil or mineral oil) +/- enema  -If still no improvement with these measures, call/follow-up   -consider linzess    Lower abdominal pain   -start on constipation regimen   -Xray stat of abdomen ordered    Rectal bleeding, External Hemorrhoids   - avoid constipation and straining with bowel movements; try using an OTC stool softener as directed and increase fiber in diet (20-30 grams daily)/OTC fiber supplement such as metamucil (take as directed)  - recommend SITZ baths  - possible referral to colorectal surgery if symptoms persist  -consider repeating colonoscopy if symptom continues or worsens        No follow-ups on file.      If no improvement in symptoms or symptoms worsen, call/follow-up at clinic or go to ER.       [facility]  [facility]  [facility], [place]     Dictation software program was used for this note. Please expect some simple typographical  errors in this note.    Encounter includes face to face time and non-face to face time preparing to see the patient (eg, review of tests), obtaining and/or reviewing separately obtained history, documenting clinical information in the electronic or other health record, independently interpreting results (not separately reported) and communicating results to  the patient/family/caregiver, or care coordination (not separately reported).

## 2024-08-23 ENCOUNTER — TELEPHONE (OUTPATIENT)
Dept: HEMATOLOGY/ONCOLOGY | Facility: CLINIC | Age: 29
End: 2024-08-23
Payer: MEDICARE

## 2024-08-23 DIAGNOSIS — D50.0 IRON DEFICIENCY ANEMIA DUE TO CHRONIC BLOOD LOSS: Primary | ICD-10-CM

## 2024-08-23 NOTE — TELEPHONE ENCOUNTER
Attempted to call patient to let her know that she does need labs done before her upcoming appointment. No answer at number listed. Voicemail left with instructions to call back with lab preference.

## 2024-08-26 ENCOUNTER — PATIENT MESSAGE (OUTPATIENT)
Dept: ADMINISTRATIVE | Facility: OTHER | Age: 29
End: 2024-08-26
Payer: MEDICARE

## 2024-08-26 ENCOUNTER — PATIENT MESSAGE (OUTPATIENT)
Dept: PULMONOLOGY | Facility: CLINIC | Age: 29
End: 2024-08-26
Payer: MEDICARE

## 2024-08-27 ENCOUNTER — LAB VISIT (OUTPATIENT)
Dept: LAB | Facility: HOSPITAL | Age: 29
End: 2024-08-27
Attending: INTERNAL MEDICINE
Payer: MEDICARE

## 2024-08-27 ENCOUNTER — LAB VISIT (OUTPATIENT)
Dept: LAB | Facility: HOSPITAL | Age: 29
End: 2024-08-27
Payer: MEDICARE

## 2024-08-27 DIAGNOSIS — R11.2 NAUSEA AND VOMITING, UNSPECIFIED VOMITING TYPE: ICD-10-CM

## 2024-08-27 DIAGNOSIS — R74.8 ELEVATED LIVER ENZYMES: ICD-10-CM

## 2024-08-27 DIAGNOSIS — D50.0 IRON DEFICIENCY ANEMIA DUE TO CHRONIC BLOOD LOSS: ICD-10-CM

## 2024-08-27 LAB
ALBUMIN SERPL BCP-MCNC: 4.1 G/DL (ref 3.5–5.2)
ALP SERPL-CCNC: 118 U/L (ref 55–135)
ALT SERPL W/O P-5'-P-CCNC: 55 U/L (ref 10–44)
AST SERPL-CCNC: 31 U/L (ref 10–40)
BASOPHILS # BLD AUTO: 0.07 K/UL (ref 0–0.2)
BASOPHILS NFR BLD: 0.9 % (ref 0–1.9)
BILIRUB DIRECT SERPL-MCNC: 0.2 MG/DL (ref 0.1–0.3)
BILIRUB SERPL-MCNC: 0.5 MG/DL (ref 0.1–1)
DIFFERENTIAL METHOD BLD: ABNORMAL
EOSINOPHIL # BLD AUTO: 0.2 K/UL (ref 0–0.5)
EOSINOPHIL NFR BLD: 2.7 % (ref 0–8)
ERYTHROCYTE [DISTWIDTH] IN BLOOD BY AUTOMATED COUNT: 13.2 % (ref 11.5–14.5)
FERRITIN SERPL-MCNC: 32 NG/ML (ref 20–300)
HCT VFR BLD AUTO: 40.4 % (ref 37–48.5)
HGB BLD-MCNC: 13.2 G/DL (ref 12–16)
IMM GRANULOCYTES # BLD AUTO: 0.03 K/UL (ref 0–0.04)
IMM GRANULOCYTES NFR BLD AUTO: 0.4 % (ref 0–0.5)
LYMPHOCYTES # BLD AUTO: 1.8 K/UL (ref 1–4.8)
LYMPHOCYTES NFR BLD: 23.7 % (ref 18–48)
MCH RBC QN AUTO: 27.6 PG (ref 27–31)
MCHC RBC AUTO-ENTMCNC: 32.7 G/DL (ref 32–36)
MCV RBC AUTO: 84 FL (ref 82–98)
MONOCYTES # BLD AUTO: 0.5 K/UL (ref 0.3–1)
MONOCYTES NFR BLD: 6.4 % (ref 4–15)
NEUTROPHILS # BLD AUTO: 5.1 K/UL (ref 1.8–7.7)
NEUTROPHILS NFR BLD: 65.9 % (ref 38–73)
NRBC BLD-RTO: 0 /100 WBC
PLATELET # BLD AUTO: 354 K/UL (ref 150–450)
PMV BLD AUTO: 9.1 FL (ref 9.2–12.9)
PROT SERPL-MCNC: 7.3 G/DL (ref 6–8.4)
RBC # BLD AUTO: 4.79 M/UL (ref 4–5.4)
WBC # BLD AUTO: 7.69 K/UL (ref 3.9–12.7)

## 2024-08-27 PROCEDURE — 82784 ASSAY IGA/IGD/IGG/IGM EACH: CPT

## 2024-08-27 PROCEDURE — 86364 TISS TRNSGLTMNASE EA IG CLAS: CPT

## 2024-08-27 PROCEDURE — 80076 HEPATIC FUNCTION PANEL: CPT

## 2024-08-27 PROCEDURE — 85025 COMPLETE CBC W/AUTO DIFF WBC: CPT | Performed by: INTERNAL MEDICINE

## 2024-08-27 PROCEDURE — 82728 ASSAY OF FERRITIN: CPT | Performed by: INTERNAL MEDICINE

## 2024-08-28 LAB — IGA SERPL-MCNC: 93 MG/DL (ref 61–356)

## 2024-08-29 ENCOUNTER — OFFICE VISIT (OUTPATIENT)
Dept: URGENT CARE | Facility: CLINIC | Age: 29
End: 2024-08-29
Payer: MEDICARE

## 2024-08-29 VITALS
RESPIRATION RATE: 16 BRPM | DIASTOLIC BLOOD PRESSURE: 78 MMHG | HEIGHT: 60 IN | BODY MASS INDEX: 31.22 KG/M2 | TEMPERATURE: 98 F | SYSTOLIC BLOOD PRESSURE: 112 MMHG | WEIGHT: 159 LBS | HEART RATE: 100 BPM | OXYGEN SATURATION: 99 %

## 2024-08-29 DIAGNOSIS — U07.1 COVID-19 VIRUS DETECTED: ICD-10-CM

## 2024-08-29 DIAGNOSIS — R05.9 COUGH, UNSPECIFIED TYPE: ICD-10-CM

## 2024-08-29 DIAGNOSIS — R09.81 SINUS CONGESTION: ICD-10-CM

## 2024-08-29 DIAGNOSIS — U07.1 COVID-19 VIRUS INFECTION: Primary | ICD-10-CM

## 2024-08-29 DIAGNOSIS — J02.9 SORE THROAT: ICD-10-CM

## 2024-08-29 LAB
CTP QC/QA: YES
FLUAV AG NPH QL: NEGATIVE
FLUBV AG NPH QL: NEGATIVE
S PYO RRNA THROAT QL PROBE: NEGATIVE
SARS-COV-2 AG RESP QL IA.RAPID: POSITIVE

## 2024-08-29 RX ORDER — CETIRIZINE HYDROCHLORIDE 10 MG/1
10 TABLET ORAL DAILY
Qty: 30 TABLET | Refills: 0 | Status: SHIPPED | OUTPATIENT
Start: 2024-08-29

## 2024-08-29 RX ORDER — PROMETHAZINE HYDROCHLORIDE AND DEXTROMETHORPHAN HYDROBROMIDE 6.25; 15 MG/5ML; MG/5ML
5 SYRUP ORAL NIGHTLY PRN
Qty: 50 ML | Refills: 0 | Status: SHIPPED | OUTPATIENT
Start: 2024-08-29 | End: 2024-09-08

## 2024-08-29 RX ORDER — GUAIFENESIN AND DEXTROMETHORPHAN HYDROBROMIDE 10; 100 MG/5ML; MG/5ML
5 SYRUP ORAL EVERY 6 HOURS PRN
Qty: 200 ML | Refills: 0 | Status: SHIPPED | OUTPATIENT
Start: 2024-08-29 | End: 2024-09-08

## 2024-08-29 RX ORDER — FLUTICASONE PROPIONATE 50 MCG
1 SPRAY, SUSPENSION (ML) NASAL DAILY
Qty: 15.8 ML | Refills: 0 | Status: SHIPPED | OUTPATIENT
Start: 2024-08-29

## 2024-08-29 NOTE — PROGRESS NOTES
Subjective:      Patient ID: Sherry Burns is a 29 y.o. female.    Vitals:  height is 5' (1.524 m) and weight is 72.1 kg (159 lb). Her temperature is 98 °F (36.7 °C). Her blood pressure is 112/78 and her pulse is 100. Her respiration is 16 and oxygen saturation is 99%.     Chief Complaint: Cough and Sore Throat    Patient complains of coughing, sore throat and popping in both ears x 3 days. Denies fever, SOB, difficulty breathing.       Constitution: Positive for chills and fatigue. Negative for fever.   HENT:  Positive for congestion, postnasal drip and sinus pressure.    Cardiovascular: Negative.    Respiratory:  Positive for cough and asthma. Negative for sputum production, shortness of breath and wheezing.    Musculoskeletal:  Positive for muscle ache.   Allergic/Immunologic: Positive for asthma.   Neurological: Negative.    Psychiatric/Behavioral: Negative.        Objective:     Physical Exam   Constitutional: She is oriented to person, place, and time. She appears well-developed. She is cooperative.  Non-toxic appearance. She does not appear ill. No distress.   HENT:   Head: Normocephalic and atraumatic.   Ears:   Right Ear: Hearing, tympanic membrane, external ear and ear canal normal.   Left Ear: Hearing, tympanic membrane, external ear and ear canal normal.   Nose: Rhinorrhea and congestion present. No mucosal edema or nasal deformity. No epistaxis. Right sinus exhibits no maxillary sinus tenderness and no frontal sinus tenderness. Left sinus exhibits no maxillary sinus tenderness and no frontal sinus tenderness.   Mouth/Throat: Uvula is midline and mucous membranes are normal. Mucous membranes are moist. No trismus in the jaw. Normal dentition. No uvula swelling. Posterior oropharyngeal erythema present. No oropharyngeal exudate or posterior oropharyngeal edema.   Eyes: Conjunctivae and lids are normal. No scleral icterus.   Neck: Trachea normal and phonation normal. Neck supple. No edema present.  No erythema present. No neck rigidity present.   Cardiovascular: Normal rate, regular rhythm and normal heart sounds.   Pulmonary/Chest: Effort normal and breath sounds normal. No respiratory distress. She has no decreased breath sounds. She has no wheezes. She has no rhonchi. She has no rales.   Abdominal: Normal appearance.   Musculoskeletal: Normal range of motion.         General: No deformity. Normal range of motion.   Neurological: She is alert and oriented to person, place, and time. She displays no weakness. She exhibits normal muscle tone.   Skin: Skin is warm, dry, intact, not diaphoretic and not pale.   Psychiatric: Her speech is normal and behavior is normal. Mood, judgment and thought content normal.   Nursing note and vitals reviewed.      Assessment:     1. COVID-19 virus infection    2. Cough, unspecified type    3. Sore throat    4. Sinus congestion        Plan:       COVID-19 virus infection    Cough, unspecified type  -     SARS Coronavirus 2 Antigen, POCT Manual Read  -     POCT rapid strep A  -     POCT Influenza A/B Rapid Antigen  -     dextromethorphan-guaiFENesin  mg/5 ml (ROBITUSSIN-DM)  mg/5 mL liquid; Take 5 mLs by mouth every 6 (six) hours as needed.  Dispense: 200 mL; Refill: 0  -     promethazine-dextromethorphan (PROMETHAZINE-DM) 6.25-15 mg/5 mL Syrp; Take 5 mLs by mouth nightly as needed.  Dispense: 50 mL; Refill: 0    Sore throat    Sinus congestion  -     fluticasone propionate (FLONASE) 50 mcg/actuation nasal spray; 1 spray (50 mcg total) by Each Nostril route once daily.  Dispense: 15.8 mL; Refill: 0  -     cetirizine (ZYRTEC) 10 MG tablet; Take 1 tablet (10 mg total) by mouth once daily.  Dispense: 30 tablet; Refill: 0      COVID: positive  FLU: negative  Strep: negative    Discussed medication with patient who acknowledges understanding and is agreeable to POC. Follow up with primary care. Increase fluid intake. Red flags for ER discussed.

## 2024-08-29 NOTE — PATIENT INSTRUCTIONS
Symptomatic treatment to include:     Rest, increase fluid intake to include electrolyte replacement  Ibuprofen/Tylenol as directed for fever, sore throat, body aches  Zrytec and flonase for sinus symptoms  Guaifenesin DM cough syrup for daytime use  Phenergan DM cough syrup for night time use as this may make you drowsy.   Warm, salt water gargles, over the counter throat lozenges or sprays as desires.   ER for difficulty breathing not relieved by rest, excessive lethargy and/or change in mental status

## 2024-08-30 LAB — TTG IGA SER-ACNC: <0.2 U/ML

## 2024-09-16 ENCOUNTER — TELEPHONE (OUTPATIENT)
Dept: ORTHOPEDICS | Facility: CLINIC | Age: 29
End: 2024-09-16
Payer: MEDICARE

## 2024-09-16 NOTE — TELEPHONE ENCOUNTER
----- Message from [place] sent at 9/16/2024  3:22 PM CDT -----  Contact: Self  Type:  Sooner Appointment Request    Caller is requesting a sooner appointment.  Caller declined first available appointment listed below.  Caller will not accept being placed on the waitlist and is requesting a message be sent to doctor.    Name of Caller:  Patient  When is the first available appointment?  NA  Symptoms:  Back pain  Would the patient rather a call back or a response via [facility]? Call Back  Best Call Back Number:  [phone]  Additional Information:  Patient is requesting a call back regarding her back

## 2024-09-18 DIAGNOSIS — M25.551 RIGHT HIP PAIN: Primary | ICD-10-CM

## 2024-09-19 ENCOUNTER — HOSPITAL ENCOUNTER (OUTPATIENT)
Dept: RADIOLOGY | Facility: HOSPITAL | Age: 29
Discharge: HOME | End: 2024-09-19
Attending: NURSE PRACTITIONER
Payer: MEDICARE

## 2024-09-19 ENCOUNTER — PATIENT MESSAGE (OUTPATIENT)
Dept: PULMONOLOGY | Facility: CLINIC | Age: 29
End: 2024-09-19
Payer: MEDICARE

## 2024-09-19 DIAGNOSIS — M25.551 RIGHT HIP PAIN: ICD-10-CM

## 2024-09-19 PROCEDURE — 73502 X-RAY EXAM HIP UNI 2-3 VIEWS: CPT | Mod: TC,FY,PO,RT

## 2024-09-19 PROCEDURE — 73502 X-RAY EXAM HIP UNI 2-3 VIEWS: CPT | Mod: 26,RT,, | Performed by: RADIOLOGY

## 2024-09-21 ENCOUNTER — HOSPITAL ENCOUNTER (EMERGENCY)
Facility: HOSPITAL | Age: 29
Discharge: HOME | End: 2024-09-21
Attending: STUDENT IN AN ORGANIZED HEALTH CARE EDUCATION/TRAINING PROGRAM
Payer: MEDICARE

## 2024-09-21 VITALS
SYSTOLIC BLOOD PRESSURE: 115 MMHG | HEART RATE: 75 BPM | TEMPERATURE: 98 F | DIASTOLIC BLOOD PRESSURE: 74 MMHG | HEIGHT: 60 IN | RESPIRATION RATE: 17 BRPM | OXYGEN SATURATION: 99 % | WEIGHT: 159 LBS | BODY MASS INDEX: 31.22 KG/M2

## 2024-09-21 DIAGNOSIS — R10.31 RIGHT LOWER QUADRANT ABDOMINAL PAIN: Primary | ICD-10-CM

## 2024-09-21 DIAGNOSIS — R79.89 ELEVATED LFTS: ICD-10-CM

## 2024-09-21 DIAGNOSIS — N89.8 VAGINAL DISCHARGE: ICD-10-CM

## 2024-09-21 DIAGNOSIS — K76.0 HEPATIC STEATOSIS: ICD-10-CM

## 2024-09-21 LAB
ALBUMIN SERPL BCP-MCNC: 4.2 G/DL (ref 3.5–5.2)
ALP SERPL-CCNC: 110 U/L (ref 55–135)
ALT SERPL W/O P-5'-P-CCNC: 140 U/L (ref 10–44)
ANION GAP SERPL CALC-SCNC: 12 MMOL/L (ref 8–16)
AST SERPL-CCNC: 92 U/L (ref 10–40)
B-HCG UR QL: NEGATIVE
BACTERIA GENITAL QL WET PREP: ABNORMAL
BASOPHILS # BLD AUTO: 0.06 K/UL (ref 0–0.2)
BASOPHILS NFR BLD: 0.9 % (ref 0–1.9)
BILIRUB SERPL-MCNC: 0.6 MG/DL (ref 0.1–1)
BILIRUB UR QL STRIP: NEGATIVE
BUN SERPL-MCNC: 10 MG/DL (ref 6–20)
CALCIUM SERPL-MCNC: 9.6 MG/DL (ref 8.7–10.5)
CHLORIDE SERPL-SCNC: 107 MMOL/L (ref 95–110)
CLARITY UR: CLEAR
CLUE CELLS VAG QL WET PREP: ABNORMAL
CO2 SERPL-SCNC: 20 MMOL/L (ref 23–29)
COLOR UR: YELLOW
CREAT SERPL-MCNC: 0.9 MG/DL (ref 0.5–1.4)
CTP QC/QA: YES
DIFFERENTIAL METHOD BLD: NORMAL
EOSINOPHIL # BLD AUTO: 0.2 K/UL (ref 0–0.5)
EOSINOPHIL NFR BLD: 3 % (ref 0–8)
ERYTHROCYTE [DISTWIDTH] IN BLOOD BY AUTOMATED COUNT: 13.8 % (ref 11.5–14.5)
EST. GFR  (NO RACE VARIABLE): >60 ML/MIN/1.73 M^2
FILAMENT FUNGI VAG WET PREP-#/AREA: ABNORMAL
GLUCOSE SERPL-MCNC: 118 MG/DL (ref 70–110)
GLUCOSE UR QL STRIP: NEGATIVE
HCT VFR BLD AUTO: 39.6 % (ref 37–48.5)
HGB BLD-MCNC: 13.1 G/DL (ref 12–16)
HGB UR QL STRIP: NEGATIVE
IMM GRANULOCYTES # BLD AUTO: 0.01 K/UL (ref 0–0.04)
IMM GRANULOCYTES NFR BLD AUTO: 0.2 % (ref 0–0.5)
KETONES UR QL STRIP: NEGATIVE
LEUKOCYTE ESTERASE UR QL STRIP: NEGATIVE
LIPASE SERPL-CCNC: 29 U/L (ref 4–60)
LYMPHOCYTES # BLD AUTO: 1.7 K/UL (ref 1–4.8)
LYMPHOCYTES NFR BLD: 26 % (ref 18–48)
MCH RBC QN AUTO: 27.9 PG (ref 27–31)
MCHC RBC AUTO-ENTMCNC: 33.1 G/DL (ref 32–36)
MCV RBC AUTO: 84 FL (ref 82–98)
MONOCYTES # BLD AUTO: 0.5 K/UL (ref 0.3–1)
MONOCYTES NFR BLD: 7.8 % (ref 4–15)
NEUTROPHILS # BLD AUTO: 4 K/UL (ref 1.8–7.7)
NEUTROPHILS NFR BLD: 62.1 % (ref 38–73)
NITRITE UR QL STRIP: NEGATIVE
NRBC BLD-RTO: 0 /100 WBC
PH UR STRIP: 6 [PH] (ref 5–8)
PLATELET # BLD AUTO: 292 K/UL (ref 150–450)
PMV BLD AUTO: 9.3 FL (ref 9.2–12.9)
POTASSIUM SERPL-SCNC: 4 MMOL/L (ref 3.5–5.1)
PROT SERPL-MCNC: 7 G/DL (ref 6–8.4)
PROT UR QL STRIP: NEGATIVE
RBC # BLD AUTO: 4.69 M/UL (ref 4–5.4)
SODIUM SERPL-SCNC: 139 MMOL/L (ref 136–145)
SP GR UR STRIP: 1.02 (ref 1–1.03)
SPECIMEN SOURCE: ABNORMAL
T VAGINALIS GENITAL QL WET PREP: ABNORMAL
URN SPEC COLLECT METH UR: NORMAL
UROBILINOGEN UR STRIP-ACNC: NEGATIVE EU/DL
WBC # BLD AUTO: 6.38 K/UL (ref 3.9–12.7)
WBC #/AREA VAG WET PREP: ABNORMAL
YEAST GENITAL QL WET PREP: ABNORMAL

## 2024-09-21 PROCEDURE — 83690 ASSAY OF LIPASE: CPT

## 2024-09-21 PROCEDURE — 99285 EMERGENCY DEPT VISIT HI MDM: CPT | Mod: 25

## 2024-09-21 PROCEDURE — 87591 N.GONORRHOEAE DNA AMP PROB: CPT

## 2024-09-21 PROCEDURE — 25500020 PHARM REV CODE 255

## 2024-09-21 PROCEDURE — 96374 THER/PROPH/DIAG INJ IV PUSH: CPT

## 2024-09-21 PROCEDURE — 80053 COMPREHEN METABOLIC PANEL: CPT

## 2024-09-21 PROCEDURE — 85025 COMPLETE CBC W/AUTO DIFF WBC: CPT

## 2024-09-21 PROCEDURE — 25000003 PHARM REV CODE 250: Mod: UD

## 2024-09-21 PROCEDURE — 81025 URINE PREGNANCY TEST: CPT

## 2024-09-21 PROCEDURE — 36415 COLL VENOUS BLD VENIPUNCTURE: CPT

## 2024-09-21 PROCEDURE — 87210 SMEAR WET MOUNT SALINE/INK: CPT

## 2024-09-21 PROCEDURE — 96361 HYDRATE IV INFUSION ADD-ON: CPT

## 2024-09-21 PROCEDURE — 63600175 PHARM REV CODE 636 W HCPCS: Mod: UD

## 2024-09-21 PROCEDURE — 81003 URINALYSIS AUTO W/O SCOPE: CPT

## 2024-09-21 RX ORDER — NAPROXEN 500 MG/1
500 TABLET ORAL 2 TIMES DAILY PRN
Qty: 30 TABLET | Refills: 0 | Status: SHIPPED | OUTPATIENT
Start: 2024-09-21 | End: 2024-09-24

## 2024-09-21 RX ORDER — KETOROLAC TROMETHAMINE 30 MG/ML
15 INJECTION, SOLUTION INTRAMUSCULAR; INTRAVENOUS
Status: COMPLETED | OUTPATIENT
Start: 2024-09-21 | End: 2024-09-21

## 2024-09-21 RX ADMIN — SODIUM CHLORIDE 1000 ML: 9 INJECTION, SOLUTION INTRAVENOUS at 01:09

## 2024-09-21 RX ADMIN — IOHEXOL 75 ML: 350 INJECTION, SOLUTION INTRAVENOUS at 02:09

## 2024-09-21 RX ADMIN — KETOROLAC TROMETHAMINE 15 MG: 30 INJECTION, SOLUTION INTRAMUSCULAR; INTRAVENOUS at 01:09

## 2024-09-21 NOTE — ED PROVIDER NOTES
Encounter Date: 9/21/2024       History     Chief Complaint   Patient presents with    Abdominal Pain     Rt. Lower pain x 1 week      29-year-old female with a past medical history bipolar, anxiety, depression, and GERD presents to ED for abdominal pain.  Patient states it has been going on for 1 week.  Patient complaining of right lower quadrant stabbing intermittent 5/10 pain.  No medications prior to arrival.  Movement makes it worse.  Patient denies any previous surgeries.  Last menstrual period ended 1 week ago.  Patient also admits to dysuria and yellow vaginal discharge.  Patient denies fever, sweats, chills, shortness breath, chest pain, nausea, vomiting, diarrhea, constipation, urgency, frequency, decreased urine, vaginal bleeding, vaginal pain, low back pain, headaches, dizziness, and lightheadedness.      Review of patient's allergies indicates:  No Known Allergies  Past Medical History:   Diagnosis Date    Anemia     Anxiety disorder, unspecified     Asthma     Bipolar disorder, unspecified     Depression     Eczema     GERD (gastroesophageal reflux disease)     Glaucoma     Headache     Heart murmur      Past Surgical History:   Procedure Laterality Date    COLONOSCOPY N/A 03/05/2021    Procedure: COLONOSCOPY;  Surgeon: Jake Williamson MD;  Location: [facility];  Service: Endoscopy;  Laterality: N/A;    ESOPHAGOGASTRODUODENOSCOPY N/A 03/05/2021    Procedure: EGD (ESOPHAGOGASTRODUODENOSCOPY);  Surgeon: Jake Williamson MD;  Location: [facility];  Service: Endoscopy;  Laterality: N/A;    UPPER GASTROINTESTINAL ENDOSCOPY       Family History   Problem Relation Name Age of Onset    Hypertension Mother      Hyperlipidemia Mother      Colon polyps Mother      Heart attacks under age 50 Mother      Drug abuse Father      Arthritis Brother      Glaucoma Paternal Grandmother      Dementia Paternal Grandmother      Melanoma Neg Hx      Colon cancer Neg Hx      Crohn's disease Neg Hx      Esophageal cancer Neg Hx       Liver cancer Neg Hx      Rectal cancer Neg Hx      Stomach cancer Neg Hx      Ulcerative colitis Neg Hx       Social History     Tobacco Use    Smoking status: Some Days     Current packs/day: 0.50     Average packs/day: 0.5 packs/day for 10.8 years (5.4 ttl pk-yrs)     Types: Cigarettes     Start date: 12/3/2013    Smokeless tobacco: Never   Substance Use Topics    Alcohol use: Not Currently    Drug use: Never     Review of Systems   Constitutional:  Negative for chills, diaphoresis and fever.   Respiratory:  Negative for shortness of breath.    Gastrointestinal:  Positive for abdominal pain. Negative for constipation, diarrhea, nausea and vomiting.   Genitourinary:  Positive for dysuria and vaginal discharge. Negative for decreased urine volume, difficulty urinating, frequency, genital sores, hematuria, menstrual problem, urgency, vaginal bleeding and vaginal pain.   Musculoskeletal:  Negative for back pain.   Neurological:  Negative for dizziness, weakness, light-headedness and headaches.       Physical Exam     Initial Vitals   BP Pulse Resp Temp SpO2   09/21/24 1241 09/21/24 1241 09/21/24 1241 09/21/24 1241 09/21/24 1351   105/62 94 20 97.9 °F (36.6 °C) 100 %      MAP       --                Physical Exam    Nursing note and vitals reviewed.  Constitutional: Vital signs are normal. She appears well-developed and well-nourished. She is not diaphoretic. She is active. She does not appear ill. No distress.   HENT:   Head: Normocephalic and atraumatic.   Right Ear: External ear normal.   Left Ear: External ear normal.   Nose: Nose normal.   Eyes: Conjunctivae, EOM and lids are normal. Pupils are equal, round, and reactive to light. Right eye exhibits no discharge. Left eye exhibits no discharge.   Neck: Phonation normal. Neck supple.   Normal range of motion.   Full passive range of motion without pain.     Cardiovascular:  Normal rate and regular rhythm.           Pulmonary/Chest: Effort normal and breath  sounds normal. No respiratory distress.   Abdominal: Abdomen is soft. She exhibits no distension. There is abdominal tenderness in the right lower quadrant. There is no rebound and no guarding. positive psoas sign  Musculoskeletal:         General: Normal range of motion.      Cervical back: Full passive range of motion without pain, normal range of motion and neck supple.     Neurological: She is alert and oriented to person, place, and time. GCS eye subscore is 4. GCS verbal subscore is 5. GCS motor subscore is 6.   Skin: Skin is dry. Capillary refill takes less than 2 seconds.         ED Course   Procedures  Labs Reviewed   VAGINAL SCREEN - Abnormal       Result Value    Trichomonas None      Clue Cells None      Budding Yeast None      Fungal Hyphae None      WBC - Vaginal Screen Occasional (*)     Bacteria - Vaginal Screen Rare (*)     Wet Prep Source Vagina      Narrative:     Release to patient->Immediate   COMPREHENSIVE METABOLIC PANEL - Abnormal    Sodium 139      Potassium 4.0      Chloride 107      CO2 20 (*)     Glucose 118 (*)     BUN 10      Creatinine 0.9      Calcium 9.6      Total Protein 7.0      Albumin 4.2      Total Bilirubin 0.6      Alkaline Phosphatase 110      AST 92 (*)      (*)     eGFR >60      Anion Gap 12     C.TRACH/N.GONOR AMP RNA, VARIES    C. trach. RNA Source Urine      N.gonorroheae, RNA Source Urine     URINALYSIS, REFLEX TO URINE CULTURE    Specimen UA Urine, Clean Catch      Color, UA Yellow      Appearance, UA Clear      pH, UA 6.0      Specific Gravity, UA 1.020      Protein, UA Negative      Glucose, UA Negative      Ketones, UA Negative      Bilirubin (UA) Negative      Occult Blood UA Negative      Nitrite, UA Negative      Urobilinogen, UA Negative      Leukocytes, UA Negative      Narrative:     Specimen Source->Urine   CBC W/ AUTO DIFFERENTIAL    WBC 6.38      RBC 4.69      Hemoglobin 13.1      Hematocrit 39.6      MCV 84      MCH 27.9      MCHC 33.1      RDW  13.8      Platelets 292      MPV 9.3      Immature Granulocytes 0.2      Gran # (ANC) 4.0      Immature Grans (Abs) 0.01      Lymph # 1.7      Mono # 0.5      Eos # 0.2      Baso # 0.06      nRBC 0      Gran % 62.1      Lymph % 26.0      Mono % 7.8      Eosinophil % 3.0      Basophil % 0.9      Differential Method Automated     LIPASE    Lipase 29     POCT URINE PREGNANCY    POC Preg Test, Ur Negative       Acceptable Yes            Imaging Results              CT Abdomen Pelvis With IV Contrast NO Oral Contrast (Final result)  Result time 09/21/24 14:49:02      Final result by Michael Ordoñez MD (09/21/24 14:49:02)                   Impression:      No acute abdominopelvic process.  Normal appendix.    Hepatic steatosis.      Electronically signed by: Michael Ordoñez MD  Date:    09/21/2024  Time:    14:49               Narrative:    EXAMINATION:  CT ABDOMEN PELVIS WITH IV CONTRAST    CLINICAL HISTORY:  RLQ abdominal pain (Age >= 14y);    TECHNIQUE:  Low dose axial images, sagittal and coronal reformations were obtained from the lung bases to the pubic symphysis following the IV administration of 75 mL of Omnipaque 350 .  Oral contrast was not given.    COMPARISON:  12/18/2023    FINDINGS:  The liver is hypoattenuating indicative of fatty infiltration.    The gallbladder, pancreas, spleen, adrenal glands, and kidneys are unremarkable.    The small bowel is normal caliber.  The appendix is normal.  The colon is unremarkable.    No free air or free fluid.    No significant bony abnormality                                       Medications   sodium chloride 0.9% bolus 1,000 mL 1,000 mL (0 mLs Intravenous Stopped 9/21/24 1455)   ketorolac injection 15 mg (15 mg Intravenous Given 9/21/24 1343)   iohexoL (OMNIPAQUE 350) 350 mg iodine/mL injection (75 mLs Intravenous Given 9/21/24 1426)     Medical Decision Making  29-year-old female with a past medical history bipolar, anxiety, depression, and GERD  presents to ED for abdominal pain.  Patient's chart and medical history reviewed.    Ddx:  Colitis  Appendicitis  UTI  Ovarian torsion  Ovarian cyst    Patient's vitals reviewed.  Afebrile, no respiratory distress, and nontoxic-appearing in the ED. patient had right lower quadrant tenderness palpation with positive psoas sign.  UPT was negative.  Patient started on fluids and given Toradol for pain. Sent off wet prep and GC swabs. Discussed with patient her results will be back in 2-3 days, and she will be called with any abnormal results and sent in the proper medications. She verbalized understanding.  UA unremarkable for infection.  CBC unremarkable.  Lipase in normal range, pancreatitis unlikely.  Wet prep was unremarkable.  CMP showed elevated AST of 92 and ALT of 140. CT abd showed No acute abdominopelvic process.  Normal appendix. Hepatic steatosis.  Patient states she is feeling better.  Discussed all results with patient, she verbalized understanding.  Referral sent to hepatology for further management of her elevated LFTs and hepatic steatosis.  Referral sent to OBGYN to establish care. Instructed patient to rest and stay well hydrated.  Patient follow-up with the PCP.  Patient is sent home on naproxen as needed for pain. Patient agrees with this plan. Discussed with her strict return precautions, she verbalized understanding. Patient is stable for discharge.     Amount and/or Complexity of Data Reviewed  Labs: ordered.  Radiology: ordered.    Risk  Prescription drug management.                                      Clinical Impression:  Final diagnoses:  [R10.31] Right lower quadrant abdominal pain (Primary)  [R79.89] Elevated LFTs  [K76.0] Hepatic steatosis  [N89.8] Vaginal discharge          ED Disposition Condition    Discharge Stable          ED Prescriptions       Medication Sig Dispense Start Date End Date Auth. Provider    naproxen (NAPROSYN) 500 MG tablet Take 1 tablet (500 mg total) by mouth 2  (two) times daily as needed (Pain). 30 tablet 9/21/2024 -- Holdsworth, Alayna, PA-C          Follow-up Information       Follow up With Specialties Details Why Contact Info    Kleber Odonnell MD Internal Medicine Call   [address] [place] [address] [address]  [facility] [address]  [phone]               Holdsworth, Alayna, PA-C  09/21/24 1508       Holdsworth, Alayna, PA-C  09/21/24 1508

## 2024-09-21 NOTE — DISCHARGE INSTRUCTIONS

## 2024-09-21 NOTE — ED NOTES
Assumed care:  Sherry Burns is awake, alert and oriented x 3, skin warm and dry, in NAD with family at bedside.  Patient CO RLQ pain that started a couple of days ago.  Denies N&V at this time.    Patient identifiers for Sherry Burns checked and correct.  LOC:  Sherry Burns is awake, alert, and aware of environment with an appropriate affect. She is oriented x 3 and speaking appropriately.  APPEARANCE:  She is resting comfortably and in no acute distress. She is clean and well groomed, patient's clothing is properly fastened.  SKIN:  The skin is warm and dry. She has normal skin turgor and moist mucus membranes. Skin is intact; no bruising or breakdown noted.  MUSCULOSKELETAL:  She is moving all extremities well, no obvious deformities noted. Pulses intact.   RESPIRATORY:  Airway is open and patent. Respirations are spontaneous and non-labored with normal effort and rate.  CARDIAC:  She has a normal rate and rhythm. No peripheral edema noted. Capillary refill < 3 seconds.  ABDOMEN:  No distention noted.  Soft and non-tender upon palpation.  RLQ pain  NEUROLOGICAL:  PERRL. Facial expression is symmetrical. Hand grasps are equal bilaterally. Normal sensation in all extremities when touched with finger.  Allergies reported:  Review of patient's allergies indicates:  No Known Allergies

## 2024-09-24 ENCOUNTER — OFFICE VISIT (OUTPATIENT)
Dept: OBSTETRICS AND GYNECOLOGY | Facility: CLINIC | Age: 29
End: 2024-09-24
Payer: MEDICARE

## 2024-09-24 VITALS
SYSTOLIC BLOOD PRESSURE: 125 MMHG | WEIGHT: 150.69 LBS | HEIGHT: 60 IN | HEART RATE: 97 BPM | BODY MASS INDEX: 29.58 KG/M2 | DIASTOLIC BLOOD PRESSURE: 93 MMHG

## 2024-09-24 DIAGNOSIS — R10.31 RLQ ABDOMINAL PAIN: Primary | ICD-10-CM

## 2024-09-24 LAB
C TRACH RRNA SPEC QL NAA+PROBE: NEGATIVE
N GONORRHOEA RRNA SPEC QL NAA+PROBE: NEGATIVE
N.GONORROHEAE, AMP RNA SOURCE: NORMAL
SPECIMEN SOURCE: NORMAL

## 2024-09-24 PROCEDURE — 3080F DIAST BP >= 90 MM HG: CPT | Mod: CPTII,S$GLB,, | Performed by: STUDENT IN AN ORGANIZED HEALTH CARE EDUCATION/TRAINING PROGRAM

## 2024-09-24 PROCEDURE — 3008F BODY MASS INDEX DOCD: CPT | Mod: CPTII,S$GLB,, | Performed by: STUDENT IN AN ORGANIZED HEALTH CARE EDUCATION/TRAINING PROGRAM

## 2024-09-24 PROCEDURE — 99999 PR PBB SHADOW E&M-EST. PATIENT-LVL III: CPT | Mod: PBBFAC,,, | Performed by: STUDENT IN AN ORGANIZED HEALTH CARE EDUCATION/TRAINING PROGRAM

## 2024-09-24 PROCEDURE — 99203 OFFICE O/P NEW LOW 30 MIN: CPT | Mod: S$GLB,,, | Performed by: STUDENT IN AN ORGANIZED HEALTH CARE EDUCATION/TRAINING PROGRAM

## 2024-09-24 PROCEDURE — 3044F HG A1C LEVEL LT 7.0%: CPT | Mod: CPTII,S$GLB,, | Performed by: STUDENT IN AN ORGANIZED HEALTH CARE EDUCATION/TRAINING PROGRAM

## 2024-09-24 PROCEDURE — 3074F SYST BP LT 130 MM HG: CPT | Mod: CPTII,S$GLB,, | Performed by: STUDENT IN AN ORGANIZED HEALTH CARE EDUCATION/TRAINING PROGRAM

## 2024-09-25 ENCOUNTER — HOSPITAL ENCOUNTER (OUTPATIENT)
Dept: RADIOLOGY | Facility: HOSPITAL | Age: 29
Discharge: HOME | End: 2024-09-25
Attending: PHYSICIAN ASSISTANT
Payer: MEDICARE

## 2024-09-25 ENCOUNTER — OFFICE VISIT (OUTPATIENT)
Dept: NEUROSURGERY | Facility: CLINIC | Age: 29
End: 2024-09-25
Payer: MEDICARE

## 2024-09-25 VITALS
HEIGHT: 60 IN | DIASTOLIC BLOOD PRESSURE: 81 MMHG | HEART RATE: 77 BPM | SYSTOLIC BLOOD PRESSURE: 114 MMHG | BODY MASS INDEX: 29.56 KG/M2 | WEIGHT: 150.56 LBS | RESPIRATION RATE: 18 BRPM

## 2024-09-25 DIAGNOSIS — M54.9 DORSALGIA, UNSPECIFIED: Primary | ICD-10-CM

## 2024-09-25 DIAGNOSIS — M54.9 DORSALGIA, UNSPECIFIED: ICD-10-CM

## 2024-09-25 DIAGNOSIS — M54.16 LUMBAR RADICULOPATHY, CHRONIC: ICD-10-CM

## 2024-09-25 PROCEDURE — 72110 X-RAY EXAM L-2 SPINE 4/>VWS: CPT | Mod: 26,,, | Performed by: RADIOLOGY

## 2024-09-25 PROCEDURE — 72110 X-RAY EXAM L-2 SPINE 4/>VWS: CPT | Mod: TC,PO

## 2024-09-25 NOTE — PROGRESS NOTES
Chief Complaint   Patient presents with    Abdominal Pain       History of Present Illness   Sherry Burns is 29 y.o.   patient who presents today RLQ pain.  Patient had presented to the ER on 24 for RLQ pain x 1 week.  She described it as sharp pains, worsens with bending over.  She also noted swelling on that side.  CT A/P was obtained without any remarks on the GYN (spoke to reading radiologist Dr. Ordoñez via secure chat today and he states it was normal GYN anatomy - no ovarian cyst).  Patient was tested for GC/CZ/Trich/Yeast/BV (all negative) and discharged with Naproxen.  Patient reported noticing pink discharge after she left the hospital.  LMP 9.    Patient continues to report pain today.  She report monthly cycles lasting 7 days, 2-3 of which are heavy.  She is not using any hormonal contraception at this time and is not sexually active    History  PMH: pre-DM, Asthma, High cholesterol, Depression/Anxiety, Bipolar d/o, Glaucoma, Heart murmur  Psx: Denies  All: NKDA  OB:   GYN: Denies any STIs or abnl Pap; last Pap (23, NILM)  FH: Denies any female/colon cancer or MI prior to 51yo  SH: Denies WILFRED  Meds:  Current Outpatient Medications   Medication Instructions    albuterol (PROVENTIL/VENTOLIN HFA) 90 mcg/actuation inhaler 1 puff, Inhalation, Every 4 hours PRN    EScitalopram oxalate (LEXAPRO) 10 MG tablet No dose, route, or frequency recorded.       Review of Systems   Constitutional:  Negative for chills and fever.   Eyes:  Negative for visual disturbance.   Respiratory:  Negative for cough and shortness of breath.    Cardiovascular:  Negative for chest pain and palpitations.   Gastrointestinal:  Positive for abdominal pain. Negative for nausea and vomiting.   Genitourinary:  Negative for dysmenorrhea, menstrual problem, vaginal discharge, vaginal pain and vaginal odor.   Neurological:  Negative for headaches.   Psychiatric/Behavioral:  Negative for depression and  sleep disturbance. The patient is not nervous/anxious.    All other systems reviewed and are negative.  Breast: Negative for lump and mastodynia        Physical Examination:  Vitals:    09/24/24 1409   BP: (!) 125/93   BP Location: Right arm   Patient Position: Sitting   BP Method: Medium (Automatic)   Pulse: 97   Weight: 68.3 kg (150 lb 11 oz)   Height: 5' (1.524 m)        Physical Exam  Vitals reviewed.   Constitutional:       Appearance: Normal appearance.   HENT:      Head: Normocephalic and atraumatic.   Eyes:      Conjunctiva/sclera: Conjunctivae normal.   Cardiovascular:      Rate and Rhythm: Normal rate and regular rhythm.   Pulmonary:      Effort: Pulmonary effort is normal.      Breath sounds: Normal breath sounds. No wheezing.   Abdominal:      General: Abdomen is flat.      Palpations: Abdomen is soft.      Tenderness: There is abdominal tenderness (with deep palpation).   Musculoskeletal:         General: Normal range of motion.      Cervical back: Normal range of motion.   Skin:     General: Skin is warm and dry.   Neurological:      General: No focal deficit present.      Mental Status: She is alert and oriented to person, place, and time.   Psychiatric:         Mood and Affect: Mood normal.         Behavior: Behavior normal.         Thought Content: Thought content normal.         Judgment: Judgment normal.          Assessment:    1. RLQ abdominal pain  US Pelvis Comp with Transvag NON-OB (xpd          Plan:  - Will obtain pelvic US for closer look at GYN anatomy.    - Counseled that if cyst is noted, then more than likely that it is physiologic  - Counseled that possible MSK strain.  Encouraged use of Naproxen and heat to that area.  Avoid exacerbation by bending over.  - Will call with results of US if abnormal  - Patient and mom verbalized understanding and is agreeable to this plan.    Total time spent including review of record prior to face-to-face visit is 30 minutes. Greater than 80% of the  time was spent in counseling and coordination of care. The above assessment and plan have been discussed at length. Referring provider's note reviewed. Labs and procedure reviewed. Problem List and History updated.

## 2024-09-25 NOTE — PROGRESS NOTES
[facility] Neurosurgery - [facility]  Clinic Consult     Consult Requested By: Antonio Rodriguez  PCP: Kleber Odonnell MD    SUBJECTIVE:     Chief Complaint:   Chief Complaint   Patient presents with    Lumbar Spine Pain (L-Spine)       History of Present Illness:  Sherry Burns is a 29 y.o. female who presents for evaluation of back pain. The pain began several years ago. It is present in the low back and will radiate into the right posterior proximal leg. She denies numbness/tingling. She denies weakness. She has completed PT. She has not received injection. CT abd/pelvis is without acute skeletal concern.     Pertinent and recent history, provider evaluations, imaging and data reviewed in EPIC        Past Medical History:   Diagnosis Date    Anemia     Anxiety disorder, unspecified     Asthma     Bipolar disorder, unspecified     Depression     Eczema     GERD (gastroesophageal reflux disease)     Glaucoma     Headache     Heart murmur      Past Surgical History:   Procedure Laterality Date    COLONOSCOPY N/A 03/05/2021    Procedure: COLONOSCOPY;  Surgeon: Jake Williamson MD;  Location: [facility];  Service: Endoscopy;  Laterality: N/A;    ESOPHAGOGASTRODUODENOSCOPY N/A 03/05/2021    Procedure: EGD (ESOPHAGOGASTRODUODENOSCOPY);  Surgeon: Jake Williamson MD;  Location: [facility];  Service: Endoscopy;  Laterality: N/A;    UPPER GASTROINTESTINAL ENDOSCOPY       Family History   Problem Relation Name Age of Onset    Hypertension Mother      Hyperlipidemia Mother      Colon polyps Mother      Heart attacks under age 50 Mother      Drug abuse Father      Arthritis Brother      Glaucoma Paternal Grandmother      Dementia Paternal Grandmother      Melanoma Neg Hx      Colon cancer Neg Hx      Crohn's disease Neg Hx      Esophageal cancer Neg Hx      Liver cancer Neg Hx      Rectal cancer Neg Hx      Stomach cancer Neg Hx      Ulcerative colitis Neg Hx       Social History     Tobacco Use     Smoking status: Some Days     Current packs/day: 0.50     Average packs/day: 0.5 packs/day for 10.8 years (5.4 ttl pk-yrs)     Types: Cigarettes     Start date: 12/3/2013    Smokeless tobacco: Never   Substance Use Topics    Alcohol use: Not Currently    Drug use: Never      Review of patient's allergies indicates:  No Known Allergies    Current Outpatient Medications:     albuterol (PROVENTIL/VENTOLIN HFA) 90 mcg/actuation inhaler, Inhale 1 puff into the lungs every 4 (four) hours as needed for Shortness of Breath. (Patient not taking: Reported on 9/24/2024), Disp: 18 g, Rfl: 11    EScitalopram oxalate (LEXAPRO) 10 MG tablet, , Disp: , Rfl:     Review of Systems:   Constitutional: no fever, chills or night sweats. No changes in weight   Eyes: no visual changes   ENT: no nasal congestion or sore throat   Respiratory: no cough or shortness of breath   Cardiovascular: no chest pain or palpitations   Gastrointestinal: no nausea or vomiting   Genitourinary: no hematuria or dysuria   Integument/Breast: no rash or pruritis   Hematologic/Lymphatic: no easy bruising or lymphadenopathy   Musculoskeletal: +back pain, leg pain   Neurological: no seizures or tremors   Behavioral/Psych: no auditory or visual hallucinations   Endocrine: no heat or cold intolerance         OBJECTIVE:     Vital Signs (Most Recent):  Pulse: 77 (09/25/24 1104)  Resp: 18 (09/25/24 1104)  BP: 114/81 (09/25/24 1104)  Estimated body mass index is 29.41 kg/m² as calculated from the following:    Height as of this encounter: 5' (1.524 m).    Weight as of this encounter: 68.3 kg (150 lb 9.2 oz).    Physical Exam:   General: well developed, well nourished, no distress   Neurologic: Alert and oriented. Thought content appropriate. GCS 15.   Head: normocephalic, atraumatic  Eyes: EOMI  Neck: trachea midline, no JVD   Cardiovascular: no LE edema  Pulmonary: normal respirations, no signs of respiratory distress  Abdomen: non-distended  Sensory: intact to light  touch throughout  Skin: Skin is warm, dry and intact    Motor Strength: Moves all extremities spontaneously with good tone. No abnormal movements seen.       Iliopsoas Quadriceps Knee  Flexion Tibialis  anterior Gastro- cnemius EHL   Lower: R 5/5 5/5 5/5 5/5 5/5 5/5    L 5/5 5/5 5/5 5/5 5/5 5/5     DTR's: 2 +     Gait: normal            Diagnostic Results:  I have independently reviewed the following imaging:  MRI lumbar spine 2023 without significant findings    ASSESSMENT/PLAN:     Dorsalgia, unspecified  -     MRI Lumbar Spine Without Contrast; Future; Expected date: 09/25/2024  -     Cancel: X-Ray Lumbar Spine Ap Lateral w/Flex Ext; Future; Expected date: 09/25/2024  -     Cancel: X-Ray Lumbar Spine AP And Lateral; Future; Expected date: 09/25/2024  -     X-Ray Lumbar Spine Ap Lateral w/Flex Ext; Future; Expected date: 09/25/2024    Lumbar radiculopathy, chronic  -     MRI Lumbar Spine Without Contrast; Future; Expected date: 09/25/2024  -     Cancel: X-Ray Lumbar Spine Ap Lateral w/Flex Ext; Future; Expected date: 09/25/2024  -     Cancel: X-Ray Lumbar Spine AP And Lateral; Future; Expected date: 09/25/2024  -     X-Ray Lumbar Spine Ap Lateral w/Flex Ext; Future; Expected date: 09/25/2024        Sherry Burns is a 29 y.o. female with chronic back pain and radiculopathy. She has previously received PT for her back. Last MRI was without significant degeneration. She will complete updated MRI and dynamic x-rays. She will return once complete to review. Will consider referral to pain management for further treatment.     Patient verbalized understanding of plan. Encouraged to call with any questions or concerns.     This note was partially dictated using voice recognition software, so please excuse any errors that were not corrected.

## 2024-09-26 ENCOUNTER — HOSPITAL ENCOUNTER (OUTPATIENT)
Dept: RADIOLOGY | Facility: HOSPITAL | Age: 29
Discharge: HOME | End: 2024-09-26
Attending: STUDENT IN AN ORGANIZED HEALTH CARE EDUCATION/TRAINING PROGRAM
Payer: MEDICARE

## 2024-09-26 DIAGNOSIS — R10.31 RLQ ABDOMINAL PAIN: ICD-10-CM

## 2024-09-26 PROCEDURE — 76856 US EXAM PELVIC COMPLETE: CPT | Mod: TC

## 2024-09-26 PROCEDURE — 76830 TRANSVAGINAL US NON-OB: CPT | Mod: 26,,, | Performed by: RADIOLOGY

## 2024-09-26 PROCEDURE — 76830 TRANSVAGINAL US NON-OB: CPT | Mod: TC

## 2024-09-26 PROCEDURE — 76856 US EXAM PELVIC COMPLETE: CPT | Mod: 26,,, | Performed by: RADIOLOGY

## 2024-10-01 ENCOUNTER — PATIENT MESSAGE (OUTPATIENT)
Dept: NEUROSURGERY | Facility: CLINIC | Age: 29
End: 2024-10-01
Payer: MEDICARE

## 2024-10-02 ENCOUNTER — PATIENT MESSAGE (OUTPATIENT)
Dept: NEUROSURGERY | Facility: CLINIC | Age: 29
End: 2024-10-02
Payer: MEDICARE

## 2024-10-03 ENCOUNTER — PATIENT MESSAGE (OUTPATIENT)
Dept: PULMONOLOGY | Facility: CLINIC | Age: 29
End: 2024-10-03
Payer: MEDICARE

## 2024-10-04 DIAGNOSIS — R10.2 PELVIC PAIN SYNDROME: Primary | ICD-10-CM

## 2024-10-06 ENCOUNTER — HOSPITAL ENCOUNTER (EMERGENCY)
Facility: HOSPITAL | Age: 29
Discharge: HOME | End: 2024-10-06
Attending: STUDENT IN AN ORGANIZED HEALTH CARE EDUCATION/TRAINING PROGRAM
Payer: MEDICARE

## 2024-10-06 VITALS
TEMPERATURE: 98 F | WEIGHT: 150 LBS | HEART RATE: 103 BPM | DIASTOLIC BLOOD PRESSURE: 78 MMHG | SYSTOLIC BLOOD PRESSURE: 116 MMHG | RESPIRATION RATE: 18 BRPM | BODY MASS INDEX: 30.24 KG/M2 | HEIGHT: 59 IN | OXYGEN SATURATION: 99 %

## 2024-10-06 DIAGNOSIS — M79.18 PAIN OF LEFT DELTOID: ICD-10-CM

## 2024-10-06 DIAGNOSIS — R10.13 EPIGASTRIC PAIN: ICD-10-CM

## 2024-10-06 DIAGNOSIS — R11.0 NAUSEA: ICD-10-CM

## 2024-10-06 DIAGNOSIS — T88.1XXA POST-IMMUNIZATION REACTION, INITIAL ENCOUNTER: Primary | ICD-10-CM

## 2024-10-06 LAB
ALBUMIN SERPL BCP-MCNC: 3.8 G/DL (ref 3.5–5.2)
ALP SERPL-CCNC: 125 U/L (ref 55–135)
ALT SERPL W/O P-5'-P-CCNC: 118 U/L (ref 10–44)
ANION GAP SERPL CALC-SCNC: 10 MMOL/L (ref 8–16)
AST SERPL-CCNC: 37 U/L (ref 10–40)
B-HCG UR QL: NEGATIVE
BASOPHILS # BLD AUTO: 0.05 K/UL (ref 0–0.2)
BASOPHILS NFR BLD: 0.6 % (ref 0–1.9)
BILIRUB SERPL-MCNC: 0.7 MG/DL (ref 0.1–1)
BILIRUB UR QL STRIP: NEGATIVE
BUN SERPL-MCNC: 16 MG/DL (ref 6–20)
CALCIUM SERPL-MCNC: 9.7 MG/DL (ref 8.7–10.5)
CHLORIDE SERPL-SCNC: 105 MMOL/L (ref 95–110)
CLARITY UR: ABNORMAL
CO2 SERPL-SCNC: 24 MMOL/L (ref 23–29)
COLOR UR: YELLOW
CREAT SERPL-MCNC: 0.9 MG/DL (ref 0.5–1.4)
CTP QC/QA: YES
DIFFERENTIAL METHOD BLD: NORMAL
EOSINOPHIL # BLD AUTO: 0.2 K/UL (ref 0–0.5)
EOSINOPHIL NFR BLD: 2 % (ref 0–8)
ERYTHROCYTE [DISTWIDTH] IN BLOOD BY AUTOMATED COUNT: 14.2 % (ref 11.5–14.5)
EST. GFR  (NO RACE VARIABLE): >60 ML/MIN/1.73 M^2
GLUCOSE SERPL-MCNC: 98 MG/DL (ref 70–110)
GLUCOSE UR QL STRIP: NEGATIVE
HCT VFR BLD AUTO: 39.1 % (ref 37–48.5)
HGB BLD-MCNC: 12.8 G/DL (ref 12–16)
HGB UR QL STRIP: ABNORMAL
IMM GRANULOCYTES # BLD AUTO: 0.03 K/UL (ref 0–0.04)
IMM GRANULOCYTES NFR BLD AUTO: 0.4 % (ref 0–0.5)
KETONES UR QL STRIP: NEGATIVE
LEUKOCYTE ESTERASE UR QL STRIP: NEGATIVE
LIPASE SERPL-CCNC: 24 U/L (ref 4–60)
LYMPHOCYTES # BLD AUTO: 1.7 K/UL (ref 1–4.8)
LYMPHOCYTES NFR BLD: 20.9 % (ref 18–48)
MCH RBC QN AUTO: 27.5 PG (ref 27–31)
MCHC RBC AUTO-ENTMCNC: 32.7 G/DL (ref 32–36)
MCV RBC AUTO: 84 FL (ref 82–98)
MONOCYTES # BLD AUTO: 0.8 K/UL (ref 0.3–1)
MONOCYTES NFR BLD: 9.4 % (ref 4–15)
NEUTROPHILS # BLD AUTO: 5.6 K/UL (ref 1.8–7.7)
NEUTROPHILS NFR BLD: 66.7 % (ref 38–73)
NITRITE UR QL STRIP: NEGATIVE
NRBC BLD-RTO: 0 /100 WBC
PH UR STRIP: 6 [PH] (ref 5–8)
PLATELET # BLD AUTO: 312 K/UL (ref 150–450)
PMV BLD AUTO: 9.2 FL (ref 9.2–12.9)
POTASSIUM SERPL-SCNC: 3.9 MMOL/L (ref 3.5–5.1)
PROT SERPL-MCNC: 7.1 G/DL (ref 6–8.4)
PROT UR QL STRIP: ABNORMAL
RBC # BLD AUTO: 4.66 M/UL (ref 4–5.4)
SODIUM SERPL-SCNC: 139 MMOL/L (ref 136–145)
SP GR UR STRIP: 1.03 (ref 1–1.03)
URN SPEC COLLECT METH UR: ABNORMAL
UROBILINOGEN UR STRIP-ACNC: NEGATIVE EU/DL
WBC # BLD AUTO: 8.34 K/UL (ref 3.9–12.7)

## 2024-10-06 PROCEDURE — 25000003 PHARM REV CODE 250: Performed by: STUDENT IN AN ORGANIZED HEALTH CARE EDUCATION/TRAINING PROGRAM

## 2024-10-06 PROCEDURE — 36415 COLL VENOUS BLD VENIPUNCTURE: CPT | Performed by: STUDENT IN AN ORGANIZED HEALTH CARE EDUCATION/TRAINING PROGRAM

## 2024-10-06 PROCEDURE — 96361 HYDRATE IV INFUSION ADD-ON: CPT

## 2024-10-06 PROCEDURE — 83690 ASSAY OF LIPASE: CPT | Performed by: STUDENT IN AN ORGANIZED HEALTH CARE EDUCATION/TRAINING PROGRAM

## 2024-10-06 PROCEDURE — 99284 EMERGENCY DEPT VISIT MOD MDM: CPT | Mod: 25

## 2024-10-06 PROCEDURE — 63600175 PHARM REV CODE 636 W HCPCS: Mod: UD | Performed by: STUDENT IN AN ORGANIZED HEALTH CARE EDUCATION/TRAINING PROGRAM

## 2024-10-06 PROCEDURE — 80053 COMPREHEN METABOLIC PANEL: CPT | Performed by: STUDENT IN AN ORGANIZED HEALTH CARE EDUCATION/TRAINING PROGRAM

## 2024-10-06 PROCEDURE — 96374 THER/PROPH/DIAG INJ IV PUSH: CPT

## 2024-10-06 PROCEDURE — 81003 URINALYSIS AUTO W/O SCOPE: CPT | Performed by: STUDENT IN AN ORGANIZED HEALTH CARE EDUCATION/TRAINING PROGRAM

## 2024-10-06 PROCEDURE — 85025 COMPLETE CBC W/AUTO DIFF WBC: CPT | Performed by: STUDENT IN AN ORGANIZED HEALTH CARE EDUCATION/TRAINING PROGRAM

## 2024-10-06 PROCEDURE — 81025 URINE PREGNANCY TEST: CPT | Performed by: STUDENT IN AN ORGANIZED HEALTH CARE EDUCATION/TRAINING PROGRAM

## 2024-10-06 RX ORDER — LIDOCAINE 50 MG/G
1 PATCH TOPICAL DAILY
Qty: 14 PATCH | Refills: 0 | Status: SHIPPED | OUTPATIENT
Start: 2024-10-06 | End: 2024-10-20

## 2024-10-06 RX ORDER — ONDANSETRON HYDROCHLORIDE 2 MG/ML
4 INJECTION, SOLUTION INTRAVENOUS
Status: COMPLETED | OUTPATIENT
Start: 2024-10-06 | End: 2024-10-06

## 2024-10-06 RX ORDER — LIDOCAINE 50 MG/G
1 PATCH TOPICAL
Status: DISCONTINUED | OUTPATIENT
Start: 2024-10-06 | End: 2024-10-06 | Stop reason: HOSPADM

## 2024-10-06 RX ORDER — ONDANSETRON 4 MG/1
4 TABLET, ORALLY DISINTEGRATING ORAL EVERY 8 HOURS PRN
Qty: 15 TABLET | Refills: 0 | Status: SHIPPED | OUTPATIENT
Start: 2024-10-06 | End: 2024-10-21

## 2024-10-06 RX ADMIN — ONDANSETRON 4 MG: 2 INJECTION INTRAMUSCULAR; INTRAVENOUS at 03:10

## 2024-10-06 RX ADMIN — SODIUM CHLORIDE 1000 ML: 9 INJECTION, SOLUTION INTRAVENOUS at 03:10

## 2024-10-06 RX ADMIN — LIDOCAINE 5% 1 PATCH: 700 PATCH TOPICAL at 03:10

## 2024-10-06 NOTE — ED PROVIDER NOTES
Encounter Date: 10/6/2024       History     Chief Complaint   Patient presents with    Allergic Reaction     Received tetanus shot on 10/2 .. Redness to site.. patient complains of nausea  recently started ozempic within past 2 weeks      29-year-old female presents with 2 chief complaints.  Patient reports erythema and swelling at site of tetanus shot from 10/02/2024.  Second chief complaint is epigastric pain and nausea, ongoing for 2 weeks intermittently, worsening, just started Ozempic.  Collateral history obtained from friend and mother.  No trauma, fever or chills, no difficulty breathing and wheezing    The history is provided by the patient.     Review of patient's allergies indicates:  No Known Allergies  Past Medical History:   Diagnosis Date    Anemia     Anxiety disorder, unspecified     Asthma     Bipolar disorder, unspecified     Depression     Eczema     GERD (gastroesophageal reflux disease)     Glaucoma     Headache     Heart murmur      Past Surgical History:   Procedure Laterality Date    COLONOSCOPY N/A 03/05/2021    Procedure: COLONOSCOPY;  Surgeon: Jake Williamson MD;  Location: [facility];  Service: Endoscopy;  Laterality: N/A;    ESOPHAGOGASTRODUODENOSCOPY N/A 03/05/2021    Procedure: EGD (ESOPHAGOGASTRODUODENOSCOPY);  Surgeon: Jake Williamson MD;  Location: [facility];  Service: Endoscopy;  Laterality: N/A;    UPPER GASTROINTESTINAL ENDOSCOPY       Family History   Problem Relation Name Age of Onset    Hypertension Mother      Hyperlipidemia Mother      Colon polyps Mother      Heart attacks under age 50 Mother      Drug abuse Father      Arthritis Brother      Glaucoma Paternal Grandmother      Dementia Paternal Grandmother      Melanoma Neg Hx      Colon cancer Neg Hx      Crohn's disease Neg Hx      Esophageal cancer Neg Hx      Liver cancer Neg Hx      Rectal cancer Neg Hx      Stomach cancer Neg Hx      Ulcerative colitis Neg Hx       Social History     Tobacco Use    Smoking status: Some  Days     Current packs/day: 0.50     Average packs/day: 0.5 packs/day for 10.8 years (5.4 ttl pk-yrs)     Types: Cigarettes     Start date: 12/3/2013    Smokeless tobacco: Never   Substance Use Topics    Alcohol use: Not Currently    Drug use: Never     Review of Systems   All other systems reviewed and are negative.      Physical Exam     Initial Vitals [10/06/24 1426]   BP Pulse Resp Temp SpO2   116/78 103 18 97.8 °F (36.6 °C) 99 %      MAP       --         Physical Exam    Nursing note and vitals reviewed.  Constitutional: No distress.   HENT:   Head: Normocephalic.   Eyes: No scleral icterus.   Cardiovascular:  Normal rate.           Pulmonary/Chest: Effort normal. No stridor. No respiratory distress.   No wheezing or hypoxemia or difficulty breathing, able to speak in full sentences   Abdominal:   Minimal epigastric tenderness palpation There is no guarding.     Neurological: She is alert.   Skin: No rash noted. There is erythema.   Local erythema to left deltoid         ED Course   Procedures  Labs Reviewed   COMPREHENSIVE METABOLIC PANEL - Abnormal       Result Value    Sodium 139      Potassium 3.9      Chloride 105      CO2 24      Glucose 98      BUN 16      Creatinine 0.9      Calcium 9.7      Total Protein 7.1      Albumin 3.8      Total Bilirubin 0.7      Alkaline Phosphatase 125      AST 37       (*)     eGFR >60      Anion Gap 10     URINALYSIS, REFLEX TO URINE CULTURE - Abnormal    Specimen UA Urine, Clean Catch      Color, UA Yellow      Appearance, UA Hazy (*)     pH, UA 6.0      Specific Gravity, UA 1.030      Protein, UA Trace (*)     Glucose, UA Negative      Ketones, UA Negative      Bilirubin (UA) Negative      Occult Blood UA Trace (*)     Nitrite, UA Negative      Urobilinogen, UA Negative      Leukocytes, UA Negative      Narrative:     Specimen Source->Urine   CBC W/ AUTO DIFFERENTIAL    WBC 8.34      RBC 4.66      Hemoglobin 12.8      Hematocrit 39.1      MCV 84      MCH 27.5       MCHC 32.7      RDW 14.2      Platelets 312      MPV 9.2      Immature Granulocytes 0.4      Gran # (ANC) 5.6      Immature Grans (Abs) 0.03      Lymph # 1.7      Mono # 0.8      Eos # 0.2      Baso # 0.05      nRBC 0      Gran % 66.7      Lymph % 20.9      Mono % 9.4      Eosinophil % 2.0      Basophil % 0.6      Differential Method Automated     LIPASE    Lipase 24     POCT URINE PREGNANCY    POC Preg Test, Ur Negative       Acceptable Yes            Imaging Results    None          Medications   LIDOcaine 5 % patch 1 patch (1 patch Transdermal Patch Applied 10/6/24 1546)   sodium chloride 0.9% bolus 1,000 mL 1,000 mL (0 mLs Intravenous Stopped 10/6/24 1640)   ondansetron injection 4 mg (4 mg Intravenous Given 10/6/24 1541)     Medical Decision Making  29-year-old female presents with left deltoid erythema and tenderness to palpation and swelling post tetanus injection 4 days ago likely local reaction, we will treat with lidocaine patch, do not suspect any anaphylactic reaction.  Patient currently on Ozempic and has some epigastric discomfort and nausea, within differential diagnosis includes pancreatitis, we will obtain lab work for further evaluation, administer IV fluids and IV antiemetic.  We will evaluate for pregnancy and UTI as well.  Patient reports some minimal dysuria.    -workup with no significant abnormality requiring hospitalization, no evidence of UTI.  Symptoms improved with intervention.  Heart rate improved physical exam after IV fluids.  Will discharge with Zofran and lidocaine patch and provided strict return precautions.  Patient voiced understanding and agrees with plan.  No concern for surgical abdomen on repeat evaluation prior to discharge.    Amount and/or Complexity of Data Reviewed  Labs: ordered. Decision-making details documented in ED Course.    Risk  Prescription drug management.               ED Course as of 10/06/24 1656   Sun Oct 06, 2024   1632 Lipase: 24 [KB]    1632 WBC: 8.34 [KB]   1632 Hemoglobin: 12.8 [KB]   1632 Hematocrit: 39.1 [KB]   1632 Sodium: 139 [KB]   1632 Potassium: 3.9 [KB]   1632 Chloride: 105 [KB]   1632 CO2: 24 [KB]   1632 Glucose: 98 [KB]   1632 BUN: 16 [KB]   1632 Creatinine: 0.9 [KB]   1632 PROTEIN TOTAL: 7.1 [KB]   1632 Albumin: 3.8 [KB]   1632 BILIRUBIN TOTAL: 0.7 [KB]   1632 ALP: 125 [KB]   1632 AST: 37 [KB]   1632 ALT(!): 118 [KB]   1632 Anion Gap: 10 [KB]   1633 Leukocyte Esterase, UA: Negative [KB]   1633 UROBILINOGEN UA: Negative [KB]   1633 NITRITE UA: Negative [KB]   1633 Blood, UA(!): Trace [KB]   1633 Bilirubin (UA): Negative [KB]   1633 Ketones, UA: Negative [KB]   1633 Glucose, UA: Negative [KB]   1633 Protein, UA(!): Trace [KB]   1633 Spec Grav UA: 1.030 [KB]   1633 pH, UA: 6.0 [KB]   1633 Appearance, UA(!): Hazy [KB]   1633 Color, UA: Yellow [KB]   1633 Specimen UA: Urine, Clean Catch [KB]      ED Course User Index  [KB] Dipak Guerrero Jr., DO                           Clinical Impression:  Final diagnoses:  [R11.0] Nausea  [R10.13] Epigastric pain  [T88.1XXA] Post-immunization reaction, initial encounter (Primary)  [M79.18] Pain of left deltoid          ED Disposition Condition    Discharge Stable          ED Prescriptions       Medication Sig Dispense Start Date End Date Auth. Provider    ondansetron (ZOFRAN-ODT) 4 MG TbDL Take 1 tablet (4 mg total) by mouth every 8 (eight) hours as needed. 15 tablet 10/6/2024 10/21/2024 Dipak Guerrero Jr., DO    LIDOcaine (LIDODERM) 5 % Place 1 patch onto the skin once daily. Remove & Discard patch within 12 hours or as directed by MD for 14 doses 14 patch 10/6/2024 10/20/2024 Dipak Guerrero Jr., DO          Follow-up Information    None          Dipak Guerrero Jr.,   10/06/24 1656       Dipak Guerrero Jr.,   10/06/24 1659

## 2024-10-06 NOTE — DISCHARGE INSTRUCTIONS
Use topical lidocaine patch for pain control of left deltoid, return to ED for any worsening symptoms.  May also  use warm compress.  Use Zofran for nausea.  Follow up with PCP repeat evaluation return to ED for any worsening symptoms

## 2024-10-08 ENCOUNTER — HOSPITAL ENCOUNTER (OUTPATIENT)
Dept: RADIOLOGY | Facility: HOSPITAL | Age: 29
Discharge: HOME | End: 2024-10-08
Attending: PHYSICIAN ASSISTANT
Payer: MEDICARE

## 2024-10-08 DIAGNOSIS — M54.16 LUMBAR RADICULOPATHY, CHRONIC: ICD-10-CM

## 2024-10-08 DIAGNOSIS — M54.9 DORSALGIA, UNSPECIFIED: ICD-10-CM

## 2024-10-08 PROCEDURE — 72148 MRI LUMBAR SPINE W/O DYE: CPT | Mod: 26,,, | Performed by: RADIOLOGY

## 2024-10-08 PROCEDURE — 72148 MRI LUMBAR SPINE W/O DYE: CPT | Mod: TC,PO

## 2024-10-09 ENCOUNTER — OFFICE VISIT (OUTPATIENT)
Dept: NEUROSURGERY | Facility: CLINIC | Age: 29
End: 2024-10-09
Payer: MEDICARE

## 2024-10-09 VITALS
SYSTOLIC BLOOD PRESSURE: 106 MMHG | HEART RATE: 96 BPM | RESPIRATION RATE: 18 BRPM | DIASTOLIC BLOOD PRESSURE: 76 MMHG | WEIGHT: 149.94 LBS | BODY MASS INDEX: 30.23 KG/M2 | HEIGHT: 59 IN

## 2024-10-09 DIAGNOSIS — M54.50 CHRONIC LOW BACK PAIN, UNSPECIFIED BACK PAIN LATERALITY, UNSPECIFIED WHETHER SCIATICA PRESENT: Primary | ICD-10-CM

## 2024-10-09 DIAGNOSIS — G89.29 CHRONIC LOW BACK PAIN, UNSPECIFIED BACK PAIN LATERALITY, UNSPECIFIED WHETHER SCIATICA PRESENT: Primary | ICD-10-CM

## 2024-10-09 PROCEDURE — 3008F BODY MASS INDEX DOCD: CPT | Mod: CPTII,S$GLB,, | Performed by: PHYSICIAN ASSISTANT

## 2024-10-09 PROCEDURE — 3044F HG A1C LEVEL LT 7.0%: CPT | Mod: CPTII,S$GLB,, | Performed by: PHYSICIAN ASSISTANT

## 2024-10-09 PROCEDURE — 3074F SYST BP LT 130 MM HG: CPT | Mod: CPTII,S$GLB,, | Performed by: PHYSICIAN ASSISTANT

## 2024-10-09 PROCEDURE — 99213 OFFICE O/P EST LOW 20 MIN: CPT | Mod: S$GLB,,, | Performed by: PHYSICIAN ASSISTANT

## 2024-10-09 PROCEDURE — 1159F MED LIST DOCD IN RCRD: CPT | Mod: CPTII,S$GLB,, | Performed by: PHYSICIAN ASSISTANT

## 2024-10-09 PROCEDURE — 3078F DIAST BP <80 MM HG: CPT | Mod: CPTII,S$GLB,, | Performed by: PHYSICIAN ASSISTANT

## 2024-10-09 PROCEDURE — 1160F RVW MEDS BY RX/DR IN RCRD: CPT | Mod: CPTII,S$GLB,, | Performed by: PHYSICIAN ASSISTANT

## 2024-10-09 NOTE — PROGRESS NOTES
[facility]  Clinic Progress Note    PCP: Kleber Odonnell MD    SUBJECTIVE:     Chief Complaint:   Chief Complaint   Patient presents with    Follow-up     Image review      Interval History 10/9/24  Patient returns after completing imaging. No changes.       History of Present Illness 9/25/24  Sherry Burns is a 29 y.o. female who presents for evaluation of back pain. The pain began several years ago. It is present in the low back and will radiate into the right posterior proximal leg. She denies numbness/tingling. She denies weakness. She has completed PT. She has not received injection. CT abd/pelvis is without acute skeletal concern.     Pertinent and recent history, provider evaluations, imaging and data reviewed in EPIC        Past Medical History:   Diagnosis Date    Anemia     Anxiety disorder, unspecified     Asthma     Bipolar disorder, unspecified     Depression     Eczema     GERD (gastroesophageal reflux disease)     Glaucoma     Headache     Heart murmur      Past Surgical History:   Procedure Laterality Date    COLONOSCOPY N/A 03/05/2021    Procedure: COLONOSCOPY;  Surgeon: Jake Williamson MD;  Location: [facility];  Service: Endoscopy;  Laterality: N/A;    ESOPHAGOGASTRODUODENOSCOPY N/A 03/05/2021    Procedure: EGD (ESOPHAGOGASTRODUODENOSCOPY);  Surgeon: aJke Williamson MD;  Location: [facility];  Service: Endoscopy;  Laterality: N/A;    UPPER GASTROINTESTINAL ENDOSCOPY       Family History   Problem Relation Name Age of Onset    Hypertension Mother      Hyperlipidemia Mother      Colon polyps Mother      Heart attacks under age 50 Mother      Drug abuse Father      Arthritis Brother      Glaucoma Paternal Grandmother      Dementia Paternal Grandmother      Melanoma Neg Hx      Colon cancer Neg Hx      Crohn's disease Neg Hx      Esophageal cancer Neg Hx      Liver cancer Neg Hx      Rectal cancer Neg Hx      Stomach cancer Neg Hx      Ulcerative  "colitis Neg Hx       Social History     Tobacco Use    Smoking status: Some Days     Current packs/day: 0.50     Average packs/day: 0.5 packs/day for 10.8 years (5.4 ttl pk-yrs)     Types: Cigarettes     Start date: 12/3/2013    Smokeless tobacco: Never   Substance Use Topics    Alcohol use: Not Currently    Drug use: Never      Review of patient's allergies indicates:  No Known Allergies    Current Outpatient Medications:     LIDOcaine (LIDODERM) 5 %, Place 1 patch onto the skin once daily. Remove & Discard patch within 12 hours or as directed by MD for 14 doses, Disp: 14 patch, Rfl: 0    ondansetron (ZOFRAN-ODT) 4 MG TbDL, Take 1 tablet (4 mg total) by mouth every 8 (eight) hours as needed., Disp: 15 tablet, Rfl: 0    semaglutide (OZEMPIC SUBQ), Inject into the skin., Disp: , Rfl:     albuterol (PROVENTIL/VENTOLIN HFA) 90 mcg/actuation inhaler, Inhale 1 puff into the lungs every 4 (four) hours as needed for Shortness of Breath. (Patient not taking: Reported on 10/9/2024), Disp: 18 g, Rfl: 11    EScitalopram oxalate (LEXAPRO) 10 MG tablet, , Disp: , Rfl:     Review of Systems:   Constitutional: no fever, chills or night sweats. No changes in weight   Eyes: no visual changes   ENT: no nasal congestion or sore throat   Respiratory: no cough or shortness of breath   Cardiovascular: no chest pain or palpitations   Musculoskeletal: +back pain, leg pain   Neurological: no seizures or tremors           OBJECTIVE:     Vital Signs (Most Recent):  Pulse: 96 (10/09/24 0955)  Resp: 18 (10/09/24 0955)  BP: 106/76 (10/09/24 0955)  Estimated body mass index is 30.28 kg/m² as calculated from the following:    Height as of this encounter: 4' 11" (1.499 m).    Weight as of this encounter: 68 kg (149 lb 14.6 oz).    Physical Exam:   General: well developed, well nourished, no distress   Neurologic: Alert and oriented. Thought content appropriate. GCS 15.   Head: normocephalic, atraumatic  Eyes: EOMI  Neck: trachea midline, no JVD "   Cardiovascular: no LE edema  Pulmonary: normal respirations, no signs of respiratory distress  Abdomen: non-distended  Skin: Skin is warm, dry and intact  Motor Strength: Moves all extremities spontaneously with good tone. No abnormal movements seen.   Gait: normal            Diagnostic Results:  I have independently reviewed the following imaging:  XR and MRI lumbar spine reveal no significant degenerative changes. No stenosis. No neural compression.     ASSESSMENT/PLAN:     Chronic low back pain, unspecified back pain laterality, unspecified whether sciatica present  -     Ambulatory referral/consult to Pain Clinic; Future; Expected date: 10/16/2024          Sherry Burns is a 29 y.o. female with chronic back pain without significant degeneration and no stenosis on imaging. There is no surgical target for her symptoms. She has completed PT, so she will be referred to pain management for procedure consideration     Patient verbalized understanding of plan. Encouraged to call with any questions or concerns.     This note was partially dictated using voice recognition software, so please excuse any errors that were not corrected.    I spent a total of 20 minutes on the day of the visit.This includes face to face time and non-face to face time preparing to see the patient (eg, review of tests), obtaining and/or reviewing separately obtained history, documenting clinical information in the electronic or other health record, independently interpreting results and communicating results to the patient/family/caregiver, or care coordinator.

## 2024-10-17 ENCOUNTER — PATIENT MESSAGE (OUTPATIENT)
Dept: GASTROENTEROLOGY | Facility: CLINIC | Age: 29
End: 2024-10-17
Payer: MEDICARE

## 2024-10-25 ENCOUNTER — OFFICE VISIT (OUTPATIENT)
Dept: HEPATOLOGY | Facility: CLINIC | Age: 29
End: 2024-10-25
Payer: MEDICARE

## 2024-10-25 VITALS — WEIGHT: 148.38 LBS | HEIGHT: 59 IN | BODY MASS INDEX: 29.91 KG/M2

## 2024-10-25 DIAGNOSIS — K76.0 METABOLIC DYSFUNCTION-ASSOCIATED STEATOTIC LIVER DISEASE (MASLD): Primary | ICD-10-CM

## 2024-10-25 DIAGNOSIS — R73.03 PREDIABETES: ICD-10-CM

## 2024-10-25 DIAGNOSIS — E66.3 OVERWEIGHT (BMI 25.0-29.9): ICD-10-CM

## 2024-10-25 DIAGNOSIS — R74.8 ELEVATED LIVER ENZYMES: ICD-10-CM

## 2024-10-25 PROCEDURE — 99999 PR PBB SHADOW E&M-EST. PATIENT-LVL III: CPT | Mod: PBBFAC,,, | Performed by: NURSE PRACTITIONER

## 2024-10-25 NOTE — PROGRESS NOTES
[facility] New Patient Visit    Reason for Visit:  Fatty Liver    PCP: Monika Rajan    HPI:  This is a 29 y.o. female with PMH noted below, here for evaluation of fatty liver.    The patient's risk factors for fatty liver disease include:     Overweight/Obesity                     Yes; BMI 29.97 - Previously on Ozempic (unable to tolerate due to GI side effects) with net weight loss of 14 lbs since July.   Dyslipidemia                                No; Refer to most recent lipid panel results below:   Latest Reference Range & Units 07/16/24 00:00   Cholesterol Total 0 - 200 mg/dL 180 (E)   HDL 35 - 70 mg/dL 35 (E)   LDL Calculated 0 - 160 MG/ (E)   Triglycerides 40 - 160 mg/dL 135 (E)     Insulin resistance/Diabetes         Yes; Last HgbA1c was 5.9% (7/2024)    She has had intermittently elevated liver enzymes in a hepatocellular pattern since at least 4/2021.     Abdominal CT in 9/2024 showed:    FINDINGS:    The liver is hypoattenuating indicative of fatty infiltration.     The gallbladder, pancreas, spleen, adrenal glands, and kidneys are unremarkable.     The small bowel is normal caliber.  The appendix is normal.  The colon is unremarkable.     No free air or free fluid.     No significant bony abnormality     Impression:     No acute abdominopelvic process.  Normal appendix.     Hepatic steatosis.     She has never undergone a liver biopsy or non-invasive staging exam.    FIB-4 Calculation: [id number] at 10/25/2024  9:01 AM   Calculated from:  Last SGOT/AST: 37  Last SGPT/ALT: 118   Last Platelets: 312   Age: 29 y.o.     FIB-4 below 1.30 is considered as low-risk for advanced fibrosis  FIB-4 over 2.67 is considered as high-risk for advanced fibrosis  FIB-4 values between 1.30 and 2.67 are considered as intermediate-risk of advanced fibrosis for ages 36-64.     For ages > 64 the cut-off for low-risk goes to < 2.  This is a screening tool and clinical judgement should be  used in the interpretation of these results.    She has no known family history of liver disease. She denies any history of heavy alcohol intake and does not use illicit drugs. HBV, HCV and HIV negative on prior labs. She is well appearing, and has no signs or symptoms of hepatic decompensation including jaundice, dark urine, pruritus, abdominal distention, lower extremity edema, hematemesis, melena, or periods of confusion suggestive of hepatic encephalopathy.      PMHX:  has a past medical history of Anemia, Anxiety disorder, unspecified, Asthma, Bipolar disorder, unspecified, Depression, Eczema, GERD (gastroesophageal reflux disease), Glaucoma, Headache, and Heart murmur.    PSHX:  has a past surgical history that includes Colonoscopy (N/A, 03/05/2021); Esophagogastroduodenoscopy (N/A, 03/05/2021); and Upper gastrointestinal endoscopy.    The patient's social and family histories were reviewed by me and updated in the appropriate section of the electronic medical record.    Review of patient's allergies indicates:  No Known Allergies    Current Outpatient Medications on File Prior to Visit   Medication Sig Dispense Refill    DUPIXENT SYRINGE 300 mg/2 mL Syrg Inject 300 mg (2 mL) into the skin every other week (Patient not taking: Reported on 10/25/2024) 4 mL 11    [DISCONTINUED] albuterol (PROVENTIL/VENTOLIN HFA) 90 mcg/actuation inhaler Inhale 1 puff into the lungs every 4 (four) hours as needed for Shortness of Breath. (Patient not taking: Reported on 9/24/2024) 18 g 11    [DISCONTINUED] EScitalopram oxalate (LEXAPRO) 10 MG tablet  (Patient not taking: Reported on 9/24/2024)      [DISCONTINUED] LIDOcaine (LIDODERM) 5 % Place 1 patch onto the skin once daily. Remove & Discard patch within 12 hours or as directed by MD for 14 doses 14 patch 0    [DISCONTINUED] semaglutide (OZEMPIC SUBQ) Inject into the skin. (Patient not taking: Reported on 10/25/2024)       No current facility-administered medications on file  "prior to visit.     SOCIAL HISTORY:   Social History     Tobacco Use   Smoking Status Some Days    Current packs/day: 0.50    Average packs/day: 0.5 packs/day for 10.9 years (5.4 ttl pk-yrs)    Types: Cigarettes    Start date: 12/3/2013   Smokeless Tobacco Never     Social History     Substance and Sexual Activity   Alcohol Use Not Currently     Social History     Substance and Sexual Activity   Drug Use Never     ROS:   GENERAL: Denies fever, chills, + intentional weight loss, fatigue  HEENT: Denies headaches, dizziness, vision/hearing changes  CARDIOVASCULAR: Denies chest pain, palpitations, or edema  RESPIRATORY: Denies dyspnea, cough  GI: Denies abdominal pain, rectal bleeding, nausea, vomiting. No change in bowel pattern or color  : Denies dysuria, hematuria   SKIN: Denies rash, itching   NEURO: Denies confusion, memory loss, or mood changes  PSYCH: Denies depression or anxiety  HEME/LYMPH: Denies easy bruising or bleeding    Objective Findings:    PHYSICAL EXAM:   Friendly White female, in no acute distress; alert and oriented to person, place and time  VITALS: Ht 4' 11" (1.499 m)   Wt 67.3 kg (148 lb 5.9 oz)   LMP 09/22/2024 (Approximate)   BMI 29.97 kg/m²   HENT: Normocephalic, without obvious abnormality.   EYES: Sclerae anicteric.   NECK: No obvious masses.  CARDIOVASCULAR: No peripheral edema.  RESPIRATORY: Normal respiratory effort.   GI: Non-distended abdomen.  EXTREMITIES:  No clubbing, cyanosis or edema.  SKIN: Warm and dry. No jaundice. No rashes noted to exposed skin.   NEURO: No asterixis.  PSYCH:  Memory intact. Thought and speech pattern appropriate. Behavior normal. No depression or anxiety noted.    DIAGNOSTIC STUDIES: Reviewed in Epic.     FIBROSCAN - Ordered at visit.     EDUCATION:  Per AVS.    ASSESSMENT & PLAN:  29 y.o. White female with:    1. Metabolic dysfunction-associated steatotic liver disease (MASLD)  FibroScan Transplant Hepatology(Vibration Controlled Transient Elastography) "    Hepatic Function Panel    Hepatitis A antibody, IgG    Hemoglobin A1C      2. Elevated liver enzymes  FibroScan Transplant Hepatology(Vibration Controlled Transient Elastography)    Hepatic Function Panel    Hepatitis A antibody, IgG    Hemoglobin A1C      3. Prediabetes  FibroScan Transplant Hepatology(Vibration Controlled Transient Elastography)    Hepatic Function Panel    Hepatitis A antibody, IgG    Hemoglobin A1C      4. Overweight (BMI 25.0-29.9)  FibroScan Transplant Hepatology(Vibration Controlled Transient Elastography)    Hepatic Function Panel    Hepatitis A antibody, IgG    Hemoglobin A1C        - Schedule Fibroscan to non-invasively stage liver disease.  - Repeat liver function tests in 6 months.  - Recommend additional weight loss goal of 15 lbs, through diet and exercise.  - Discuss starting another medication for weight loss/prediabetes with PCP at next visit.   - Recommend good control of cholesterol, blood pressure, & blood sugar levels.  - Avoid alcohol and herbal supplements/alternative remedies.  - Return to clinic in 6 months with Fibroscan and labs prior to the visit.      Follow up in about 6 months (around 4/25/2025). with Fibroscan and labs before visit.    Thank you for allowing me to participate in the care of Sherry Burns       Hepatology Nurse Practitioner  [facility]    CC'ed note to:   No ref. provider found  Monika Rajan NP

## 2024-11-15 ENCOUNTER — PATIENT MESSAGE (OUTPATIENT)
Dept: PULMONOLOGY | Facility: CLINIC | Age: 29
End: 2024-11-15
Payer: MEDICARE

## 2024-12-10 ENCOUNTER — TELEPHONE (OUTPATIENT)
Facility: CLINIC | Age: 29
End: 2024-12-10
Payer: MEDICARE

## 2024-12-17 ENCOUNTER — HOSPITAL ENCOUNTER (EMERGENCY)
Facility: HOSPITAL | Age: 29
Discharge: HOME | End: 2024-12-18
Attending: EMERGENCY MEDICINE
Payer: MEDICARE

## 2024-12-17 DIAGNOSIS — R00.2 PALPITATIONS: Primary | ICD-10-CM

## 2024-12-17 PROCEDURE — 99284 EMERGENCY DEPT VISIT MOD MDM: CPT

## 2024-12-18 ENCOUNTER — PATIENT MESSAGE (OUTPATIENT)
Dept: CARDIOLOGY | Facility: CLINIC | Age: 29
End: 2024-12-18
Payer: MEDICARE

## 2024-12-18 ENCOUNTER — HOSPITAL ENCOUNTER (EMERGENCY)
Facility: HOSPITAL | Age: 29
Discharge: HOME | End: 2024-12-19
Attending: STUDENT IN AN ORGANIZED HEALTH CARE EDUCATION/TRAINING PROGRAM
Payer: MEDICARE

## 2024-12-18 VITALS
HEIGHT: 59 IN | BODY MASS INDEX: 28.22 KG/M2 | HEART RATE: 72 BPM | TEMPERATURE: 98 F | SYSTOLIC BLOOD PRESSURE: 112 MMHG | WEIGHT: 140 LBS | DIASTOLIC BLOOD PRESSURE: 76 MMHG | RESPIRATION RATE: 20 BRPM | OXYGEN SATURATION: 100 %

## 2024-12-18 DIAGNOSIS — R07.9 CHEST PAIN: ICD-10-CM

## 2024-12-18 DIAGNOSIS — R00.2 PALPITATIONS: ICD-10-CM

## 2024-12-18 LAB
ALBUMIN SERPL BCP-MCNC: 3.9 G/DL (ref 3.5–5.2)
ALP SERPL-CCNC: 125 U/L (ref 40–150)
ALT SERPL W/O P-5'-P-CCNC: 93 U/L (ref 10–44)
ANION GAP SERPL CALC-SCNC: 13 MMOL/L (ref 8–16)
AST SERPL-CCNC: 71 U/L (ref 10–40)
B-HCG UR QL: NEGATIVE
BASOPHILS # BLD AUTO: 0.05 K/UL (ref 0–0.2)
BASOPHILS NFR BLD: 0.6 % (ref 0–1.9)
BILIRUB SERPL-MCNC: 0.5 MG/DL (ref 0.1–1)
BNP SERPL-MCNC: 23 PG/ML (ref 0–99)
BUN SERPL-MCNC: 11 MG/DL (ref 6–20)
CALCIUM SERPL-MCNC: 9.3 MG/DL (ref 8.7–10.5)
CHLORIDE SERPL-SCNC: 109 MMOL/L (ref 95–110)
CO2 SERPL-SCNC: 20 MMOL/L (ref 23–29)
CREAT SERPL-MCNC: 1 MG/DL (ref 0.5–1.4)
CTP QC/QA: YES
D DIMER PPP IA.FEU-MCNC: 0.52 MG/L FEU
DIFFERENTIAL METHOD BLD: ABNORMAL
EOSINOPHIL # BLD AUTO: 0.1 K/UL (ref 0–0.5)
EOSINOPHIL NFR BLD: 0.9 % (ref 0–8)
ERYTHROCYTE [DISTWIDTH] IN BLOOD BY AUTOMATED COUNT: 13.7 % (ref 11.5–14.5)
EST. GFR  (NO RACE VARIABLE): >60 ML/MIN/1.73 M^2
GLUCOSE SERPL-MCNC: 113 MG/DL (ref 70–110)
HCT VFR BLD AUTO: 37.2 % (ref 37–48.5)
HGB BLD-MCNC: 12.2 G/DL (ref 12–16)
IMM GRANULOCYTES # BLD AUTO: 0.02 K/UL (ref 0–0.04)
IMM GRANULOCYTES NFR BLD AUTO: 0.2 % (ref 0–0.5)
LYMPHOCYTES # BLD AUTO: 1.4 K/UL (ref 1–4.8)
LYMPHOCYTES NFR BLD: 15.3 % (ref 18–48)
MAGNESIUM SERPL-MCNC: 2 MG/DL (ref 1.6–2.6)
MCH RBC QN AUTO: 26.9 PG (ref 27–31)
MCHC RBC AUTO-ENTMCNC: 32.8 G/DL (ref 32–36)
MCV RBC AUTO: 82 FL (ref 82–98)
MONOCYTES # BLD AUTO: 0.7 K/UL (ref 0.3–1)
MONOCYTES NFR BLD: 8.4 % (ref 4–15)
NEUTROPHILS # BLD AUTO: 6.6 K/UL (ref 1.8–7.7)
NEUTROPHILS NFR BLD: 74.6 % (ref 38–73)
NRBC BLD-RTO: 0 /100 WBC
OHS QRS DURATION: 76 MS
OHS QTC CALCULATION: 435 MS
PLATELET # BLD AUTO: 287 K/UL (ref 150–450)
PMV BLD AUTO: 9.2 FL (ref 9.2–12.9)
POTASSIUM SERPL-SCNC: 3.5 MMOL/L (ref 3.5–5.1)
PROT SERPL-MCNC: 6.9 G/DL (ref 6–8.4)
RBC # BLD AUTO: 4.53 M/UL (ref 4–5.4)
SODIUM SERPL-SCNC: 142 MMOL/L (ref 136–145)
TROPONIN I SERPL DL<=0.01 NG/ML-MCNC: <0.006 NG/ML (ref 0–0.03)
WBC # BLD AUTO: 8.83 K/UL (ref 3.9–12.7)

## 2024-12-18 PROCEDURE — 25000003 PHARM REV CODE 250: Performed by: STUDENT IN AN ORGANIZED HEALTH CARE EDUCATION/TRAINING PROGRAM

## 2024-12-18 PROCEDURE — 25000003 PHARM REV CODE 250: Performed by: EMERGENCY MEDICINE

## 2024-12-18 PROCEDURE — 63600175 PHARM REV CODE 636 W HCPCS: Performed by: STUDENT IN AN ORGANIZED HEALTH CARE EDUCATION/TRAINING PROGRAM

## 2024-12-18 PROCEDURE — 96361 HYDRATE IV INFUSION ADD-ON: CPT

## 2024-12-18 PROCEDURE — 36415 COLL VENOUS BLD VENIPUNCTURE: CPT | Performed by: STUDENT IN AN ORGANIZED HEALTH CARE EDUCATION/TRAINING PROGRAM

## 2024-12-18 PROCEDURE — 83880 ASSAY OF NATRIURETIC PEPTIDE: CPT | Performed by: STUDENT IN AN ORGANIZED HEALTH CARE EDUCATION/TRAINING PROGRAM

## 2024-12-18 PROCEDURE — 81025 URINE PREGNANCY TEST: CPT | Performed by: STUDENT IN AN ORGANIZED HEALTH CARE EDUCATION/TRAINING PROGRAM

## 2024-12-18 PROCEDURE — 85379 FIBRIN DEGRADATION QUANT: CPT | Performed by: STUDENT IN AN ORGANIZED HEALTH CARE EDUCATION/TRAINING PROGRAM

## 2024-12-18 PROCEDURE — 84443 ASSAY THYROID STIM HORMONE: CPT | Performed by: STUDENT IN AN ORGANIZED HEALTH CARE EDUCATION/TRAINING PROGRAM

## 2024-12-18 PROCEDURE — 99285 EMERGENCY DEPT VISIT HI MDM: CPT | Mod: 25

## 2024-12-18 PROCEDURE — 93005 ELECTROCARDIOGRAM TRACING: CPT

## 2024-12-18 PROCEDURE — 84484 ASSAY OF TROPONIN QUANT: CPT | Performed by: STUDENT IN AN ORGANIZED HEALTH CARE EDUCATION/TRAINING PROGRAM

## 2024-12-18 PROCEDURE — 25500020 PHARM REV CODE 255

## 2024-12-18 PROCEDURE — 80053 COMPREHEN METABOLIC PANEL: CPT | Performed by: STUDENT IN AN ORGANIZED HEALTH CARE EDUCATION/TRAINING PROGRAM

## 2024-12-18 PROCEDURE — 85025 COMPLETE CBC W/AUTO DIFF WBC: CPT | Performed by: STUDENT IN AN ORGANIZED HEALTH CARE EDUCATION/TRAINING PROGRAM

## 2024-12-18 PROCEDURE — 83735 ASSAY OF MAGNESIUM: CPT | Performed by: STUDENT IN AN ORGANIZED HEALTH CARE EDUCATION/TRAINING PROGRAM

## 2024-12-18 PROCEDURE — 96374 THER/PROPH/DIAG INJ IV PUSH: CPT

## 2024-12-18 RX ORDER — ASPIRIN 325 MG
325 TABLET ORAL
Status: COMPLETED | OUTPATIENT
Start: 2024-12-18 | End: 2024-12-18

## 2024-12-18 RX ORDER — FAMOTIDINE 10 MG/ML
20 INJECTION INTRAVENOUS
Status: COMPLETED | OUTPATIENT
Start: 2024-12-18 | End: 2024-12-18

## 2024-12-18 RX ORDER — ALUMINUM HYDROXIDE, MAGNESIUM HYDROXIDE, AND SIMETHICONE 1200; 120; 1200 MG/30ML; MG/30ML; MG/30ML
15 SUSPENSION ORAL
Status: COMPLETED | OUTPATIENT
Start: 2024-12-18 | End: 2024-12-18

## 2024-12-18 RX ORDER — ACETAMINOPHEN 500 MG
1000 TABLET ORAL
Status: COMPLETED | OUTPATIENT
Start: 2024-12-18 | End: 2024-12-18

## 2024-12-18 RX ADMIN — SODIUM CHLORIDE, SODIUM LACTATE, POTASSIUM CHLORIDE, AND CALCIUM CHLORIDE 1000 ML: .6; .31; .03; .02 INJECTION, SOLUTION INTRAVENOUS at 11:12

## 2024-12-18 RX ADMIN — ACETAMINOPHEN 1000 MG: 500 TABLET, FILM COATED ORAL at 04:12

## 2024-12-18 RX ADMIN — ALUMINUM HYDROXIDE, MAGNESIUM HYDROXIDE, AND SIMETHICONE 15 ML: 1200; 120; 1200 SUSPENSION ORAL at 10:12

## 2024-12-18 RX ADMIN — ASPIRIN 325 MG ORAL TABLET 325 MG: 325 PILL ORAL at 04:12

## 2024-12-18 RX ADMIN — Medication 20 MG: at 11:12

## 2024-12-18 RX ADMIN — IOHEXOL 100 ML: 350 INJECTION, SOLUTION INTRAVENOUS at 11:12

## 2024-12-18 NOTE — DISCHARGE INSTRUCTIONS
Return for palpitations.  Return for heaviness tightness squeezing in the chest area.  Please follow-up with your doctor in the next few days

## 2024-12-18 NOTE — ED PROVIDER NOTES
"Encounter Date: 12/17/2024       History     Chief Complaint   Patient presents with    Palpitations     Pt says she was blowing her nose and suddenly felt like her hear was racing     Chief complaint is "I was hurting in my chest" she stated that she sneezed and then she noticed her heart beating fast.  The patient does have a history of anxiety.  She has had cardiac workup in the past with Dr. HI.  She has had a stress test in the past.  At the present time she has no palpitations sensation but only minimal tightness in her chest.  No difficulty breathing no nausea vomiting or diarrhea.  She does have history of bipolar disease anemia.  Per the nurse's the patient arrived here crying.        Review of patient's allergies indicates:  No Known Allergies  Past Medical History:   Diagnosis Date    Anemia     Anxiety disorder, unspecified     Asthma     Bipolar disorder, unspecified     Depression     Eczema     GERD (gastroesophageal reflux disease)     Glaucoma     Headache     Heart murmur      Past Surgical History:   Procedure Laterality Date    COLONOSCOPY N/A 03/05/2021    Procedure: COLONOSCOPY;  Surgeon: Jake Williamson MD;  Location: [facility];  Service: Endoscopy;  Laterality: N/A;    ESOPHAGOGASTRODUODENOSCOPY N/A 03/05/2021    Procedure: EGD (ESOPHAGOGASTRODUODENOSCOPY);  Surgeon: Jake Williamson MD;  Location: [facility];  Service: Endoscopy;  Laterality: N/A;    ESOPHAGOGASTRODUODENOSCOPY N/A 12/2/2024    Procedure: EGD (ESOPHAGOGASTRODUODENOSCOPY);  Surgeon: Sourav Rushing MD;  Location: [facility];  Service: Endoscopy;  Laterality: N/A;    UPPER GASTROINTESTINAL ENDOSCOPY       Family History   Problem Relation Name Age of Onset    Hypertension Mother      Hyperlipidemia Mother      Colon polyps Mother      Heart attacks under age 50 Mother      Drug abuse Father      Arthritis Brother      Glaucoma Paternal Grandmother      Dementia Paternal Grandmother      Melanoma Neg Hx      Colon cancer Neg " Hx      Crohn's disease Neg Hx      Esophageal cancer Neg Hx      Liver cancer Neg Hx      Rectal cancer Neg Hx      Stomach cancer Neg Hx      Ulcerative colitis Neg Hx       Social History     Tobacco Use    Smoking status: Some Days     Current packs/day: 0.50     Average packs/day: 0.5 packs/day for 11.0 years (5.5 ttl pk-yrs)     Types: Cigarettes     Start date: 12/3/2013    Smokeless tobacco: Never   Substance Use Topics    Alcohol use: Not Currently    Drug use: Never     Review of Systems   Constitutional:  Negative for chills and fever.   HENT:  Negative for ear pain, rhinorrhea and sore throat.    Eyes:  Negative for pain and visual disturbance.   Respiratory:  Negative for cough and shortness of breath.    Cardiovascular:  Positive for chest pain and palpitations.   Gastrointestinal:  Negative for abdominal pain, constipation, diarrhea, nausea and vomiting.   Genitourinary:  Negative for dysuria, frequency, hematuria and urgency.   Musculoskeletal:  Negative for back pain, joint swelling and myalgias.   Skin:  Negative for rash.   Neurological:  Negative for dizziness, seizures, weakness and headaches.   Psychiatric/Behavioral:  Negative for dysphoric mood. The patient is not nervous/anxious.        Physical Exam     Initial Vitals [12/17/24 2327]   BP Pulse Resp Temp SpO2   124/85 105 (!) 22 98 °F (36.7 °C) 100 %      MAP       --         Physical Exam    Nursing note and vitals reviewed.  Constitutional: She appears well-developed and well-nourished.   HENT:   Head: Normocephalic and atraumatic.   Eyes: Conjunctivae, EOM and lids are normal. Pupils are equal, round, and reactive to light.   Neck: Trachea normal. Neck supple. No thyroid mass and no thyromegaly present.   Normal range of motion.  Cardiovascular:  Normal rate, regular rhythm and normal heart sounds.           Pulmonary/Chest: Effort normal and breath sounds normal.   Abdominal: Abdomen is soft. There is no abdominal tenderness.    Musculoskeletal:         General: Normal range of motion.      Cervical back: Normal range of motion and neck supple.     Neurological: She is alert and oriented to person, place, and time. She has normal strength and normal reflexes. No cranial nerve deficit or sensory deficit.   Skin: Skin is warm and dry.   Psychiatric: She has a normal mood and affect. Her speech is normal and behavior is normal. Judgment and thought content normal.         ED Course   Procedures  Labs Reviewed - No data to display       Imaging Results    None          Medications   acetaminophen tablet 1,000 mg (1,000 mg Oral Given 12/18/24 0440)   aspirin tablet 325 mg (325 mg Oral Given 12/18/24 0440)     Medical Decision Making  Patient with palpitations EKG done here shows sinus tachycardia only no acute changes.  Patient has history of recent stress echo that was negative in May of this year.  Patient given Tylenol and aspirin and her symptoms have abated.  Will discharge the patient home with instructions.Angel Ricks MD  5:52 AM 12/18/2024          Risk  OTC drugs.                                      Clinical Impression:  Final diagnoses:  [R00.2] Palpitations (Primary)          ED Disposition Condition    Discharge Stable          ED Prescriptions    None       Follow-up Information    None          Angel Ricks MD  12/18/24 [id number]

## 2024-12-19 VITALS
WEIGHT: 140 LBS | BODY MASS INDEX: 28.22 KG/M2 | HEIGHT: 59 IN | DIASTOLIC BLOOD PRESSURE: 83 MMHG | RESPIRATION RATE: 20 BRPM | SYSTOLIC BLOOD PRESSURE: 139 MMHG | HEART RATE: 96 BPM | TEMPERATURE: 98 F | OXYGEN SATURATION: 98 %

## 2024-12-19 LAB
TROPONIN I SERPL DL<=0.01 NG/ML-MCNC: <0.006 NG/ML (ref 0–0.03)
TSH SERPL DL<=0.005 MIU/L-ACNC: 3.67 UIU/ML (ref 0.4–4)

## 2024-12-19 PROCEDURE — 96361 HYDRATE IV INFUSION ADD-ON: CPT

## 2024-12-19 PROCEDURE — 36415 COLL VENOUS BLD VENIPUNCTURE: CPT | Performed by: STUDENT IN AN ORGANIZED HEALTH CARE EDUCATION/TRAINING PROGRAM

## 2024-12-19 PROCEDURE — 93005 ELECTROCARDIOGRAM TRACING: CPT

## 2024-12-19 PROCEDURE — 84484 ASSAY OF TROPONIN QUANT: CPT | Performed by: STUDENT IN AN ORGANIZED HEALTH CARE EDUCATION/TRAINING PROGRAM

## 2024-12-19 PROCEDURE — 93010 ELECTROCARDIOGRAM REPORT: CPT | Mod: ,,, | Performed by: INTERNAL MEDICINE

## 2024-12-19 NOTE — ED PROVIDER NOTES
"Encounter Date: 12/18/2024       History     Chief Complaint   Patient presents with    Palpitations     Pt reports drinking "lots of caffeine today and heart is racing.     HPI  29-year-old woman with a history of bipolar disorder and history of a rapid heart rate for which she is supposed to take metoprolol presents for evaluation of palpitations all-over chest burning sensation and some shortness of breath.  It started her on iron p.m. today.  She reports she was lying in bed when it started.  She had a similar episode yesterday.  She says her heart has been fast before but never this fast.  She denies fever chills cough congestion nausea or vomiting change in bowel or bladder habits abdominal pain.  Review of patient's allergies indicates:  No Known Allergies  Past Medical History:   Diagnosis Date    Anemia     Anxiety disorder, unspecified     Asthma     Bipolar disorder, unspecified     Depression     Eczema     GERD (gastroesophageal reflux disease)     Glaucoma     Headache     Heart murmur      Past Surgical History:   Procedure Laterality Date    COLONOSCOPY N/A 03/05/2021    Procedure: COLONOSCOPY;  Surgeon: Jake Williamson MD;  Location: [facility];  Service: Endoscopy;  Laterality: N/A;    ESOPHAGOGASTRODUODENOSCOPY N/A 03/05/2021    Procedure: EGD (ESOPHAGOGASTRODUODENOSCOPY);  Surgeon: Jake Williamson MD;  Location: [facility];  Service: Endoscopy;  Laterality: N/A;    ESOPHAGOGASTRODUODENOSCOPY N/A 12/2/2024    Procedure: EGD (ESOPHAGOGASTRODUODENOSCOPY);  Surgeon: Sourav Rushing MD;  Location: [facility];  Service: Endoscopy;  Laterality: N/A;    UPPER GASTROINTESTINAL ENDOSCOPY       Family History   Problem Relation Name Age of Onset    Hypertension Mother      Hyperlipidemia Mother      Colon polyps Mother      Heart attacks under age 50 Mother      Drug abuse Father      Arthritis Brother      Glaucoma Paternal Grandmother      Dementia Paternal Grandmother      Melanoma Neg Hx      Colon " cancer Neg Hx      Crohn's disease Neg Hx      Esophageal cancer Neg Hx      Liver cancer Neg Hx      Rectal cancer Neg Hx      Stomach cancer Neg Hx      Ulcerative colitis Neg Hx       Social History     Tobacco Use    Smoking status: Some Days     Current packs/day: 0.50     Average packs/day: 0.5 packs/day for 11.0 years (5.5 ttl pk-yrs)     Types: Cigarettes     Start date: 12/3/2013    Smokeless tobacco: Never   Substance Use Topics    Alcohol use: Not Currently    Drug use: Never     Review of Systems   All other systems reviewed and are negative.      Physical Exam     Initial Vitals [12/18/24 2134]   BP Pulse Resp Temp SpO2   139/83 (!) 133 20 97.9 °F (36.6 °C) 100 %      MAP       --         Physical Exam    Nursing note and vitals reviewed.  Constitutional: She appears well-developed. She is not diaphoretic.   HENT:   Head: Normocephalic and atraumatic.   Eyes: Right eye exhibits no discharge. Left eye exhibits no discharge.   Neck: No tracheal deviation present.   Cardiovascular:  Regular rhythm and intact distal pulses.           Rapid regular rhythm   Pulmonary/Chest: Breath sounds normal. No stridor. No respiratory distress.   Abdominal: Abdomen is soft. There is no abdominal tenderness.   Musculoskeletal:         General: No tenderness.     Neurological: She is alert and oriented to person, place, and time.   Skin: Skin is warm and dry.         ED Course   Procedures  Labs Reviewed   CBC W/ AUTO DIFFERENTIAL - Abnormal       Result Value    WBC 8.83      RBC 4.53      Hemoglobin 12.2      Hematocrit 37.2      MCV 82      MCH 26.9 (*)     MCHC 32.8      RDW 13.7      Platelets 287      MPV 9.2      Immature Granulocytes 0.2      Gran # (ANC) 6.6      Immature Grans (Abs) 0.02      Lymph # 1.4      Mono # 0.7      Eos # 0.1      Baso # 0.05      nRBC 0      Gran % 74.6 (*)     Lymph % 15.3 (*)     Mono % 8.4      Eosinophil % 0.9      Basophil % 0.6      Differential Method Automated     COMPREHENSIVE  METABOLIC PANEL - Abnormal    Sodium 142      Potassium 3.5      Chloride 109      CO2 20 (*)     Glucose 113 (*)     BUN 11      Creatinine 1.0      Calcium 9.3      Total Protein 6.9      Albumin 3.9      Total Bilirubin 0.5      Alkaline Phosphatase 125      AST 71 (*)     ALT 93 (*)     eGFR >60      Anion Gap 13     D DIMER, QUANTITATIVE - Abnormal    D-Dimer 0.52 (*)     Narrative:       Ddimer  critical result(s) called and verbal readback obtained from   Babita Lee RN ED by [facility] 12/18/2024 23:34   TROPONIN I    Troponin I <0.006     TROPONIN I    Troponin I <0.006     B-TYPE NATRIURETIC PEPTIDE    BNP 23     MAGNESIUM    Magnesium 2.0     TSH    TSH 3.675     POCT URINE PREGNANCY    POC Preg Test, Ur Negative       Acceptable Yes            Imaging Results              CTA Chest Non-Coronary (PE Studies) (Final result)  Result time 12/19/24 01:40:09      Final result by Katharina Avila MD (12/19/24 01:40:09)                   Impression:      No evidence of pulmonary thromboemboli to the level of the segmental branches noting small peripheral pulmonary thromboemboli are not reliably excluded.    Several punctate 2 mm in maximal size pulmonary nodules bilaterally.  For multiple solid nodules all <6 mm, Fleischner Society 2017 guidelines recommend no routine follow up for a low risk patient, or follow up with non-contrast chest CT at 12 months after discovery in a high risk patient.      Electronically signed by: Katharina Avila  Date:    12/19/2024  Time:    01:40               Narrative:    EXAMINATION:  CTA CHEST NON CORONARY (PE STUDIES)    CLINICAL HISTORY:  Pulmonary embolism (PE) suspected, positive D-dimer;    TECHNIQUE:  Low dose axial images, sagittal and coronal reformations were obtained from the thoracic inlet to the lung bases following the IV administration of 100 mL of Omnipaque 350.  MIP imaging was performed.    COMPARISON:  Checks x-ray  10/01/2019    FINDINGS:  Pulmonary vasculature: There is no evidence of pulmonary thromboemboli to the level of the segmental branches.  More peripheral small pulmonary thromboemboli are not reliably excluded.  Pulmonary artery caliber is normal and pulmonary arteries distribute normally.  There are four pulmonary veins.    Amina/Mediastinum: No pathologic hu enlargement.    Airways: Patent.    Lungs/Pleura: Small micro nodules are noted in the lower lobes bilaterally axial images 235 and 234 measuring 2 mm maximally.  There is no evidence of interstitial airspace disease/consolidation.  There is no pleural effusion or thickening.    Aorta: Left-sided aortic arch.  No aneurysm and no significant atherosclerosis    Heart: Normal in size.  No pericardial is effusion.    Base of Neck: No significant abnormality.    Thoracic soft tissues: Unremarkable.    Esophagus: Unremarkable.    Upper Abdomen: No abnormality of the partially imaged upper abdomen.    Bones: No acute fracture. No suspicious lytic or sclerotic lesions.                                       Medications   lactated ringers bolus 1,000 mL (0 mLs Intravenous Stopped 12/19/24 0126)   aluminum-magnesium hydroxide-simethicone 200-200-20 mg/5 mL suspension 15 mL (15 mLs Oral Given 12/18/24 2232)   famotidine (PF) injection 20 mg (20 mg Intravenous Given 12/18/24 2316)   iohexoL (OMNIPAQUE 350) 350 mg iodine/mL injection (100 mLs  Given 12/18/24 2350)     Medical Decision Making  Palpitations chest pain shortness of breath, she is tachycardic to 130 in triage, otherwise her vitals are stable, on exam she is nontoxic-appearing, differential includes anxiety arrhythmia metabolic or endocrine derangement ACS medication effect, tox pulmonary embolism.  I think it is reasonable to evaluate her for pulmonary embolism with D-dimer based on her risk factors or lack that her 4 of.  Her D-dimer was elevated.  Got a CT PE, she reported feeling better, her heart rate was  in the 90s.  Repeat EKG ordered.  I think she is a low risk heart score reasonable for negative troponins and discharge    Heart rate improved with fluids    Amount and/or Complexity of Data Reviewed  Independent Historian: friend     Details: Reports they were concerned about how fast her heart was going  External Data Reviewed: notes.     Details: Seen at an outside hospital yesterday for a similar presentation and discharged    07/03/2024 outpatient cardiology note notes stress test negative for reversible ischemia  Labs: ordered. Decision-making details documented in ED Course.  Radiology: ordered.  ECG/medicine tests: ordered and independent interpretation performed.     Details: EKG on my independent interpretation, tachycardic, sinus rhythm, normal axis, no STEMI, does have mild diffuse ST segment depressions    Risk  OTC drugs.  Prescription drug management.               ED Course as of 12/19/24 0345   Thu Dec 19, 2024   0219 Comprehensive metabolic panel(!)  Mild LFT elevation seen previously [IC]   0232 Repeat EKG 88 beats per minute normal axis no STEMI QTC less than 500 no ST segment depressions noted [IC]   0259 I do not appreciate evidence of pneumothorax on my independent interpretation of CT chest [IC]   0259 She is feeling better we discussed her results we discussed that she should take her medicine as prescribed, she should follow up with her doctors, Return precautions/follow up instructions/treatment plan given, expressed agreement and understanding.    [IC]      ED Course User Index  [IC] Eliezer Shirley MD                           Clinical Impression:  Final diagnoses:  [R07.9] Chest pain  [R00.2] Palpitations          ED Disposition Condition    Discharge           ED Prescriptions    None       Follow-up Information       Follow up With Specialties Details Why Contact Info Additional Information    Monika Rajan NP Family Medicine Schedule an appointment as soon as possible  for a visit in 1 day  [facility] [place] [address]  [phone]       [facility] Emergency Medicine Go to  As needed, If symptoms worsen [address] [place] [address] [address] [facility] [id number] 1st floor             Eliezer Shirley MD  12/19/24 [id number]

## 2024-12-23 LAB
OHS QRS DURATION: 74 MS
OHS QTC CALCULATION: 430 MS

## 2024-12-24 ENCOUNTER — PATIENT MESSAGE (OUTPATIENT)
Dept: CARDIOLOGY | Facility: CLINIC | Age: 29
End: 2024-12-24
Payer: MEDICARE

## 2024-12-26 ENCOUNTER — OFFICE VISIT (OUTPATIENT)
Dept: URGENT CARE | Facility: CLINIC | Age: 29
End: 2024-12-26
Payer: MEDICARE

## 2024-12-26 VITALS
HEART RATE: 86 BPM | BODY MASS INDEX: 28.22 KG/M2 | OXYGEN SATURATION: 97 % | RESPIRATION RATE: 15 BRPM | HEIGHT: 59 IN | DIASTOLIC BLOOD PRESSURE: 62 MMHG | TEMPERATURE: 101 F | SYSTOLIC BLOOD PRESSURE: 93 MMHG | WEIGHT: 140 LBS

## 2024-12-26 DIAGNOSIS — R05.9 COUGH, UNSPECIFIED TYPE: ICD-10-CM

## 2024-12-26 DIAGNOSIS — J10.1 INFLUENZA A: Primary | ICD-10-CM

## 2024-12-26 DIAGNOSIS — R09.81 SINUS CONGESTION: ICD-10-CM

## 2024-12-26 LAB
CTP QC/QA: YES
CTP QC/QA: YES
FLUAV AG NPH QL: POSITIVE
FLUBV AG NPH QL: NEGATIVE
SARS-COV-2 AG RESP QL IA.RAPID: NEGATIVE

## 2024-12-26 RX ORDER — CHLOPHEDIANOL HCL AND PYRILAMINE MALEATE 12.5; 12.5 MG/5ML; MG/5ML
10 SOLUTION ORAL EVERY 8 HOURS PRN
Qty: 300 ML | Refills: 0 | Status: SHIPPED | OUTPATIENT
Start: 2024-12-26 | End: 2025-01-05

## 2024-12-26 RX ORDER — CHLOPHEDIANOL HCL AND PYRILAMINE MALEATE 12.5; 12.5 MG/5ML; MG/5ML
10 SOLUTION ORAL EVERY 8 HOURS PRN
Qty: 300 ML | Refills: 0 | Status: SHIPPED | OUTPATIENT
Start: 2024-12-26 | End: 2024-12-26

## 2024-12-26 RX ORDER — OSELTAMIVIR PHOSPHATE 75 MG/1
75 CAPSULE ORAL 2 TIMES DAILY
Qty: 10 CAPSULE | Refills: 0 | Status: SHIPPED | OUTPATIENT
Start: 2024-12-26 | End: 2024-12-31

## 2024-12-26 NOTE — PROGRESS NOTES
"Subjective:      Patient ID: Sherry Burns is a 29 y.o. female.    Vitals:  height is 4' 11" (1.499 m) and weight is 63.5 kg (140 lb). Her temperature is 100.7 °F (38.2 °C) (abnormal). Her blood pressure is 93/62 and her pulse is 86. Her respiration is 15 and oxygen saturation is 97%.     Chief Complaint: Cough and Sinus Problem    Patient complains of sneezing and coughing that began yesterday.         Constitution: Positive for chills, fatigue and fever.   HENT:  Positive for congestion and postnasal drip.    Respiratory:  Positive for cough. Negative for sputum production.    Musculoskeletal:  Positive for muscle ache.   Neurological:  Positive for headaches.      Objective:     Physical Exam   Constitutional: She is oriented to person, place, and time. She appears well-developed. She is cooperative.  Non-toxic appearance. She does not appear ill. No distress.   HENT:   Head: Normocephalic and atraumatic.   Ears:   Right Ear: Hearing, tympanic membrane, external ear and ear canal normal.   Left Ear: Hearing, tympanic membrane, external ear and ear canal normal.   Nose: Rhinorrhea and congestion present. No mucosal edema or nasal deformity. No epistaxis. Right sinus exhibits no maxillary sinus tenderness and no frontal sinus tenderness. Left sinus exhibits no maxillary sinus tenderness and no frontal sinus tenderness.   Mouth/Throat: Uvula is midline, oropharynx is clear and moist and mucous membranes are normal. No trismus in the jaw. Normal dentition. No uvula swelling. No oropharyngeal exudate, posterior oropharyngeal edema or posterior oropharyngeal erythema.   Eyes: Conjunctivae and lids are normal. No scleral icterus.   Neck: Trachea normal and phonation normal. Neck supple. No edema present. No erythema present. No neck rigidity present.   Cardiovascular: Normal rate, regular rhythm and normal heart sounds.   Pulmonary/Chest: Effort normal and breath sounds normal. No respiratory distress. She has no " decreased breath sounds. She has no wheezes. She has no rhonchi. She has no rales.   Abdominal: Normal appearance.   Musculoskeletal: Normal range of motion.         General: No deformity. Normal range of motion.   Neurological: She is alert and oriented to person, place, and time. She displays no weakness. She exhibits normal muscle tone.   Skin: Skin is warm, dry, intact, not diaphoretic and not pale.   Psychiatric: Her speech is normal and behavior is normal. Mood, judgment and thought content normal.   Nursing note and vitals reviewed.      Assessment:     1. Influenza A    2. Cough, unspecified type    3. Sinus congestion        Plan:       Influenza A  -     oseltamivir (TAMIFLU) 75 MG capsule; Take 1 capsule (75 mg total) by mouth 2 (two) times daily. for 5 days  Dispense: 10 capsule; Refill: 0    Cough, unspecified type  -     SARS Coronavirus 2 Antigen, POCT Manual Read  -     POCT Influenza A/B Rapid Antigen  -     pyrilamine-chlophedianoL (NINJACOF) 12.5-12.5 mg/5 mL Liqd; Take 10 mLs by mouth every 8 (eight) hours as needed.  Dispense: 300 mL; Refill: 0    Sinus congestion  -     pyrilamine-chlophedianoL (NINJACOF) 12.5-12.5 mg/5 mL Liqd; Take 10 mLs by mouth every 8 (eight) hours as needed.  Dispense: 300 mL; Refill: 0      COVID: neg  FLU: A    Discussed medication with patient who acknowledges understanding and is agreeable to POC. Follow up with primary care. Increase fluid intake. Red flags for ER discussed.

## 2025-01-02 ENCOUNTER — PATIENT MESSAGE (OUTPATIENT)
Dept: PULMONOLOGY | Facility: CLINIC | Age: 30
End: 2025-01-02
Payer: MEDICARE

## 2025-01-03 ENCOUNTER — OFFICE VISIT (OUTPATIENT)
Dept: CARDIOLOGY | Facility: CLINIC | Age: 30
End: 2025-01-03
Payer: MEDICARE

## 2025-01-03 ENCOUNTER — HOSPITAL ENCOUNTER (OUTPATIENT)
Dept: PULMONOLOGY | Facility: HOSPITAL | Age: 30
Discharge: HOME | End: 2025-01-03
Attending: INTERNAL MEDICINE
Payer: MEDICARE

## 2025-01-03 VITALS
OXYGEN SATURATION: 99 % | SYSTOLIC BLOOD PRESSURE: 118 MMHG | HEART RATE: 63 BPM | BODY MASS INDEX: 30.04 KG/M2 | HEIGHT: 59 IN | WEIGHT: 149 LBS | RESPIRATION RATE: 17 BRPM | DIASTOLIC BLOOD PRESSURE: 60 MMHG

## 2025-01-03 DIAGNOSIS — J45.40 MODERATE PERSISTENT ASTHMA WITHOUT COMPLICATION: ICD-10-CM

## 2025-01-03 DIAGNOSIS — R06.02 SOBOE (SHORTNESS OF BREATH ON EXERTION): ICD-10-CM

## 2025-01-03 DIAGNOSIS — R00.2 PALPITATIONS: Primary | ICD-10-CM

## 2025-01-03 LAB
DLCO SINGLE BREATH LLN: 18.45
DLCO SINGLE BREATH PRE REF: 57.3 %
DLCO SINGLE BREATH REF: 24.18
DLCOC SBVA LLN: 3.9
DLCOC SBVA REF: 5.9
DLCOC SINGLE BREATH LLN: 18.45
DLCOC SINGLE BREATH REF: 24.18
DLCOVA LLN: 3.9
DLCOVA PRE REF: 71.3 %
DLCOVA PRE: 4.21 ML/(MIN*MMHG*L) (ref 3.9–7.89)
DLCOVA REF: 5.9
ERV LLN: -16448.79
ERV PRE REF: 68.1 %
ERV REF: 1.21
FEF 25 75 CHG: 5.9 %
FEF 25 75 LLN: 2.41
FEF 25 75 POST REF: 70.5 %
FEF 25 75 PRE REF: 66.5 %
FEF 25 75 REF: 3.81
FET100 CHG: -0.3 %
FEV1 CHG: 2.3 %
FEV1 FVC CHG: 1.1 %
FEV1 FVC LLN: 74
FEV1 FVC POST REF: 97.9 %
FEV1 FVC PRE REF: 96.9 %
FEV1 FVC REF: 86
FEV1 LLN: 2.18
FEV1 POST REF: 86.6 %
FEV1 PRE REF: 84.6 %
FEV1 REF: 2.71
FRCPLETH LLN: 1.56
FRCPLETH PREREF: 63.9 %
FRCPLETH REF: 2.39
FVC CHG: 1.2 %
FVC LLN: 2.53
FVC POST REF: 88.2 %
FVC PRE REF: 87.2 %
FVC REF: 3.16
IVC PRE: 2.73 L (ref 2.53–3.82)
IVC SINGLE BREATH LLN: 2.53
IVC SINGLE BREATH PRE REF: 86.2 %
IVC SINGLE BREATH REF: 3.16
MVV LLN: 88
MVV PRE REF: 48.4 %
MVV REF: 104
PEF CHG: -5.2 %
PEF LLN: 4.8
PEF POST REF: 70 %
PEF PRE REF: 73.9 %
PEF REF: 6.25
POST FEF 25 75: 2.68 L/S (ref 2.41–5.21)
POST FET 100: 6.26 SEC
POST FEV1 FVC: 84.07 % (ref 74.23–95.05)
POST FEV1: 2.35 L (ref 2.18–3.23)
POST FVC: 2.79 L (ref 2.53–3.82)
POST PEF: 4.38 L/S (ref 4.8–7.71)
PRE DLCO: 13.87 ML/(MIN*MMHG) (ref 18.45–29.91)
PRE ERV: 0.82 L (ref -16448.79–16451.21)
PRE FEF 25 75: 2.53 L/S (ref 2.41–5.21)
PRE FET 100: 6.28 SEC
PRE FEV1 FVC: 83.2 % (ref 74.23–95.05)
PRE FEV1: 2.29 L (ref 2.18–3.23)
PRE FRC PL: 1.53 L (ref 1.56–3.21)
PRE FVC: 2.76 L (ref 2.53–3.82)
PRE MVV: 50.22 L/MIN (ref 88.21–119.35)
PRE PEF: 4.62 L/S (ref 4.8–7.71)
PRE RV: 0.7 L (ref 0.6–1.75)
PRE TLC: 3.46 L (ref 3.11–5.09)
RAW LLN: 3.06
RAW PRE REF: 171.7 %
RAW PRE: 5.25 CMH2O*S/L (ref 3.06–3.06)
RAW REF: 3.06
RV LLN: 0.6
RV PRE REF: 59.7 %
RV REF: 1.18
RVTLC LLN: 19
RVTLC PRE REF: 70.4 %
RVTLC PRE: 20.3 % (ref 19.23–38.41)
RVTLC REF: 29
TLC LLN: 3.11
TLC PRE REF: 84.3 %
TLC REF: 4.1
VA PRE: 3.3 L (ref 3.95–3.95)
VA SINGLE BREATH LLN: 3.95
VA SINGLE BREATH PRE REF: 83.4 %
VA SINGLE BREATH REF: 3.95
VC LLN: 2.53
VC PRE REF: 87.2 %
VC PRE: 2.76 L (ref 2.53–3.82)
VC REF: 3.16

## 2025-01-03 PROCEDURE — 94729 DIFFUSING CAPACITY: CPT

## 2025-01-03 PROCEDURE — 94060 EVALUATION OF WHEEZING: CPT

## 2025-01-03 PROCEDURE — 94726 PLETHYSMOGRAPHY LUNG VOLUMES: CPT

## 2025-01-03 PROCEDURE — 99999 PR PBB SHADOW E&M-EST. PATIENT-LVL III: CPT | Mod: PBBFAC,,, | Performed by: NURSE PRACTITIONER

## 2025-01-03 RX ORDER — BUSPIRONE HYDROCHLORIDE 30 MG/1
30 TABLET ORAL 2 TIMES DAILY
COMMUNITY
Start: 2024-11-20

## 2025-01-03 RX ORDER — ALBUTEROL SULFATE 2.5 MG/.5ML
SOLUTION RESPIRATORY (INHALATION)
Status: DISCONTINUED
Start: 2025-01-03 | End: 2025-01-04 | Stop reason: HOSPADM

## 2025-01-03 RX ORDER — POTASSIUM CHLORIDE 600 MG/1
8 TABLET, FILM COATED, EXTENDED RELEASE ORAL DAILY
Qty: 90 TABLET | Refills: 1 | Status: SHIPPED | OUTPATIENT
Start: 2025-01-03

## 2025-01-03 RX ORDER — CARIPRAZINE 3 MG/1
3 CAPSULE, GELATIN COATED ORAL DAILY
COMMUNITY
Start: 2024-11-20

## 2025-01-03 RX ORDER — METFORMIN HYDROCHLORIDE 500 MG/1
500 TABLET, EXTENDED RELEASE ORAL EVERY MORNING
COMMUNITY
Start: 2024-12-19

## 2025-01-03 RX ORDER — METOPROLOL SUCCINATE 25 MG/1
25 TABLET, EXTENDED RELEASE ORAL 2 TIMES DAILY
COMMUNITY
Start: 2024-12-19

## 2025-01-03 RX ORDER — ESCITALOPRAM OXALATE 20 MG/1
20 TABLET ORAL DAILY
COMMUNITY
Start: 2024-11-20

## 2025-01-03 NOTE — PROGRESS NOTES
Subjective:    Patient ID:  Sherry Burns is a 29 y.o. female.  Chief Complaint   Patient presents with    Follow-up     Pt went to [facility] on 12/17-12/18 due to palpations     Chest Pain     Pt states that she has been having chest pain since 12/17/24. Pt did have EKG done on 12/17/24, 12/18/24, and 12/19/24.       HPI:  Patient seen today for follow up appointment post ER visit. She experienced palpitations and felt like she might pass out.  Patient went to the emergency room on 3 separate occasions due to the symptoms.  Patient found to have sinus tachycardia but no significant arrhythmias.  Patient had a CTA to rule out PE which was negative.  She was noted to have a mildly decreased potassium level of 3.5.  She reports that she has been taking her metoprolol.  She does consume caffeine on a daily basis but states that she normally consumes a regular amount of caffeine and not any additional.    Review of patient's allergies indicates:  No Known Allergies    Past Medical History:   Diagnosis Date    Anemia     Anxiety disorder, unspecified     Asthma     Bipolar disorder, unspecified     Depression     Eczema     GERD (gastroesophageal reflux disease)     Glaucoma     Headache     Heart murmur      Past Surgical History:   Procedure Laterality Date    COLONOSCOPY N/A 03/05/2021    Procedure: COLONOSCOPY;  Surgeon: Jake Williamson MD;  Location: [facility];  Service: Endoscopy;  Laterality: N/A;    ESOPHAGOGASTRODUODENOSCOPY N/A 03/05/2021    Procedure: EGD (ESOPHAGOGASTRODUODENOSCOPY);  Surgeon: Jake Williamson MD;  Location: [facility];  Service: Endoscopy;  Laterality: N/A;    ESOPHAGOGASTRODUODENOSCOPY N/A 12/2/2024    Procedure: EGD (ESOPHAGOGASTRODUODENOSCOPY);  Surgeon: Sourav Rushing MD;  Location: [facility];  Service: Endoscopy;  Laterality: N/A;    UPPER GASTROINTESTINAL ENDOSCOPY       Social History     Tobacco Use    Smoking status: Some Days     Current packs/day: 0.50     Average packs/day:  0.5 packs/day for 11.1 years (5.5 ttl pk-yrs)     Types: Cigarettes     Start date: 12/3/2013    Smokeless tobacco: Never   Substance Use Topics    Alcohol use: Not Currently    Drug use: Never     Family History   Problem Relation Name Age of Onset    Hypertension Mother      Hyperlipidemia Mother      Colon polyps Mother      Heart attacks under age 50 Mother      Drug abuse Father      Arthritis Brother      Glaucoma Paternal Grandmother      Dementia Paternal Grandmother      Melanoma Neg Hx      Colon cancer Neg Hx      Crohn's disease Neg Hx      Esophageal cancer Neg Hx      Liver cancer Neg Hx      Rectal cancer Neg Hx      Stomach cancer Neg Hx      Ulcerative colitis Neg Hx          Review of Systems:   Per HPI         Objective:        Vitals:    01/03/25 0808   BP: 118/60   Pulse: 63   Resp: 17       Lab Results   Component Value Date    WBC 8.83 12/18/2024    HGB 12.2 12/18/2024    HCT 37.2 12/18/2024     12/18/2024    CHOL 180 07/16/2024    TRIG 135 07/16/2024    HDL 35 07/16/2024    ALT 93 (H) 12/18/2024    AST 71 (H) 12/18/2024     12/18/2024    K 3.5 12/18/2024     12/18/2024    CREATININE 1.0 12/18/2024    BUN 11 12/18/2024    CO2 20 (L) 12/18/2024    TSH 3.675 12/18/2024    HGBA1C 5.9 07/16/2024        ECHOCARDIOGRAM RESULTS  Results for orders placed in visit on 05/07/24    Stress Echo Which stress agent will be used? Treadmill Exercise; Color Flow Doppler? No    Interpretation Summary    Left Ventricle: The left ventricle is normal in size. There is normal systolic function with a visually estimated ejection fraction of 55 - 60%.    Stress Protocol: The patient exercised for 6 minutes 40 seconds on a Roman protocol, corresponding to a functional capacity of 8.3METS, achieving a peak heart rate of 167 bpm, which is 92% of the age predicted maximum heart rate. The patient reported no symptoms during the stress test. The test was stopped because the patient experienced fatigue.     Baseline ECG: The Baseline ECG reveals sinus rhythm. The axis is normal. The ST segments are normal.    Stress ECG: There are no ST segment deviation identified during the protocol. There are no arrhythmias during stress. There is normal blood pressure response with stress.    ECG Conclusion: The ECG portion of the study is negative for ischemia.    Post-stress Echo: The left ventricle systolic function is normal.    Post-stress        CURRENT/PREVIOUS VISIT EKG  Results for orders placed or performed during the hospital encounter of 12/18/24   EKG 12-lead    Collection Time: 12/19/24  2:26 AM   Result Value Ref Range    QRS Duration 74 ms    OHS QTC Calculation 430 ms    Narrative    Test Reason : R07.9,    Vent. Rate :  88 BPM     Atrial Rate :  88 BPM     P-R Int : 140 ms          QRS Dur :  74 ms      QT Int : 356 ms       P-R-T Axes :  49  71  27 degrees    QTcB Int : 430 ms    Normal sinus rhythm  Normal ECG    Confirmed by Garett No ([id number]) on 12/23/2024 9:42:25 AM    Referred By: AAAREFERRAL SELF           Confirmed By: Garett No     No valid procedures specified.   Results for orders placed during the hospital encounter of 02/29/24    Exercise Stress - EKG    Interpretation Summary    The patient exercised for 4 minutes 54 seconds on a Roman protocol, corresponding to a functional capacity of 7 METS, achieving a peak heart rate of 168 bpm, which is 93 % of the age predicted maximum heart rate.    The ECG portion of the study is positive for ischemia.    The patient reported no chest pain during the stress test.    Consider stress echocardiography to enhance the specificity and sensitivity of this test      Physical Exam:  CONSTITUTIONAL: No fever, no chills  HEENT: Normocephalic, atraumatic,pupils reactive to light                 NECK:  No JVD no carotid bruit  CVS: S1S2+, RRR, no murmurs,   LUNGS: Clear  ABDOMEN: Soft, NT, BS+  EXTREMITIES: No cyanosis, edema  : No casanova  catheter  NEURO: AAO X 3  PSY: Normal affect      Medication List with Changes/Refills   New Medications    POTASSIUM CHLORIDE (KLOR-CON) 8 MEQ TBSR    Take 1 tablet (8 mEq total) by mouth once daily.   Current Medications    BUSPIRONE (BUSPAR) 30 MG TAB    Take 30 mg by mouth 2 (two) times daily.    DUPIXENT SYRINGE 300 MG/2 ML SYRG    Inject 300 mg (2 mL) into the skin every other week    ESCITALOPRAM OXALATE (LEXAPRO) 20 MG TABLET    Take 20 mg by mouth once daily.    METFORMIN (GLUCOPHAGE-XR) 500 MG ER 24HR TABLET    Take 500 mg by mouth every morning.    METOPROLOL SUCCINATE (TOPROL-XL) 25 MG 24 HR TABLET    Take 25 mg by mouth 2 (two) times daily.    PYRILAMINE-CHLOPHEDIANOL (NINJACOF) 12.5-12.5 MG/5 ML LIQD    Take 10 mLs by mouth every 8 (eight) hours as needed.    VRAYLAR 3 MG CAP    Take 3 mg by mouth once daily.             Assessment:       1. Palpitations    2. SOBOE (shortness of breath on exertion)         Plan:     Problem List Items Addressed This Visit          Unprioritized    Palpitations - Primary    Overview     Avoid triptans for migraine abortive therapy          Current Assessment & Plan     Continue metoprolol succinate 25 mg p.o. b.i.d..  Start potassium chloride 8 mEq p.o. daily for potassium supplementation.  Will obtain a cardiac event monitor to evaluate for arrhythmias.          Relevant Orders    Cardiac Monitor - 3-15 Day Adult (Cupid Only)    SOBOE (shortness of breath on exertion)    Current Assessment & Plan     Stable.         Relevant Orders    Cardiac Monitor - 3-15 Day Adult (Cupid Only)       Follow up in about 8 weeks (around 2/28/2025).

## 2025-01-03 NOTE — ASSESSMENT & PLAN NOTE
Continue metoprolol succinate 25 mg p.o. b.i.d..  Start potassium chloride 8 mEq p.o. daily for potassium supplementation.  Will obtain a cardiac event monitor to evaluate for arrhythmias.

## 2025-01-06 ENCOUNTER — PATIENT MESSAGE (OUTPATIENT)
Dept: CARDIOLOGY | Facility: CLINIC | Age: 30
End: 2025-01-06
Payer: MEDICARE

## 2025-01-06 ENCOUNTER — OFFICE VISIT (OUTPATIENT)
Dept: PULMONOLOGY | Facility: CLINIC | Age: 30
End: 2025-01-06
Payer: MEDICARE

## 2025-01-06 ENCOUNTER — TELEPHONE (OUTPATIENT)
Dept: CARDIOLOGY | Facility: CLINIC | Age: 30
End: 2025-01-06
Payer: MEDICARE

## 2025-01-06 VITALS
HEART RATE: 64 BPM | SYSTOLIC BLOOD PRESSURE: 106 MMHG | DIASTOLIC BLOOD PRESSURE: 65 MMHG | WEIGHT: 147.06 LBS | BODY MASS INDEX: 29.65 KG/M2 | HEIGHT: 59 IN | OXYGEN SATURATION: 99 %

## 2025-01-06 DIAGNOSIS — J45.40 MODERATE PERSISTENT ASTHMA WITHOUT COMPLICATION: Primary | ICD-10-CM

## 2025-01-06 PROCEDURE — 99999 PR PBB SHADOW E&M-EST. PATIENT-LVL III: CPT | Mod: PBBFAC,,, | Performed by: INTERNAL MEDICINE

## 2025-01-06 PROCEDURE — 99214 OFFICE O/P EST MOD 30 MIN: CPT | Mod: S$GLB,,, | Performed by: INTERNAL MEDICINE

## 2025-01-06 PROCEDURE — 3078F DIAST BP <80 MM HG: CPT | Mod: CPTII,S$GLB,, | Performed by: INTERNAL MEDICINE

## 2025-01-06 PROCEDURE — 1159F MED LIST DOCD IN RCRD: CPT | Mod: CPTII,S$GLB,, | Performed by: INTERNAL MEDICINE

## 2025-01-06 PROCEDURE — 3008F BODY MASS INDEX DOCD: CPT | Mod: CPTII,S$GLB,, | Performed by: INTERNAL MEDICINE

## 2025-01-06 PROCEDURE — 3074F SYST BP LT 130 MM HG: CPT | Mod: CPTII,S$GLB,, | Performed by: INTERNAL MEDICINE

## 2025-01-06 RX ORDER — FLUTICASONE FUROATE, UMECLIDINIUM BROMIDE AND VILANTEROL TRIFENATATE 200; 62.5; 25 UG/1; UG/1; UG/1
1 POWDER RESPIRATORY (INHALATION) DAILY
Qty: 60 EACH | Refills: 11 | Status: SHIPPED | OUTPATIENT
Start: 2025-01-06

## 2025-01-06 NOTE — TELEPHONE ENCOUNTER
Sw the patient's mom about her request to change her date to get Holter monitor on and informed the patient that the daughter was already scheduled tried to call her back to let her know that they had a slot for 10:30 am but there was no answers to she if that would work

## 2025-01-06 NOTE — PATIENT INSTRUCTIONS
Asthma control inhaler trelegy restart- use 1 puff once daily, rinse mouth after use  Continue xopenex as needed

## 2025-01-06 NOTE — TELEPHONE ENCOUNTER
----- Message from Candi sent at 1/6/2025  2:25 PM CST -----  Regarding: Needs return call  Type: Needs Medical Advice  Who Called:  Finn    Best Call Back Number: [phone]    Additional Information: Mom states her daughter the pt  was supposed to get a heart monitor, mom is scheduled to get one tomorrow, wanted to see if her daughter pt could get one with her, please constantin   56 y/o m with pmhx HTN, present with mother for eval of headaches. Began yesterday on left side. today had more on the middle. also felt some discomfort on the back of the left eye. no change in vision. no blurry vision. headache is described as sharp and lasting approx 1 min, episodic and had all night. does not usually get headaches. no weakness or numbness. has been under more stress lately. drinks often alcohol during the week. denies tob and illicit. no back pain. no chest pain  Gen.  no acute distress,  HEENT:  pupils 3 mm reactive to light, EOMI. neck supple. no temp tender  Lungs:  b/l bs  CVS: S1S2   Abd;  soft no tender no distention  Ext: no pitting edema no erythema  Neuro: aaox,3 no pronator drift. normal finger to nose. steady gait. clear speech  MSK: strength 5/5 b/l upper and lower ext.

## 2025-01-06 NOTE — PROGRESS NOTES
1/6/2025    Sherry Burns  Follow up    Chief Complaint   Patient presents with    Asthma       HPI:  01/06/2025- pt has run out of trelegy and using xopenex inhaler twice daily for wheezing. Continues on dupixent shots. She had the flu around rae and now feeling like she is improving- still wheezing more than usual, productive cough. Pt here w/ her mother Radha, also my pt- mom is having acute exacerbation copd and needs help    08/12/2024-   Pulmonary function tests  Keep trelegy inhaler and switch rescue medicine to xopenex due to fast heart rate  Xopenex in nebulizer as needed  Continue to avoid smoking    Pt saw dr thomas last yr for asthma, SOB. Recommendations at that time:   - smoking cessation recommended  - trial albuterol inhaler  - PFTs ordered  - methacholine challenge ordered  - CXR ordered  - TTE ordered    PFTs and methacholine were not done. She c/o wheezing and feels out of breath with walking to the car. She notices this since she has gained weight- about 70# she attributes to medicine side effects.   For asthma she uses trelegy 200 with benefit. She still wheezes and uses rescue inhaler 2x/day. Denies nocturnal arousals. She gets exacerbations requiring steroid about 2x/yr, last 2 mos ago. Rarely she uses night time albuterol nebs. As a child her asthma was worse. She also has eczema and takes dupixent shots which has helped her asthma.  She sees cardiology for fast heart rhythm- triggered by exercise- takes metoprolol.  She still gets episodes of this. Albuterol often triggers tachycardia. She has not tried xopenex.  She used to smoke, quit 3 mos ago.    The chief complaint problem is New to me    PFSH:  Past Medical History:   Diagnosis Date    Anemia     Anxiety disorder, unspecified     Asthma     Bipolar disorder, unspecified     Depression     Eczema     GERD (gastroesophageal reflux disease)     Glaucoma     Headache     Heart murmur          Past Surgical History:  "  Procedure Laterality Date    COLONOSCOPY N/A 03/05/2021    Procedure: COLONOSCOPY;  Surgeon: Jake Williamson MD;  Location: [facility];  Service: Endoscopy;  Laterality: N/A;    ESOPHAGOGASTRODUODENOSCOPY N/A 03/05/2021    Procedure: EGD (ESOPHAGOGASTRODUODENOSCOPY);  Surgeon: Jake Williamson MD;  Location: [facility];  Service: Endoscopy;  Laterality: N/A;    ESOPHAGOGASTRODUODENOSCOPY N/A 12/2/2024    Procedure: EGD (ESOPHAGOGASTRODUODENOSCOPY);  Surgeon: Sourav Rushing MD;  Location: [facility];  Service: Endoscopy;  Laterality: N/A;    UPPER GASTROINTESTINAL ENDOSCOPY       Social History     Tobacco Use    Smoking status: Some Days     Current packs/day: 0.50     Average packs/day: 0.5 packs/day for 11.1 years (5.5 ttl pk-yrs)     Types: Cigarettes     Start date: 12/3/2013    Smokeless tobacco: Never   Substance Use Topics    Alcohol use: Not Currently    Drug use: Never     Family History   Problem Relation Name Age of Onset    Hypertension Mother      Hyperlipidemia Mother      Colon polyps Mother      Heart attacks under age 50 Mother      Drug abuse Father      Arthritis Brother      Glaucoma Paternal Grandmother      Dementia Paternal Grandmother      Melanoma Neg Hx      Colon cancer Neg Hx      Crohn's disease Neg Hx      Esophageal cancer Neg Hx      Liver cancer Neg Hx      Rectal cancer Neg Hx      Stomach cancer Neg Hx      Ulcerative colitis Neg Hx       Review of patient's allergies indicates:  No Known Allergies    Performance Status:The patient's activity level is functions out of house.      Review of Systems:  a review of eleven systems covering constitutional, Eye, HEENT, Psych, Respiratory, Cardiac, GI, , Musculoskeletal, Endocrine, Dermatologic was negative except for pertinent findings as listed ABOVE and below:  All negative with pertinent positives as above       Exam:Comprehensive exam done. /65 (BP Location: Left arm, Patient Position: Sitting)   Pulse 64   Ht 4' 11" " (1.499 m)   Wt 66.7 kg (147 lb 0.8 oz)   LMP 12/26/2024 (Exact Date)   SpO2 99%   BMI 29.70 kg/m²   Exam included Vitals as listed, and patient's appearance and affect and alertness and mood, oral exam for yeast and hygiene and pharynx lesions and Mallapatti (M) score, neck with inspection for jvd and masses and thyroid abnormalities and lymph nodes (supraclavicular and infraclavicular nodes and axillary also examined and noted if abn), chest exam included symmetry and effort and fremitus and percussion and auscultation, cardiac exam included rhythm and gallops and murmur and rubs and jvd and edema, abdominal exam for mass and hepatosplenomegaly and tenderness and hernias and bowel sounds, Musculoskeletal exam with muscle tone and posture and mobility/gait and  strength, and skin for rashes and cyanosis and pallor and turgor, extremity for clubbing.  Findings were normal except for pertinent findings listed below:  M3, oropharynx clear  HR regular  Breath sounds clear bilaterally  No edema/clubbing    Radiographs (ct chest and cxr) reviewed: view by direct vision and interpretation as below   CXR 2019- clear lungs    Labs reviewed     Lab Results   Component Value Date    WBC 8.83 12/18/2024    HGB 12.2 12/18/2024    HCT 37.2 12/18/2024    MCV 82 12/18/2024     12/18/2024      Latest Reference Range & Units 05/18/22 09:34 05/28/22 18:10 03/07/23 14:49 09/06/23 12:45 12/18/23 13:22   Eos # 0.0 - 0.5 K/uL 0.4 0.2 0.3 190 0.3     CMP  Sodium   Date Value Ref Range Status   12/18/2024 142 136 - 145 mmol/L Final     Potassium   Date Value Ref Range Status   12/18/2024 3.5 3.5 - 5.1 mmol/L Final     Chloride   Date Value Ref Range Status   12/18/2024 109 95 - 110 mmol/L Final     CO2   Date Value Ref Range Status   12/18/2024 20 (L) 23 - 29 mmol/L Final     Glucose   Date Value Ref Range Status   12/18/2024 113 (H) 70 - 110 mg/dL Final     BUN   Date Value Ref Range Status   12/18/2024 11 6 - 20 mg/dL Final      Creatinine   Date Value Ref Range Status   12/18/2024 1.0 0.5 - 1.4 mg/dL Final     Calcium   Date Value Ref Range Status   12/18/2024 9.3 8.7 - 10.5 mg/dL Final     Total Protein   Date Value Ref Range Status   12/18/2024 6.9 6.0 - 8.4 g/dL Final     Albumin   Date Value Ref Range Status   12/18/2024 3.9 3.5 - 5.2 g/dL Final     Total Bilirubin   Date Value Ref Range Status   12/18/2024 0.5 0.1 - 1.0 mg/dL Final     Comment:     For infants and newborns, interpretation of results should be based  on gestational age, weight and in agreement with clinical  observations.    Premature Infant recommended reference ranges:  Up to 24 hours.............<8.0 mg/dL  Up to 48 hours............<12.0 mg/dL  3-5 days..................<15.0 mg/dL  6-29 days.................<15.0 mg/dL       Alkaline Phosphatase   Date Value Ref Range Status   12/18/2024 125 40 - 150 U/L Final     AST   Date Value Ref Range Status   12/18/2024 71 (H) 10 - 40 U/L Final     ALT   Date Value Ref Range Status   12/18/2024 93 (H) 10 - 44 U/L Final     Anion Gap   Date Value Ref Range Status   12/18/2024 13 8 - 16 mmol/L Final     eGFR   Date Value Ref Range Status   12/18/2024 >60 >60 mL/min/1.73 m^2 Final   07/16/2024 105  Final         PFT reviewed  1/3/24- reduced dlco corrects to normal for [facility]        Plan:  Clinical impression is apparently straight forward and impression with management as below.  Asthmatic on dupixent, here for follow up. Not in exacerbation, tends to exacerbate frequently    Problem List Items Addressed This Visit       Moderate persistent asthma without complication - Primary    Relevant Medications    fluticasone-umeclidin-vilanter (TRELEGY ELLIPTA) 200-62.5-25 mcg inhaler         Follow up in about 6 months (around 7/6/2025).    Discussed with patient above for education the following:      Patient Instructions   Asthma control inhaler trelegy restart- use 1 puff once daily, rinse mouth after use  Continue xopenex as  needed

## 2025-01-07 ENCOUNTER — PATIENT MESSAGE (OUTPATIENT)
Dept: PULMONOLOGY | Facility: CLINIC | Age: 30
End: 2025-01-07

## 2025-01-07 DIAGNOSIS — J45.40 MODERATE PERSISTENT ASTHMA WITHOUT COMPLICATION: Primary | ICD-10-CM

## 2025-01-07 PROCEDURE — 94729 DIFFUSING CAPACITY: CPT | Mod: 26,,, | Performed by: INTERNAL MEDICINE

## 2025-01-07 PROCEDURE — 94726 PLETHYSMOGRAPHY LUNG VOLUMES: CPT | Mod: 26,,, | Performed by: INTERNAL MEDICINE

## 2025-01-07 PROCEDURE — 94060 EVALUATION OF WHEEZING: CPT | Mod: 26,,, | Performed by: INTERNAL MEDICINE

## 2025-01-08 ENCOUNTER — OFFICE VISIT (OUTPATIENT)
Dept: OPTOMETRY | Facility: CLINIC | Age: 30
End: 2025-01-08
Payer: COMMERCIAL

## 2025-01-08 DIAGNOSIS — Z01.00 EXAMINATION OF EYES AND VISION: Primary | ICD-10-CM

## 2025-01-08 DIAGNOSIS — H52.7 REFRACTIVE ERROR: ICD-10-CM

## 2025-01-08 PROCEDURE — 92004 COMPRE OPH EXAM NEW PT 1/>: CPT | Mod: S$GLB,,, | Performed by: OPTOMETRIST

## 2025-01-08 PROCEDURE — 92015 DETERMINE REFRACTIVE STATE: CPT | Mod: S$GLB,,, | Performed by: OPTOMETRIST

## 2025-01-08 PROCEDURE — 99999 PR PBB SHADOW E&M-EST. PATIENT-LVL III: CPT | Mod: PBBFAC,,, | Performed by: OPTOMETRIST

## 2025-01-08 RX ORDER — ALBUTEROL SULFATE 90 UG/1
1 INHALANT RESPIRATORY (INHALATION) EVERY 4 HOURS PRN
Qty: 18 G | Refills: 11 | Status: SHIPPED | OUTPATIENT
Start: 2025-01-08

## 2025-01-08 NOTE — PROGRESS NOTES
HPI    Pt states trouble with distance vision, had glasses but lost them  Says as a child was told she had glaucoma, another doc said she did not   but has high pressure  Was taking latanoprost at bedtime, hasn't taken it in almost a year    (+)occasional pain  (+)headaches often  (-)FOL/floaters     MARSHA about a year ago     Hemoglobin A1C       Date                     Value               Ref Range             Status                07/16/2024               5.9                 4.0 - 6.0 %           Final              Last edited by Bhavin Pascual, OD on 1/8/2025  3:13 PM.            Assessment /Plan     For exam results, see Encounter Report.    Examination of eyes and vision    Refractive error      1. Examination of eyes and vision (Primary)  Good ocular health OU    2. Refractive error  Dispensed updated spectacle Rx. Discussed various spectacle lens options. Discussed adaptation period to new specs.   Demonstrated new spec Rx vs uncorrected vision in phoropter with patient satisfaction

## 2025-01-09 ENCOUNTER — HOSPITAL ENCOUNTER (OUTPATIENT)
Dept: CARDIOLOGY | Facility: CLINIC | Age: 30
Discharge: HOME | End: 2025-01-09
Attending: NURSE PRACTITIONER
Payer: MEDICARE

## 2025-01-09 ENCOUNTER — PATIENT MESSAGE (OUTPATIENT)
Dept: OPTOMETRY | Facility: CLINIC | Age: 30
End: 2025-01-09
Payer: MEDICARE

## 2025-01-09 DIAGNOSIS — R00.2 PALPITATIONS: ICD-10-CM

## 2025-01-09 DIAGNOSIS — R06.02 SOBOE (SHORTNESS OF BREATH ON EXERTION): ICD-10-CM

## 2025-01-12 ENCOUNTER — PATIENT MESSAGE (OUTPATIENT)
Dept: CARDIOLOGY | Facility: CLINIC | Age: 30
End: 2025-01-12
Payer: MEDICARE

## 2025-01-13 ENCOUNTER — LAB VISIT (OUTPATIENT)
Dept: LAB | Facility: HOSPITAL | Age: 30
End: 2025-01-13
Attending: STUDENT IN AN ORGANIZED HEALTH CARE EDUCATION/TRAINING PROGRAM
Payer: MEDICARE

## 2025-01-13 ENCOUNTER — HOSPITAL ENCOUNTER (EMERGENCY)
Facility: HOSPITAL | Age: 30
Discharge: HOME | End: 2025-01-14
Attending: STUDENT IN AN ORGANIZED HEALTH CARE EDUCATION/TRAINING PROGRAM
Payer: MEDICARE

## 2025-01-13 ENCOUNTER — OFFICE VISIT (OUTPATIENT)
Dept: ALLERGY | Facility: CLINIC | Age: 30
End: 2025-01-13
Payer: MEDICARE

## 2025-01-13 VITALS — OXYGEN SATURATION: 98 % | WEIGHT: 148.38 LBS | HEART RATE: 74 BPM | BODY MASS INDEX: 29.97 KG/M2

## 2025-01-13 DIAGNOSIS — R06.02 SOB (SHORTNESS OF BREATH): ICD-10-CM

## 2025-01-13 DIAGNOSIS — R07.9 CHEST PAIN: ICD-10-CM

## 2025-01-13 DIAGNOSIS — J31.0 CHRONIC RHINITIS: ICD-10-CM

## 2025-01-13 DIAGNOSIS — R00.0 TACHYCARDIA: ICD-10-CM

## 2025-01-13 DIAGNOSIS — L20.9 ATOPIC DERMATITIS, UNSPECIFIED TYPE: Primary | ICD-10-CM

## 2025-01-13 DIAGNOSIS — J45.40 MODERATE PERSISTENT ASTHMA WITHOUT COMPLICATION: ICD-10-CM

## 2025-01-13 DIAGNOSIS — L21.9 SEBORRHEIC DERMATITIS: ICD-10-CM

## 2025-01-13 DIAGNOSIS — Q80.9 ICHTHYOSIS: ICD-10-CM

## 2025-01-13 DIAGNOSIS — L50.9 URTICARIA: ICD-10-CM

## 2025-01-13 PROCEDURE — 86003 ALLG SPEC IGE CRUDE XTRC EA: CPT | Mod: 59 | Performed by: STUDENT IN AN ORGANIZED HEALTH CARE EDUCATION/TRAINING PROGRAM

## 2025-01-13 PROCEDURE — 99205 OFFICE O/P NEW HI 60 MIN: CPT | Mod: S$GLB,,, | Performed by: STUDENT IN AN ORGANIZED HEALTH CARE EDUCATION/TRAINING PROGRAM

## 2025-01-13 PROCEDURE — 1159F MED LIST DOCD IN RCRD: CPT | Mod: CPTII,S$GLB,, | Performed by: STUDENT IN AN ORGANIZED HEALTH CARE EDUCATION/TRAINING PROGRAM

## 2025-01-13 PROCEDURE — 86003 ALLG SPEC IGE CRUDE XTRC EA: CPT | Performed by: STUDENT IN AN ORGANIZED HEALTH CARE EDUCATION/TRAINING PROGRAM

## 2025-01-13 PROCEDURE — 93005 ELECTROCARDIOGRAM TRACING: CPT

## 2025-01-13 PROCEDURE — 82785 ASSAY OF IGE: CPT | Performed by: STUDENT IN AN ORGANIZED HEALTH CARE EDUCATION/TRAINING PROGRAM

## 2025-01-13 PROCEDURE — 93010 ELECTROCARDIOGRAM REPORT: CPT | Mod: ,,, | Performed by: GENERAL PRACTICE

## 2025-01-13 PROCEDURE — 3008F BODY MASS INDEX DOCD: CPT | Mod: CPTII,S$GLB,, | Performed by: STUDENT IN AN ORGANIZED HEALTH CARE EDUCATION/TRAINING PROGRAM

## 2025-01-13 PROCEDURE — 99285 EMERGENCY DEPT VISIT HI MDM: CPT | Mod: 25

## 2025-01-13 PROCEDURE — 99999 PR PBB SHADOW E&M-EST. PATIENT-LVL III: CPT | Mod: PBBFAC,,, | Performed by: STUDENT IN AN ORGANIZED HEALTH CARE EDUCATION/TRAINING PROGRAM

## 2025-01-13 RX ORDER — QUETIAPINE FUMARATE 200 MG/1
200 TABLET, FILM COATED ORAL 2 TIMES DAILY
COMMUNITY
Start: 2025-01-08

## 2025-01-13 RX ORDER — KETOCONAZOLE 20 MG/ML
SHAMPOO, SUSPENSION TOPICAL
Qty: 120 ML | Refills: 3 | Status: SHIPPED | OUTPATIENT
Start: 2025-01-13

## 2025-01-13 RX ORDER — FLUOCINOLONE ACETONIDE 0.11 MG/ML
OIL TOPICAL
Qty: 118.28 ML | Refills: 2 | Status: SHIPPED | OUTPATIENT
Start: 2025-01-13

## 2025-01-13 RX ORDER — TRIAMCINOLONE ACETONIDE 0.25 MG/G
CREAM TOPICAL
Qty: 454 G | Refills: 0 | Status: SHIPPED | OUTPATIENT
Start: 2025-01-13

## 2025-01-13 RX ORDER — MUPIROCIN 20 MG/G
OINTMENT TOPICAL
Qty: 30 G | Refills: 1 | Status: SHIPPED | OUTPATIENT
Start: 2025-01-13

## 2025-01-13 RX ORDER — CETIRIZINE HYDROCHLORIDE 10 MG/1
10 TABLET ORAL 2 TIMES DAILY
Qty: 30 TABLET | Refills: 0 | Status: SHIPPED | OUTPATIENT
Start: 2025-01-13 | End: 2026-01-13

## 2025-01-13 RX ORDER — HYDROXYZINE HYDROCHLORIDE 50 MG/1
50 TABLET, FILM COATED ORAL 2 TIMES DAILY
COMMUNITY
Start: 2025-01-08

## 2025-01-13 NOTE — PROGRESS NOTES
ALLERGY & [facility]MM[facility]OLOGY CLINIC       HISTORY OF PRESENT ILLNESS     CC:   Chief Complaint   Patient presents with    Eczema    Asthma       HPI: Sherry Burns is a 29 y.o. female  History obtained from patient.     Atopic dermatitis: follows with Dermatology. Arms and back are the most affected.   On Dupilumab every 2 weeks (due this Wednesday) for 2 years. Improved but since the weather change has noted dryness and noted.   As a child (around 7 yo) had allergy testing for several things for eczema. This was done by Allergist in [place]. Including foods. Environmental testing positive for CD, DM, DD, mold,all the pollen.   Moisturizer: Eucerin eczema lotion  Soap: dove cucumber   Not using fragrance free detergents     Egg allergy: avoided from testing but not interested in consuming.     Persistent Asthma: since a child. She notes that since Dupilumab was started she has been very controlled.   Controller: Trelegy BID   Rescue: Albuterol not needed as much (less than monthly)   Admission: None  SCS: No SCS     Chronic urticaria: hives when sweating since she was a child. Pruritic.       Chart review: follows with pulmonology, last seen on 1/6/25. PFT done 6 days ago shows normal spirometry with no bronchodilator response and decreased non adjust DLCO. CTA small micronodules in the Lower lobes bilaterally. Follows with Dermatology who rx dupilumab. Follows with GI and seeing hepatology soon for COWART.     Fhx:  Mother: atopic dermatitis     Born with erythema.      MEDICAL HISTORY   SurgHx:  Past Surgical History:   Procedure Laterality Date    COLONOSCOPY N/A 03/05/2021    Procedure: COLONOSCOPY;  Surgeon: Jake Williamson MD;  Location: [facility];  Service: Endoscopy;  Laterality: N/A;    ESOPHAGOGASTRODUODENOSCOPY N/A 03/05/2021    Procedure: EGD (ESOPHAGOGASTRODUODENOSCOPY);  Surgeon: Jake Williamson MD;  Location: [facility];  Service: Endoscopy;  Laterality: N/A;    ESOPHAGOGASTRODUODENOSCOPY N/A 12/2/2024     Procedure: EGD (ESOPHAGOGASTRODUODENOSCOPY);  Surgeon: Sourav Rushing MD;  Location: [facility];  Service: Endoscopy;  Laterality: N/A;    UPPER GASTROINTESTINAL ENDOSCOPY          PHYSICAL EXAM   VS: Pulse 74   Wt 67.3 kg (148 lb 5.9 oz)   LMP 12/26/2024 (Exact Date)   SpO2 98%   BMI 29.97 kg/m²   GENERAL: NAD, well nourished, well appearing  DERM: ichthyosis, xerosis through out.        LABS AND IMAGING     AEC normal   Elevated liver enzymes     ASSESSMENT & PLAN     Atopic dermatitis//Ichthyosis: xerosis through out with no active flares. Has been on Dupilumab for 2 years with great improvement. She was off it for a while with exacerbating sxs.     Avoid using products with fragrance such as perfumes, soaps, creams, detergents etc  Bathe daily for max 10-15 minutes. Pat dry (do not rub) and moisturize after to help lock in moisture   For moisturizer we recommend over the counter fragrance-free ointments or creams. Some product we recommend are Vanicream, CeraVe, Cetaphil, and Vaseline. Opt for cream or ointments and avoid lotions since they contain more alcohol.   For red and itchy spots apply topical steroids twice a day for max 2 weeks at a time   Triamcinolone 0.025% cream twice a day for torso and arms. Avoid in face and groin  Continue Dupilumab 300 mg every other week with dermatology     Moderate persistent asthma without complication: great improvement noted since on Dupilumab. Recent PFT while on Dupilumab showed a normal spirometry with a decreased DLCO.   -Cont to follow with pulmonology   -Continue Trelegy BID and ELIZABETH as needed     Chronic Urticaria, inducible:   -     cetirizine (ZYRTEC) 10 MG tablet BID if still having hives can increase to 2 tablets twice a day.     Seborrheic dermatitis: noted over scalp and eye brow  area.   -     ketoconazole (NIZORAL) 2 % shampoo; Apply at least three times a week. Leave in for at least 5 minutes and then rinse.    -     fluocinolone (DERMA-SMOOTHE)  0.01 % external oil; Apply to scalp and affected areas as needed up to twice a day for up to 2 weeks at a time.      Chronic rhinitis: not fully addressed today. From the past she know she is sensitized to DM, CD, DD, mold and pollens    -Aeroallergen testing via immunocap  -Will discuss further management during her follow up.       Follow up: as need. Please notify us if need for atopic dermatitis management     I spent a total of 60 minutes on the day of the visit. This includes face to face time and non-face to face time preparing to see the patient (eg, review of tests), obtaining and/or reviewing separately obtained history, documenting clinical information in the electronic or other health record, independently interpreting results and communicating results to the patient/family/caregiver, or care coordinator.        Shaan Joseph MD   [facility]

## 2025-01-13 NOTE — PATIENT INSTRUCTIONS
Atopic Dermatitis:  Avoid using products with fragrance such as perfumes, soaps, creams, detergents etc  Bathe daily for max 10-15 minutes. Pat dry (do not rub) and moisturize after to help lock in moisture   For moisturizer we recommend over the counter fragrance-free ointments or creams. Some product we recommend are Vanicream, CeraVe, Cetaphil, and Vaseline. Opt for cream or ointments and avoid lotions since they contain more alcohol.   For red and itchy spots apply topical steroids twice a day for max 2 weeks at a time   Triamcinolone 0.025% cream twice a day for torso and arms. Avoid in face and groin         Use: Free and clear Tide or all for detergent. Avoid softners  For soaps avoid any fragrance including oatmeal and lavender      URTICARIA      Urticaria is another term for hives or wheals. This is a very itchy skin rash that develops quickly, and may also fade quickly. It is sometimes seen with patches of swelling around the eyes or on the face. The swelling is called angioedema.    Hives and/or angioedema can develop during allergic reactions. Foods and medications are the most common culprits for producing such an allergic reaction. But not all hives are associated with an allergic reaction. Sometimes infections such as those caused by viruses can cause hives. Other factors such as temperature, pressure or even vibration can also cause hives. It is common to have hives that are sporadic and not associated with an allergic reaction. These sporadic and recurrent hives are treated with oral antihistamines (eg loratadine, cetirizine, levocetirizine, fexofenadine). Some patients require up to 4x the regular dose of antihistamines to control their hives. Please see below instruction on how to increase oral antihistamines for control of urticaria/hives:     Start by taking one pill a day. If you are still experiencing hives increase to one pill twice a day. If hives are still persisting can take one tablet in  the morning and two tablets at night (total 3 pills in a day). If hives are still persistent can increase to two pills twice a day (total 4 pills in a day). Please reach out if you are at three pills a day and have not reached resolution as there are other medications that can be added to this regimen.

## 2025-01-14 ENCOUNTER — TELEPHONE (OUTPATIENT)
Facility: CLINIC | Age: 30
End: 2025-01-14
Payer: MEDICARE

## 2025-01-14 VITALS
OXYGEN SATURATION: 100 % | DIASTOLIC BLOOD PRESSURE: 77 MMHG | HEIGHT: 59 IN | RESPIRATION RATE: 17 BRPM | WEIGHT: 140 LBS | BODY MASS INDEX: 28.22 KG/M2 | SYSTOLIC BLOOD PRESSURE: 129 MMHG | TEMPERATURE: 99 F | HEART RATE: 83 BPM

## 2025-01-14 LAB
ALBUMIN SERPL BCP-MCNC: 4 G/DL (ref 3.5–5.2)
ALP SERPL-CCNC: 120 U/L (ref 40–150)
ALT SERPL W/O P-5'-P-CCNC: 68 U/L (ref 10–44)
ANION GAP SERPL CALC-SCNC: 10 MMOL/L (ref 8–16)
AST SERPL-CCNC: 30 U/L (ref 10–40)
B-HCG UR QL: NEGATIVE
BASOPHILS # BLD AUTO: 0.05 K/UL (ref 0–0.2)
BASOPHILS NFR BLD: 0.6 % (ref 0–1.9)
BILIRUB SERPL-MCNC: 0.5 MG/DL (ref 0.1–1)
BNP SERPL-MCNC: 51 PG/ML (ref 0–99)
BUN SERPL-MCNC: 16 MG/DL (ref 6–20)
CALCIUM SERPL-MCNC: 9.3 MG/DL (ref 8.7–10.5)
CHLORIDE SERPL-SCNC: 108 MMOL/L (ref 95–110)
CO2 SERPL-SCNC: 20 MMOL/L (ref 23–29)
CREAT SERPL-MCNC: 0.9 MG/DL (ref 0.5–1.4)
CTP QC/QA: YES
D DIMER PPP IA.FEU-MCNC: 0.3 MG/L FEU
DIFFERENTIAL METHOD BLD: NORMAL
EOSINOPHIL # BLD AUTO: 0.1 K/UL (ref 0–0.5)
EOSINOPHIL NFR BLD: 1.6 % (ref 0–8)
ERYTHROCYTE [DISTWIDTH] IN BLOOD BY AUTOMATED COUNT: 14.3 % (ref 11.5–14.5)
EST. GFR  (NO RACE VARIABLE): >60 ML/MIN/1.73 M^2
GLUCOSE SERPL-MCNC: 161 MG/DL (ref 70–110)
HCT VFR BLD AUTO: 38.2 % (ref 37–48.5)
HGB BLD-MCNC: 12.5 G/DL (ref 12–16)
IGE SERPL-ACNC: <21 IU/ML (ref 0–100)
IMM GRANULOCYTES # BLD AUTO: 0.02 K/UL (ref 0–0.04)
IMM GRANULOCYTES NFR BLD AUTO: 0.2 % (ref 0–0.5)
INFLUENZA A, MOLECULAR: NEGATIVE
INFLUENZA B, MOLECULAR: NEGATIVE
LYMPHOCYTES # BLD AUTO: 2.1 K/UL (ref 1–4.8)
LYMPHOCYTES NFR BLD: 23.8 % (ref 18–48)
MCH RBC QN AUTO: 27.5 PG (ref 27–31)
MCHC RBC AUTO-ENTMCNC: 32.7 G/DL (ref 32–36)
MCV RBC AUTO: 84 FL (ref 82–98)
MONOCYTES # BLD AUTO: 0.6 K/UL (ref 0.3–1)
MONOCYTES NFR BLD: 6.9 % (ref 4–15)
NEUTROPHILS # BLD AUTO: 6 K/UL (ref 1.8–7.7)
NEUTROPHILS NFR BLD: 66.9 % (ref 38–73)
NRBC BLD-RTO: 0 /100 WBC
PLATELET # BLD AUTO: 314 K/UL (ref 150–450)
PMV BLD AUTO: 9.5 FL (ref 9.2–12.9)
POTASSIUM SERPL-SCNC: 3.6 MMOL/L (ref 3.5–5.1)
PROT SERPL-MCNC: 7.1 G/DL (ref 6–8.4)
RBC # BLD AUTO: 4.54 M/UL (ref 4–5.4)
SARS-COV-2 RDRP RESP QL NAA+PROBE: NEGATIVE
SODIUM SERPL-SCNC: 138 MMOL/L (ref 136–145)
SPECIMEN SOURCE: NORMAL
T4 FREE SERPL-MCNC: 0.97 NG/DL (ref 0.71–1.51)
TROPONIN I SERPL DL<=0.01 NG/ML-MCNC: <0.006 NG/ML (ref 0–0.03)
TROPONIN I SERPL DL<=0.01 NG/ML-MCNC: <0.006 NG/ML (ref 0–0.03)
TSH SERPL DL<=0.005 MIU/L-ACNC: 4.74 UIU/ML (ref 0.4–4)
WBC # BLD AUTO: 8.9 K/UL (ref 3.9–12.7)

## 2025-01-14 PROCEDURE — 83880 ASSAY OF NATRIURETIC PEPTIDE: CPT | Performed by: STUDENT IN AN ORGANIZED HEALTH CARE EDUCATION/TRAINING PROGRAM

## 2025-01-14 PROCEDURE — 87635 SARS-COV-2 COVID-19 AMP PRB: CPT | Performed by: STUDENT IN AN ORGANIZED HEALTH CARE EDUCATION/TRAINING PROGRAM

## 2025-01-14 PROCEDURE — 84443 ASSAY THYROID STIM HORMONE: CPT | Performed by: STUDENT IN AN ORGANIZED HEALTH CARE EDUCATION/TRAINING PROGRAM

## 2025-01-14 PROCEDURE — 85379 FIBRIN DEGRADATION QUANT: CPT | Performed by: STUDENT IN AN ORGANIZED HEALTH CARE EDUCATION/TRAINING PROGRAM

## 2025-01-14 PROCEDURE — 25000003 PHARM REV CODE 250: Performed by: STUDENT IN AN ORGANIZED HEALTH CARE EDUCATION/TRAINING PROGRAM

## 2025-01-14 PROCEDURE — 80053 COMPREHEN METABOLIC PANEL: CPT | Performed by: STUDENT IN AN ORGANIZED HEALTH CARE EDUCATION/TRAINING PROGRAM

## 2025-01-14 PROCEDURE — 63600175 PHARM REV CODE 636 W HCPCS: Performed by: STUDENT IN AN ORGANIZED HEALTH CARE EDUCATION/TRAINING PROGRAM

## 2025-01-14 PROCEDURE — 84439 ASSAY OF FREE THYROXINE: CPT | Performed by: STUDENT IN AN ORGANIZED HEALTH CARE EDUCATION/TRAINING PROGRAM

## 2025-01-14 PROCEDURE — 81025 URINE PREGNANCY TEST: CPT | Performed by: STUDENT IN AN ORGANIZED HEALTH CARE EDUCATION/TRAINING PROGRAM

## 2025-01-14 PROCEDURE — 84484 ASSAY OF TROPONIN QUANT: CPT | Performed by: STUDENT IN AN ORGANIZED HEALTH CARE EDUCATION/TRAINING PROGRAM

## 2025-01-14 PROCEDURE — 96360 HYDRATION IV INFUSION INIT: CPT

## 2025-01-14 PROCEDURE — 96361 HYDRATE IV INFUSION ADD-ON: CPT

## 2025-01-14 PROCEDURE — 93005 ELECTROCARDIOGRAM TRACING: CPT

## 2025-01-14 PROCEDURE — 87502 INFLUENZA DNA AMP PROBE: CPT | Performed by: STUDENT IN AN ORGANIZED HEALTH CARE EDUCATION/TRAINING PROGRAM

## 2025-01-14 PROCEDURE — 93010 ELECTROCARDIOGRAM REPORT: CPT | Mod: ,,, | Performed by: INTERNAL MEDICINE

## 2025-01-14 PROCEDURE — 85025 COMPLETE CBC W/AUTO DIFF WBC: CPT | Performed by: STUDENT IN AN ORGANIZED HEALTH CARE EDUCATION/TRAINING PROGRAM

## 2025-01-14 PROCEDURE — 36415 COLL VENOUS BLD VENIPUNCTURE: CPT | Performed by: STUDENT IN AN ORGANIZED HEALTH CARE EDUCATION/TRAINING PROGRAM

## 2025-01-14 RX ORDER — ASPIRIN 325 MG
325 TABLET ORAL
Status: COMPLETED | OUTPATIENT
Start: 2025-01-14 | End: 2025-01-14

## 2025-01-14 RX ADMIN — ASPIRIN 325 MG: 325 TABLET ORAL at 02:01

## 2025-01-14 RX ADMIN — SODIUM CHLORIDE, POTASSIUM CHLORIDE, SODIUM LACTATE AND CALCIUM CHLORIDE 1000 ML: 600; 310; 30; 20 INJECTION, SOLUTION INTRAVENOUS at 12:01

## 2025-01-14 NOTE — ED PROVIDER NOTES
Encounter Date: 1/13/2025       History     Chief Complaint   Patient presents with    Shortness of Breath     Starting today with cough.      HPI  29-year-old woman with a history of bipolar disorder presents for evaluation of chest pain described as sharp, center of her chest, no obvious exacerbating or relieving factors, associated shortness of breath.  Had some cough today.  [place] like her heart was racing.  Denies fever chills belly pain nausea or vomiting change in bowel or bladder habits.  She reports intermittent compliance with her metoprolol.  She did not take it this morning but did take it tonight.  She said this felt like her prior episode in December but the shortness of breath is new.  Review of patient's allergies indicates:  No Known Allergies  Past Medical History:   Diagnosis Date    Anemia     Anxiety disorder, unspecified     Asthma     Bipolar disorder, unspecified     Depression     Eczema     GERD (gastroesophageal reflux disease)     Glaucoma     Headache     Heart murmur      Past Surgical History:   Procedure Laterality Date    COLONOSCOPY N/A 03/05/2021    Procedure: COLONOSCOPY;  Surgeon: Jake Williamson MD;  Location: [facility];  Service: Endoscopy;  Laterality: N/A;    ESOPHAGOGASTRODUODENOSCOPY N/A 03/05/2021    Procedure: EGD (ESOPHAGOGASTRODUODENOSCOPY);  Surgeon: Jake Williamson MD;  Location: [facility];  Service: Endoscopy;  Laterality: N/A;    ESOPHAGOGASTRODUODENOSCOPY N/A 12/2/2024    Procedure: EGD (ESOPHAGOGASTRODUODENOSCOPY);  Surgeon: Sourav Rushing MD;  Location: [facility];  Service: Endoscopy;  Laterality: N/A;    UPPER GASTROINTESTINAL ENDOSCOPY       Family History   Problem Relation Name Age of Onset    Hypertension Mother      Hyperlipidemia Mother      Colon polyps Mother      Heart attacks under age 50 Mother      Drug abuse Father      Arthritis Brother      Glaucoma Paternal Grandmother      Dementia Paternal Grandmother      Melanoma Neg Hx      Colon cancer  Neg Hx      Crohn's disease Neg Hx      Esophageal cancer Neg Hx      Liver cancer Neg Hx      Rectal cancer Neg Hx      Stomach cancer Neg Hx      Ulcerative colitis Neg Hx       Social History     Tobacco Use    Smoking status: Some Days     Current packs/day: 0.50     Average packs/day: 0.5 packs/day for 11.1 years (5.6 ttl pk-yrs)     Types: Cigarettes     Start date: 12/3/2013    Smokeless tobacco: Never   Substance Use Topics    Alcohol use: Not Currently    Drug use: Never     Review of Systems   All other systems reviewed and are negative.      Physical Exam     Initial Vitals [01/13/25 2342]   BP Pulse Resp Temp SpO2   135/81 (!) 120 18 98.8 °F (37.1 °C) 99 %      MAP       --         Physical Exam    Nursing note and vitals reviewed.  Constitutional: She appears well-developed. She is not diaphoretic.   HENT:   Head: Normocephalic and atraumatic.   Eyes: Right eye exhibits no discharge. Left eye exhibits no discharge.   Neck: No tracheal deviation present.   Cardiovascular:  Regular rhythm and intact distal pulses.           Tachycardic   Pulmonary/Chest: Breath sounds normal. No stridor. No respiratory distress.   Abdominal: Abdomen is soft. There is no abdominal tenderness.   Musculoskeletal:         General: No tenderness or edema.     Neurological: She is alert and oriented to person, place, and time.   Skin: Skin is warm and dry.         ED Course   Procedures  Labs Reviewed   COMPREHENSIVE METABOLIC PANEL - Abnormal       Result Value    Sodium 138      Potassium 3.6      Chloride 108      CO2 20 (*)     Glucose 161 (*)     BUN 16      Creatinine 0.9      Calcium 9.3      Total Protein 7.1      Albumin 4.0      Total Bilirubin 0.5      Alkaline Phosphatase 120      AST 30      ALT 68 (*)     eGFR >60      Anion Gap 10     TSH - Abnormal    TSH 4.735 (*)    INFLUENZA A & B BY MOLECULAR    Influenza A, Molecular Negative      Influenza B, Molecular Negative      Flu A & B Source Nasal swab     CBC W/  AUTO DIFFERENTIAL    WBC 8.90      RBC 4.54      Hemoglobin 12.5      Hematocrit 38.2      MCV 84      MCH 27.5      MCHC 32.7      RDW 14.3      Platelets 314      MPV 9.5      Immature Granulocytes 0.2      Gran # (ANC) 6.0      Immature Grans (Abs) 0.02      Lymph # 2.1      Mono # 0.6      Eos # 0.1      Baso # 0.05      nRBC 0      Gran % 66.9      Lymph % 23.8      Mono % 6.9      Eosinophil % 1.6      Basophil % 0.6      Differential Method Automated     TROPONIN I    Troponin I <0.006     B-TYPE NATRIURETIC PEPTIDE    BNP 51     D DIMER, QUANTITATIVE    D-Dimer 0.30     SARS-COV-2 RNA AMPLIFICATION, QUAL    SARS-CoV-2 RNA, Amplification, Qual Negative     TROPONIN I    Troponin I <0.006     T4, FREE    Free T4 0.97     POCT URINE PREGNANCY    POC Preg Test, Ur Negative       Acceptable Yes            Imaging Results              X-Ray Chest AP Portable (Final result)  Result time 01/14/25 00:34:40      Final result by Jorge Zavaleta DO (01/14/25 00:34:40)                   Impression:      No acute abnormality.      Electronically signed by: Jorge Zavaleta  Date:    01/14/2025  Time:    00:34               Narrative:    EXAMINATION:  XR CHEST AP PORTABLE    CLINICAL HISTORY:  Chest pain, unspecified    TECHNIQUE:  Single frontal view of the chest was performed.    COMPARISON:  10/01/2019.    FINDINGS:  The lungs are well expanded and clear. No focal opacities are seen. The pleural spaces are clear. The cardiac silhouette is unremarkable. The visualized osseous structures are unremarkable.                                       Medications   lactated ringers bolus 1,000 mL (0 mLs Intravenous Stopped 1/14/25 0225)   aspirin tablet 325 mg (325 mg Oral Given 1/14/25 0223)     Medical Decision Making  29-year-old woman presents for evaluation of chest pain shortness of breath tachycardia, she is tachycardic to 120 in triage, otherwise vitals are within normal limits on exam she is nontoxic she  has intact pulses she is very well-appearing she is clear to auscultation bilaterally.  Differential includes metabolic or endocrine derangement arrhythmia medication effect ACS pneumothorax pneumonia pulmonary embolism.  Based on history and physical I think she is reasonable for D-dimer evaluation of pulmonary embolism.  I think she is reasonable for two troponin discharge, low risk heart score, in the event of negative troponins.    Amount and/or Complexity of Data Reviewed  External Data Reviewed: notes.     Details: 01/03/2025 outpatient cardiology visit for follow up of ER visit for palpitations  Labs: ordered. Decision-making details documented in ED Course.  Radiology: ordered.    Risk  OTC drugs.               ED Course as of 01/14/25 0417   Tue Jan 14, 2025   0039 Chest x-ray on my independent interpretation without focal consolidation or pneumothorax [IC]   0040 EKGs sinus tachycardia 106 normal axis QTC less than 500 no STEMI appears similar to prior from 12/18/2024 where she was also tachycardic [IC]   0103 D-Dimer: 0.30 [IC]   0126 The cardiac monitor revealed NSR as interpreted by me.  The cardiac monitor was ordered secondary to patient's tachycardia.   [IC]   0138 ALT(!): 68  This is the lowest it has been in the last 3 months [IC]   0138 The cardiac monitor revealed NSR as interpreted by me.  The cardiac monitor was ordered secondary to patient's tachycardia.   [IC]   0232 Repeat EKG independent interpretation normal sinus rhythm normal axis no STEMI, QTC less than 450 [IC]   0327 She is resting comfortably in bed, she is asymptomatic, she is ready to go home, troponin negative x2, D-dimer negative, EKG reassuring, Return precautions/follow up instructions/treatment plan given, expressed agreement and understanding.      [IC]      ED Course User Index  [IC] Eliezer Shirley MD                           Clinical Impression:  Final diagnoses:  [R06.02] SOB (shortness of breath)  [R07.9] Chest  pain  [R00.0] Tachycardia          ED Disposition Condition    Discharge Stable          ED Prescriptions    None       Follow-up Information       Follow up With Specialties Details Why Contact Info Additional Information    Monika Rajan, NP Family Medicine Schedule an appointment as soon as possible for a visit in 1 day  [facility] [place] [address]  [phone]       [facility] Emergency Medicine Go to  As needed, If symptoms worsen [address] [place] [address] [address] [place] [address] 1st floor             Eliezer Shirley MD  01/14/25 1913

## 2025-01-14 NOTE — DISCHARGE INSTRUCTIONS
Your lab work did not show evidence of damage to your heart.      Please return to this facility or another ED as needed for any new or worsening symptoms including chest pain, shortness of breath, abdominal pain, nausea or vomiting, fever or chills. Please follow up with your primary care doctor in 3 days. Please take all medicines as prescribed.

## 2025-01-15 ENCOUNTER — PATIENT MESSAGE (OUTPATIENT)
Dept: PULMONOLOGY | Facility: CLINIC | Age: 30
End: 2025-01-15
Payer: MEDICARE

## 2025-01-16 ENCOUNTER — PATIENT MESSAGE (OUTPATIENT)
Dept: CARDIOLOGY | Facility: CLINIC | Age: 30
End: 2025-01-16
Payer: MEDICARE

## 2025-01-16 LAB
A ALTERNATA IGE QN: <0.1 KU/L
A FUMIGATUS IGE QN: <0.1 KU/L
BERMUDA GRASS IGE QN: <0.1 KU/L
CAT DANDER IGE QN: 0.18 KU/L
CEDAR IGE QN: <0.1 KU/L
D FARINAE IGE QN: 2.24 KU/L
D PTERONYSS IGE QN: 5.73 KU/L
DEPRECATED CEDAR IGE RAST QL: NORMAL
DEPRECATED TIMOTHY IGE RAST QL: NORMAL
DOG DANDER IGE QN: 0.34 KU/L
ELDER IGE QN: <0.1 KU/L
ENGL PLANTAIN IGE QN: <0.1 KU/L
OHS QRS DURATION: 74 MS
OHS QTC CALCULATION: 434 MS
PECAN/HICK TREE IGE QN: <0.1 KU/L
RAST CLASS: ABNORMAL
RAST CLASS: NORMAL
TIMOTHY IGE QN: <0.1 KU/L
WEST RAGWEED IGE QN: <0.1 KU/L
WHITE OAK IGE QN: <0.1 KU/L

## 2025-01-17 ENCOUNTER — PATIENT MESSAGE (OUTPATIENT)
Dept: CARDIOLOGY | Facility: CLINIC | Age: 30
End: 2025-01-17
Payer: MEDICARE

## 2025-01-18 ENCOUNTER — PATIENT MESSAGE (OUTPATIENT)
Dept: CARDIOLOGY | Facility: CLINIC | Age: 30
End: 2025-01-18
Payer: MEDICARE

## 2025-01-20 LAB
OHS QRS DURATION: 74 MS
OHS QTC CALCULATION: 441 MS

## 2025-01-23 ENCOUNTER — PATIENT MESSAGE (OUTPATIENT)
Dept: CARDIOLOGY | Facility: CLINIC | Age: 30
End: 2025-01-23

## 2025-01-23 ENCOUNTER — TELEPHONE (OUTPATIENT)
Dept: CARDIOLOGY | Facility: CLINIC | Age: 30
End: 2025-01-23

## 2025-01-23 ENCOUNTER — OFFICE VISIT (OUTPATIENT)
Dept: CARDIOLOGY | Facility: CLINIC | Age: 30
End: 2025-01-23
Payer: MEDICARE

## 2025-01-23 VITALS
WEIGHT: 139.25 LBS | DIASTOLIC BLOOD PRESSURE: 84 MMHG | SYSTOLIC BLOOD PRESSURE: 120 MMHG | HEIGHT: 59 IN | OXYGEN SATURATION: 98 % | BODY MASS INDEX: 28.07 KG/M2 | HEART RATE: 100 BPM | RESPIRATION RATE: 17 BRPM

## 2025-01-23 DIAGNOSIS — K21.9 GASTROESOPHAGEAL REFLUX DISEASE, UNSPECIFIED WHETHER ESOPHAGITIS PRESENT: ICD-10-CM

## 2025-01-23 DIAGNOSIS — R00.2 PALPITATIONS: ICD-10-CM

## 2025-01-23 DIAGNOSIS — F41.9 ANXIETY: Primary | ICD-10-CM

## 2025-01-23 DIAGNOSIS — D50.9 IRON DEFICIENCY ANEMIA, UNSPECIFIED IRON DEFICIENCY ANEMIA TYPE: ICD-10-CM

## 2025-01-23 DIAGNOSIS — R74.8 ELEVATED LIVER ENZYMES: ICD-10-CM

## 2025-01-23 DIAGNOSIS — R01.1 HEART MURMUR PREVIOUSLY UNDIAGNOSED: ICD-10-CM

## 2025-01-23 DIAGNOSIS — E66.3 OVERWEIGHT (BMI 25.0-29.9): ICD-10-CM

## 2025-01-23 DIAGNOSIS — F17.210 CIGARETTE NICOTINE DEPENDENCE WITHOUT COMPLICATION: ICD-10-CM

## 2025-01-23 DIAGNOSIS — R94.39 ABNORMAL STRESS ECG: ICD-10-CM

## 2025-01-23 PROCEDURE — 3008F BODY MASS INDEX DOCD: CPT | Mod: CPTII,S$GLB,,

## 2025-01-23 PROCEDURE — 99214 OFFICE O/P EST MOD 30 MIN: CPT | Mod: S$GLB,,,

## 2025-01-23 PROCEDURE — 99999 PR PBB SHADOW E&M-EST. PATIENT-LVL IV: CPT | Mod: PBBFAC,,,

## 2025-01-23 PROCEDURE — 1159F MED LIST DOCD IN RCRD: CPT | Mod: CPTII,S$GLB,,

## 2025-01-23 PROCEDURE — 1160F RVW MEDS BY RX/DR IN RCRD: CPT | Mod: CPTII,S$GLB,,

## 2025-01-23 PROCEDURE — 3074F SYST BP LT 130 MM HG: CPT | Mod: CPTII,S$GLB,,

## 2025-01-23 PROCEDURE — 3079F DIAST BP 80-89 MM HG: CPT | Mod: CPTII,S$GLB,,

## 2025-01-23 RX ORDER — PROPRANOLOL HYDROCHLORIDE 10 MG/1
20 TABLET ORAL 2 TIMES DAILY
COMMUNITY
Start: 2025-01-14 | End: 2025-01-23 | Stop reason: ALTCHOICE

## 2025-01-23 RX ORDER — LANOLIN ALCOHOL/MO/W.PET/CERES
400 CREAM (GRAM) TOPICAL DAILY
Qty: 90 TABLET | Refills: 3 | Status: SHIPPED | OUTPATIENT
Start: 2025-01-23

## 2025-01-23 RX ORDER — PROPRANOLOL HYDROCHLORIDE 20 MG/1
20 TABLET ORAL 2 TIMES DAILY
Qty: 60 TABLET | Refills: 11 | Status: SHIPPED | OUTPATIENT
Start: 2025-01-23 | End: 2026-01-23

## 2025-01-23 NOTE — PROGRESS NOTES
Subjective:    Patient ID:  Sherry Burns is a 29 y.o. female patient here for evaluation Follow-up (Pt states that she has been having chest pains, palpitations, and SOB since Christmas. /Pt states that she went to [facility] ER on 1/13/25 for SOB and palpitations./Pt states that her Thyroid level is high.), Palpitations (Pt states that she has palpitations only at night.), Shortness of Breath (Pt states that she has SOB only at night.), and Chest Pain (Pt states that she has chest pains only at night.)      History of Present Illness:     Patient is here for ER follow up.  She has had 3 ER visits in the last 1-2 months for palpitations, chest pain, shortness of breath.  No acute STT wave changes have been noted.  Troponin HS have been negative each time.  TSH was 4.735 with normal free T4 on 01/14/2025.  EKG shows sinus rhythm.  No arrhythmias noted.  Patient was switched from metoprolol to propranolol 10 mg b.i.d..  Heart rate today in office is proximally 100 at rest.  She denies caffeine use, alcohol or drug use.  She has history of anxiety.  However she feels that the elevated heart rate precedes the anxiety.  She wore an event monitor recently and plans to send it off in the mail tomorrow.        Review of patient's allergies indicates:  No Known Allergies    Past Medical History:   Diagnosis Date    Anemia     Anxiety disorder, unspecified     Asthma     Bipolar disorder, unspecified     Depression     Eczema     GERD (gastroesophageal reflux disease)     Glaucoma     Headache     Heart murmur      Past Surgical History:   Procedure Laterality Date    COLONOSCOPY N/A 03/05/2021    Procedure: COLONOSCOPY;  Surgeon: Jake Williamson MD;  Location: [facility];  Service: Endoscopy;  Laterality: N/A;    ESOPHAGOGASTRODUODENOSCOPY N/A 03/05/2021    Procedure: EGD (ESOPHAGOGASTRODUODENOSCOPY);  Surgeon: Jake Williamson MD;  Location: [facility];  Service: Endoscopy;  Laterality: N/A;     ESOPHAGOGASTRODUODENOSCOPY N/A 12/2/2024    Procedure: EGD (ESOPHAGOGASTRODUODENOSCOPY);  Surgeon: Sourav Rushing MD;  Location: [facility];  Service: Endoscopy;  Laterality: N/A;    UPPER GASTROINTESTINAL ENDOSCOPY       Social History     Tobacco Use    Smoking status: Some Days     Current packs/day: 0.50     Average packs/day: 0.5 packs/day for 11.1 years (5.6 ttl pk-yrs)     Types: Cigarettes     Start date: 12/3/2013    Smokeless tobacco: Never   Substance Use Topics    Alcohol use: Not Currently    Drug use: Never        Review of Systems:    As noted in HPI in addition      REVIEW OF SYSTEMS  CARDIOVASCULAR: No recent chest pain, palpitations, arm, neck, or jaw pain  RESPIRATORY: No recent fever, cough chills, SOB or congestion  : No blood in the urine  GI: No Nausea, vomiting, constipation, diarrhea, blood, or reflux.  MUSCULOSKELETAL: No myalgias  NEURO: No lightheadedness or dizziness  EYES: No Double vision, blurry, vision or headache              Objective        Vitals:    01/23/25 1435   BP: 120/84   Pulse: 100   Resp: 17       LIPIDS - LAST 2   Lab Results   Component Value Date    CHOL 180 07/16/2024    CHOL 159 01/03/2020    HDL 35 07/16/2024    HDL 58 01/03/2020    LDLCALC 121 07/16/2024    LDLCALC 91.6 01/03/2020    TRIG 135 07/16/2024    TRIG 47 01/03/2020    CHOLHDL 36.5 01/03/2020       CBC - LAST 2  Lab Results   Component Value Date    WBC 8.90 01/14/2025    WBC 8.83 12/18/2024    RBC 4.54 01/14/2025    RBC 4.53 12/18/2024    HGB 12.5 01/14/2025    HGB 12.2 12/18/2024    HCT 38.2 01/14/2025    HCT 37.2 12/18/2024    MCV 84 01/14/2025    MCV 82 12/18/2024    MCH 27.5 01/14/2025    MCH 26.9 (L) 12/18/2024    MCHC 32.7 01/14/2025    MCHC 32.8 12/18/2024    RDW 14.3 01/14/2025    RDW 13.7 12/18/2024     01/14/2025     12/18/2024    MPV 9.5 01/14/2025    MPV 9.2 12/18/2024    GRAN 6.0 01/14/2025    GRAN 66.9 01/14/2025    LYMPH 2.1 01/14/2025    LYMPH 23.8 01/14/2025    MONO  "0.6 01/14/2025    MONO 6.9 01/14/2025    BASO 0.05 01/14/2025    BASO 0.05 12/18/2024    NRBC 0 01/14/2025    NRBC 0 12/18/2024       CHEMISTRY & LIVER FUNCTION - LAST 2  Lab Results   Component Value Date     01/14/2025     12/18/2024    K 3.6 01/14/2025    K 3.5 12/18/2024     01/14/2025     12/18/2024    CO2 20 (L) 01/14/2025    CO2 20 (L) 12/18/2024    ANIONGAP 10 01/14/2025    ANIONGAP 13 12/18/2024    BUN 16 01/14/2025    BUN 11 12/18/2024    CREATININE 0.9 01/14/2025    CREATININE 1.0 12/18/2024     (H) 01/14/2025     (H) 12/18/2024    CALCIUM 9.3 01/14/2025    CALCIUM 9.3 12/18/2024    MG 2.0 12/18/2024    ALBUMIN 4.0 01/14/2025    ALBUMIN 3.9 12/18/2024    PROT 7.1 01/14/2025    PROT 6.9 12/18/2024    ALKPHOS 120 01/14/2025    ALKPHOS 125 12/18/2024    ALT 68 (H) 01/14/2025    ALT 93 (H) 12/18/2024    AST 30 01/14/2025    AST 71 (H) 12/18/2024    BILITOT 0.5 01/14/2025    BILITOT 0.5 12/18/2024        CARDIAC PROFILE - LAST 2  Lab Results   Component Value Date    BNP 51 01/14/2025    BNP 23 12/18/2024    TROPONINI <0.006 01/14/2025    TROPONINI <0.006 01/14/2025        COAGULATION - LAST 2  No results found for: "LABPT", "INR", "APTT"    ENDOCRINE & PSA - LAST 2  Lab Results   Component Value Date    HGBA1C 5.9 07/16/2024    TSH 4.735 (H) 01/14/2025    TSH 3.675 12/18/2024        ECHOCARDIOGRAM RESULTS  Results for orders placed in visit on 05/07/24    Stress Echo Which stress agent will be used? Treadmill Exercise; Color Flow Doppler? No    Interpretation Summary    Left Ventricle: The left ventricle is normal in size. There is normal systolic function with a visually estimated ejection fraction of 55 - 60%.    Stress Protocol: The patient exercised for 6 minutes 40 seconds on a Roman protocol, corresponding to a functional capacity of 8.3METS, achieving a peak heart rate of 167 bpm, which is 92% of the age predicted maximum heart rate. The patient reported no " symptoms during the stress test. The test was stopped because the patient experienced fatigue.    Baseline ECG: The Baseline ECG reveals sinus rhythm. The axis is normal. The ST segments are normal.    Stress ECG: There are no ST segment deviation identified during the protocol. There are no arrhythmias during stress. There is normal blood pressure response with stress.    ECG Conclusion: The ECG portion of the study is negative for ischemia.    Post-stress Echo: The left ventricle systolic function is normal.    Post-stress      CURRENT/PREVIOUS VISIT EKG  Results for orders placed or performed during the hospital encounter of 01/13/25   EKG 12-lead    Collection Time: 01/14/25  2:28 AM   Result Value Ref Range    QRS Duration 74 ms    OHS QTC Calculation 434 ms    Narrative    Test Reason : R00.0,    Vent. Rate :  84 BPM     Atrial Rate :  84 BPM     P-R Int : 148 ms          QRS Dur :  74 ms      QT Int : 368 ms       P-R-T Axes :  53  56  37 degrees    QTcB Int : 434 ms    Normal sinus rhythm  Normal ECG  Confirmed by Garett No ([id number]) on 1/16/2025 5:30:03 PM    Referred By: AAAREFERRAL SELF           Confirmed By: Garett No     No valid procedures specified.   Results for orders placed during the hospital encounter of 02/29/24    Exercise Stress - EKG    Interpretation Summary    The patient exercised for 4 minutes 54 seconds on a Roman protocol, corresponding to a functional capacity of 7 METS, achieving a peak heart rate of 168 bpm, which is 93 % of the age predicted maximum heart rate.    The ECG portion of the study is positive for ischemia.    The patient reported no chest pain during the stress test.    Consider stress echocardiography to enhance the specificity and sensitivity of this test    No valid procedures specified.    PHYSICAL EXAM  CONSTITUTIONAL: Well built, well nourished in no apparent distress  NECK: no carotid bruit, no JVD  LUNGS: CTA  CHEST WALL: no  tenderness  HEART: regular rate and rhythm, S1, S2 normal, no murmur, click, rub or gallop   ABDOMEN: soft, non-tender; bowel sounds normal; no masses,  no organomegaly  EXTREMITIES: Extremities normal, no edema, no calf tenderness noted  NEURO: AAO X 3    I HAVE REVIEWED :    The vital signs, nurses notes, and all the pertinent radiology and labs.        Current Outpatient Medications   Medication Instructions    albuterol (PROVENTIL/VENTOLIN HFA) 90 mcg/actuation inhaler 1 puff, Inhalation, Every 4 hours PRN    busPIRone (BUSPAR) 30 mg, 2 times daily    cetirizine (ZYRTEC) 10 mg, Oral, 2 times daily    DUPIXENT SYRINGE 300 mg/2 mL Syrg Inject 300 mg (2 mL) into the skin every other week    EScitalopram oxalate (LEXAPRO) 20 mg, Daily    fluocinolone (DERMA-SMOOTHE) 0.01 % external oil Apply to scalp and affected areas as needed up to twice a day for up to 2 weeks at a time.    fluticasone-umeclidin-vilanter (TRELEGY ELLIPTA) 200-62.5-25 mcg inhaler 1 puff, Inhalation, Daily    hydrOXYzine (ATARAX) 50 mg, 2 times daily    ketoconazole (NIZORAL) 2 % shampoo Apply at least three times a week. Leave in for at least 5 minutes and then rinse.    magnesium oxide (MAG-OX) 400 mg, Oral, Daily    metFORMIN (GLUCOPHAGE-XR) 500 mg, Every morning    mupirocin (BACTROBAN) 2 % ointment Apply as needed twice a day to open skin areas for 5 days or until improved.    potassium chloride (KLOR-CON) 8 MEQ TbSR 8 mEq, Oral, Daily    propranoloL (INDERAL) 20 mg, Oral, 2 times daily    QUEtiapine (SEROQUEL) 200 mg, 2 times daily    triamcinolone acetonide 0.025% (KENALOG) 0.025 % cream Please apply to affected areas when flaring twice a day as needed for up to 2 weeks at a time    VRAYLAR 3 mg, Daily          Assessment & Plan     Anxiety  Recommend follow up with psychiatry to optimize antianxiety medication regimen as this could be contributing to tachycardia.  Start magnesium oxide 400 mg daily    Palpitations  Increase propranolol to  20 mg b.i.d..  Monitor heart rate and blood pressure.  Hold for systolic BP less than 100 and heart rate less than 60.  Discussed with patient to closely monitor her asthma as well as beta-blockers can exacerbate asthma.  If asthma continues to worsen recommend switching back to metoprolol.    Start magnesium oxide 400 mg daily.  Patient to send an event monitor tomorrow.  Further recommendations to follow results of event monitor.    Iron deficiency anemia  Stable.  Last H&H MCV/MCH within normal limit    Overweight (BMI 25.0-29.9)  Calorie restricted diet.  Weight loss as tolerated.    Gastroesophageal reflux disease  Stable.    Heart murmur previously undiagnosed  Echo results are stable.  No significant valvular abnormalities.    Abnormal stress ECG  Patient had a stress echocardiogram performed last year that was negative for ischemia.    Cigarette nicotine dependence without complication  Patient no longer smokes.  Continue cessation    Elevated liver enzymes  Managed by GI and PCP.  LFTs downtrending but chronically elevated          Follow up in about 3 months (around 4/23/2025).

## 2025-01-23 NOTE — TELEPHONE ENCOUNTER
----- Message from Bhupinder sent at 1/23/2025  4:04 PM CST -----  Regarding: advice  Contact: CHUCKY WEI [[id number]]  Type: Needs Medical Advice  Who Called:  Nunu mother    Symptoms (please be specific):  na    How long has patient had these symptoms:  na    Pharmacy name and phone #:  na    Best Call Back Number: [phone]    Additional Information: Med dosage questions. Please call to advise.

## 2025-01-23 NOTE — ASSESSMENT & PLAN NOTE
Increase propranolol to 20 mg b.i.d..  Monitor heart rate and blood pressure.  Hold for systolic BP less than 100 and heart rate less than 60.  Discussed with patient to closely monitor her asthma as well as beta-blockers can exacerbate asthma.  If asthma continues to worsen recommend switching back to metoprolol.    Start magnesium oxide 400 mg daily.  Patient to send an event monitor tomorrow.  Further recommendations to follow results of event monitor.

## 2025-01-23 NOTE — ASSESSMENT & PLAN NOTE
Recommend follow up with psychiatry to optimize antianxiety medication regimen as this could be contributing to tachycardia.  Start magnesium oxide 400 mg daily

## 2025-01-24 ENCOUNTER — PATIENT MESSAGE (OUTPATIENT)
Dept: CARDIOLOGY | Facility: CLINIC | Age: 30
End: 2025-01-24
Payer: MEDICARE

## 2025-01-24 NOTE — TELEPHONE ENCOUNTER
Spoke to the pt explained to her to take her propanolol 20 mg twice daily and it was sent to her pharmacy. Pt understood with no questions or concerns.

## 2025-01-28 ENCOUNTER — PATIENT MESSAGE (OUTPATIENT)
Dept: OPTOMETRY | Facility: CLINIC | Age: 30
End: 2025-01-28
Payer: MEDICARE

## 2025-01-31 ENCOUNTER — TELEPHONE (OUTPATIENT)
Dept: ENDOCRINOLOGY | Facility: CLINIC | Age: 30
End: 2025-01-31
Payer: MEDICARE

## 2025-02-06 ENCOUNTER — TELEPHONE (OUTPATIENT)
Dept: CARDIOLOGY | Facility: CLINIC | Age: 30
End: 2025-02-06
Payer: MEDICARE

## 2025-02-13 ENCOUNTER — PATIENT MESSAGE (OUTPATIENT)
Dept: CARDIOLOGY | Facility: CLINIC | Age: 30
End: 2025-02-13
Payer: MEDICARE

## 2025-02-13 DIAGNOSIS — R00.2 PALPITATIONS: Primary | ICD-10-CM

## 2025-02-13 RX ORDER — PROPRANOLOL HYDROCHLORIDE 20 MG/1
20 TABLET ORAL 2 TIMES DAILY
Qty: 60 TABLET | Refills: 11 | Status: SHIPPED | OUTPATIENT
Start: 2025-02-13 | End: 2026-02-13

## 2025-02-13 NOTE — TELEPHONE ENCOUNTER
Patient called to request to change the pharmacy on file to a different pharmacy [facility] for her medicine refills

## 2025-02-13 NOTE — TELEPHONE ENCOUNTER
----- Message from Chalo sent at 2/13/2025  2:15 PM CST -----  Regarding: rt call  Type:  Patient Returning Call    Who Called:mom     Who Left Message for Patient:BaumanBenjie avila     Does the patient know what this is regarding?:yes    Best Call Back Number:[phone]      Additional Information: mom st she wants a call back from benjie.

## 2025-02-19 ENCOUNTER — OFFICE VISIT (OUTPATIENT)
Dept: PULMONOLOGY | Facility: CLINIC | Age: 30
End: 2025-02-19
Payer: MEDICARE

## 2025-02-19 VITALS
HEART RATE: 64 BPM | WEIGHT: 146.69 LBS | SYSTOLIC BLOOD PRESSURE: 107 MMHG | BODY MASS INDEX: 29.57 KG/M2 | OXYGEN SATURATION: 98 % | DIASTOLIC BLOOD PRESSURE: 71 MMHG | HEIGHT: 59 IN

## 2025-02-19 DIAGNOSIS — R91.8 LUNG NODULES: ICD-10-CM

## 2025-02-19 DIAGNOSIS — J45.40 MODERATE PERSISTENT ASTHMA WITHOUT COMPLICATION: Primary | ICD-10-CM

## 2025-02-19 PROCEDURE — 99999 PR PBB SHADOW E&M-EST. PATIENT-LVL III: CPT | Mod: PBBFAC,,, | Performed by: INTERNAL MEDICINE

## 2025-02-19 RX ORDER — ALPRAZOLAM 0.25 MG/1
0.25 TABLET ORAL NIGHTLY PRN
COMMUNITY
Start: 2025-02-06

## 2025-02-19 NOTE — PATIENT INSTRUCTIONS
Continue inhaler regimen  Dupixent continue  Nodules likely due to vape or flu- reviewed scan 12/2024 not too concerning appearing  Quit vaping

## 2025-02-19 NOTE — PROGRESS NOTES
2/19/2025    Sherry Burns  Follow up    Chief Complaint   Patient presents with    Asthma       HPI:  02/19/2025- she is doing well on trelegy, dupixent. Slight cough/mucous. She vapes, wants to quit. She denies exacerbation. Not needing rescue inhaler    01/06/2025-   Asthma control inhaler trelegy restart- use 1 puff once daily, rinse mouth after use  Continue xopenex as needed  pt has run out of trelegy and using xopenex inhaler twice daily for wheezing. Continues on dupixent shots. She had the flu around rae and now feeling like she is improving- still wheezing more than usual, productive cough. Pt here w/ her mother Radha, also my pt- mom is having acute exacerbation copd and needs help    08/12/2024-   Pulmonary function tests  Keep trelegy inhaler and switch rescue medicine to xopenex due to fast heart rate  Xopenex in nebulizer as needed  Continue to avoid smoking    Pt saw dr thomas last yr for asthma, SOB. Recommendations at that time:   - smoking cessation recommended  - trial albuterol inhaler  - PFTs ordered  - methacholine challenge ordered  - CXR ordered  - TTE ordered    PFTs and methacholine were not done. She c/o wheezing and feels out of breath with walking to the car. She notices this since she has gained weight- about 70# she attributes to medicine side effects.   For asthma she uses trelegy 200 with benefit. She still wheezes and uses rescue inhaler 2x/day. Denies nocturnal arousals. She gets exacerbations requiring steroid about 2x/yr, last 2 mos ago. Rarely she uses night time albuterol nebs. As a child her asthma was worse. She also has eczema and takes dupixent shots which has helped her asthma.  She sees cardiology for fast heart rhythm- triggered by exercise- takes metoprolol.  She still gets episodes of this. Albuterol often triggers tachycardia. She has not tried xopenex.  She used to smoke, quit 3 mos ago.    The chief complaint problem is New to me    [facility]:  Past  Medical History:   Diagnosis Date    Anemia     Anxiety disorder, unspecified     Asthma     Bipolar disorder, unspecified     Depression     Eczema     GERD (gastroesophageal reflux disease)     Glaucoma     Headache     Heart murmur          Past Surgical History:   Procedure Laterality Date    COLONOSCOPY N/A 03/05/2021    Procedure: COLONOSCOPY;  Surgeon: Jake Williamson MD;  Location: [facility];  Service: Endoscopy;  Laterality: N/A;    ESOPHAGOGASTRODUODENOSCOPY N/A 03/05/2021    Procedure: EGD (ESOPHAGOGASTRODUODENOSCOPY);  Surgeon: Jake Williamson MD;  Location: [facility];  Service: Endoscopy;  Laterality: N/A;    ESOPHAGOGASTRODUODENOSCOPY N/A 12/2/2024    Procedure: EGD (ESOPHAGOGASTRODUODENOSCOPY);  Surgeon: Sourav Rushing MD;  Location: [facility];  Service: Endoscopy;  Laterality: N/A;    UPPER GASTROINTESTINAL ENDOSCOPY       Social History     Tobacco Use    Smoking status: Some Days     Current packs/day: 0.50     Average packs/day: 0.5 packs/day for 11.2 years (5.6 ttl pk-yrs)     Types: Cigarettes     Start date: 12/3/2013    Smokeless tobacco: Never   Substance Use Topics    Alcohol use: Not Currently    Drug use: Never     Family History   Problem Relation Name Age of Onset    Hypertension Mother      Hyperlipidemia Mother      Colon polyps Mother      Heart attacks under age 50 Mother      Drug abuse Father      Arthritis Brother      Glaucoma Paternal Grandmother      Dementia Paternal Grandmother      Melanoma Neg Hx      Colon cancer Neg Hx      Crohn's disease Neg Hx      Esophageal cancer Neg Hx      Liver cancer Neg Hx      Rectal cancer Neg Hx      Stomach cancer Neg Hx      Ulcerative colitis Neg Hx       Review of patient's allergies indicates:  No Known Allergies    Performance Status:The patient's activity level is functions out of house.      Review of Systems:  a review of eleven systems covering constitutional, Eye, HEENT, Psych, Respiratory, Cardiac, GI, , Musculoskeletal,  "Endocrine, Dermatologic was negative except for pertinent findings as listed ABOVE and below:  All negative with pertinent positives as above       Exam:Comprehensive exam done. /71 (BP Location: Left arm, Patient Position: Sitting)   Pulse 64   Ht 4' 11" (1.499 m)   Wt 66.6 kg (146 lb 11.5 oz)   SpO2 98%   BMI 29.63 kg/m²   Exam included Vitals as listed, and patient's appearance and affect and alertness and mood, oral exam for yeast and hygiene and pharynx lesions and Mallapatti (M) score, neck with inspection for jvd and masses and thyroid abnormalities and lymph nodes (supraclavicular and infraclavicular nodes and axillary also examined and noted if abn), chest exam included symmetry and effort and fremitus and percussion and auscultation, cardiac exam included rhythm and gallops and murmur and rubs and jvd and edema, abdominal exam for mass and hepatosplenomegaly and tenderness and hernias and bowel sounds, Musculoskeletal exam with muscle tone and posture and mobility/gait and  strength, and skin for rashes and cyanosis and pallor and turgor, extremity for clubbing.  Findings were normal except for pertinent findings listed below:  M3, oropharynx clear  HR regular  Breath sounds clear bilaterally  No edema/clubbing    Radiographs (ct chest and cxr) reviewed: view by direct vision and interpretation as below   CT chest 12/18/24- small ground glass tree in bud nodules- cluster in RML, scattered in bilat lower lobes-- pt was sick with flu at the time, also vapes  CXR 2019- clear lungs    Labs reviewed     Lab Results   Component Value Date    WBC 8.90 01/14/2025    HGB 12.5 01/14/2025    HCT 38.2 01/14/2025    MCV 84 01/14/2025     01/14/2025      Latest Reference Range & Units 05/18/22 09:34 05/28/22 18:10 03/07/23 14:49 09/06/23 12:45 12/18/23 13:22   Eos # 0.0 - 0.5 K/uL 0.4 0.2 0.3 190 0.3     CMP  Sodium   Date Value Ref Range Status   01/14/2025 138 136 - 145 mmol/L Final     Potassium "   Date Value Ref Range Status   01/14/2025 3.6 3.5 - 5.1 mmol/L Final     Chloride   Date Value Ref Range Status   01/14/2025 108 95 - 110 mmol/L Final     CO2   Date Value Ref Range Status   01/14/2025 20 (L) 23 - 29 mmol/L Final     Glucose   Date Value Ref Range Status   01/14/2025 161 (H) 70 - 110 mg/dL Final     BUN   Date Value Ref Range Status   01/14/2025 16 6 - 20 mg/dL Final     Creatinine   Date Value Ref Range Status   01/14/2025 0.9 0.5 - 1.4 mg/dL Final     Calcium   Date Value Ref Range Status   01/14/2025 9.3 8.7 - 10.5 mg/dL Final     Total Protein   Date Value Ref Range Status   01/14/2025 7.1 6.0 - 8.4 g/dL Final     Albumin   Date Value Ref Range Status   01/14/2025 4.0 3.5 - 5.2 g/dL Final     Total Bilirubin   Date Value Ref Range Status   01/14/2025 0.5 0.1 - 1.0 mg/dL Final     Comment:     For infants and newborns, interpretation of results should be based  on gestational age, weight and in agreement with clinical  observations.    Premature Infant recommended reference ranges:  Up to 24 hours.............<8.0 mg/dL  Up to 48 hours............<12.0 mg/dL  3-5 days..................<15.0 mg/dL  6-29 days.................<15.0 mg/dL       Alkaline Phosphatase   Date Value Ref Range Status   01/14/2025 120 40 - 150 U/L Final     AST   Date Value Ref Range Status   01/14/2025 30 10 - 40 U/L Final     ALT   Date Value Ref Range Status   01/14/2025 68 (H) 10 - 44 U/L Final     Anion Gap   Date Value Ref Range Status   01/14/2025 10 8 - 16 mmol/L Final     eGFR   Date Value Ref Range Status   01/14/2025 >60 >60 mL/min/1.73 m^2 Final   07/16/2024 105  Final         PFT reviewed  1/3/24- reduced dlco corrects to normal for [facility]        Plan:  Clinical impression is apparently straight forward and impression with management as below.  Asthmatic on dupixent, here for follow up. Not in exacerbation, tends to exacerbate frequently    Problem List Items Addressed This Visit       Moderate persistent asthma  without complication - Primary     Other Visit Diagnoses         Lung nodules                    Follow up in about 6 months (around 8/19/2025).    Discussed with patient above for education the following:      Patient Instructions   Continue inhaler regimen  Dupixent continue  Nodules likely due to vape or flu- reviewed scan 12/2024 not too concerning appearing  Quit vaping

## 2025-02-20 ENCOUNTER — PATIENT MESSAGE (OUTPATIENT)
Dept: PULMONOLOGY | Facility: CLINIC | Age: 30
End: 2025-02-20
Payer: MEDICARE

## 2025-02-25 ENCOUNTER — PATIENT MESSAGE (OUTPATIENT)
Dept: DERMATOLOGY | Facility: CLINIC | Age: 30
End: 2025-02-25
Payer: MEDICARE

## 2025-03-06 ENCOUNTER — PATIENT MESSAGE (OUTPATIENT)
Dept: ADMINISTRATIVE | Facility: OTHER | Age: 30
End: 2025-03-06
Payer: MEDICARE

## 2025-03-06 ENCOUNTER — PATIENT MESSAGE (OUTPATIENT)
Dept: GASTROENTEROLOGY | Facility: CLINIC | Age: 30
End: 2025-03-06
Payer: MEDICARE

## 2025-03-06 DIAGNOSIS — R13.10 DYSPHAGIA, UNSPECIFIED TYPE: ICD-10-CM

## 2025-03-06 DIAGNOSIS — R74.8 ELEVATED LIVER ENZYMES: ICD-10-CM

## 2025-03-06 DIAGNOSIS — K21.9 GASTROESOPHAGEAL REFLUX DISEASE, UNSPECIFIED WHETHER ESOPHAGITIS PRESENT: Primary | ICD-10-CM

## 2025-03-10 ENCOUNTER — HOSPITAL ENCOUNTER (OUTPATIENT)
Dept: RADIOLOGY | Facility: HOSPITAL | Age: 30
Discharge: HOME | End: 2025-03-10
Payer: MEDICARE

## 2025-03-10 ENCOUNTER — RESULTS FOLLOW-UP (OUTPATIENT)
Dept: GASTROENTEROLOGY | Facility: CLINIC | Age: 30
End: 2025-03-10

## 2025-03-10 ENCOUNTER — OFFICE VISIT (OUTPATIENT)
Dept: GASTROENTEROLOGY | Facility: CLINIC | Age: 30
End: 2025-03-10
Payer: MEDICARE

## 2025-03-10 VITALS — BODY MASS INDEX: 30.63 KG/M2 | WEIGHT: 151.69 LBS

## 2025-03-10 DIAGNOSIS — R19.5 LOOSE STOOLS: ICD-10-CM

## 2025-03-10 DIAGNOSIS — R10.30 LOWER ABDOMINAL PAIN: Primary | ICD-10-CM

## 2025-03-10 DIAGNOSIS — R14.2 BELCHING: ICD-10-CM

## 2025-03-10 DIAGNOSIS — R93.89 ABNORMAL X-RAY: ICD-10-CM

## 2025-03-10 DIAGNOSIS — R10.30 LOWER ABDOMINAL PAIN: ICD-10-CM

## 2025-03-10 DIAGNOSIS — K59.09 CHRONIC CONSTIPATION: ICD-10-CM

## 2025-03-10 DIAGNOSIS — K21.9 GASTROESOPHAGEAL REFLUX DISEASE, UNSPECIFIED WHETHER ESOPHAGITIS PRESENT: ICD-10-CM

## 2025-03-10 DIAGNOSIS — R12 HEARTBURN: ICD-10-CM

## 2025-03-10 DIAGNOSIS — R00.2 PALPITATIONS: Primary | ICD-10-CM

## 2025-03-10 DIAGNOSIS — R13.10 DYSPHAGIA, UNSPECIFIED TYPE: ICD-10-CM

## 2025-03-10 DIAGNOSIS — R11.0 NAUSEA: ICD-10-CM

## 2025-03-10 PROCEDURE — 74018 RADEX ABDOMEN 1 VIEW: CPT | Mod: TC

## 2025-03-10 PROCEDURE — 3008F BODY MASS INDEX DOCD: CPT | Mod: CPTII,S$GLB,,

## 2025-03-10 PROCEDURE — 74018 RADEX ABDOMEN 1 VIEW: CPT | Mod: 26,,, | Performed by: RADIOLOGY

## 2025-03-10 PROCEDURE — 1159F MED LIST DOCD IN RCRD: CPT | Mod: CPTII,S$GLB,,

## 2025-03-10 PROCEDURE — 1160F RVW MEDS BY RX/DR IN RCRD: CPT | Mod: CPTII,S$GLB,,

## 2025-03-10 PROCEDURE — 99213 OFFICE O/P EST LOW 20 MIN: CPT | Mod: S$GLB,,,

## 2025-03-10 PROCEDURE — 99999 PR PBB SHADOW E&M-EST. PATIENT-LVL III: CPT | Mod: PBBFAC,,,

## 2025-03-10 RX ORDER — OMEPRAZOLE 40 MG/1
40 CAPSULE, DELAYED RELEASE ORAL DAILY
Qty: 90 CAPSULE | Refills: 1 | Status: SHIPPED | OUTPATIENT
Start: 2025-03-10 | End: 2025-09-06

## 2025-03-10 NOTE — PROGRESS NOTES
Subjective:       Patient ID: Sherry Burns is a 29 y.o. female Body mass index is 30.63 kg/m².    Chief Complaint: Abdominal Pain (Pt states her stomach is hurting/No pain /Started yesterday /diarrhea)    This patient is new to me.  Referring Provider: No ref. provider found for abdominal pain.  Established patient of Dr. Williamson.     Abdominal Pain  This is a new (a week ago) problem. The pain is located in the RLQ and LLQ (fried/spicy foods and not having BMs worsened pain states is not taking prilosec for reflux currently). The pain is at a severity of 0/10. The quality of the pain is burning. Associated symptoms include belching, constipation (chronic constipation, has tried fiber and miralax daily with mild relief was prescribed linzess toke one dose states it helped has not been taking it lately does not feel empty and strains occasionally), diarrhea (states has a bm every 2 days last bm this morning rates stool type 5-7 on bristol stool scale currently not taking anything for her bowels states was skipping days before having bm today) and nausea. Pertinent negatives include no anorexia, arthralgias, dysuria, fever, flatus, frequency, headaches, hematochezia, hematuria, melena, myalgias, vomiting or weight loss. Associated symptoms comments: Pt with hx of fatty liver followed by hep, asthma, GERD, presents to clinic for abdominal pain and to discuss bowel habits.. The pain is aggravated by eating. The pain is relieved by Bowel movements (heating pad). Prior diagnostic workup includes CT scan (CT abdomen normal in 9/2024). Her past medical history is significant for GERD. There is no history of abdominal surgery, colon cancer, Crohn's disease, gallstones, irritable bowel syndrome, pancreatitis, PUD or ulcerative colitis. Patient's medical history does not include kidney stones and UTI.   Gastroesophageal Reflux  She complains of abdominal pain, belching, dysphagia (reports intermittent dysphagia with  foods no issues with pills or liquids hx of egd with dilation pt is scheduled for EGD), heartburn, nausea and water brash. She reports no chest pain, no choking, no coughing, no early satiety, no globus sensation or no hoarse voice. The current episode started more than 1 year ago. The problem has been waxing and waning. The heartburn is located in the substernum. The heartburn wakes her from sleep. The heartburn does not limit her activity. The heartburn doesn't change with position. The symptoms are aggravated by certain foods. Pertinent negatives include no anemia, fatigue, melena, muscle weakness, orthopnea or weight loss. She has tried a PPI (was taking prilosec in the past and did help) for the symptoms. Past procedures include an EGD (has EGD scheduled in 4/2025). Past procedures do not include an abdominal ultrasound, esophageal manometry, esophageal pH monitoring, H. pylori antibody titer or a UGI. Past invasive treatments do not include gastroplasty, gastroplication or reflux surgery.       Review of Systems   Constitutional:  Negative for fatigue, fever and weight loss.   HENT:  Negative for hoarse voice.    Respiratory:  Negative for cough and choking.    Cardiovascular:  Negative for chest pain.   Gastrointestinal:  Positive for abdominal pain, constipation (chronic constipation, has tried fiber and miralax daily with mild relief was prescribed linzess toke one dose states it helped has not been taking it lately does not feel empty and strains occasionally), diarrhea (states has a bm every 2 days last bm this morning rates stool type 5-7 on bristol stool scale currently not taking anything for her bowels states was skipping days before having bm today), dysphagia (reports intermittent dysphagia with foods no issues with pills or liquids hx of egd with dilation pt is scheduled for EGD), heartburn and nausea. Negative for abdominal distention, anal bleeding, anorexia, blood in stool, flatus, hematochezia,  melena, rectal pain and vomiting.   Genitourinary:  Negative for dysuria, frequency and hematuria.   Musculoskeletal:  Negative for arthralgias, myalgias and muscle weakness.   Neurological:  Negative for headaches.         No LMP recorded.  Past Medical History:   Diagnosis Date    Anemia     Anxiety disorder, unspecified     Asthma     Bipolar disorder, unspecified     Depression     Eczema     GERD (gastroesophageal reflux disease)     Glaucoma     Headache     Heart murmur      Past Surgical History:   Procedure Laterality Date    COLONOSCOPY N/A 03/05/2021    Procedure: COLONOSCOPY;  Surgeon: Jake Williamson MD;  Location: [facility];  Service: Endoscopy;  Laterality: N/A;    ESOPHAGOGASTRODUODENOSCOPY N/A 03/05/2021    Procedure: EGD (ESOPHAGOGASTRODUODENOSCOPY);  Surgeon: Jake Williamson MD;  Location: [facility];  Service: Endoscopy;  Laterality: N/A;    ESOPHAGOGASTRODUODENOSCOPY N/A 12/2/2024    Procedure: EGD (ESOPHAGOGASTRODUODENOSCOPY);  Surgeon: Sourav Rushing MD;  Location: [facility];  Service: Endoscopy;  Laterality: N/A;    UPPER GASTROINTESTINAL ENDOSCOPY       Family History   Problem Relation Name Age of Onset    Hypertension Mother      Hyperlipidemia Mother      Colon polyps Mother      Heart attacks under age 50 Mother      Drug abuse Father      Arthritis Brother      Glaucoma Paternal Grandmother      Dementia Paternal Grandmother      Melanoma Neg Hx      Colon cancer Neg Hx      Crohn's disease Neg Hx      Esophageal cancer Neg Hx      Liver cancer Neg Hx      Rectal cancer Neg Hx      Stomach cancer Neg Hx      Ulcerative colitis Neg Hx       Social History[1]  Wt Readings from Last 10 Encounters:   03/10/25 68.8 kg (151 lb 10.8 oz)   02/19/25 66.6 kg (146 lb 11.5 oz)   01/23/25 63.2 kg (139 lb 3.5 oz)   01/13/25 63.5 kg (140 lb)   01/13/25 67.3 kg (148 lb 5.9 oz)   01/06/25 66.7 kg (147 lb 0.8 oz)   01/03/25 67.6 kg (149 lb)   12/26/24 63.5 kg (140 lb)   12/18/24 63.5 kg (140 lb)    12/17/24 63.5 kg (140 lb)     Lab Results   Component Value Date    WBC 8.90 01/14/2025    HGB 12.5 01/14/2025    HCT 38.2 01/14/2025    MCV 84 01/14/2025     01/14/2025     CMP  Sodium   Date Value Ref Range Status   01/14/2025 138 136 - 145 mmol/L Final     Potassium   Date Value Ref Range Status   01/14/2025 3.6 3.5 - 5.1 mmol/L Final     Chloride   Date Value Ref Range Status   01/14/2025 108 95 - 110 mmol/L Final     CO2   Date Value Ref Range Status   01/14/2025 20 (L) 23 - 29 mmol/L Final     Glucose   Date Value Ref Range Status   01/14/2025 161 (H) 70 - 110 mg/dL Final     BUN   Date Value Ref Range Status   01/14/2025 16 6 - 20 mg/dL Final     Creatinine   Date Value Ref Range Status   01/14/2025 0.9 0.5 - 1.4 mg/dL Final     Calcium   Date Value Ref Range Status   01/14/2025 9.3 8.7 - 10.5 mg/dL Final     Total Protein   Date Value Ref Range Status   01/14/2025 7.1 6.0 - 8.4 g/dL Final     Albumin   Date Value Ref Range Status   01/14/2025 4.0 3.5 - 5.2 g/dL Final     Total Bilirubin   Date Value Ref Range Status   01/14/2025 0.5 0.1 - 1.0 mg/dL Final     Comment:     For infants and newborns, interpretation of results should be based  on gestational age, weight and in agreement with clinical  observations.    Premature Infant recommended reference ranges:  Up to 24 hours.............<8.0 mg/dL  Up to 48 hours............<12.0 mg/dL  3-5 days..................<15.0 mg/dL  6-29 days.................<15.0 mg/dL       Alkaline Phosphatase   Date Value Ref Range Status   01/14/2025 120 40 - 150 U/L Final     AST   Date Value Ref Range Status   01/14/2025 30 10 - 40 U/L Final     ALT   Date Value Ref Range Status   01/14/2025 68 (H) 10 - 44 U/L Final     Anion Gap   Date Value Ref Range Status   01/14/2025 10 8 - 16 mmol/L Final     eGFR if    Date Value Ref Range Status   05/28/2022 >60 >60 mL/min/1.73 m^2 Final     eGFR if non    Date Value Ref Range Status  "  05/28/2022 >60 >60 mL/min/1.73 m^2 Final     Comment:     Calculation used to obtain the estimated glomerular filtration  rate (eGFR) is the CKD-EPI equation.        Lab Results   Component Value Date    LIPASE 24 10/06/2024     No results found for: "LIPASERES"  Lab Results   Component Value Date    TSH 4.735 (H) 01/14/2025       Reviewed prior medical records including radiology report of CT abdomen pelvis 9/21/24 & endoscopy history (see surgical history/procedures).    Objective:      Physical Exam  Vitals and nursing note reviewed.   Constitutional:       Appearance: Normal appearance. She is normal weight.   Cardiovascular:      Rate and Rhythm: Normal rate and regular rhythm.      Heart sounds: Normal heart sounds.   Pulmonary:      Breath sounds: Normal breath sounds.   Abdominal:      General: Bowel sounds are normal.      Palpations: Abdomen is soft.      Tenderness: There is abdominal tenderness.   Skin:     General: Skin is warm and dry.      Coloration: Skin is not jaundiced.   Neurological:      Mental Status: She is alert and oriented to person, place, and time.   Psychiatric:         Mood and Affect: Mood normal.         Behavior: Behavior normal.         Assessment:       1. Lower abdominal pain    2. Loose stools    3. Chronic constipation    4. Gastroesophageal reflux disease, unspecified whether esophagitis present    5. Belching    6. Heartburn    7. Nausea    8. Dysphagia, unspecified type        Plan:       Lower abdominal pain  -     X-Ray KUB; Future; Expected date: 03/10/2025  - Start on constipation regimen  -  Consider repeating ct of abdomen if pain does not improve  - Consider bentyl    Loose stools   -Possible constipation with overflow    -will order stool tests if stool is consistently loose   - recommend OTC probiotic, such as Florastor or Culturelle, taken as directed on packaging  - avoid lactose, alcohol, & caffeine  - avoid known triggers    Chronic constipation  -     X-Ray " JONI; Future; Expected date: 03/10/2025  - Start on Linzess 145mcg daily  -Recommend daily exercise as tolerated, adequate water intake (six 8-oz glasses of water daily), and high fiber diet. OTC fiber supplements are recommended if diet does not reach daily fiber goal (20-30 grams daily), such as Metamucil, Citrucel, or FiberCon (take as directed, separate from other oral medications by >2 hours).  -Recommend trying OTC MiraLax once daily (17g PO) as directed  -If no improvement with above recommendations, try intermittently dosed Dulcolax OTC as directed (every 3-4 days) PRN to facilitate bowel movements  -If no relief with this, consider adding a emollient laxative (castor oil or mineral oil) +/- enema  -If still no improvement with these measures, call/follow-up    Gastroesophageal reflux disease, unspecified whether esophagitis present, belching, heartburn, nausea   -Continue with scheduled EGD  - START    omeprazole (PRILOSEC) 40 MG capsule; Take 1 capsule (40 mg total) by mouth Daily.  Dispense: 90 capsule; Refill: 1   -Take PPI 30min-1hr before eating breakfast  -Educated patient on lifestyle modifications to help control/reduce reflux/abdominal pain including: avoid large meals, avoid eating within 2-3 hours of bedtime (avoid late night eating & lying down soon after eating), elevate head of bed if nocturnal symptoms are present, smoking cessation (if current smoker), & weight loss (if overweight).   -Educated to avoid known foods which trigger reflux symptoms & to minimize/avoid high-fat foods, chocolate, caffeine, citrus, alcohol, & tomato products.  -Advised to avoid/limit use of NSAID's, since they can cause GI upset, bleeding, and/or ulcers. If needed, take with food.     Dysphagia, unspecified type   -Continue with scheduled EGD   - recommend to eat smaller more frequent meals and to eat slowly and advised to eat a soft diet. Take medications one at a time with a full glass of water.  - possible  UGI/esophagram/esophageal manometry if symptoms persist       Follow up if symptoms worsen or fail to improve.      If no improvement in symptoms or symptoms worsen, call/follow-up at clinic or go to ER.       [facility]  [facility], [place]     Dictation software program was used for this note. Please expect some simple typographical  errors in this note.    Encounter includes face to face time and non-face to face time preparing to see the patient (eg, review of tests), obtaining and/or reviewing separately obtained history, documenting clinical information in the electronic or other health record, independently interpreting results (not separately reported) and communicating results to the patient/family/caregiver, or care coordination (not separately reported).             [1]   Social History  Tobacco Use    Smoking status: Some Days     Current packs/day: 0.50     Average packs/day: 0.5 packs/day for 11.3 years (5.6 ttl pk-yrs)     Types: Cigarettes     Start date: 12/3/2013    Smokeless tobacco: Never   Substance Use Topics    Alcohol use: Not Currently    Drug use: Never

## 2025-03-10 NOTE — PROGRESS NOTES
Subjective:       Patient ID: Sherry Burns is a 29 y.o. female Body mass index is 30.63 kg/m².    Chief Complaint: Constipation    This patient is new to me.  Referring Provider: No ref. provider found for Constipation.  Established patient of Dr. Williamson.     Constipation  This is a chronic problem. The current episode started more than 1 year ago. The problem has been waxing and waning since onset. Her stool frequency is 2 to 3 times per week (reports straining with bowels). The stool is described as firm and formed. The patient is on a high fiber diet. She Does not exercise regularly. There has Been adequate water intake. Associated symptoms include abdominal pain, hematochezia (reports intermittent rectal bleeding sees spots of BRPBR on tissue no blood in stool hx of constipation, straining, and has external hemorrhoids colonoscopy 2021 normal except external hemorrhoids, no rectal pain), hemorrhoids and nausea (reports nausea has improved with starting on Prilosec daily and controlling GERD symptoms zofran helps as well denies vomiting, US-fattyliver, scheduled for EGD on 9/13/24). Pertinent negatives include no anorexia, back pain, bloating, diarrhea, difficulty urinating, fecal incontinence, fever, flatus, melena, rectal pain, vomiting or weight loss. Associated symptoms comments: 7/8/2024  XR ABDOMEN AP 1 VIEW  Impression:  Mildly prominent amount of stool throughout the colon.  Mild dilatation of small bowel loops right lower quadrant of the abdomen not nonspecific and nonobstructive in appearance. She has tried diet changes, fiber and stool softeners (has been taking fiber and miralax daily with mild relief) for the symptoms. Her past medical history is significant for psychiatric history. There is no history of abdominal surgery, endocrine disease, inflammatory bowel disease, irritable bowel syndrome, metabolic disease, neurologic disease, neuromuscular disease or radiation treatment.   Abdominal  Pain  Associated symptoms include constipation, hematochezia (reports intermittent rectal bleeding sees spots of BRPBR on tissue no blood in stool hx of constipation, straining, and has external hemorrhoids colonoscopy 2021 normal except external hemorrhoids, no rectal pain) and nausea (reports nausea has improved with starting on Prilosec daily and controlling GERD symptoms zofran helps as well denies vomiting, US-fattyliver, scheduled for EGD on 9/13/24). Pertinent negatives include no anorexia, diarrhea, fever, flatus, melena, vomiting or weight loss. There is no history of abdominal surgery or irritable bowel syndrome.       Review of Systems   Constitutional:  Negative for fever and weight loss.   Gastrointestinal:  Positive for abdominal pain, constipation, hematochezia (reports intermittent rectal bleeding sees spots of BRPBR on tissue no blood in stool hx of constipation, straining, and has external hemorrhoids colonoscopy 2021 normal except external hemorrhoids, no rectal pain), hemorrhoids and nausea (reports nausea has improved with starting on Prilosec daily and controlling GERD symptoms zofran helps as well denies vomiting, US-fattyliver, scheduled for EGD on 9/13/24). Negative for abdominal distention, anal bleeding, anorexia, bloating, blood in stool, diarrhea, flatus, melena, rectal pain and vomiting.   Genitourinary:  Negative for difficulty urinating.   Musculoskeletal:  Negative for back pain.         No LMP recorded.  Past Medical History:   Diagnosis Date    Anemia     Anxiety disorder, unspecified     Asthma     Bipolar disorder, unspecified     Depression     Eczema     GERD (gastroesophageal reflux disease)     Glaucoma     Headache     Heart murmur      Past Surgical History:   Procedure Laterality Date    COLONOSCOPY N/A 03/05/2021    Procedure: COLONOSCOPY;  Surgeon: Jake Williamson MD;  Location: [facility];  Service: Endoscopy;  Laterality: N/A;    ESOPHAGOGASTRODUODENOSCOPY N/A  03/05/2021    Procedure: EGD (ESOPHAGOGASTRODUODENOSCOPY);  Surgeon: Jake Williamson MD;  Location: [facility] ENDO;  Service: Endoscopy;  Laterality: N/A;    ESOPHAGOGASTRODUODENOSCOPY N/A 12/2/2024    Procedure: EGD (ESOPHAGOGASTRODUODENOSCOPY);  Surgeon: Sourav Rushing MD;  Location: [facility] ENDO;  Service: Endoscopy;  Laterality: N/A;    UPPER GASTROINTESTINAL ENDOSCOPY       Family History   Problem Relation Name Age of Onset    Hypertension Mother      Hyperlipidemia Mother      Colon polyps Mother      Heart attacks under age 50 Mother      Drug abuse Father      Arthritis Brother      Glaucoma Paternal Grandmother      Dementia Paternal Grandmother      Melanoma Neg Hx      Colon cancer Neg Hx      Crohn's disease Neg Hx      Esophageal cancer Neg Hx      Liver cancer Neg Hx      Rectal cancer Neg Hx      Stomach cancer Neg Hx      Ulcerative colitis Neg Hx       Social History     Tobacco Use    Smoking status: Some Days     Current packs/day: 0.50     Average packs/day: 0.5 packs/day for 11.3 years (5.6 ttl pk-yrs)     Types: Cigarettes     Start date: 12/3/2013    Smokeless tobacco: Never   Substance Use Topics    Alcohol use: Not Currently    Drug use: Never     Wt Readings from Last 10 Encounters:   03/10/25 68.8 kg (151 lb 10.8 oz)   02/19/25 66.6 kg (146 lb 11.5 oz)   01/23/25 63.2 kg (139 lb 3.5 oz)   01/13/25 63.5 kg (140 lb)   01/13/25 67.3 kg (148 lb 5.9 oz)   01/06/25 66.7 kg (147 lb 0.8 oz)   01/03/25 67.6 kg (149 lb)   12/26/24 63.5 kg (140 lb)   12/18/24 63.5 kg (140 lb)   12/17/24 63.5 kg (140 lb)     Lab Results   Component Value Date    WBC 8.90 01/14/2025    HGB 12.5 01/14/2025    HCT 38.2 01/14/2025    MCV 84 01/14/2025     01/14/2025     CMP  Sodium   Date Value Ref Range Status   01/14/2025 138 136 - 145 mmol/L Final     Potassium   Date Value Ref Range Status   01/14/2025 3.6 3.5 - 5.1 mmol/L Final     Chloride   Date Value Ref Range Status   01/14/2025 108 95 - 110 mmol/L Final  "    CO2   Date Value Ref Range Status   01/14/2025 20 (L) 23 - 29 mmol/L Final     Glucose   Date Value Ref Range Status   01/14/2025 161 (H) 70 - 110 mg/dL Final     BUN   Date Value Ref Range Status   01/14/2025 16 6 - 20 mg/dL Final     Creatinine   Date Value Ref Range Status   01/14/2025 0.9 0.5 - 1.4 mg/dL Final     Calcium   Date Value Ref Range Status   01/14/2025 9.3 8.7 - 10.5 mg/dL Final     Total Protein   Date Value Ref Range Status   01/14/2025 7.1 6.0 - 8.4 g/dL Final     Albumin   Date Value Ref Range Status   01/14/2025 4.0 3.5 - 5.2 g/dL Final     Total Bilirubin   Date Value Ref Range Status   01/14/2025 0.5 0.1 - 1.0 mg/dL Final     Comment:     For infants and newborns, interpretation of results should be based  on gestational age, weight and in agreement with clinical  observations.    Premature Infant recommended reference ranges:  Up to 24 hours.............<8.0 mg/dL  Up to 48 hours............<12.0 mg/dL  3-5 days..................<15.0 mg/dL  6-29 days.................<15.0 mg/dL       Alkaline Phosphatase   Date Value Ref Range Status   01/14/2025 120 40 - 150 U/L Final     AST   Date Value Ref Range Status   01/14/2025 30 10 - 40 U/L Final     ALT   Date Value Ref Range Status   01/14/2025 68 (H) 10 - 44 U/L Final     Anion Gap   Date Value Ref Range Status   01/14/2025 10 8 - 16 mmol/L Final     eGFR if    Date Value Ref Range Status   05/28/2022 >60 >60 mL/min/1.73 m^2 Final     eGFR if non    Date Value Ref Range Status   05/28/2022 >60 >60 mL/min/1.73 m^2 Final     Comment:     Calculation used to obtain the estimated glomerular filtration  rate (eGFR) is the CKD-EPI equation.        Lab Results   Component Value Date    LIPASE 24 10/06/2024     No results found for: "LIPASERES"  Lab Results   Component Value Date    TSH 4.735 (H) 01/14/2025       Reviewed prior medical records including radiology report of xray abdomen 7/8/24 & endoscopy history (see " surgical history/procedures).    Objective:      Physical Exam  Vitals and nursing note reviewed.   Constitutional:       Appearance: Normal appearance. She is normal weight.   Cardiovascular:      Rate and Rhythm: Normal rate and regular rhythm.      Heart sounds: Normal heart sounds.   Pulmonary:      Breath sounds: Normal breath sounds.   Abdominal:      General: Bowel sounds are normal. There is no distension.      Palpations: Abdomen is soft.      Tenderness: There is no abdominal tenderness.   Skin:     General: Skin is warm and dry.      Coloration: Skin is not jaundiced.   Neurological:      Mental Status: She is alert and oriented to person, place, and time.   Psychiatric:         Mood and Affect: Mood normal.         Behavior: Behavior normal.         Assessment:       No diagnosis found.      Plan:       Chronic idiopathic constipation  -     linaCLOtide (LINZESS) 145 mcg Cap capsule; Take 1 capsule (145 mcg total) by mouth once daily.  Dispense: 30 capsule; Refill: 2   -Recommend daily exercise as tolerated, adequate water intake (six 8-oz glasses of water daily), and high fiber diet. OTC fiber supplements are recommended if diet does not reach daily fiber goal (20-30 grams daily), such as Metamucil, Citrucel, or FiberCon (take as directed, separate from other oral medications by >2 hours).  -If still no improvement with these measures, call/follow-up    Rectal bleeding, External hemorrhoids    - avoid constipation and straining with bowel movements; try using an OTC stool softener as directed and increase fiber in diet (20-30 grams daily)/OTC fiber supplement such as metamucil (take as directed)  - recommend SITZ baths  - possible referral to colorectal surgery if symptoms persist  -consider repeating colonoscopy if symptom continues or worsens    Gastroesophageal reflux disease, unspecified whether esophagitis present   Continue Prilosec 40 mg orally daily   Continue with scheduled EGD   -Take PPI  30min-1hr before eating breakfast  -Educated patient on lifestyle modifications to help control/reduce reflux/abdominal pain including: avoid large meals, avoid eating within 2-3 hours of bedtime (avoid late night eating & lying down soon after eating), elevate head of bed if nocturnal symptoms are present, smoking cessation (if current smoker), & weight loss (if overweight).   -Educated to avoid known foods which trigger reflux symptoms & to minimize/avoid high-fat foods, chocolate, caffeine, citrus, alcohol, & tomato products.  -Advised to avoid/limit use of NSAID's, since they can cause GI upset, bleeding, and/or ulcers. If needed, take with food.     Chronic nausea   -Continue Prilosec 40 mg orally daily   -Continue with scheduled EGD   -Follow GERD lifestyle modifications   -continue Zofran as needed for nausea   -consider GES    No follow-ups on file.      If no improvement in symptoms or symptoms worsen, call/follow-up at clinic or go to ER.       [facility] - GASTROENTEROLOGY  [facility], [place]     Dictation software program was used for this note. Please expect some simple typographical  errors in this note.    Encounter includes face to face time and non-face to face time preparing to see the patient (eg, review of tests), obtaining and/or reviewing separately obtained history, documenting clinical information in the electronic or other health record, independently interpreting results (not separately reported) and communicating results to the patient/family/caregiver, or care coordination (not separately reported).

## 2025-03-11 ENCOUNTER — OFFICE VISIT (OUTPATIENT)
Dept: DERMATOLOGY | Facility: CLINIC | Age: 30
End: 2025-03-11
Payer: MEDICARE

## 2025-03-11 ENCOUNTER — PATIENT MESSAGE (OUTPATIENT)
Dept: CARDIOLOGY | Facility: CLINIC | Age: 30
End: 2025-03-11
Payer: MEDICARE

## 2025-03-11 DIAGNOSIS — Q80.0 ICHTHYOSIS VULGARIS: ICD-10-CM

## 2025-03-11 DIAGNOSIS — L91.8 INFLAMED SKIN TAG: ICD-10-CM

## 2025-03-11 DIAGNOSIS — L20.84 INTRINSIC ATOPIC DERMATITIS: Primary | ICD-10-CM

## 2025-03-11 PROCEDURE — 1159F MED LIST DOCD IN RCRD: CPT | Mod: CPTII,S$GLB,, | Performed by: STUDENT IN AN ORGANIZED HEALTH CARE EDUCATION/TRAINING PROGRAM

## 2025-03-11 PROCEDURE — 99214 OFFICE O/P EST MOD 30 MIN: CPT | Mod: 25,S$GLB,, | Performed by: STUDENT IN AN ORGANIZED HEALTH CARE EDUCATION/TRAINING PROGRAM

## 2025-03-11 PROCEDURE — 1160F RVW MEDS BY RX/DR IN RCRD: CPT | Mod: CPTII,S$GLB,, | Performed by: STUDENT IN AN ORGANIZED HEALTH CARE EDUCATION/TRAINING PROGRAM

## 2025-03-11 PROCEDURE — 17110 DESTRUCTION B9 LES UP TO 14: CPT | Mod: S$GLB,,, | Performed by: STUDENT IN AN ORGANIZED HEALTH CARE EDUCATION/TRAINING PROGRAM

## 2025-03-11 RX ORDER — TRIAMCINOLONE ACETONIDE 1 MG/G
CREAM TOPICAL 2 TIMES DAILY
Qty: 454 G | Refills: 2 | Status: SHIPPED | OUTPATIENT
Start: 2025-03-11

## 2025-03-11 NOTE — PROGRESS NOTES
Subjective:      Patient ID:  Sherry Burns is a 29 y.o. female who presents for   Chief Complaint   Patient presents with    Rash     LOV 8/21/24 [place]     Patient here today for f/u on atopic derm. Patient states she has been flared on back. Very itchy and feel dry. On Dupixent, last injection was yesterday 3/10. States she has been consistent with injections. Does not use any topicals currently.     Also complains of skin tag on right axilla. States it itches and will bleed if she scratches it.        2021:  Skin, left back, punch biopsy:   - FEATURES SUGGESTIVE OF ICHTHYOSIS VULGARIS (SEE COMMENT).   COMMENT:  These histologic features are relatively subtle and non-specific.   However, the thickened layer of compact orthokeratosis overlying a diminished   granular layer in the absence of any significant epidermal spongiosis or   dermal inflammation are features that are consistent with this diagnosis.  A   resolving eczematous process cannot be entirely excluded.     Previous derm hx:     -born bright red with scaling on whole body. She was treated with PO steroids for > 1 year as a child. Her rash has lessened in severity over time but it is still present.         Derm Hx:  Denies Phx of NMSC  Denies Fhx of MM          Review of Systems   Constitutional:  Negative for fever, chills and fatigue.   Respiratory:  Negative for cough and shortness of breath.    Gastrointestinal:  Negative for nausea and vomiting.   Skin:  Positive for itching and rash. Negative for dry skin, daily sunscreen use and activity-related sunscreen use.       Objective:   Physical Exam   Constitutional: She appears well-developed and well-nourished.   Neurological: She is alert and oriented to person, place, and time.   Psychiatric: She has a normal mood and affect.   Skin:   Areas Examined (abnormalities noted in diagram):   Chest / Axilla Inspection Performed  Back Inspection Performed  RUE Inspected  LUE Inspection  Performed            Diagram Legend     Erythematous scaling macule/papule c/w actinic keratosis       Vascular papule c/w angioma      Pigmented verrucoid papule/plaque c/w seborrheic keratosis      Yellow umbilicated papule c/w sebaceous hyperplasia      Irregularly shaped tan macule c/w lentigo     1-2 mm smooth white papules consistent with Milia      Movable subcutaneous cyst with punctum c/w epidermal inclusion cyst      Subcutaneous movable cyst c/w pilar cyst      Firm pink to brown papule c/w dermatofibroma      Pedunculated fleshy papule(s) c/w skin tag(s)      Evenly pigmented macule c/w junctional nevus     Mildly variegated pigmented, slightly irregular-bordered macule c/w mildly atypical nevus      Flesh colored to evenly pigmented papule c/w intradermal nevus       Pink pearly papule/plaque c/w basal cell carcinoma      Erythematous hyperkeratotic cursted plaque c/w SCC      Surgical scar with no sign of skin cancer recurrence      Open and closed comedones      Inflammatory papules and pustules      Verrucoid papule consistent consistent with wart     Erythematous eczematous patches and plaques     Dystrophic onycholytic nail with subungual debris c/w onychomycosis     Umbilicated papule    Erythematous-base heme-crusted tan verrucoid plaque consistent with inflamed seborrheic keratosis     Erythematous Silvery Scaling Plaque c/w Psoriasis     See annotation      Assessment / Plan:        Intrinsic atopic dermatitis  Ichthyosis vulgaris  -     triamcinolone acetonide 0.1% (KENALOG) 0.1 % cream; Apply topically 2 (two) times daily.  Dispense: 454 g; Refill: 2  Not moisturizing, very dry, scaly skin on arms and back  States she cannot reach, lives with mom, asked mom to help and also discussed back lotion applicator  Dove unscented body wash  Triamcinolone twice daily for 2 weeks w/ cerave on top- use back lotion applicator  After 2 weeks can use the cerave cream twice daily   Continue dupixent   -  patient counseled to use topical steroids for specified period of time and on locations discussed to prevent side effects. Reviewed side effects of long-term use of topical steroids which include thinning and lightening of skin  Discussed  benefits and risks of Dupixent including but not limited to injection site reactions, Dupixent- induced facial dermatitis, increased risk of eye/eyelid irritation and inflammation as well as oral herpes infection and possible helminth infections.    Inflamed skin tag  Right axilla  Cryosurgery procedure note:    Verbal consent from the patient is obtained. Liquid nitrogen cryosurgery is applied to 2 lesions to produce a freeze injury. The patient is aware that blisters may form and is instructed on wound care with gentle cleansing and use of vaseline ointment to keep moist until healed. The patient is supplied a handout on cryosurgery and is instructed to call if lesions do not completely resolve.             No follow-ups on file.

## 2025-03-11 NOTE — PATIENT INSTRUCTIONS
Dove unscented body wash  Triamcinolone twice daily for 2 weeks w/ cerave on top- use back lotion applicator  After 2 weeks can use the cerave cream twice daily   Keep taking dupixent       CRYOSURGERY      Your doctor has used a method called cryosurgery to treat your skin condition. Cryosurgery refers to the use of very cold substances to treat a variety of skin conditions such as warts, pre-skin cancers, molluscum contagiosum, sun spots, and several benign growths. The substance we use in cryosurgery is liquid nitrogen and is so cold (-195 degrees Celsius) that is burns when administered.     Following treatment in the office, the skin may immediately burn and become red. You may find the area around the lesion is affected as well. It is sometimes necessary to treat not only the lesion, but a small area of the surrounding normal skin to achieve a good response.     A blister, and even a blood filled blister, may form after treatment.   This is a normal response. If the blister is painful, it is acceptable to sterilize a needle and with rubbing alcohol and gently pop the blister. It is important that you gently wash the area with soap and warm water as the blister fluid may contain wart virus if a wart was treated. Do no remove the roof of the blister.     The area treated can take anywhere from 1-3 weeks to heal. Healing time depends on the kind skin lesion treated, the location, and how aggressively the lesion was treated. It is recommended that the areas treated are covered with Vaseline or bacitracin ointment and a band-aid. If a band-aid is not practical, just ointment applied several times per day will do. Keeping these areas moist will speed the healing time.    Treatment with liquid nitrogen can leave a scar. In dark skin, it may be a light or dark scar, in light skin it may be a white or pink scar. These will generally fade with time, but may never go away completely.     If you have any concerns after  your treatment, please feel free to call the office.       [facility] [place] [address]/ [phone] FAX/ [email or web address]

## 2025-03-12 ENCOUNTER — PATIENT MESSAGE (OUTPATIENT)
Dept: GASTROENTEROLOGY | Facility: CLINIC | Age: 30
End: 2025-03-12
Payer: MEDICARE

## 2025-03-14 ENCOUNTER — HOSPITAL ENCOUNTER (OUTPATIENT)
Dept: RADIOLOGY | Facility: HOSPITAL | Age: 30
Discharge: HOME | End: 2025-03-14
Payer: MEDICARE

## 2025-03-14 ENCOUNTER — HOSPITAL ENCOUNTER (OUTPATIENT)
Dept: CARDIOLOGY | Facility: CLINIC | Age: 30
Discharge: HOME | End: 2025-03-14
Payer: MEDICARE

## 2025-03-14 ENCOUNTER — RESULTS FOLLOW-UP (OUTPATIENT)
Dept: GASTROENTEROLOGY | Facility: CLINIC | Age: 30
End: 2025-03-14

## 2025-03-14 DIAGNOSIS — R00.2 PALPITATIONS: ICD-10-CM

## 2025-03-14 DIAGNOSIS — R93.89 ABNORMAL X-RAY: ICD-10-CM

## 2025-03-14 DIAGNOSIS — R10.30 LOWER ABDOMINAL PAIN: ICD-10-CM

## 2025-03-14 PROCEDURE — 25500020 PHARM REV CODE 255

## 2025-03-14 PROCEDURE — 74177 CT ABD & PELVIS W/CONTRAST: CPT | Mod: TC

## 2025-03-14 PROCEDURE — A9698 NON-RAD CONTRAST MATERIALNOC: HCPCS

## 2025-03-14 PROCEDURE — 74177 CT ABD & PELVIS W/CONTRAST: CPT | Mod: 26,,, | Performed by: RADIOLOGY

## 2025-03-14 RX ADMIN — IOHEXOL 1000 ML: 9 SOLUTION ORAL at 11:03

## 2025-03-14 RX ADMIN — IOHEXOL 75 ML: 350 INJECTION, SOLUTION INTRAVENOUS at 11:03

## 2025-03-18 ENCOUNTER — PATIENT MESSAGE (OUTPATIENT)
Dept: OPTOMETRY | Facility: CLINIC | Age: 30
End: 2025-03-18
Payer: MEDICARE

## 2025-03-24 ENCOUNTER — PATIENT MESSAGE (OUTPATIENT)
Dept: GASTROENTEROLOGY | Facility: CLINIC | Age: 30
End: 2025-03-24
Payer: MEDICARE

## 2025-03-24 ENCOUNTER — PATIENT MESSAGE (OUTPATIENT)
Dept: CARDIOLOGY | Facility: CLINIC | Age: 30
End: 2025-03-24
Payer: MEDICARE

## 2025-03-24 RX ORDER — POTASSIUM CHLORIDE 600 MG/1
8 TABLET, FILM COATED, EXTENDED RELEASE ORAL DAILY
Qty: 90 TABLET | Refills: 1 | Status: SHIPPED | OUTPATIENT
Start: 2025-03-24

## 2025-03-26 DIAGNOSIS — F17.200 TOBACCO DEPENDENCE: Primary | ICD-10-CM

## 2025-03-27 ENCOUNTER — OFFICE VISIT (OUTPATIENT)
Dept: FAMILY MEDICINE | Facility: CLINIC | Age: 30
End: 2025-03-27
Payer: MEDICARE

## 2025-03-27 VITALS
OXYGEN SATURATION: 98 % | SYSTOLIC BLOOD PRESSURE: 100 MMHG | HEART RATE: 75 BPM | BODY MASS INDEX: 31.1 KG/M2 | DIASTOLIC BLOOD PRESSURE: 70 MMHG | WEIGHT: 154 LBS

## 2025-03-27 DIAGNOSIS — H69.93 DYSFUNCTION OF BOTH EUSTACHIAN TUBES: Primary | ICD-10-CM

## 2025-03-27 PROCEDURE — 3074F SYST BP LT 130 MM HG: CPT | Mod: CPTII,S$GLB,, | Performed by: PHYSICIAN ASSISTANT

## 2025-03-27 PROCEDURE — 1159F MED LIST DOCD IN RCRD: CPT | Mod: CPTII,S$GLB,, | Performed by: PHYSICIAN ASSISTANT

## 2025-03-27 PROCEDURE — 99999 PR PBB SHADOW E&M-EST. PATIENT-LVL V: CPT | Mod: PBBFAC,,, | Performed by: PHYSICIAN ASSISTANT

## 2025-03-27 PROCEDURE — 99213 OFFICE O/P EST LOW 20 MIN: CPT | Mod: S$GLB,,, | Performed by: PHYSICIAN ASSISTANT

## 2025-03-27 PROCEDURE — 3008F BODY MASS INDEX DOCD: CPT | Mod: CPTII,S$GLB,, | Performed by: PHYSICIAN ASSISTANT

## 2025-03-27 PROCEDURE — 1160F RVW MEDS BY RX/DR IN RCRD: CPT | Mod: CPTII,S$GLB,, | Performed by: PHYSICIAN ASSISTANT

## 2025-03-27 PROCEDURE — 3078F DIAST BP <80 MM HG: CPT | Mod: CPTII,S$GLB,, | Performed by: PHYSICIAN ASSISTANT

## 2025-03-27 NOTE — PATIENT INSTRUCTIONS
A few reminders from today:      Start daily claritin or zyrtec (can get generic)  Start daily flonase    Follow up with me if needed.   Please go to ER/urgent care if symptoms persist/worsen, especially if after hours.     Do not hesitate to get in touch with me should you have any further questions.     Thank you for trusting me with your care!  I wish you health and happiness.    -Babita Espinal PA-C

## 2025-03-27 NOTE — PROGRESS NOTES
Subjective     Patient ID: Sherry Burns is a 29 y.o. female.    Chief Complaint: Otalgia      HPI      Pt is known  to me, PCP Monika Rajan NP.      Pt is a 29 year old female with migraines, BPD, ADD, anxiety, atopic dermatitis, asthma, GERD, tobacco abuse. She presents today complaining of bilateral ear fullness x 2 days. Hearing occasional is muffled. No sinus congestion, no postnasal drip, no cough. No fever, chills, body aches.     Review of Systems   All other systems reviewed and are negative.         Objective     Physical Exam  Vitals and nursing note reviewed.   Constitutional:       General: She is not in acute distress.     Appearance: Normal appearance. She is not ill-appearing, toxic-appearing or diaphoretic.   HENT:      Head: Normocephalic and atraumatic.      Right Ear: Tympanic membrane, ear canal and external ear normal. There is no impacted cerumen.      Left Ear: Tympanic membrane, ear canal and external ear normal. There is no impacted cerumen.      Nose: No congestion or rhinorrhea.      Mouth/Throat:      Pharynx: No oropharyngeal exudate or posterior oropharyngeal erythema.   Eyes:      General: No scleral icterus.        Right eye: No discharge.         Left eye: No discharge.      Conjunctiva/sclera: Conjunctivae normal.      Pupils: Pupils are equal, round, and reactive to light.   Cardiovascular:      Rate and Rhythm: Normal rate and regular rhythm.      Pulses: Normal pulses.      Heart sounds: Normal heart sounds. No murmur heard.     No friction rub. No gallop.   Pulmonary:      Effort: Pulmonary effort is normal. No respiratory distress.      Breath sounds: Normal breath sounds. No stridor. No wheezing, rhonchi or rales.   Musculoskeletal:         General: No deformity or signs of injury.      Cervical back: Normal range of motion and neck supple.      Right lower leg: No edema.      Left lower leg: No edema.   Lymphadenopathy:      Cervical: No cervical adenopathy.    Skin:     General: Skin is warm.      Coloration: Skin is not jaundiced or pale.      Findings: No lesion or rash.   Neurological:      General: No focal deficit present.      Mental Status: She is alert and oriented to person, place, and time. Mental status is at baseline.   Psychiatric:         Mood and Affect: Mood normal.         Behavior: Behavior normal.         Thought Content: Thought content normal.         Judgment: Judgment normal.       1. Dysfunction of both eustachian tubes (Primary)  -start daily claritin and flonase    Note, pt needs to establish with a pcp. Scheduled her to establish care with Dr. Vargas      RTC/ER precautions given. F/U    Babita Espinal PA-C

## 2025-03-31 ENCOUNTER — PATIENT MESSAGE (OUTPATIENT)
Dept: ADMINISTRATIVE | Facility: OTHER | Age: 30
End: 2025-03-31
Payer: MEDICARE

## 2025-04-10 ENCOUNTER — RESULTS FOLLOW-UP (OUTPATIENT)
Dept: CARDIOLOGY | Facility: CLINIC | Age: 30
End: 2025-04-10

## 2025-04-11 DIAGNOSIS — R00.0 TACHYCARDIA: ICD-10-CM

## 2025-04-11 DIAGNOSIS — R01.1 HEART MURMUR PREVIOUSLY UNDIAGNOSED: Primary | ICD-10-CM

## 2025-04-11 NOTE — TELEPHONE ENCOUNTER
----- Message from Emi Vargas NP sent at 4/10/2025  2:24 PM CDT -----  Stable event monitor. No acute arrhythmias.    Recommend continuing current medication regimen.  Continue magnesium oxide 400 mg daily  Continue propranolol 20 mg BID  ----- Message -----  From: Mohit Mcnamara MD  Sent: 4/6/2025   2:50 PM CDT  To: Emi Vargas NP

## 2025-04-12 RX ORDER — LANOLIN ALCOHOL/MO/W.PET/CERES
400 CREAM (GRAM) TOPICAL DAILY
Qty: 90 TABLET | Refills: 3 | Status: SHIPPED | OUTPATIENT
Start: 2025-04-12

## 2025-05-05 ENCOUNTER — PATIENT MESSAGE (OUTPATIENT)
Dept: CARDIOLOGY | Facility: CLINIC | Age: 30
End: 2025-05-05
Payer: MEDICARE

## 2025-05-06 ENCOUNTER — PATIENT MESSAGE (OUTPATIENT)
Dept: CARDIOLOGY | Facility: CLINIC | Age: 30
End: 2025-05-06
Payer: MEDICARE

## 2025-05-08 ENCOUNTER — OFFICE VISIT (OUTPATIENT)
Dept: CARDIOLOGY | Facility: CLINIC | Age: 30
End: 2025-05-08
Payer: MEDICARE

## 2025-05-08 VITALS
SYSTOLIC BLOOD PRESSURE: 129 MMHG | BODY MASS INDEX: 38.39 KG/M2 | WEIGHT: 165.88 LBS | HEIGHT: 55 IN | HEART RATE: 72 BPM | DIASTOLIC BLOOD PRESSURE: 83 MMHG | OXYGEN SATURATION: 99 %

## 2025-05-08 DIAGNOSIS — F31.9 BIPOLAR AFFECTIVE DISORDER, REMISSION STATUS UNSPECIFIED: ICD-10-CM

## 2025-05-08 DIAGNOSIS — F41.9 ANXIETY: ICD-10-CM

## 2025-05-08 DIAGNOSIS — J45.40 MODERATE PERSISTENT ASTHMA WITHOUT COMPLICATION: ICD-10-CM

## 2025-05-08 DIAGNOSIS — R00.2 PALPITATIONS: ICD-10-CM

## 2025-05-08 DIAGNOSIS — R06.02 SHORTNESS OF BREATH: ICD-10-CM

## 2025-05-08 DIAGNOSIS — E66.01 SEVERE OBESITY: ICD-10-CM

## 2025-05-08 DIAGNOSIS — R00.0 TACHYCARDIA: Primary | ICD-10-CM

## 2025-05-08 DIAGNOSIS — R01.1 HEART MURMUR PREVIOUSLY UNDIAGNOSED: ICD-10-CM

## 2025-05-08 DIAGNOSIS — R07.9 CHEST PAIN, UNSPECIFIED TYPE: ICD-10-CM

## 2025-05-08 PROCEDURE — 3008F BODY MASS INDEX DOCD: CPT | Mod: CPTII,S$GLB,, | Performed by: NURSE PRACTITIONER

## 2025-05-08 PROCEDURE — 1159F MED LIST DOCD IN RCRD: CPT | Mod: CPTII,S$GLB,, | Performed by: NURSE PRACTITIONER

## 2025-05-08 PROCEDURE — 3079F DIAST BP 80-89 MM HG: CPT | Mod: CPTII,S$GLB,, | Performed by: NURSE PRACTITIONER

## 2025-05-08 PROCEDURE — 99214 OFFICE O/P EST MOD 30 MIN: CPT | Mod: S$GLB,,, | Performed by: NURSE PRACTITIONER

## 2025-05-08 PROCEDURE — 3074F SYST BP LT 130 MM HG: CPT | Mod: CPTII,S$GLB,, | Performed by: NURSE PRACTITIONER

## 2025-05-08 PROCEDURE — 99999 PR PBB SHADOW E&M-EST. PATIENT-LVL IV: CPT | Mod: PBBFAC,,, | Performed by: NURSE PRACTITIONER

## 2025-05-08 PROCEDURE — 1160F RVW MEDS BY RX/DR IN RCRD: CPT | Mod: CPTII,S$GLB,, | Performed by: NURSE PRACTITIONER

## 2025-05-08 RX ORDER — PROPRANOLOL HYDROCHLORIDE 20 MG/1
20 TABLET ORAL DAILY
COMMUNITY

## 2025-05-08 RX ORDER — PROPRANOLOL HYDROCHLORIDE 40 MG/1
40 TABLET ORAL NIGHTLY
Qty: 30 TABLET | Refills: 11 | Status: SHIPPED | OUTPATIENT
Start: 2025-05-08 | End: 2026-05-08

## 2025-05-08 NOTE — PROGRESS NOTES
[facility]    Subjective:     Patient ID:  Sherry Burns is a 29 y.o. female patient here for evaluation Follow-up (3 month f/u), Chest Pain, and Tachycardia      History of Present Illness    CHIEF COMPLAINT:  Patient presents today for palpitations    CARDIAC SYMPTOMS:  She experiences palpitations, occurring mainly at night. The episodes are frequent but not nightly. She takes propranolol which provides slight relief, with morning dose being effective but nocturnal symptoms persisting when trying to relax. A recent heart monitor documented the palpitations but showed no critical findings such as AF or pauses. She reports associated dyspnea and cardiac pain occurring at night. Feels like anxiety is component of the symptoms    RESPIRATORY:  She has a history of asthma with current wheezing, managed with inhalers.      MEDICATIONS:  She is currently taking Vraylar, Seroquel, and propranolol.    SOCIAL HISTORY:  She denies alcohol use but reports high caffeine consumption.      ROS:  General: -fever, -chills, -fatigue, -weight gain, -weight loss  Eyes: -vision changes, -redness, -discharge  ENT: -ear pain, -nasal congestion, -sore throat  Cardiovascular: +chest pain, +palpitations, -lower extremity edema  Respiratory: -cough, +shortness of breath, +wheezing  Gastrointestinal: -abdominal pain, -nausea, -vomiting, -diarrhea, -constipation, -blood in stool  Genitourinary: -dysuria, -hematuria, -frequency  Musculoskeletal: -joint pain, -muscle pain, +limb pain, +back pain  Skin: -rash, -lesion  Neurological: -headache, -dizziness, +numbness, -tingling  Psychiatric: +anxiety, -depression, -sleep difficulty              Past Medical History:   Diagnosis Date    Anemia     Anxiety disorder, unspecified     Asthma     Bipolar disorder, unspecified     Depression     Eczema     GERD (gastroesophageal reflux disease)     Glaucoma     Headache     Heart murmur         Past Surgical History:   Procedure Laterality Date    COLONOSCOPY N/A 03/05/2021    Procedure: COLONOSCOPY;  Surgeon: Jake Williamson MD;  Location: [facility];  Service: Endoscopy;  Laterality: N/A;    ESOPHAGOGASTRODUODENOSCOPY N/A 03/05/2021    Procedure: EGD (ESOPHAGOGASTRODUODENOSCOPY);  Surgeon: Jake Williamson MD;  Location: [facility] ENDO;  Service: Endoscopy;  Laterality: N/A;    ESOPHAGOGASTRODUODENOSCOPY N/A 12/2/2024    Procedure: EGD (ESOPHAGOGASTRODUODENOSCOPY);  Surgeon: Sourav Rushing MD;  Location: [facility];  Service: Endoscopy;  Laterality: N/A;    UPPER GASTROINTESTINAL ENDOSCOPY         Family History   Problem Relation Name Age of Onset    Hypertension Mother      Hyperlipidemia Mother      Colon polyps Mother      Heart attacks under age 50 Mother      Drug abuse Father      Arthritis Brother      Glaucoma Paternal Grandmother      Dementia Paternal Grandmother      Melanoma Neg Hx      Colon cancer Neg Hx      Crohn's disease Neg Hx      Esophageal cancer Neg Hx      Liver cancer Neg Hx      Rectal cancer Neg Hx      Stomach cancer Neg Hx      Ulcerative colitis Neg Hx         Social History     Socioeconomic History    Marital status: Single   Tobacco Use    Smoking status: Former     Current packs/day: 0.50     Average packs/day: 0.5 packs/day for 11.4 years (5.7 ttl pk-yrs)     Types: Cigarettes     Start date: 12/3/2013    Smokeless tobacco: Never   Substance and Sexual Activity    Alcohol use: Not Currently    Drug use: Never    Sexual activity: Not Currently     Birth control/protection: None     Social Drivers of Health     Financial Resource Strain: Medium Risk (1/2/2025)    Overall Financial Resource Strain (CARDIA)     Difficulty of Paying Living Expenses: Somewhat hard   Food Insecurity: Food Insecurity Present (12/21/2023)    Hunger Vital Sign     Worried About Running Out of Food in the Last Year: Sometimes true     Ran Out of Food in the Last Year: Sometimes true    Transportation Needs: No Transportation Needs (12/21/2023)    PRAPARE - Transportation     Lack of Transportation (Medical): No     Lack of Transportation (Non-Medical): No   Physical Activity: Insufficiently Active (1/2/2025)    Exercise Vital Sign     Days of Exercise per Week: 2 days     Minutes of Exercise per Session: 10 min   Stress: Stress Concern Present (1/2/2025)    [employer] - Occupational Stress Questionnaire     Feeling of Stress : Rather much   Housing Stability: Low Risk  (12/21/2023)    Housing Stability Vital Sign     Unable to Pay for Housing in the Last Year: No     Number of Places Lived in the Last Year: 2     Unstable Housing in the Last Year: No       Current Medications[1]    Review of patient's allergies indicates:  No Known Allergies      Objective:        Vitals:    05/08/25 0759   BP: 129/83   Pulse: 72       Physical Exam    General: No acute distress. Well-developed. Well-nourished.  Eyes: EOMI. Sclerae anicteric.  HENT: Normocephalic. Atraumatic. Nares patent. Moist oral mucosa.  Ears: Bilateral TMs clear. Bilateral EACs clear.  Cardiovascular: Regular rate. Regular rhythm. No murmurs. No rubs. No gallops. Normal S1, S2.  Respiratory: Normal respiratory effort. Clear to auscultation bilaterally. No rales. No rhonchi. No wheezing.  Abdomen: Soft. Non-tender. Non-distended. Normoactive bowel sounds.  Musculoskeletal: No  obvious deformity.  Extremities: No lower extremity edema.  Neurological: Alert & oriented x3. No slurred speech. Normal gait.  Psychiatric: Normal mood. Normal affect. Good insight. Good judgment.  Skin: Warm. Dry. No rash.          LIPIDS - LAST 2   Lab Results   Component Value Date    CHOL 180 07/16/2024    CHOL 159 01/03/2020    HDL 35 07/16/2024    HDL 58 01/03/2020    LDLCALC 121 07/16/2024    LDLCALC 91.6 01/03/2020    TRIG 135 07/16/2024    TRIG 47 01/03/2020    CHOLHDL 36.5 01/03/2020       CBC - LAST 2  Lab Results   Component  "Value Date    WBC 8.90 01/14/2025    WBC 8.83 12/18/2024    RBC 4.54 01/14/2025    RBC 4.53 12/18/2024    HGB 12.5 01/14/2025    HGB 12.2 12/18/2024    HCT 38.2 01/14/2025    HCT 37.2 12/18/2024    MCV 84 01/14/2025    MCV 82 12/18/2024    MCH 27.5 01/14/2025    MCH 26.9 (L) 12/18/2024    MCHC 32.7 01/14/2025    MCHC 32.8 12/18/2024    RDW 14.3 01/14/2025    RDW 13.7 12/18/2024     01/14/2025     12/18/2024    MPV 9.5 01/14/2025    MPV 9.2 12/18/2024    GRAN 6.0 01/14/2025    GRAN 66.9 01/14/2025    LYMPH 2.1 01/14/2025    LYMPH 23.8 01/14/2025    MONO 0.6 01/14/2025    MONO 6.9 01/14/2025    BASO 0.05 01/14/2025    BASO 0.05 12/18/2024    NRBC 0 01/14/2025    NRBC 0 12/18/2024       CHEMISTRY & LIVER FUNCTION - LAST 2  Lab Results   Component Value Date     01/14/2025     12/18/2024    K 3.6 01/14/2025    K 3.5 12/18/2024     01/14/2025     12/18/2024    CO2 20 (L) 01/14/2025    CO2 20 (L) 12/18/2024    ANIONGAP 10 01/14/2025    ANIONGAP 13 12/18/2024    BUN 16 01/14/2025    BUN 11 12/18/2024    CREATININE 0.9 01/14/2025    CREATININE 1.0 12/18/2024     (H) 01/14/2025     (H) 12/18/2024    CALCIUM 9.3 01/14/2025    CALCIUM 9.3 12/18/2024    MG 2.0 12/18/2024    ALBUMIN 4.0 01/14/2025    ALBUMIN 3.9 12/18/2024    PROT 7.1 01/14/2025    PROT 6.9 12/18/2024    ALKPHOS 120 01/14/2025    ALKPHOS 125 12/18/2024    ALT 68 (H) 01/14/2025    ALT 93 (H) 12/18/2024    AST 30 01/14/2025    AST 71 (H) 12/18/2024    BILITOT 0.5 01/14/2025    BILITOT 0.5 12/18/2024        CARDIAC PROFILE - LAST 2  Lab Results   Component Value Date    BNP 51 01/14/2025    BNP 23 12/18/2024    TROPONINI <0.006 01/14/2025    TROPONINI <0.006 01/14/2025        COAGULATION - LAST 2  No results found for: "LABPT", "INR", "APTT"    ENDOCRINE & PSA - LAST 2  Lab Results   Component Value Date    HGBA1C 5.9 07/16/2024    TSH 4.735 (H) 01/14/2025    TSH 3.675 12/18/2024        ECHOCARDIOGRAM " RESULTS  Results for orders placed in visit on 05/07/24    Stress Echo Which stress agent will be used? Treadmill Exercise; Color Flow Doppler? No    Interpretation Summary    Left Ventricle: The left ventricle is normal in size. There is normal systolic function with a visually estimated ejection fraction of 55 - 60%.    Stress Protocol: The patient exercised for 6 minutes 40 seconds on a Roman protocol, corresponding to a functional capacity of 8.3METS, achieving a peak heart rate of 167 bpm, which is 92% of the age predicted maximum heart rate. The patient reported no symptoms during the stress test. The test was stopped because the patient experienced fatigue.    Baseline ECG: The Baseline ECG reveals sinus rhythm. The axis is normal. The ST segments are normal.    Stress ECG: There are no ST segment deviation identified during the protocol. There are no arrhythmias during stress. There is normal blood pressure response with stress.    ECG Conclusion: The ECG portion of the study is negative for ischemia.    Post-stress Echo: The left ventricle systolic function is normal.    Post-stress      CURRENT/PREVIOUS VISIT EKG  Results for orders placed or performed during the hospital encounter of 01/13/25   EKG 12-lead    Collection Time: 01/14/25  2:28 AM   Result Value Ref Range    QRS Duration 74 ms    [facility] QTC Calculation 434 ms    Narrative    Test Reason : R00.0,    Vent. Rate :  84 BPM     Atrial Rate :  84 BPM     P-R Int : 148 ms          QRS Dur :  74 ms      QT Int : 368 ms       P-R-T Axes :  53  56  37 degrees    QTcB Int : 434 ms    Normal sinus rhythm  Normal ECG  Confirmed by Garett No ([id number]) on 1/16/2025 5:30:03 PM    Referred By: AAAREFERRAL SELF           Confirmed By: Garett No     No valid procedures specified.   Results for orders placed during the hospital encounter of 02/29/24    Exercise Stress - EKG    Interpretation Summary    The patient exercised for 4 minutes 54  seconds on a Roman protocol, corresponding to a functional capacity of 7 METS, achieving a peak heart rate of 168 bpm, which is 93 % of the age predicted maximum heart rate.    The ECG portion of the study is positive for ischemia.    The patient reported no chest pain during the stress test.    Consider stress echocardiography to enhance the specificity and sensitivity of this test    No valid procedures specified.        Assessment:       1. Tachycardia    2. Heart murmur previously undiagnosed    3. Palpitations    4. Chest pain, unspecified type    5. Shortness of breath    6. Anxiety    7. Bipolar affective disorder, remission status unspecified    8. Moderate persistent asthma without complication    9. Severe obesity           Plan:         Assessment & Plan    E66.01 Severe obesity  F31.9 Bipolar affective disorder, remission status unspecified  R01.1 Heart murmur previously undiagnosed  R00.2 Palpitations  R00.0 Tachycardia  R07.9 Chest pain, unspecified type  R06.02 Shortness of breath  F41.9 Anxiety  J45.40 Moderate persistent asthma without complication    1. Palpitations primarily occurring at night, with some improvement from propranolol.  2. High caffeine intake potentially contributes to heart racing.  3. Previous lab work, particularly thyroid function, was abnormal in January.  4. Recent heart monitor results showed palpitations but no life-threatening arrhythmias.  5. Discussed relationship between anxiety and cardiac symptoms such as palpitations and chest pain.    BIPOLAR AFFECTIVE DISORDER, REMISSION STATUS UNSPECIFIED:  1. Continued Vraylar.  2. Continued Seroquel.    HEART MURMUR PREVIOUSLY UNDIAGNOSED:  1. Ordered echocardiogram due to ongoing palpitations.    PALPITATIONS:  1. Continued propranolol 20 mg in the morning and increased to 40 mg at night.  2. Ordered echocardiogram due to ongoing palpitations.  3. Referred to Dr. Drummond for cardiology care.  4. Follow up in 1 month, after  completing stress test to assess effectiveness of increased propranolol dose.    TACHYCARDIA:  1. Continued propranolol 20 mg in the morning and increased to 40 mg at night.  2. Ordered exercise stress test.  3. Follow up in 1 month, after completing stress test to assess effectiveness of increased propranolol dose.  4. Ordered thyroid function test.    CHEST PAIN, UNSPECIFIED TYPE:  1. Ordered exercise stress test.  2. Referred to Dr. Drummond for cardiology care.    LIFESTYLE CHANGES:  1. Patient to reduce caffeine intake by half.          Problem List Items Addressed This Visit          Psychiatric    Bipolar disorder, unspecified    Anxiety       Pulmonary    Moderate persistent asthma without complication       Cardiac/Vascular    Palpitations    Overview   Avoid triptans for migraine abortive therapy          Heart murmur previously undiagnosed       Endocrine    Severe obesity     Other Visit Diagnoses         Tachycardia    -  Primary      Chest pain, unspecified type          Shortness of breath                   [facility] Mitzi Ricketts, FNP-C, CVNP-BC    This note was generated with the assistance of ambient listening technology. Verbal consent was obtained by the patient and accompanying visitor(s) for the recording of patient appointment to facilitate this note. I attest to having reviewed and edited the generated note for accuracy, though some syntax or spelling errors may persist. Please contact the author of this note for any clarification.                    [1]   Current Outpatient Medications   Medication Sig Dispense Refill    albuterol (PROVENTIL/VENTOLIN HFA) 90 mcg/actuation inhaler Inhale 1 puff into the lungs every 4 (four) hours as needed for Shortness of Breath. 18 g 11    ALPRAZolam (XANAX) 0.25 MG tablet Take 0.25 mg by mouth nightly as needed.      busPIRone (BUSPAR) 30 MG Tab Take 30 mg by mouth  2 (two) times daily.      cetirizine (ZYRTEC) 10 MG tablet Take 1 tablet (10 mg total) by mouth 2 (two) times a day. 30 tablet 0    DUPIXENT SYRINGE 300 mg/2 mL Syrg Inject 300 mg (2 mL) into the skin every other week 4 mL 11    EScitalopram oxalate (LEXAPRO) 20 MG tablet Take 20 mg by mouth once daily.      fluocinolone (DERMA-SMOOTHE) 0.01 % external oil Apply to scalp and affected areas as needed up to twice a day for up to 2 weeks at a time. 118.28 mL 2    fluticasone-umeclidin-vilanter (TRELEGY ELLIPTA) 200-62.5-25 mcg inhaler Inhale 1 puff into the lungs once daily. 60 each 11    hydrOXYzine (ATARAX) 50 MG tablet Take 50 mg by mouth 2 (two) times daily.      ketoconazole (NIZORAL) 2 % shampoo Apply at least three times a week. Leave in for at least 5 minutes and then rinse. 120 mL 3    magnesium oxide (MAG-OX) 400 mg (241.3 mg magnesium) tablet Take 1 tablet (400 mg total) by mouth once daily. 90 tablet 3    metFORMIN (GLUCOPHAGE-XR) 500 MG ER 24hr tablet Take 500 mg by mouth every morning.      mupirocin (BACTROBAN) 2 % ointment Apply as needed twice a day to open skin areas for 5 days or until improved. 30 g 1    omeprazole (PRILOSEC) 40 MG capsule Take 1 capsule (40 mg total) by mouth Daily. 90 capsule 1    potassium chloride (KLOR-CON) 8 MEQ TbSR Take 1 tablet (8 mEq total) by mouth once daily. 90 tablet 1    propranoloL (INDERAL) 20 MG tablet Take 20 mg by mouth once daily.      QUEtiapine (SEROQUEL) 200 MG Tab Take 200 mg by mouth 2 (two) times daily.      triamcinolone acetonide 0.025% (KENALOG) 0.025 % cream Please apply to affected areas when flaring twice a day as needed for up to 2 weeks at a time 454 g 0    triamcinolone acetonide 0.1% (KENALOG) 0.1 % cream Apply topically 2 (two) times daily. 454 g 2    VRAYLAR 3 mg Cap Take 3 mg by mouth once daily.      propranoloL (INDERAL) 40 MG tablet Take 1 tablet (40 mg total) by mouth nightly. 30 tablet 11     No current facility-administered medications  for this visit.

## 2025-05-09 ENCOUNTER — PATIENT MESSAGE (OUTPATIENT)
Dept: CARDIOLOGY | Facility: CLINIC | Age: 30
End: 2025-05-09
Payer: MEDICARE

## 2025-05-09 ENCOUNTER — LAB VISIT (OUTPATIENT)
Dept: LAB | Facility: HOSPITAL | Age: 30
End: 2025-05-09
Attending: NURSE PRACTITIONER
Payer: MEDICARE

## 2025-05-09 DIAGNOSIS — R00.0 TACHYCARDIA: ICD-10-CM

## 2025-05-09 DIAGNOSIS — R00.2 PALPITATIONS: ICD-10-CM

## 2025-05-09 LAB — TSH SERPL-ACNC: 3.45 UIU/ML (ref 0.34–5.6)

## 2025-05-09 PROCEDURE — 84443 ASSAY THYROID STIM HORMONE: CPT

## 2025-05-09 PROCEDURE — 36415 COLL VENOUS BLD VENIPUNCTURE: CPT

## 2025-05-13 ENCOUNTER — TELEPHONE (OUTPATIENT)
Dept: CARDIOLOGY | Facility: HOSPITAL | Age: 30
End: 2025-05-13

## 2025-05-13 NOTE — TELEPHONE ENCOUNTER
Patient advised, test will be at [facility] ([address] [facility]).  Will need to register on the first floor at the main entrance.  Patient advised that arrival time is 10:00.  Patient advised, may take their medications prior to testing if you need to. Advised if medications are taken with food, please keep it light, like toast and juice.    Patient advised to avoid all caffeine 12 hours prior to testing.  This includes decaf tea and coffee.    Wear comfortable clothing with tennis shoes.  No lotions, oils, or powders to the upper chest area. May wear deodorant.    Patient advised that an IV will be placed for safety.  Patient verbalizes understanding of instructions.

## 2025-05-14 ENCOUNTER — HOSPITAL ENCOUNTER (OUTPATIENT)
Dept: CARDIOLOGY | Facility: HOSPITAL | Age: 30
Discharge: HOME | End: 2025-05-14
Attending: NURSE PRACTITIONER
Payer: MEDICARE

## 2025-05-14 VITALS — WEIGHT: 165 LBS | HEIGHT: 59 IN | BODY MASS INDEX: 33.26 KG/M2

## 2025-05-14 DIAGNOSIS — R07.9 CHEST PAIN, UNSPECIFIED TYPE: ICD-10-CM

## 2025-05-14 DIAGNOSIS — R06.02 SHORTNESS OF BREATH: ICD-10-CM

## 2025-05-14 DIAGNOSIS — R00.2 PALPITATIONS: ICD-10-CM

## 2025-05-14 LAB
AORTIC ROOT ANNULUS: 2.6 CM
AORTIC SIZE INDEX: 1.2 CM/M2
AORTIC VALVE CUSP SEPERATION: 1.9 CM
APICAL FOUR CHAMBER EJECTION FRACTION: 55 %
APICAL TWO CHAMBER EJECTION FRACTION: 54 %
ASCENDING AORTA: 2.1 CM
AV INDEX (PROSTH): 0.93
AV MEAN GRADIENT: 3 MMHG
AV PEAK GRADIENT: 6 MMHG
AV VALVE AREA BY VELOCITY RATIO: 2.4 CM²
AV VALVE AREA: 2.9 CM²
AV VELOCITY RATIO: 0.75
BSA FOR ECHO PROCEDURE: 1.76 M2
CV ECHO LV RWT: 0.42 CM
DOP CALC AO PEAK VEL: 1.2 M/S
DOP CALC AO VTI: 23.3 CM
DOP CALC LVOT AREA: 3.1 CM2
DOP CALC LVOT DIAMETER: 2 CM
DOP CALC LVOT PEAK VEL: 0.9 M/S
DOP CALC LVOT STROKE VOLUME: 67.8 CM3
DOP CALC MV VTI: 24.2 CM
DOP CALCLVOT PEAK VEL VTI: 21.6 CM
E WAVE DECELERATION TIME: 173 MSEC
E/A RATIO: 1.3
E/E' RATIO: 11 M/S
ECHO LV POSTERIOR WALL: 0.8 CM (ref 0.6–1.1)
FRACTIONAL SHORTENING: 26.3 % (ref 28–44)
INTERVENTRICULAR SEPTUM: 0.8 CM (ref 0.6–1.1)
IVC DIAMETER: 1.25 CM
LA MAJOR: 4.2 CM
LA MINOR: 4.2 CM
LEFT ATRIUM AREA SYSTOLIC (APICAL 2 CHAMBER): 13.84 CM2
LEFT ATRIUM AREA SYSTOLIC (APICAL 4 CHAMBER): 15.05 CM2
LEFT ATRIUM SIZE: 2.9 CM
LEFT ATRIUM VOLUME INDEX MOD: 23 ML/M2
LEFT ATRIUM VOLUME MOD: 39 ML
LEFT INTERNAL DIMENSION IN SYSTOLE: 2.8 CM (ref 2.1–4)
LEFT VENTRICLE DIASTOLIC VOLUME INDEX: 35.29 ML/M2
LEFT VENTRICLE DIASTOLIC VOLUME: 60 ML
LEFT VENTRICLE END DIASTOLIC VOLUME APICAL 2 CHAMBER: 46.05 ML
LEFT VENTRICLE END DIASTOLIC VOLUME APICAL 4 CHAMBER: 64.63 ML
LEFT VENTRICLE END SYSTOLIC VOLUME APICAL 2 CHAMBER: 35.01 ML
LEFT VENTRICLE END SYSTOLIC VOLUME APICAL 4 CHAMBER: 41.35 ML
LEFT VENTRICLE MASS INDEX: 50.6 G/M2
LEFT VENTRICLE SYSTOLIC VOLUME INDEX: 17.1 ML/M2
LEFT VENTRICLE SYSTOLIC VOLUME: 29 ML
LEFT VENTRICULAR INTERNAL DIMENSION IN DIASTOLE: 3.8 CM (ref 3.5–6)
LEFT VENTRICULAR MASS: 86 G
LV LATERAL E/E' RATIO: 9.6 M/S
LV SEPTAL E/E' RATIO: 12 M/S
LVED V (TEICH): 60.4 ML
LVES V (TEICH): 28.5 ML
LVOT MG: 2.02 MMHG
LVOT MV: 0.68 CM/S
MV MEAN GRADIENT: 2 MMHG
MV PEAK A VEL: 0.74 M/S
MV PEAK E VEL: 0.96 M/S
MV PEAK GRADIENT: 5 MMHG
MV STENOSIS PRESSURE HALF TIME: 60.89 MS
MV VALVE AREA BY CONTINUITY EQUATION: 2.8 CM2
MV VALVE AREA P 1/2 METHOD: 3.61 CM2
OHS LV EJECTION FRACTION SIMPSONS BIPLANE MOD: 54 %
PISA TR MAX VEL: 2.2 M/S
PV MV: 0.75 M/S
PV PEAK GRADIENT: 4 MMHG
PV PEAK VELOCITY: 0.96 M/S
RA MAJOR: 4.6 CM
RA VOL SYS: 23.84 ML
RIGHT ATRIAL AREA: 11.2 CM2
RIGHT ATRIUM VOLUME AREA LENGTH APICAL 4 CHAMBER: 23.04 ML
RV TISSUE DOPPLER FREE WALL SYSTOLIC VELOCITY 1 (APICAL 4 CHAMBER VIEW): 7.86 CM/S
STJ: 2.3 CM
TDI LATERAL: 0.1 M/S
TDI SEPTAL: 0.08 M/S
TDI: 0.09 M/S
TR MAX PG: 20 MMHG
Z-SCORE OF LEFT VENTRICULAR DIMENSION IN END DIASTOLE: -2.18
Z-SCORE OF LEFT VENTRICULAR DIMENSION IN END SYSTOLE: -0.35

## 2025-05-14 PROCEDURE — 93017 CV STRESS TEST TRACING ONLY: CPT

## 2025-05-14 PROCEDURE — 93306 TTE W/DOPPLER COMPLETE: CPT

## 2025-05-15 ENCOUNTER — PATIENT MESSAGE (OUTPATIENT)
Dept: CARDIOLOGY | Facility: CLINIC | Age: 30
End: 2025-05-15
Payer: MEDICARE

## 2025-05-16 ENCOUNTER — TELEPHONE (OUTPATIENT)
Dept: GASTROENTEROLOGY | Facility: CLINIC | Age: 30
End: 2025-05-16
Payer: MEDICARE

## 2025-05-16 ENCOUNTER — PATIENT MESSAGE (OUTPATIENT)
Dept: PULMONOLOGY | Facility: CLINIC | Age: 30
End: 2025-05-16
Payer: MEDICARE

## 2025-05-16 ENCOUNTER — PATIENT MESSAGE (OUTPATIENT)
Dept: GASTROENTEROLOGY | Facility: CLINIC | Age: 30
End: 2025-05-16
Payer: MEDICARE

## 2025-05-16 ENCOUNTER — RESULTS FOLLOW-UP (OUTPATIENT)
Dept: CARDIOLOGY | Facility: CLINIC | Age: 30
End: 2025-05-16

## 2025-05-16 ENCOUNTER — TELEPHONE (OUTPATIENT)
Dept: CARDIOLOGY | Facility: CLINIC | Age: 30
End: 2025-05-16
Payer: MEDICARE

## 2025-05-16 LAB
CV STRESS BASE HR: 83 BPM
DIASTOLIC BLOOD PRESSURE: 56 MMHG
OHS CV CPX 1 MINUTE RECOVERY HEART RATE: 118 BPM
OHS CV CPX 85 PERCENT MAX PREDICTED HEART RATE MALE: 162
OHS CV CPX ESTIMATED METS: 8
OHS CV CPX MAX PREDICTED HEART RATE: 191
OHS CV CPX PATIENT IS FEMALE: 1
OHS CV CPX PATIENT IS MALE: 0
OHS CV CPX PEAK DIASTOLIC BLOOD PRESSURE: 75 MMHG
OHS CV CPX PEAK HEAR RATE: 146 BPM
OHS CV CPX PEAK RATE PRESSURE PRODUCT: NORMAL
OHS CV CPX PEAK SYSTOLIC BLOOD PRESSURE: 164 MMHG
OHS CV CPX PERCENT MAX PREDICTED HEART RATE ACHIEVED: 81
OHS CV CPX RATE PRESSURE PRODUCT PRESENTING: 8964
STRESS ECHO POST EXERCISE DUR MIN: 6 MINUTES
STRESS ECHO POST EXERCISE DUR SEC: 37 SECONDS
SYSTOLIC BLOOD PRESSURE: 108 MMHG

## 2025-05-16 NOTE — TELEPHONE ENCOUNTER
Spoke to patient. Rescheduled appointment to Monday 5/19/25 with Mackenzie Howell NP at 10:30 am. Patient is aware.

## 2025-05-16 NOTE — TELEPHONE ENCOUNTER
----- Message from Serenity Ricketts NP sent at 5/16/2025  3:36 PM CDT -----  Normal stress test; please notify  ----- Message -----  From: Mohit Mcnamara MD  Sent: 5/16/2025   2:06 PM CDT  To: Serenity Ricketts NP

## 2025-05-19 ENCOUNTER — OFFICE VISIT (OUTPATIENT)
Dept: GASTROENTEROLOGY | Facility: CLINIC | Age: 30
End: 2025-05-19
Payer: MEDICARE

## 2025-05-19 VITALS
BODY MASS INDEX: 33.33 KG/M2 | OXYGEN SATURATION: 95 % | HEART RATE: 75 BPM | HEIGHT: 59 IN | RESPIRATION RATE: 15 BRPM | SYSTOLIC BLOOD PRESSURE: 115 MMHG | DIASTOLIC BLOOD PRESSURE: 67 MMHG

## 2025-05-19 DIAGNOSIS — N63.20 LEFT BREAST LUMP: Primary | ICD-10-CM

## 2025-05-19 DIAGNOSIS — K21.9 GASTROESOPHAGEAL REFLUX DISEASE, UNSPECIFIED WHETHER ESOPHAGITIS PRESENT: Primary | ICD-10-CM

## 2025-05-19 DIAGNOSIS — R13.10 DYSPHAGIA, UNSPECIFIED TYPE: ICD-10-CM

## 2025-05-19 DIAGNOSIS — N64.89 OTHER SPECIFIED DISORDERS OF BREAST: ICD-10-CM

## 2025-05-19 DIAGNOSIS — R12 HEARTBURN: ICD-10-CM

## 2025-05-19 DIAGNOSIS — R11.0 NAUSEA: ICD-10-CM

## 2025-05-19 DIAGNOSIS — R14.2 BELCHING: ICD-10-CM

## 2025-05-19 DIAGNOSIS — K59.09 CHRONIC CONSTIPATION: ICD-10-CM

## 2025-05-19 PROCEDURE — 99213 OFFICE O/P EST LOW 20 MIN: CPT | Mod: S$GLB,,,

## 2025-05-19 PROCEDURE — 3074F SYST BP LT 130 MM HG: CPT | Mod: CPTII,S$GLB,,

## 2025-05-19 PROCEDURE — 3078F DIAST BP <80 MM HG: CPT | Mod: CPTII,S$GLB,,

## 2025-05-19 PROCEDURE — 99999 PR PBB SHADOW E&M-EST. PATIENT-LVL V: CPT | Mod: PBBFAC,,,

## 2025-05-19 PROCEDURE — 3008F BODY MASS INDEX DOCD: CPT | Mod: CPTII,S$GLB,,

## 2025-05-19 RX ORDER — ONDANSETRON 4 MG/1
4 TABLET, ORALLY DISINTEGRATING ORAL EVERY 12 HOURS PRN
Qty: 60 TABLET | Refills: 0 | Status: SHIPPED | OUTPATIENT
Start: 2025-05-19 | End: 2025-06-18

## 2025-05-19 NOTE — PROGRESS NOTES
Subjective:       Patient ID: Sherry Burns is a 29 y.o. female Body mass index is 33.33 kg/m².    Chief Complaint: GI Problem, Nausea, and Emesis    This patient is new to me.  Referring Provider: No ref. provider found for GERD  Established patient of Dr. Williamson.     Gastroesophageal Reflux  She complains of belching, dysphagia (Dysphagia with foods and pills hx of EGD with dilation helps), heartburn and water brash. She reports no abdominal pain, no chest pain, no choking, no coughing, no early satiety, no globus sensation, no hoarse voice or no nausea. This is a chronic problem. The current episode started more than 1 year ago. The problem has been waxing and waning. The heartburn is located in the substernum. The heartburn wakes her from sleep. The heartburn does not limit her activity. The heartburn doesn't change with position. The symptoms are aggravated by certain foods and caffeine. Pertinent negatives include no anemia, fatigue, melena (denies rectal bleeding), muscle weakness, orthopnea or weight loss. Pt with hx of fatty liver followed by hep, asthma, GERD, and chronic constipation, has tried fiber and miralax daily with mild relief was prescribed linzess took one dose states it helped has not been taking it lately does not feel empty and strains occasionally, pt had scopes in 2021 for BEATRIZ, EGD in 2021 showed gastritis biopsies normal, colonoscopy in 2021 showed hemorrhoids.. She has tried a PPI (Prilosec 40mg daily helps) for the symptoms. The treatment provided moderate relief. Past procedures include an EGD. Past procedures do not include an abdominal ultrasound, esophageal manometry, esophageal pH monitoring, H. pylori antibody titer or a UGI. Past invasive treatments do not include gastroplasty, gastroplication or reflux surgery.       Review of Systems   Constitutional:  Negative for fatigue and weight loss.   HENT:  Negative for hoarse voice.    Respiratory:  Negative for cough and  choking.    Cardiovascular:  Negative for chest pain.   Gastrointestinal:  Positive for dysphagia (Dysphagia with foods and pills hx of EGD with dilation helps) and heartburn. Negative for abdominal pain, melena (denies rectal bleeding) and nausea.   Musculoskeletal:  Negative for muscle weakness.         Patient's last menstrual period was 04/28/2025 (approximate).  Past Medical History:   Diagnosis Date    Anemia     Anxiety disorder, unspecified     Asthma     Bipolar disorder, unspecified     Depression     Eczema     GERD (gastroesophageal reflux disease)     Glaucoma     Headache     Heart murmur      Past Surgical History:   Procedure Laterality Date    COLONOSCOPY N/A 03/05/2021    Procedure: COLONOSCOPY;  Surgeon: Jake Williamson MD;  Location: [facility];  Service: Endoscopy;  Laterality: N/A;    ESOPHAGOGASTRODUODENOSCOPY N/A 03/05/2021    Procedure: EGD (ESOPHAGOGASTRODUODENOSCOPY);  Surgeon: Jake Williamson MD;  Location: [facility];  Service: Endoscopy;  Laterality: N/A;    ESOPHAGOGASTRODUODENOSCOPY N/A 12/2/2024    Procedure: EGD (ESOPHAGOGASTRODUODENOSCOPY);  Surgeon: Sourav Rushing MD;  Location: [facility];  Service: Endoscopy;  Laterality: N/A;    UPPER GASTROINTESTINAL ENDOSCOPY       Family History   Problem Relation Name Age of Onset    Hypertension Mother      Hyperlipidemia Mother      Colon polyps Mother      Heart attacks under age 50 Mother      Drug abuse Father      Arthritis Brother      Glaucoma Paternal Grandmother      Dementia Paternal Grandmother      Melanoma Neg Hx      Colon cancer Neg Hx      Crohn's disease Neg Hx      Esophageal cancer Neg Hx      Liver cancer Neg Hx      Rectal cancer Neg Hx      Stomach cancer Neg Hx      Ulcerative colitis Neg Hx       Social History[1]  Wt Readings from Last 10 Encounters:   05/14/25 74.8 kg (165 lb)   05/08/25 75.3 kg (165 lb 14.3 oz)   03/27/25 69.8 kg (153 lb 15.9 oz)   03/10/25 68.8 kg (151 lb 10.8 oz)   02/19/25 66.6 kg (146 lb  11.5 oz)   01/23/25 63.2 kg (139 lb 3.5 oz)   01/13/25 63.5 kg (140 lb)   01/13/25 67.3 kg (148 lb 5.9 oz)   01/06/25 66.7 kg (147 lb 0.8 oz)   01/03/25 67.6 kg (149 lb)     Lab Results   Component Value Date    WBC 8.90 01/14/2025    HGB 12.5 01/14/2025    HCT 38.2 01/14/2025    MCV 84 01/14/2025     01/14/2025     CMP  Sodium   Date Value Ref Range Status   01/14/2025 138 136 - 145 mmol/L Final     Potassium   Date Value Ref Range Status   01/14/2025 3.6 3.5 - 5.1 mmol/L Final     Chloride   Date Value Ref Range Status   01/14/2025 108 95 - 110 mmol/L Final     CO2   Date Value Ref Range Status   01/14/2025 20 (L) 23 - 29 mmol/L Final     Glucose   Date Value Ref Range Status   01/14/2025 161 (H) 70 - 110 mg/dL Final     BUN   Date Value Ref Range Status   01/14/2025 16 6 - 20 mg/dL Final     Creatinine   Date Value Ref Range Status   01/14/2025 0.9 0.5 - 1.4 mg/dL Final     Calcium   Date Value Ref Range Status   01/14/2025 9.3 8.7 - 10.5 mg/dL Final     Total Protein   Date Value Ref Range Status   01/14/2025 7.1 6.0 - 8.4 g/dL Final     Albumin   Date Value Ref Range Status   01/14/2025 4.0 3.5 - 5.2 g/dL Final     Total Bilirubin   Date Value Ref Range Status   01/14/2025 0.5 0.1 - 1.0 mg/dL Final     Comment:     For infants and newborns, interpretation of results should be based  on gestational age, weight and in agreement with clinical  observations.    Premature Infant recommended reference ranges:  Up to 24 hours.............<8.0 mg/dL  Up to 48 hours............<12.0 mg/dL  3-5 days..................<15.0 mg/dL  6-29 days.................<15.0 mg/dL       Alkaline Phosphatase   Date Value Ref Range Status   01/14/2025 120 40 - 150 U/L Final     AST   Date Value Ref Range Status   01/14/2025 30 10 - 40 U/L Final     ALT   Date Value Ref Range Status   01/14/2025 68 (H) 10 - 44 U/L Final     Anion Gap   Date Value Ref Range Status   01/14/2025 10 8 - 16 mmol/L Final     eGFR if   "  Date Value Ref Range Status   05/28/2022 >60 >60 mL/min/1.73 m^2 Final     eGFR if non    Date Value Ref Range Status   05/28/2022 >60 >60 mL/min/1.73 m^2 Final     Comment:     Calculation used to obtain the estimated glomerular filtration  rate (eGFR) is the CKD-EPI equation.        Lab Results   Component Value Date    LIPASE 24 10/06/2024     No results found for: "LIPASERES"  Lab Results   Component Value Date    TSH 3.454 05/09/2025       Reviewed prior medical records including radiology report of CT abdomen pelvis 9/21/24 & endoscopy history (see surgical history/procedures).    Objective:      Physical Exam  Vitals and nursing note reviewed.   Constitutional:       Appearance: Normal appearance. She is normal weight.   Cardiovascular:      Rate and Rhythm: Regular rhythm.      Heart sounds: Normal heart sounds.   Pulmonary:      Breath sounds: Normal breath sounds.   Abdominal:      General: Bowel sounds are normal.      Palpations: Abdomen is soft.      Tenderness: There is abdominal tenderness.   Skin:     General: Skin is warm and dry.      Coloration: Skin is not jaundiced.   Neurological:      Mental Status: She is alert and oriented to person, place, and time.   Psychiatric:         Mood and Affect: Mood normal.         Behavior: Behavior normal.         Assessment:       1. Gastroesophageal reflux disease, unspecified whether esophagitis present    2. Belching    3. Heartburn    4. Nausea    5. Dysphagia, unspecified type    6. Chronic constipation          Plan:       Gastroesophageal reflux disease, unspecified whether esophagitis present, belching, heartburn, nausea   -Continue with scheduled EGD  - START    omeprazole (PRILOSEC) 40 MG capsule; Take 1 capsule (40 mg total) by mouth Daily.  Dispense: 90 capsule; Refill: 1   -Take PPI 30min-1hr before eating breakfast  -Educated patient on lifestyle modifications to help control/reduce reflux/abdominal pain including: avoid large " meals, avoid eating within 2-3 hours of bedtime (avoid late night eating & lying down soon after eating), elevate head of bed if nocturnal symptoms are present, smoking cessation (if current smoker), & weight loss (if overweight).   -Educated to avoid known foods which trigger reflux symptoms & to minimize/avoid high-fat foods, chocolate, caffeine, citrus, alcohol, & tomato products.  -Advised to avoid/limit use of NSAID's, since they can cause GI upset, bleeding, and/or ulcers. If needed, take with food.     Dysphagia, unspecified type   -Continue with scheduled EGD   - recommend to eat smaller more frequent meals and to eat slowly and advised to eat a soft diet. Take medications one at a time with a full glass of water.  - possible UGI/esophagram/esophageal manometry if symptoms persist    Chronic constipation  - Start on Linzess 145mcg daily  -Recommend daily exercise as tolerated, adequate water intake (six 8-oz glasses of water daily), and high fiber diet. OTC fiber supplements are recommended if diet does not reach daily fiber goal (20-30 grams daily), such as Metamucil, Citrucel, or FiberCon (take as directed, separate from other oral medications by >2 hours).  -Recommend trying OTC MiraLax once daily (17g PO) as directed  -If no improvement with above recommendations, try intermittently dosed Dulcolax OTC as directed (every 3-4 days) PRN to facilitate bowel movements  -If no relief with this, consider adding a emollient laxative (castor oil or mineral oil) +/- enema  -If still no improvement with these measures, call/follow-up         Follow up if symptoms worsen or fail to improve.      If no improvement in symptoms or symptoms worsen, call/follow-up at clinic or go to ER.       [facility] - GASTROENTEROLOGY  [facility], [place]     Dictation software program was used for this note. Please expect some simple typographical  errors in this note.    Encounter includes face to  face time and non-face to face time preparing to see the patient (eg, review of tests), obtaining and/or reviewing separately obtained history, documenting clinical information in the electronic or other health record, independently interpreting results (not separately reported) and communicating results to the patient/family/caregiver, or care coordination (not separately reported).               [1]   Social History  Tobacco Use    Smoking status: Former     Current packs/day: 0.50     Average packs/day: 0.5 packs/day for 11.5 years (5.7 ttl pk-yrs)     Types: Cigarettes     Start date: 12/3/2013    Smokeless tobacco: Never   Substance Use Topics    Alcohol use: Not Currently    Drug use: Never

## 2025-05-20 ENCOUNTER — PATIENT MESSAGE (OUTPATIENT)
Dept: PULMONOLOGY | Facility: CLINIC | Age: 30
End: 2025-05-20

## 2025-05-20 ENCOUNTER — PATIENT MESSAGE (OUTPATIENT)
Dept: GASTROENTEROLOGY | Facility: CLINIC | Age: 30
End: 2025-05-20
Payer: MEDICARE

## 2025-05-20 ENCOUNTER — OFFICE VISIT (OUTPATIENT)
Dept: PULMONOLOGY | Facility: CLINIC | Age: 30
End: 2025-05-20
Payer: MEDICARE

## 2025-05-20 ENCOUNTER — OFFICE VISIT (OUTPATIENT)
Dept: HEPATOLOGY | Facility: CLINIC | Age: 30
End: 2025-05-20
Payer: MEDICARE

## 2025-05-20 ENCOUNTER — PATIENT MESSAGE (OUTPATIENT)
Dept: CARDIOLOGY | Facility: CLINIC | Age: 30
End: 2025-05-20
Payer: MEDICARE

## 2025-05-20 VITALS
OXYGEN SATURATION: 100 % | DIASTOLIC BLOOD PRESSURE: 65 MMHG | HEIGHT: 59 IN | HEART RATE: 65 BPM | SYSTOLIC BLOOD PRESSURE: 97 MMHG | BODY MASS INDEX: 33.33 KG/M2

## 2025-05-20 VITALS — WEIGHT: 158.75 LBS | HEIGHT: 59 IN | BODY MASS INDEX: 32 KG/M2

## 2025-05-20 DIAGNOSIS — J45.50 SEVERE PERSISTENT ASTHMA WITHOUT COMPLICATION: Primary | ICD-10-CM

## 2025-05-20 DIAGNOSIS — E66.811 CLASS 1 OBESITY WITH BODY MASS INDEX (BMI) OF 33.0 TO 33.9 IN ADULT, UNSPECIFIED OBESITY TYPE, UNSPECIFIED WHETHER SERIOUS COMORBIDITY PRESENT: ICD-10-CM

## 2025-05-20 DIAGNOSIS — R05.8 PRODUCTIVE COUGH: ICD-10-CM

## 2025-05-20 DIAGNOSIS — K76.0 METABOLIC DYSFUNCTION-ASSOCIATED STEATOTIC LIVER DISEASE (MASLD): Primary | ICD-10-CM

## 2025-05-20 DIAGNOSIS — R74.8 ELEVATED LIVER ENZYMES: ICD-10-CM

## 2025-05-20 DIAGNOSIS — J45.40 MODERATE PERSISTENT ASTHMA WITHOUT COMPLICATION: Primary | ICD-10-CM

## 2025-05-20 DIAGNOSIS — R73.03 PREDIABETES: ICD-10-CM

## 2025-05-20 DIAGNOSIS — J45.20 MILD INTERMITTENT ASTHMA WITHOUT COMPLICATION: ICD-10-CM

## 2025-05-20 PROBLEM — F39 MOOD DISORDER: Status: ACTIVE | Noted: 2024-01-05

## 2025-05-20 PROBLEM — G43.009 MIGRAINE WITHOUT AURA AND WITHOUT STATUS MIGRAINOSUS, NOT INTRACTABLE: Status: ACTIVE | Noted: 2025-05-20

## 2025-05-20 PROCEDURE — 3008F BODY MASS INDEX DOCD: CPT | Mod: CPTII,S$GLB,, | Performed by: NURSE PRACTITIONER

## 2025-05-20 PROCEDURE — 99999 PR PBB SHADOW E&M-EST. PATIENT-LVL III: CPT | Mod: PBBFAC,,, | Performed by: NURSE PRACTITIONER

## 2025-05-20 PROCEDURE — 99999 PR PBB SHADOW E&M-EST. PATIENT-LVL IV: CPT | Mod: PBBFAC,,, | Performed by: NURSE PRACTITIONER

## 2025-05-20 PROCEDURE — 1159F MED LIST DOCD IN RCRD: CPT | Mod: CPTII,S$GLB,, | Performed by: NURSE PRACTITIONER

## 2025-05-20 PROCEDURE — 99213 OFFICE O/P EST LOW 20 MIN: CPT | Mod: S$GLB,,, | Performed by: NURSE PRACTITIONER

## 2025-05-20 PROCEDURE — 1160F RVW MEDS BY RX/DR IN RCRD: CPT | Mod: CPTII,S$GLB,, | Performed by: NURSE PRACTITIONER

## 2025-05-20 PROCEDURE — 3078F DIAST BP <80 MM HG: CPT | Mod: CPTII,S$GLB,, | Performed by: NURSE PRACTITIONER

## 2025-05-20 PROCEDURE — 3074F SYST BP LT 130 MM HG: CPT | Mod: CPTII,S$GLB,, | Performed by: NURSE PRACTITIONER

## 2025-05-20 RX ORDER — ALBUTEROL SULFATE 90 UG/1
2 INHALANT RESPIRATORY (INHALATION) EVERY 4 HOURS PRN
Qty: 18 G | Refills: 0 | Status: SHIPPED | OUTPATIENT
Start: 2025-05-20

## 2025-05-20 RX ORDER — ESZOPICLONE 2 MG/1
2 TABLET, FILM COATED ORAL NIGHTLY PRN
COMMUNITY
Start: 2025-05-14

## 2025-05-20 RX ORDER — CLONAZEPAM 0.5 MG/1
0.5 TABLET ORAL DAILY PRN
COMMUNITY
Start: 2025-05-12

## 2025-05-20 RX ORDER — METOPROLOL SUCCINATE 25 MG/1
25 TABLET, EXTENDED RELEASE ORAL 2 TIMES DAILY
COMMUNITY
Start: 2025-05-01

## 2025-05-20 RX ORDER — ALBUTEROL SULFATE AND BUDESONIDE 90; 80 UG/1; UG/1
2 AEROSOL, METERED RESPIRATORY (INHALATION) EVERY 4 HOURS PRN
Qty: 10.7 G | Refills: 11 | Status: SHIPPED | OUTPATIENT
Start: 2025-05-20

## 2025-05-20 RX ORDER — FLUTICASONE FUROATE, UMECLIDINIUM BROMIDE AND VILANTEROL TRIFENATATE 200; 62.5; 25 UG/1; UG/1; UG/1
1 POWDER RESPIRATORY (INHALATION) DAILY
Qty: 60 EACH | Refills: 11 | Status: SHIPPED | OUTPATIENT
Start: 2025-05-20 | End: 2025-05-21

## 2025-05-20 RX ORDER — NORETHINDRONE 0.35 MG/1
1 TABLET ORAL
COMMUNITY
Start: 2025-05-19

## 2025-05-20 RX ORDER — SEMAGLUTIDE 0.68 MG/ML
0.5 INJECTION, SOLUTION SUBCUTANEOUS
COMMUNITY

## 2025-05-20 NOTE — PATIENT INSTRUCTIONS
Bring sputum sample to any [facility] lab with in 4 hours of collecting    Continue current asthma medication regiment    Changing rescue inhaler  Use nebulizer more often

## 2025-05-20 NOTE — PROGRESS NOTES
5/20/2025    Sherry Burns  Follow up    Chief Complaint   Patient presents with    Shortness of Breath       HPI:  5/20/2025- complaint of shortness of breath, followed by cardiologist with no clear cause of shortness of breath.   States she becomes short of breath when walking and laying flat on back.   Quit vaping 3 months prior.   Currently on Trelegy daily and dupixent every 2 weeks.   Complaint of mild cough that is productive, green mucous.      02/19/2025- she is doing well on trelegy, dupixent. Slight cough/mucous. She vapes, wants to quit. She denies exacerbation. Not needing rescue inhaler    01/06/2025-   Asthma control inhaler trelegy restart- use 1 puff once daily, rinse mouth after use  Continue xopenex as needed  pt has run out of trelegy and using xopenex inhaler twice daily for wheezing. Continues on dupixent shots. She had the flu around rae and now feeling like she is improving- still wheezing more than usual, productive cough. Pt here w/ her mother Radha, also my pt- mom is having acute exacerbation copd and needs help    08/12/2024-   Pulmonary function tests  Keep trelegy inhaler and switch rescue medicine to xopenex due to fast heart rate  Xopenex in nebulizer as needed  Continue to avoid smoking    Pt saw dr thomas last yr for asthma, SOB. Recommendations at that time:   - smoking cessation recommended  - trial albuterol inhaler  - PFTs ordered  - methacholine challenge ordered  - CXR ordered  - TTE ordered    PFTs and methacholine were not done. She c/o wheezing and feels out of breath with walking to the car. She notices this since she has gained weight- about 70# she attributes to medicine side effects.   For asthma she uses trelegy 200 with benefit. She still wheezes and uses rescue inhaler 2x/day. Denies nocturnal arousals. She gets exacerbations requiring steroid about 2x/yr, last 2 mos ago. Rarely she uses night time albuterol nebs. As a child her asthma was worse. She  also has eczema and takes dupixent shots which has helped her asthma.  She sees cardiology for fast heart rhythm- triggered by exercise- takes metoprolol.  She still gets episodes of this. Albuterol often triggers tachycardia. She has not tried xopenex.  She used to smoke, quit 3 mos ago.    The chief complaint problem is stable    PFSH:  Past Medical History:   Diagnosis Date    Anemia     Anxiety disorder, unspecified     Asthma     Bipolar disorder, unspecified     Depression     Eczema     GERD (gastroesophageal reflux disease)     Glaucoma     Headache     Heart murmur          Past Surgical History:   Procedure Laterality Date    COLONOSCOPY N/A 03/05/2021    Procedure: COLONOSCOPY;  Surgeon: Jake Williamson MD;  Location: [facility];  Service: Endoscopy;  Laterality: N/A;    ESOPHAGOGASTRODUODENOSCOPY N/A 03/05/2021    Procedure: EGD (ESOPHAGOGASTRODUODENOSCOPY);  Surgeon: Jake Williamson MD;  Location: [facility];  Service: Endoscopy;  Laterality: N/A;    ESOPHAGOGASTRODUODENOSCOPY N/A 12/2/2024    Procedure: EGD (ESOPHAGOGASTRODUODENOSCOPY);  Surgeon: Sourav Rushing MD;  Location: [facility];  Service: Endoscopy;  Laterality: N/A;    UPPER GASTROINTESTINAL ENDOSCOPY       Social History     Tobacco Use    Smoking status: Former     Current packs/day: 0.50     Average packs/day: 0.5 packs/day for 11.5 years (5.7 ttl pk-yrs)     Types: Cigarettes     Start date: 12/3/2013    Smokeless tobacco: Never   Substance Use Topics    Alcohol use: Not Currently    Drug use: Never     Family History   Problem Relation Name Age of Onset    Hypertension Mother      Hyperlipidemia Mother      Colon polyps Mother      Heart attacks under age 50 Mother      Drug abuse Father      Arthritis Brother      Glaucoma Paternal Grandmother      Dementia Paternal Grandmother      Melanoma Neg Hx      Colon cancer Neg Hx      Crohn's disease Neg Hx      Esophageal cancer Neg Hx      Liver cancer Neg Hx      Rectal cancer Neg Hx       "Stomach cancer Neg Hx      Ulcerative colitis Neg Hx       Review of patient's allergies indicates:  No Known Allergies    Performance Status:The patient's activity level is functions out of house.      Review of Systems:  a review of eleven systems covering constitutional, Eye, HEENT, Psych, Respiratory, Cardiac, GI, , Musculoskeletal, Endocrine, Dermatologic was negative except for pertinent findings as listed ABOVE and below:  Shortness of breath with exertion  Productive cough    Exam:Comprehensive exam done. BP 97/65 (BP Location: Right arm, Patient Position: Sitting)   Pulse 65   Ht 4' 11" (1.499 m)   LMP 04/28/2025 (Approximate)   SpO2 100% Comment: on room air at rest  BMI 33.33 kg/m²   Exam included Vitals as listed, and patient's appearance and affect and alertness and mood, oral exam for yeast and hygiene and pharynx lesions and Mallapatti (M) score, neck with inspection for jvd and masses and thyroid abnormalities and lymph nodes (supraclavicular and infraclavicular nodes and axillary also examined and noted if abn), chest exam included symmetry and effort and fremitus and percussion and auscultation, cardiac exam included rhythm and gallops and murmur and rubs and jvd and edema, abdominal exam for mass and hepatosplenomegaly and tenderness and hernias and bowel sounds, Musculoskeletal exam with muscle tone and posture and mobility/gait and  strength, and skin for rashes and cyanosis and pallor and turgor, extremity for clubbing.  Findings were normal except for pertinent findings listed below:  Breath sounds clear    Radiographs (ct chest and cxr) reviewed: view by direct vision and interpretation as below   CT chest 12/18/24- small ground glass tree in bud nodules- cluster in RML, scattered in bilat lower lobes-- pt was sick with flu at the time, also vapes  CXR 2019- clear lungs    Labs reviewed     Lab Results   Component Value Date    WBC 8.90 01/14/2025    HGB 12.5 01/14/2025    HCT 38.2 " 01/14/2025    MCV 84 01/14/2025     01/14/2025      Latest Reference Range & Units 05/18/22 09:34 05/28/22 18:10 03/07/23 14:49 09/06/23 12:45 12/18/23 13:22   Eos # 0.0 - 0.5 K/uL 0.4 0.2 0.3 190 0.3     CMP  Sodium   Date Value Ref Range Status   01/14/2025 138 136 - 145 mmol/L Final     Potassium   Date Value Ref Range Status   01/14/2025 3.6 3.5 - 5.1 mmol/L Final     Chloride   Date Value Ref Range Status   01/14/2025 108 95 - 110 mmol/L Final     CO2   Date Value Ref Range Status   01/14/2025 20 (L) 23 - 29 mmol/L Final     Glucose   Date Value Ref Range Status   01/14/2025 161 (H) 70 - 110 mg/dL Final     BUN   Date Value Ref Range Status   01/14/2025 16 6 - 20 mg/dL Final     Creatinine   Date Value Ref Range Status   01/14/2025 0.9 0.5 - 1.4 mg/dL Final     Calcium   Date Value Ref Range Status   01/14/2025 9.3 8.7 - 10.5 mg/dL Final     Total Protein   Date Value Ref Range Status   01/14/2025 7.1 6.0 - 8.4 g/dL Final     Albumin   Date Value Ref Range Status   01/14/2025 4.0 3.5 - 5.2 g/dL Final     Total Bilirubin   Date Value Ref Range Status   01/14/2025 0.5 0.1 - 1.0 mg/dL Final     Comment:     For infants and newborns, interpretation of results should be based  on gestational age, weight and in agreement with clinical  observations.    Premature Infant recommended reference ranges:  Up to 24 hours.............<8.0 mg/dL  Up to 48 hours............<12.0 mg/dL  3-5 days..................<15.0 mg/dL  6-29 days.................<15.0 mg/dL       Alkaline Phosphatase   Date Value Ref Range Status   01/14/2025 120 40 - 150 U/L Final     AST   Date Value Ref Range Status   01/14/2025 30 10 - 40 U/L Final     ALT   Date Value Ref Range Status   01/14/2025 68 (H) 10 - 44 U/L Final     Anion Gap   Date Value Ref Range Status   01/14/2025 10 8 - 16 mmol/L Final     eGFR   Date Value Ref Range Status   01/14/2025 >60 >60 mL/min/1.73 m^2 Final   07/16/2024 105  Final         PFT reviewed  1/3/24- reduced  dlco corrects to normal for [facility]        Plan:  Clinical impression is apparently straight forward and impression with management as below.    Sherry was seen today for shortness of breath.    Diagnoses and all orders for this visit:    Severe persistent asthma without complication  -     albuterol-budesonide (AIRSUPRA) 90-80 mcg/actuation; Inhale 2 puffs into the lungs every 4 (four) hours as needed (shortness of breath).  -     Culture, Respiratory with Gram Stain; Future    Productive cough  -     Culture, Respiratory with Gram Stain; Future        Follow up in about 6 months (around 11/20/2025), or if symptoms worsen or fail to improve.      Discussed with patient above for education the following:      Patient Instructions   Bring sputum sample to any [facility] lab with in 4 hours of collecting    Continue current asthma medication regiment    Changing rescue inhaler  Use nebulizer more often

## 2025-05-20 NOTE — PROGRESS NOTES
[facility] Established Patient Visit    Reason for Visit:  Fatty Liver    PCP: Monika Rajan    HPI:  This is a 29 y.o. female with PMH noted below, here for follow up for fatty liver. She was last seen in clinic by myself in 10/2024.    The patient's risk factors for fatty liver disease include:     Overweight/Obesity                     Yes; BMI 32.06 - Net weight gain of 10 lbs since last visit. Previously on Ozempic, but unable to tolerate due to GI side effects - restarted 1 month ago.  Dyslipidemia                                No; Refer to most recent lipid panel results below:   Latest Reference Range & Units 07/16/24 00:00   Cholesterol Total 0 - 200 mg/dL 180 (E)   HDL 35 - 70 mg/dL 35 (E)   LDL Calculated 0 - 160 MG/ (E)   Triglycerides 40 - 160 mg/dL 135 (E)     Insulin resistance/Diabetes         Yes; Last HgbA1c was 5.9% (7/2024)    She has had intermittently elevated liver enzymes in a hepatocellular pattern since at least 4/2021.     Abdominal CT in 9/2024 showed:    FINDINGS:    The liver is hypoattenuating indicative of fatty infiltration.     The gallbladder, pancreas, spleen, adrenal glands, and kidneys are unremarkable.     The small bowel is normal caliber.  The appendix is normal.  The colon is unremarkable.     No free air or free fluid.     No significant bony abnormality     Impression:     No acute abdominopelvic process.  Normal appendix.     Hepatic steatosis.     She has never undergone a liver biopsy or non-invasive staging exam.    FIB-4 Calculation: [id number] at 10/25/2024  9:01 AM   Calculated from:  Last SGOT/AST: 30  Last SGPT/ALT: 68   Last Platelets: 314   Age: 29 y.o.     FIB-4 below 1.30 is considered as low-risk for advanced fibrosis  FIB-4 over 2.67 is considered as high-risk for advanced fibrosis  FIB-4 values between 1.30 and 2.67 are considered as intermediate-risk of advanced fibrosis for ages 36-64.     For ages > 64 the cut-off  for low-risk goes to < 2.  This is a screening tool and clinical judgement should be used in the interpretation of these results.    She has no known family history of liver disease. She denies any history of heavy alcohol intake and does not use illicit drugs. HBV, HCV and HIV negative on prior labs.     She is well appearing, and has no signs or symptoms of hepatic decompensation including jaundice, dark urine, pruritus, abdominal distention, lower extremity edema, hematemesis, melena, or periods of confusion suggestive of hepatic encephalopathy.      PMHX:  has a past medical history of Anemia, Anxiety disorder, unspecified, Asthma, Bipolar disorder, unspecified, Depression, Eczema, GERD (gastroesophageal reflux disease), Glaucoma, Headache, and Heart murmur.    PSHX:  has a past surgical history that includes Colonoscopy (N/A, 03/05/2021); Esophagogastroduodenoscopy (N/A, 03/05/2021); Upper gastrointestinal endoscopy; and Esophagogastroduodenoscopy (N/A, 12/2/2024).    The patient's social and family histories were reviewed by me and updated in the appropriate section of the electronic medical record.    Review of patient's allergies indicates:  No Known Allergies    Current Outpatient Medications on File Prior to Visit   Medication Sig Dispense Refill    semaglutide (OZEMPIC) 0.25 mg or 0.5 mg (2 mg/3 mL) pen injector Inject 0.5 mg into the skin every 7 days.      albuterol (PROVENTIL/VENTOLIN HFA) 90 mcg/actuation inhaler Inhale 1 puff into the lungs every 4 (four) hours as needed for Shortness of Breath. 18 g 11    albuterol-budesonide (AIRSUPRA) 90-80 mcg/actuation Inhale 2 puffs into the lungs every 4 (four) hours as needed (shortness of breath). 10.7 g 11    ALPRAZolam (XANAX) 0.25 MG tablet Take 0.25 mg by mouth nightly as needed.      busPIRone (BUSPAR) 30 MG Tab Take 30 mg by mouth 2 (two) times daily.      cetirizine (ZYRTEC) 10 MG tablet Take 1 tablet (10 mg total) by mouth 2 (two) times a day. 30  tablet 0    clonazePAM (KLONOPIN) 0.5 MG tablet Take 0.5 mg by mouth daily as needed.      DUPIXENT SYRINGE 300 mg/2 mL Syrg Inject 300 mg (2 mL) into the skin every other week 4 mL 11    EScitalopram oxalate (LEXAPRO) 20 MG tablet Take 20 mg by mouth once daily.      eszopiclone (LUNESTA) 2 MG Tab Take 2 mg by mouth nightly as needed.      fluocinolone (DERMA-SMOOTHE) 0.01 % external oil Apply to scalp and affected areas as needed up to twice a day for up to 2 weeks at a time. 118.28 mL 2    fluticasone-umeclidin-vilanter (TRELEGY ELLIPTA) 200-62.5-25 mcg inhaler Inhale 1 puff into the lungs once daily. 60 each 11    hydrOXYzine (ATARAX) 50 MG tablet Take 50 mg by mouth 2 (two) times daily.      ketoconazole (NIZORAL) 2 % shampoo Apply at least three times a week. Leave in for at least 5 minutes and then rinse. 120 mL 3    magnesium oxide (MAG-OX) 400 mg (241.3 mg magnesium) tablet Take 1 tablet (400 mg total) by mouth once daily. 90 tablet 3    metFORMIN (GLUCOPHAGE-XR) 500 MG ER 24hr tablet Take 500 mg by mouth every morning.      metoprolol succinate (TOPROL-XL) 25 MG 24 hr tablet Take 25 mg by mouth 2 (two) times daily.      mupirocin (BACTROBAN) 2 % ointment Apply as needed twice a day to open skin areas for 5 days or until improved. 30 g 1    norethindrone (MICRONOR) 0.35 mg tablet Take 1 tablet by mouth.      omeprazole (PRILOSEC) 40 MG capsule Take 1 capsule (40 mg total) by mouth Daily. 90 capsule 1    ondansetron (ZOFRAN-ODT) 4 MG TbDL Take 1 tablet (4 mg total) by mouth every 12 (twelve) hours as needed (nausea). 60 tablet 0    potassium chloride (KLOR-CON) 8 MEQ TbSR Take 1 tablet (8 mEq total) by mouth once daily. 90 tablet 1    propranoloL (INDERAL) 20 MG tablet Take 20 mg by mouth once daily.      propranoloL (INDERAL) 40 MG tablet Take 1 tablet (40 mg total) by mouth nightly. 30 tablet 11    QUEtiapine (SEROQUEL) 200 MG Tab Take 200 mg by mouth 2 (two) times daily.      triamcinolone acetonide  0.025% (KENALOG) 0.025 % cream Please apply to affected areas when flaring twice a day as needed for up to 2 weeks at a time 454 g 0    triamcinolone acetonide 0.1% (KENALOG) 0.1 % cream Apply topically 2 (two) times daily. 454 g 2    VRAYLAR 3 mg Cap Take 3 mg by mouth once daily.       No current facility-administered medications on file prior to visit.     SOCIAL HISTORY:   Social History     Tobacco Use   Smoking Status Former    Current packs/day: 0.50    Average packs/day: 0.5 packs/day for 11.5 years (5.7 ttl pk-yrs)    Types: Cigarettes    Start date: 12/3/2013   Smokeless Tobacco Never     Social History     Substance and Sexual Activity   Alcohol Use Not Currently     Social History     Substance and Sexual Activity   Drug Use Never     ROS:   GENERAL: Denies fever, chills, + intentional weight loss, fatigue  HEENT: Denies headaches, dizziness, vision/hearing changes  CARDIOVASCULAR: Denies chest pain, palpitations, or edema  RESPIRATORY: Denies dyspnea, cough  GI: Denies abdominal pain, rectal bleeding, nausea, vomiting. No change in bowel pattern or color  : Denies dysuria, hematuria   SKIN: Denies rash, itching   NEURO: Denies confusion, memory loss, or mood changes  PSYCH: Denies depression or anxiety  HEME/LYMPH: Denies easy bruising or bleeding    Objective Findings:    PHYSICAL EXAM:   Friendly White female, in no acute distress; alert and oriented to person, place and time  VITALS: LMP 04/28/2025 (Approximate)   HENT: Normocephalic, without obvious abnormality.   EYES: Sclerae anicteric.   NECK: No obvious masses.  CARDIOVASCULAR: No peripheral edema.  RESPIRATORY: Normal respiratory effort.   GI: Non-distended abdomen.  EXTREMITIES:  No clubbing, cyanosis or edema.  SKIN: Warm and dry. No jaundice. No rashes noted to exposed skin.   NEURO: No asterixis.  PSYCH:  Memory intact. Thought and speech pattern appropriate. Behavior normal. No depression or anxiety noted.    DIAGNOSTIC STUDIES: Reviewed  in Epic.     FIBROSCAN - Ordered at visit.     EDUCATION:  Per AVS.    ASSESSMENT & PLAN:  29 y.o. White female with:    1. Metabolic dysfunction-associated steatotic liver disease (MASLD)  [facility](Vibration Controlled Transient Elastography)    Hepatic Function Panel    Hepatitis A antibody, IgG      2. Elevated liver enzymes  [facility](Vibration Controlled Transient Elastography)    Hepatic Function Panel    Hepatitis A antibody, IgG      3. Class 1 obesity with body mass index (BMI) of 33.0 to 33.9 in adult, unspecified obesity type, unspecified whether serious comorbidity present  [facility](Vibration Controlled Transient Elastography)    Hepatic Function Panel    Hepatitis A antibody, IgG      4. Prediabetes  [facility](Vibration Controlled Transient Elastography)    Hepatic Function Panel    Hepatitis A antibody, IgG        - Schedule Fibroscan to non-invasively stage liver disease.  - Repeat liver function tests now. Will also check titer level for Hepatitis A, with next lab draw.  - Continue Ozempic, per PCP recommendations.  - Recommend weight loss goal of 15 lbs, through diet and exercise.  - Recommend good control of cholesterol, blood pressure, & blood sugar levels.  - Avoid alcohol and herbal supplements/alternative remedies.  - Return to clinic TBD by Fibroscan and lab results.    Thank you for allowing me to participate in the care of Sherry Burns       Hepatology Nurse Practitioner  [facility]    CC'ed note to:   No ref. provider found  Monika Rajan NP

## 2025-05-21 ENCOUNTER — PATIENT MESSAGE (OUTPATIENT)
Dept: CARDIOLOGY | Facility: CLINIC | Age: 30
End: 2025-05-21
Payer: MEDICARE

## 2025-05-21 DIAGNOSIS — J45.40 MODERATE PERSISTENT ASTHMA WITHOUT COMPLICATION: Primary | ICD-10-CM

## 2025-05-21 RX ORDER — BUDESONIDE AND FORMOTEROL FUMARATE DIHYDRATE 160; 4.5 UG/1; UG/1
2 AEROSOL RESPIRATORY (INHALATION) EVERY 12 HOURS
Qty: 10.2 G | Refills: 11 | Status: SHIPPED | OUTPATIENT
Start: 2025-05-21 | End: 2026-05-21

## 2025-05-22 ENCOUNTER — TELEPHONE (OUTPATIENT)
Dept: SMOKING CESSATION | Facility: CLINIC | Age: 30
End: 2025-05-22
Payer: MEDICARE

## 2025-05-27 ENCOUNTER — OFFICE VISIT (OUTPATIENT)
Dept: CARDIOLOGY | Facility: CLINIC | Age: 30
End: 2025-05-27
Payer: MEDICARE

## 2025-05-27 VITALS — HEART RATE: 80 BPM | OXYGEN SATURATION: 99 % | BODY MASS INDEX: 31.84 KG/M2 | HEIGHT: 59 IN | WEIGHT: 157.94 LBS

## 2025-05-27 DIAGNOSIS — R00.2 PALPITATIONS: ICD-10-CM

## 2025-05-27 DIAGNOSIS — K21.9 GASTROESOPHAGEAL REFLUX DISEASE, UNSPECIFIED WHETHER ESOPHAGITIS PRESENT: ICD-10-CM

## 2025-05-27 DIAGNOSIS — R06.02 SOBOE (SHORTNESS OF BREATH ON EXERTION): ICD-10-CM

## 2025-05-27 DIAGNOSIS — E66.9 OBESITY WITH BODY MASS INDEX OF 30.0-39.9: ICD-10-CM

## 2025-05-27 DIAGNOSIS — N64.4 BREAST PAIN, LEFT: ICD-10-CM

## 2025-05-27 DIAGNOSIS — R10.12 LEFT UPPER QUADRANT ABDOMINAL PAIN: Primary | ICD-10-CM

## 2025-05-27 PROCEDURE — 3008F BODY MASS INDEX DOCD: CPT | Mod: CPTII,S$GLB,,

## 2025-05-27 PROCEDURE — 1159F MED LIST DOCD IN RCRD: CPT | Mod: CPTII,S$GLB,,

## 2025-05-27 PROCEDURE — 99214 OFFICE O/P EST MOD 30 MIN: CPT | Mod: S$GLB,,,

## 2025-05-27 PROCEDURE — 1160F RVW MEDS BY RX/DR IN RCRD: CPT | Mod: CPTII,S$GLB,,

## 2025-05-27 PROCEDURE — 99999 PR PBB SHADOW E&M-EST. PATIENT-LVL III: CPT | Mod: PBBFAC,,,

## 2025-05-27 RX ORDER — TIRZEPATIDE 2.5 MG/.5ML
2.5 INJECTION, SOLUTION SUBCUTANEOUS
COMMUNITY
Start: 2025-05-20

## 2025-05-27 NOTE — PROGRESS NOTES
Subjective:    Patient ID:  Sherry Burns is a 29 y.o. female patient here for evaluation Chest Pain ((L) pain in ribcage area constant stated ) and Results (Echo and stress results )      History of Present Illness    CHIEF COMPLAINT:  Patient presents today with pain under the breast bone    CHEST PAIN AND ASSOCIATED SYMPTOMS:  She reports chest and breast pain present for 2 weeks, rated 5/10 in severity. Pain improves with lying supine. She reports bilateral breast and abdominal tenderness. Associated symptoms include productive cough with green sputum. She has not taken any pain medications for symptoms.    CARDIAC HISTORY:  Echo showed mild regurgitation. Cardiac monitor in March revealed no significant events.    MEDICATIONS:  She takes propranolol at 9:20 AM daily with noted improvement in symptoms.    MUSCULOSKELETAL:  She reports RLE knee and LLE foot pain that worsens with bending movements.      ROS:  General: -fever, -chills, -fatigue, -weight gain, -weight loss    Cardiovascular: +chest pain, -palpitations, -lower extremity edema  Respiratory: -cough, -shortness of breath, +productive cough  Gastrointestinal: +abdominal pain, -nausea, -vomiting, -diarrhea, -constipation, -blood in stool  Genitourinary: -dysuria, -hematuria, -frequency  Musculoskeletal: +joint pain, -muscle pain, +limb pain, +pain with movement  Skin: -rash, -lesion  Neurological: -headache, -dizziness, -numbness, -tingling  Psychiatric: -anxiety, -depression, -sleep difficulty  Breasts: +breast pain                    Review of patient's allergies indicates:  No Known Allergies    Past Medical History:   Diagnosis Date    Anemia     Anxiety disorder, unspecified     Asthma     Bipolar disorder, unspecified     Depression     Eczema     GERD (gastroesophageal reflux disease)     Glaucoma     Headache     Heart murmur      Past Surgical History:   Procedure Laterality Date    COLONOSCOPY N/A 03/05/2021    Procedure: COLONOSCOPY;   Surgeon: Jake Williamson MD;  Location: [facility];  Service: Endoscopy;  Laterality: N/A;    ESOPHAGOGASTRODUODENOSCOPY N/A 03/05/2021    Procedure: EGD (ESOPHAGOGASTRODUODENOSCOPY);  Surgeon: Jake Williamson MD;  Location: [facility] ENDO;  Service: Endoscopy;  Laterality: N/A;    ESOPHAGOGASTRODUODENOSCOPY N/A 12/2/2024    Procedure: EGD (ESOPHAGOGASTRODUODENOSCOPY);  Surgeon: Sourav Rushing MD;  Location: [facility];  Service: Endoscopy;  Laterality: N/A;    UPPER GASTROINTESTINAL ENDOSCOPY       Social History[1]                Objective        Vitals:    05/27/25 1031   Pulse: 80       LIPIDS - LAST 2   Lab Results   Component Value Date    CHOL 180 07/16/2024    CHOL 159 01/03/2020    HDL 35 07/16/2024    HDL 58 01/03/2020    LDLCALC 121 07/16/2024    LDLCALC 91.6 01/03/2020    TRIG 135 07/16/2024    TRIG 47 01/03/2020    CHOLHDL 36.5 01/03/2020       CBC - LAST 2  Lab Results   Component Value Date    WBC 8.90 01/14/2025    WBC 8.83 12/18/2024    RBC 4.54 01/14/2025    RBC 4.53 12/18/2024    HGB 12.5 01/14/2025    HGB 12.2 12/18/2024    HCT 38.2 01/14/2025    HCT 37.2 12/18/2024    MCV 84 01/14/2025    MCV 82 12/18/2024    MCH 27.5 01/14/2025    MCH 26.9 (L) 12/18/2024    MCHC 32.7 01/14/2025    MCHC 32.8 12/18/2024    RDW 14.3 01/14/2025    RDW 13.7 12/18/2024     01/14/2025     12/18/2024    MPV 9.5 01/14/2025    MPV 9.2 12/18/2024    GRAN 6.0 01/14/2025    GRAN 66.9 01/14/2025    LYMPH 2.1 01/14/2025    LYMPH 23.8 01/14/2025    MONO 0.6 01/14/2025    MONO 6.9 01/14/2025    BASO 0.05 01/14/2025    BASO 0.05 12/18/2024    NRBC 0 01/14/2025    NRBC 0 12/18/2024       CHEMISTRY & LIVER FUNCTION - LAST 2  Lab Results   Component Value Date     01/14/2025     12/18/2024    K 3.6 01/14/2025    K 3.5 12/18/2024     01/14/2025     12/18/2024    CO2 20 (L) 01/14/2025    CO2 20 (L) 12/18/2024    ANIONGAP 10 01/14/2025    ANIONGAP 13 12/18/2024    BUN 16 01/14/2025    BUN 11  "12/18/2024    CREATININE 0.9 01/14/2025    CREATININE 1.0 12/18/2024     (H) 01/14/2025     (H) 12/18/2024    CALCIUM 9.3 01/14/2025    CALCIUM 9.3 12/18/2024    MG 2.0 12/18/2024    ALBUMIN 4.0 01/14/2025    ALBUMIN 3.9 12/18/2024    PROT 7.1 01/14/2025    PROT 6.9 12/18/2024    ALKPHOS 120 01/14/2025    ALKPHOS 125 12/18/2024    ALT 68 (H) 01/14/2025    ALT 93 (H) 12/18/2024    AST 30 01/14/2025    AST 71 (H) 12/18/2024    BILITOT 0.5 01/14/2025    BILITOT 0.5 12/18/2024        CARDIAC PROFILE - LAST 2  Lab Results   Component Value Date    BNP 51 01/14/2025    BNP 23 12/18/2024    TROPONINI <0.006 01/14/2025    TROPONINI <0.006 01/14/2025        COAGULATION - LAST 2  No results found for: "LABPT", "INR", "APTT"    ENDOCRINE & PSA - LAST 2  Lab Results   Component Value Date    HGBA1C 5.9 07/16/2024    TSH 3.454 05/09/2025    TSH 4.735 (H) 01/14/2025        ECHOCARDIOGRAM RESULTS  Results for orders placed during the hospital encounter of 05/14/25    Echo    Interpretation Summary    Left Ventricle: The left ventricle is normal in size. Normal wall thickness. Normal wall motion. There is normal systolic function with a visually estimated ejection fraction of 55 - 60%. There is normal diastolic function.    Right Ventricle: The right ventricle is mildly dilated. Systolic function is normal.    Mitral Valve: There is mild regurgitation.    Tricuspid Valve: There is mild regurgitation.    Pulmonic Valve: There is mild regurgitation.    Pulmonary Artery: No pulmonary hypertension. The estimated pulmonary artery systolic pressure is 22 mmHg.    IVC/SVC: Normal venous pressure at 3 mmHg.      CURRENT/PREVIOUS VISIT EKG  Results for orders placed or performed in visit on 05/08/25   IN OFFICE EKG 12-LEAD (to Belle)    Collection Time: 05/08/25  8:10 AM   Result Value Ref Range    QRS Duration 74 ms    [facility] QTC Calculation 412 ms    Narrative    Test Reason : R01.1,R00.2,R00.0,    Vent. Rate :  74 BPM     " Atrial Rate :  74 BPM     P-R Int : 154 ms          QRS Dur :  74 ms      QT Int : 372 ms       P-R-T Axes :  35  50  35 degrees    QTcB Int : 412 ms    Normal sinus rhythm  Possible Left atrial enlargement  Cannot rule out Anterior infarct ,age undetermined  Abnormal ECG  When compared with ECG of 14-Jan-2025 02:28,  No significant change was found    Referred By:            Confirmed By:      No valid procedures specified.   Results for orders placed during the hospital encounter of 05/14/25    Exercise Stress - EKG    Interpretation Summary    The patient exercised for 6 minutes 37 seconds on a Roman protocol, achieving a peak heart rate of 146 bpm, which is 81% of the age predicted maximum heart rate.    The ECG portion of the study is negative for ischemia.    The patient reported no chest pain during the stress test.    The blood pressure response to stress was normal.    There were no arrhythmias during stress.    No valid procedures specified.    Physical Exam    General: No acute distress. Well-developed. Well-nourished.  HENT: Normocephalic. Atraumatic. Nares patent. Dry cracked lips    Cardiovascular: Regular rate. Regular rhythm. No murmurs. No rubs. No gallops. Normal S1, S2.  Chest Wall: L sided chest wall and LUQ abdomen tender; L breast area is tender  Respiratory: Normal respiratory effort. Clear to auscultation bilaterally. No rales. No rhonchi. No wheezing.  Breast: Tenderness in breast.  Abdomen: Soft. Diffuse LUQ abdominal tenderness. Non-distended. Normoactive bowel sounds.  Musculoskeletal: No  obvious deformity.  Extremities: No lower extremity edema.  Neurological: Alert & oriented x3. No slurred speech. Normal gait.  Psychiatric: Normal mood. Normal affect. Good insight. Good judgment.  Skin: Warm. Very Dry. No rash.         I HAVE REVIEWED :    The vital signs, nurses notes, and all the pertinent radiology and labs.        Current Outpatient Medications   Medication Instructions     albuterol (PROVENTIL/VENTOLIN HFA) 90 mcg/actuation inhaler 1 puff, Inhalation, Every 4 hours PRN    albuterol (VENTOLIN HFA) 90 mcg/actuation inhaler 2 puffs, Inhalation, Every 4 hours PRN, Rescue    albuterol-budesonide (AIRSUPRA) 90-80 mcg/actuation 2 puffs, Inhalation, Every 4 hours PRN    ALPRAZolam (XANAX) 0.25 mg, Nightly PRN    budesonide-formoterol 160-4.5 mcg (SYMBICORT) 160-4.5 mcg/actuation HFAA 2 puffs, Inhalation, Every 12 hours, Controller    busPIRone (BUSPAR) 30 mg, 2 times daily    cetirizine (ZYRTEC) 10 mg, Oral, 2 times daily    clonazePAM (KLONOPIN) 0.5 mg, Daily PRN    DUPIXENT SYRINGE 300 mg/2 mL Syrg Inject 300 mg (2 mL) into the skin every other week    EScitalopram oxalate (LEXAPRO) 20 mg, Daily    eszopiclone (LUNESTA) 2 mg, Nightly PRN    fluocinolone (DERMA-SMOOTHE) 0.01 % external oil Apply to scalp and affected areas as needed up to twice a day for up to 2 weeks at a time.    hydrOXYzine (ATARAX) 50 mg, 2 times daily    ketoconazole (NIZORAL) 2 % shampoo Apply at least three times a week. Leave in for at least 5 minutes and then rinse.    magnesium oxide (MAG-OX) 400 mg, Oral, Daily    metFORMIN (GLUCOPHAGE-XR) 500 mg, Every morning    MOUNJARO 2.5 mg, Every 7 days    mupirocin (BACTROBAN) 2 % ointment Apply as needed twice a day to open skin areas for 5 days or until improved.    norethindrone (MICRONOR) 0.35 mg tablet 1 tablet    omeprazole (PRILOSEC) 40 mg, Oral, Daily    ondansetron (ZOFRAN-ODT) 4 mg, Oral, Every 12 hours PRN    potassium chloride (KLOR-CON) 8 MEQ TbSR 8 mEq, Oral, Daily    propranoloL (INDERAL) 40 mg, Oral, Nightly    propranoloL (INDERAL) 20 mg, Daily    QUEtiapine (SEROQUEL) 200 mg, 2 times daily    triamcinolone acetonide 0.025% (KENALOG) 0.025 % cream Please apply to affected areas when flaring twice a day as needed for up to 2 weeks at a time    triamcinolone acetonide 0.1% (KENALOG) 0.1 % cream Topical (Top), 2 times daily    VRAYLAR 3 mg, Daily           Assessment & Plan   Assessment & Plan    R10.12 Left upper quadrant abdominal pain  R00.2 Palpitations  R06.02 SOBOE (shortness of breath on exertion)  K21.9 Gastroesophageal reflux disease, unspecified whether esophagitis present  E66.9 Obesity with body mass index of 30.0-39.9  N64.4 Breast pain, left    PLAN SUMMARY:  - Ordered US Abdomen to evaluate left-sided abdominal pain. Also complains of L breast pain.  Unrelated to eating.  Denies trauma.  Denies n/v/d  - Discontinued metoprolol  - Continued propranolol as prescribed by Dr. Benton (9:20 AM dosing)  - Referred to Dr. Odonnell for management of abdominal pain  - Follow up in 6 months unless needed sooner    LEFT UPPER QUADRANT ABDOMINAL PAIN:  - Abdominal tenderness evaluated and considered in relation to recent CT.  - Ordered US Abdomen to further evaluate left-sided pain.  - Referred to Dr. Odonnell for management of abdominal pain.    PALPITATIONS:  - Recent cardiac tests (echocardiogram, monitor) show stable results with only mild valve regurgitation.  - Discontinued metoprolol.  Not taking  - Continued propranolol as prescribed     SOBOE (SHORTNESS OF BREATH ON EXERTION):  - Chest pain is unlikely cardiac in origin due to reproducibility with deep breathing, pressing, or positional changes.  - Considered multiple potential causes for chest pain, including lung irritation.  Pain could also be breast related as her left breast is tender to touch.  She is set up for US L breast.  LUQ abdomen is also TTP.  US LUQ abdomen for further evaluation.      GASTROESOPHAGEAL REFLUX DISEASE, UNSPECIFIED WHETHER ESOPHAGITIS PRESENT:  - Considered multiple potential causes for chest pain, including abdominal issues.  US abdomen ordered    BREAST PAIN, LEFT:  - Considered multiple potential causes for chest pain, including breast tissue irritation.  TTP, denies erythema, changes in texture, nodules, or discharge.  - Plans for breast US through PCP. Follow up with  PCP for further management              Palpitations  Stable. Continue propranolol 20 mg daily and 40 mg nightly. No acute events on monitor    SOBOE (shortness of breath on exertion)  Stable at this time.  Denies anginal equivalent symptoms.         This note was generated with the assistance of ambient listening technology. Verbal consent was obtained by the patient and accompanying visitor(s) for the recording of patient appointment to facilitate this note. I attest to having reviewed and edited the generated note for accuracy, though some syntax or spelling errors may persist. Please contact the author of this note for any clarification.             [1]   Social History  Tobacco Use    Smoking status: Former     Current packs/day: 0.50     Average packs/day: 0.5 packs/day for 11.5 years (5.7 ttl pk-yrs)     Types: Cigarettes     Start date: 12/3/2013    Smokeless tobacco: Never   Substance Use Topics    Alcohol use: Not Currently    Drug use: Never

## 2025-05-28 ENCOUNTER — OFFICE VISIT (OUTPATIENT)
Dept: URGENT CARE | Facility: CLINIC | Age: 30
End: 2025-05-28
Payer: MEDICARE

## 2025-05-28 ENCOUNTER — PATIENT MESSAGE (OUTPATIENT)
Dept: CARDIOLOGY | Facility: CLINIC | Age: 30
End: 2025-05-28

## 2025-05-28 ENCOUNTER — PATIENT MESSAGE (OUTPATIENT)
Facility: CLINIC | Age: 30
End: 2025-05-28
Payer: MEDICARE

## 2025-05-28 ENCOUNTER — TELEPHONE (OUTPATIENT)
Facility: CLINIC | Age: 30
End: 2025-05-28
Payer: MEDICARE

## 2025-05-28 VITALS
BODY MASS INDEX: 30.04 KG/M2 | HEIGHT: 59 IN | RESPIRATION RATE: 17 BRPM | DIASTOLIC BLOOD PRESSURE: 73 MMHG | OXYGEN SATURATION: 100 % | TEMPERATURE: 98 F | SYSTOLIC BLOOD PRESSURE: 101 MMHG | HEART RATE: 56 BPM | WEIGHT: 149 LBS

## 2025-05-28 DIAGNOSIS — Z87.09 HISTORY OF ASTHMA: ICD-10-CM

## 2025-05-28 DIAGNOSIS — D50.0 IRON DEFICIENCY ANEMIA DUE TO CHRONIC BLOOD LOSS: Primary | ICD-10-CM

## 2025-05-28 DIAGNOSIS — R05.9 COUGH, UNSPECIFIED TYPE: ICD-10-CM

## 2025-05-28 DIAGNOSIS — R07.89 CHEST WALL PAIN: Primary | ICD-10-CM

## 2025-05-28 DIAGNOSIS — M94.0 COSTOCHONDRITIS: ICD-10-CM

## 2025-05-28 LAB
CTP QC/QA: YES
CTP QC/QA: YES
FLUAV AG NPH QL: NEGATIVE
FLUBV AG NPH QL: NEGATIVE
SARS-COV+SARS-COV-2 AG RESP QL IA.RAPID: NEGATIVE

## 2025-05-28 PROCEDURE — 87811 SARS-COV-2 COVID19 W/OPTIC: CPT | Mod: QW,S$GLB,, | Performed by: STUDENT IN AN ORGANIZED HEALTH CARE EDUCATION/TRAINING PROGRAM

## 2025-05-28 PROCEDURE — 99214 OFFICE O/P EST MOD 30 MIN: CPT | Mod: 25,S$GLB,, | Performed by: STUDENT IN AN ORGANIZED HEALTH CARE EDUCATION/TRAINING PROGRAM

## 2025-05-28 PROCEDURE — 87804 INFLUENZA ASSAY W/OPTIC: CPT | Mod: QW,,, | Performed by: STUDENT IN AN ORGANIZED HEALTH CARE EDUCATION/TRAINING PROGRAM

## 2025-05-28 PROCEDURE — 71046 X-RAY EXAM CHEST 2 VIEWS: CPT | Mod: S$GLB,,, | Performed by: RADIOLOGY

## 2025-05-28 RX ORDER — BENZONATATE 200 MG/1
200 CAPSULE ORAL 3 TIMES DAILY PRN
Qty: 30 CAPSULE | Refills: 0 | Status: SHIPPED | OUTPATIENT
Start: 2025-05-28 | End: 2025-06-07

## 2025-05-28 RX ORDER — NAPROXEN 500 MG/1
500 TABLET ORAL 2 TIMES DAILY WITH MEALS
Qty: 20 TABLET | Refills: 0 | Status: SHIPPED | OUTPATIENT
Start: 2025-05-28 | End: 2025-06-07

## 2025-05-28 RX ORDER — METHYLPREDNISOLONE 4 MG/1
TABLET ORAL
Qty: 21 EACH | Refills: 0 | Status: SHIPPED | OUTPATIENT
Start: 2025-05-29 | End: 2025-06-18

## 2025-05-28 NOTE — PROGRESS NOTES
"Subjective:      Patient ID: Sherry Burns is a 29 y.o. female.    Vitals:  height is 4' 11" (1.499 m) and weight is 67.6 kg (149 lb). Her temperature is 97.5 °F (36.4 °C). Her blood pressure is 101/73 and her pulse is 56 (abnormal). Her respiration is 17 and oxygen saturation is 100%.     Chief Complaint: Cough    Patient is a 29-year-old female with a past medical history migraine, bipolar disorder, anxiety, depression, ADD, allergic rhinitis, asthma, anemia, and GERD presents to clinic with family for evaluation of cough and chest wall pain.  Patient states she has had the chest type pain for 2 weeks.  Patient states cough x1 day.  Patient states she is vaccinated.  Patient states positive sick exposure to a friend's sick with similar symptoms.  Patient states no trauma or injury of the chest.  Patient states no over-the-counter medicines for symptoms at this point.  Patient states dry hacking cough.  Patient states experiencing pain to the left side of the chest just underneath for to the side of the breast.  Patient states this is a dull or sharp type pain.  Patient states pain is worse when the chest is pressed on.  Patient states hurts when she coughs or moves as well.  Patient states has not noticed any swelling or discoloration.  Patient states that she has not had anything similar like this in the past.  Patient states no palpitation, shortness for breath, dyspnea on exertion, orthopnea, wheezing, abdominal pain, nausea or vomiting, diarrhea, urinary symptoms, diaphoresis, headaches or dizziness, fever or chills.  Patient states pain minimal at this time unless you press on her chest than it hurts worse.        Constitution: Negative. Negative for chills, sweating, fatigue and fever.   HENT: Negative.     Neck: neck negative.   Cardiovascular: Negative.  Negative for chest pain and palpitations.   Eyes: Negative.    Respiratory:  Positive for cough. Negative for chest tightness, sputum production, " shortness of breath and wheezing.    Gastrointestinal: Negative.  Negative for abdominal pain, nausea, vomiting and diarrhea.   Endocrine: negative.   Genitourinary: Negative.  Negative for dysuria, frequency and urgency.   Musculoskeletal: Negative.    Skin: Negative.  Negative for color change, pale, rash and erythema.   Allergic/Immunologic: Negative.    Neurological: Negative.  Negative for dizziness, light-headedness, passing out, headaches, disorientation and altered mental status.   Hematologic/Lymphatic: Negative.    Psychiatric/Behavioral: Negative.  Negative for altered mental status, disorientation and confusion.       Objective:     Physical Exam   Constitutional: She is oriented to person, place, and time. She appears well-developed. She is cooperative.  Non-toxic appearance. She does not appear ill. No distress.   HENT:   Head: Normocephalic and atraumatic.   Ears:   Right Ear: Hearing, tympanic membrane, external ear and ear canal normal.   Left Ear: Hearing, tympanic membrane, external ear and ear canal normal.   Nose: Nose normal. No mucosal edema, rhinorrhea, nasal deformity or congestion. No epistaxis. Right sinus exhibits no maxillary sinus tenderness and no frontal sinus tenderness. Left sinus exhibits no maxillary sinus tenderness and no frontal sinus tenderness.   Mouth/Throat: Uvula is midline, oropharynx is clear and moist and mucous membranes are normal. Mucous membranes are moist. No trismus in the jaw. Normal dentition. No uvula swelling. No oropharyngeal exudate or posterior oropharyngeal erythema. Oropharynx is clear.   Eyes: Conjunctivae and lids are normal. Pupils are equal, round, and reactive to light. Right eye exhibits no discharge. Left eye exhibits no discharge. No scleral icterus.   Neck: Trachea normal and phonation normal. Neck supple. No neck rigidity present.   Cardiovascular: Regular rhythm and normal pulses. Bradycardia present.   Pulmonary/Chest: Effort normal and breath  sounds normal. No respiratory distress. She has no wheezes. She has no rhonchi. She has no rales. She exhibits tenderness (Diffuse chest wall tenderness with palpation).   Abdominal: Normal appearance and bowel sounds are normal. She exhibits no distension. Soft. There is no abdominal tenderness.   Musculoskeletal: Normal range of motion.         General: Normal range of motion.      Cervical back: She exhibits no tenderness.   Lymphadenopathy:     She has no cervical adenopathy.   Neurological: She is alert and oriented to person, place, and time. She exhibits normal muscle tone.   Skin: Skin is warm, dry, intact, not diaphoretic, not pale and no rash. Capillary refill takes less than 2 seconds. No erythema   Psychiatric: Her speech is normal and behavior is normal. Judgment and thought content normal.   Nursing note and vitals reviewed.chaperone present         Assessment:     1. Chest wall pain    2. Cough, unspecified type    3. Costochondritis    4. History of asthma        Plan:       Chest wall pain  -     POCT Influenza A/B Rapid Antigen  -     SARS Coronavirus 2 Antigen, POCT Manual Read  -     EKG 12-lead; Future  -     XR CHEST PA AND LATERAL; Future; Expected date: 05/28/2025    Cough, unspecified type  -     POCT Influenza A/B Rapid Antigen  -     SARS Coronavirus 2 Antigen, POCT Manual Read  -     XR CHEST PA AND LATERAL; Future; Expected date: 05/28/2025    Costochondritis    History of asthma    Other orders  -     methylPREDNISolone (MEDROL DOSEPACK) 4 mg tablet; use as directed  Dispense: 21 each; Refill: 0  -     naproxen (NAPROSYN) 500 MG tablet; Take 1 tablet (500 mg total) by mouth 2 (two) times daily with meals. for 10 days  Dispense: 20 tablet; Refill: 0  -     benzonatate (TESSALON) 200 MG capsule; Take 1 capsule (200 mg total) by mouth 3 (three) times daily as needed for Cough.  Dispense: 30 capsule; Refill: 0                EKG:  Sinus rhythm rate of 64 beats per minute.   MS, QRS 70  MS,  MS.  No acute STEMI.  Chest x-ray: The lungs are clear, with normal appearance of pulmonary vasculature and no pleural effusion or pneumothorax. The cardiac silhouette is normal in size. The hilar and mediastinal contours are unremarkable. Bones are intact.  Labs: Influenza a and B negative.  COVID negative.  Take medications as prescribed.    Use of no other NSAIDs while on naproxen; may rotate with Tylenol.    Assure adequate hydration.    Follow-up with PCP in 1-2 days.    Return to clinic as needed.    To ED for any continued, new come or acutely worsening symptoms.    Family and patient in agreement with plan of care.    DISCLAIMER: Please note that my documentation in this Electronic Healthcare Record was produced using speech recognition software and therefore may contain errors related to that software system.These could include grammar, punctuation and spelling errors or the inclusion/exclusion of phrases that were not intended. Garbled syntax, mangled pronouns, and other bizarre constructions may be attributed to that software system.

## 2025-05-29 ENCOUNTER — PATIENT MESSAGE (OUTPATIENT)
Dept: CARDIOLOGY | Facility: CLINIC | Age: 30
End: 2025-05-29
Payer: MEDICARE

## 2025-05-29 ENCOUNTER — PATIENT MESSAGE (OUTPATIENT)
Dept: ALLERGY | Facility: CLINIC | Age: 30
End: 2025-05-29
Payer: MEDICARE

## 2025-05-29 ENCOUNTER — HOSPITAL ENCOUNTER (OUTPATIENT)
Dept: RADIOLOGY | Facility: HOSPITAL | Age: 30
Discharge: HOME | End: 2025-05-29
Payer: MEDICARE

## 2025-05-29 DIAGNOSIS — R10.12 LEFT UPPER QUADRANT ABDOMINAL PAIN: ICD-10-CM

## 2025-05-29 PROCEDURE — 76700 US EXAM ABDOM COMPLETE: CPT | Mod: TC

## 2025-05-29 PROCEDURE — 76700 US EXAM ABDOM COMPLETE: CPT | Mod: 26,,, | Performed by: RADIOLOGY

## 2025-05-30 ENCOUNTER — PATIENT MESSAGE (OUTPATIENT)
Dept: ALLERGY | Facility: CLINIC | Age: 30
End: 2025-05-30
Payer: MEDICARE

## 2025-05-30 ENCOUNTER — HOSPITAL ENCOUNTER (OUTPATIENT)
Dept: RADIOLOGY | Facility: HOSPITAL | Age: 30
Discharge: HOME | End: 2025-05-30
Attending: STUDENT IN AN ORGANIZED HEALTH CARE EDUCATION/TRAINING PROGRAM
Payer: MEDICARE

## 2025-05-30 ENCOUNTER — RESULTS FOLLOW-UP (OUTPATIENT)
Dept: CARDIOLOGY | Facility: CLINIC | Age: 30
End: 2025-05-30
Payer: MEDICARE

## 2025-05-30 DIAGNOSIS — N63.20 LEFT BREAST LUMP: ICD-10-CM

## 2025-05-30 DIAGNOSIS — N64.89 OTHER SPECIFIED DISORDERS OF BREAST: ICD-10-CM

## 2025-05-30 PROCEDURE — 76642 ULTRASOUND BREAST LIMITED: CPT | Mod: TC,PO,LT

## 2025-05-30 PROCEDURE — 76642 ULTRASOUND BREAST LIMITED: CPT | Mod: 26,LT,, | Performed by: RADIOLOGY

## 2025-06-03 ENCOUNTER — PATIENT MESSAGE (OUTPATIENT)
Dept: GASTROENTEROLOGY | Facility: CLINIC | Age: 30
End: 2025-06-03
Payer: MEDICARE

## 2025-06-03 DIAGNOSIS — K59.04 CHRONIC IDIOPATHIC CONSTIPATION: Primary | ICD-10-CM

## 2025-06-04 ENCOUNTER — OFFICE VISIT (OUTPATIENT)
Dept: ALLERGY | Facility: CLINIC | Age: 30
End: 2025-06-04
Payer: MEDICARE

## 2025-06-04 VITALS — OXYGEN SATURATION: 98 % | WEIGHT: 159.81 LBS | BODY MASS INDEX: 32.28 KG/M2 | HEART RATE: 72 BPM

## 2025-06-04 DIAGNOSIS — J45.40 MODERATE PERSISTENT ASTHMA WITHOUT COMPLICATION: ICD-10-CM

## 2025-06-04 DIAGNOSIS — L20.9 ATOPIC DERMATITIS, UNSPECIFIED TYPE: ICD-10-CM

## 2025-06-04 DIAGNOSIS — J30.9 ALLERGIC RHINITIS, UNSPECIFIED SEASONALITY, UNSPECIFIED TRIGGER: Primary | ICD-10-CM

## 2025-06-04 PROCEDURE — 1159F MED LIST DOCD IN RCRD: CPT | Mod: CPTII,S$GLB,, | Performed by: STUDENT IN AN ORGANIZED HEALTH CARE EDUCATION/TRAINING PROGRAM

## 2025-06-04 PROCEDURE — 3008F BODY MASS INDEX DOCD: CPT | Mod: CPTII,S$GLB,, | Performed by: STUDENT IN AN ORGANIZED HEALTH CARE EDUCATION/TRAINING PROGRAM

## 2025-06-04 PROCEDURE — 99999 PR PBB SHADOW E&M-EST. PATIENT-LVL V: CPT | Mod: PBBFAC,,, | Performed by: STUDENT IN AN ORGANIZED HEALTH CARE EDUCATION/TRAINING PROGRAM

## 2025-06-04 PROCEDURE — 99214 OFFICE O/P EST MOD 30 MIN: CPT | Mod: S$GLB,,, | Performed by: STUDENT IN AN ORGANIZED HEALTH CARE EDUCATION/TRAINING PROGRAM

## 2025-06-04 RX ORDER — BUDESONIDE AND FORMOTEROL FUMARATE DIHYDRATE 160; 4.5 UG/1; UG/1
2 AEROSOL RESPIRATORY (INHALATION) EVERY 12 HOURS
Qty: 10.2 G | Refills: 11 | Status: SHIPPED | OUTPATIENT
Start: 2025-06-04 | End: 2026-06-04

## 2025-06-04 RX ORDER — FLUTICASONE PROPIONATE 50 MCG
2 SPRAY, SUSPENSION (ML) NASAL 2 TIMES DAILY
Qty: 16 G | Refills: 6 | Status: SHIPPED | OUTPATIENT
Start: 2025-06-04 | End: 2025-09-02

## 2025-06-05 ENCOUNTER — TELEPHONE (OUTPATIENT)
Dept: FAMILY MEDICINE | Facility: CLINIC | Age: 30
End: 2025-06-05

## 2025-06-05 ENCOUNTER — PATIENT MESSAGE (OUTPATIENT)
Dept: PULMONOLOGY | Facility: CLINIC | Age: 30
End: 2025-06-05
Payer: MEDICARE

## 2025-06-06 ENCOUNTER — RESULTS FOLLOW-UP (OUTPATIENT)
Dept: PULMONOLOGY | Facility: CLINIC | Age: 30
End: 2025-06-06

## 2025-06-06 ENCOUNTER — HOSPITAL ENCOUNTER (OUTPATIENT)
Dept: RADIOLOGY | Facility: HOSPITAL | Age: 30
Discharge: HOME | End: 2025-06-06
Attending: NURSE PRACTITIONER | Admitting: INTERNAL MEDICINE
Payer: MEDICARE

## 2025-06-06 ENCOUNTER — TELEPHONE (OUTPATIENT)
Dept: FAMILY MEDICINE | Facility: CLINIC | Age: 30
End: 2025-06-06
Payer: MEDICARE

## 2025-06-06 ENCOUNTER — OFFICE VISIT (OUTPATIENT)
Dept: PULMONOLOGY | Facility: CLINIC | Age: 30
End: 2025-06-06
Payer: MEDICARE

## 2025-06-06 VITALS
WEIGHT: 162.06 LBS | HEIGHT: 59 IN | BODY MASS INDEX: 32.67 KG/M2 | SYSTOLIC BLOOD PRESSURE: 103 MMHG | HEART RATE: 61 BPM | OXYGEN SATURATION: 98 % | DIASTOLIC BLOOD PRESSURE: 66 MMHG

## 2025-06-06 DIAGNOSIS — J45.51 SEVERE PERSISTENT ASTHMA WITH ACUTE EXACERBATION: ICD-10-CM

## 2025-06-06 DIAGNOSIS — F17.200 TOBACCO DEPENDENCE: ICD-10-CM

## 2025-06-06 DIAGNOSIS — R91.8 LUNG NODULES: ICD-10-CM

## 2025-06-06 DIAGNOSIS — J45.51 SEVERE PERSISTENT ASTHMA WITH ACUTE EXACERBATION: Primary | ICD-10-CM

## 2025-06-06 PROCEDURE — 99999 PR PBB SHADOW E&M-EST. PATIENT-LVL III: CPT | Mod: PBBFAC,,, | Performed by: NURSE PRACTITIONER

## 2025-06-06 PROCEDURE — 71046 X-RAY EXAM CHEST 2 VIEWS: CPT | Mod: 26,,, | Performed by: RADIOLOGY

## 2025-06-06 PROCEDURE — 71046 X-RAY EXAM CHEST 2 VIEWS: CPT | Mod: TC

## 2025-06-06 RX ORDER — PREDNISONE 20 MG/1
TABLET ORAL
Qty: 9 TABLET | Refills: 0 | Status: SHIPPED | OUTPATIENT
Start: 2025-06-06

## 2025-06-07 ENCOUNTER — HOSPITAL ENCOUNTER (EMERGENCY)
Facility: HOSPITAL | Age: 30
Discharge: HOME | End: 2025-06-07
Attending: EMERGENCY MEDICINE
Payer: MEDICARE

## 2025-06-07 VITALS
DIASTOLIC BLOOD PRESSURE: 78 MMHG | SYSTOLIC BLOOD PRESSURE: 122 MMHG | TEMPERATURE: 98 F | HEIGHT: 59 IN | RESPIRATION RATE: 18 BRPM | OXYGEN SATURATION: 99 % | BODY MASS INDEX: 32.66 KG/M2 | WEIGHT: 162 LBS | HEART RATE: 65 BPM

## 2025-06-07 DIAGNOSIS — R06.02 SHORTNESS OF BREATH: ICD-10-CM

## 2025-06-07 DIAGNOSIS — J20.9 ACUTE BRONCHITIS, UNSPECIFIED ORGANISM: Primary | ICD-10-CM

## 2025-06-07 LAB
ABSOLUTE EOSINOPHIL (SMH): 0.2 K/UL
ABSOLUTE MONOCYTE (SMH): 0.65 K/UL (ref 0.3–1)
ABSOLUTE NEUTROPHIL COUNT (SMH): 5.1 K/UL (ref 1.8–7.7)
ALBUMIN SERPL-MCNC: 4.2 G/DL (ref 3.5–5.2)
ALP SERPL-CCNC: 118 UNIT/L (ref 40–150)
ALT SERPL-CCNC: 92 UNIT/L (ref 10–44)
ANION GAP (SMH): 12 MMOL/L (ref 8–16)
AST SERPL-CCNC: 76 UNIT/L (ref 11–45)
B-HCG UR QL: NEGATIVE
BASOPHILS # BLD AUTO: 0.09 K/UL
BASOPHILS NFR BLD AUTO: 1.1 %
BILIRUB SERPL-MCNC: 0.6 MG/DL (ref 0.1–1)
BUN SERPL-MCNC: 12 MG/DL (ref 6–20)
CALCIUM SERPL-MCNC: 9.4 MG/DL (ref 8.7–10.5)
CHLORIDE SERPL-SCNC: 106 MMOL/L (ref 95–110)
CO2 SERPL-SCNC: 21 MMOL/L (ref 23–29)
CREAT SERPL-MCNC: 0.9 MG/DL (ref 0.5–1.4)
CTP QC/QA: YES
D DIMER PPP IA.FEU-MCNC: 0.34 MG/L FEU
ERYTHROCYTE [DISTWIDTH] IN BLOOD BY AUTOMATED COUNT: 13.8 % (ref 11.5–14.5)
GFR SERPLBLD CREATININE-BSD FMLA CKD-EPI: >60 ML/MIN/1.73/M2
GLUCOSE SERPL-MCNC: 101 MG/DL (ref 70–110)
GROUP A STREP MOLECULAR (OHS): NEGATIVE
HCT VFR BLD AUTO: 39.2 % (ref 37–48.5)
HGB BLD-MCNC: 12.4 GM/DL (ref 12–16)
IMM GRANULOCYTES # BLD AUTO: 0.02 K/UL (ref 0–0.04)
IMM GRANULOCYTES NFR BLD AUTO: 0.2 % (ref 0–0.5)
INFLUENZA A MOLECULAR (OHS): NEGATIVE
INFLUENZA B MOLECULAR (OHS): NEGATIVE
LYMPHOCYTES # BLD AUTO: 2.19 K/UL (ref 1–4.8)
MCH RBC QN AUTO: 26.1 PG (ref 27–31)
MCHC RBC AUTO-ENTMCNC: 31.6 G/DL (ref 32–36)
MCV RBC AUTO: 83 FL (ref 82–98)
NT-PROBNP SERPL-MCNC: 61 PG/ML
NUCLEATED RBC (/100WBC) (SMH): 0 /100 WBC
PLATELET # BLD AUTO: 347 K/UL (ref 150–450)
PMV BLD AUTO: 10.7 FL (ref 9.2–12.9)
POTASSIUM SERPL-SCNC: 4.3 MMOL/L (ref 3.5–5.1)
PROT SERPL-MCNC: 7.3 GM/DL (ref 6–8.4)
RBC # BLD AUTO: 4.75 M/UL (ref 4–5.4)
RELATIVE EOSINOPHIL (SMH): 2.4 % (ref 0–8)
RELATIVE LYMPHOCYTE (SMH): 26.5 % (ref 18–48)
RELATIVE MONOCYTE (SMH): 7.9 % (ref 4–15)
RELATIVE NEUTROPHIL (SMH): 61.9 % (ref 38–73)
SARS-COV-2 RDRP RESP QL NAA+PROBE: NEGATIVE
SODIUM SERPL-SCNC: 139 MMOL/L (ref 136–145)
TROPONIN I SERPL HS-MCNC: <3 NG/L
WBC # BLD AUTO: 8.26 K/UL (ref 3.9–12.7)

## 2025-06-07 PROCEDURE — 94640 AIRWAY INHALATION TREATMENT: CPT

## 2025-06-07 PROCEDURE — 81025 URINE PREGNANCY TEST: CPT | Performed by: PHYSICIAN ASSISTANT

## 2025-06-07 PROCEDURE — 85379 FIBRIN DEGRADATION QUANT: CPT | Performed by: EMERGENCY MEDICINE

## 2025-06-07 PROCEDURE — U0002 COVID-19 LAB TEST NON-CDC: HCPCS | Performed by: EMERGENCY MEDICINE

## 2025-06-07 PROCEDURE — 93010 ELECTROCARDIOGRAM REPORT: CPT | Mod: ,,, | Performed by: GENERAL PRACTICE

## 2025-06-07 PROCEDURE — 93005 ELECTROCARDIOGRAM TRACING: CPT

## 2025-06-07 PROCEDURE — 99285 EMERGENCY DEPT VISIT HI MDM: CPT | Mod: 25

## 2025-06-07 PROCEDURE — 83880 ASSAY OF NATRIURETIC PEPTIDE: CPT | Performed by: EMERGENCY MEDICINE

## 2025-06-07 PROCEDURE — 94760 N-INVAS EAR/PLS OXIMETRY 1: CPT

## 2025-06-07 PROCEDURE — 84484 ASSAY OF TROPONIN QUANT: CPT | Performed by: EMERGENCY MEDICINE

## 2025-06-07 PROCEDURE — 25000242 PHARM REV CODE 250 ALT 637 W/ HCPCS: Performed by: EMERGENCY MEDICINE

## 2025-06-07 PROCEDURE — 94761 N-INVAS EAR/PLS OXIMETRY MLT: CPT

## 2025-06-07 PROCEDURE — 80053 COMPREHEN METABOLIC PANEL: CPT | Performed by: PHYSICIAN ASSISTANT

## 2025-06-07 PROCEDURE — 85025 COMPLETE CBC W/AUTO DIFF WBC: CPT | Performed by: PHYSICIAN ASSISTANT

## 2025-06-07 PROCEDURE — 87502 INFLUENZA DNA AMP PROBE: CPT | Performed by: EMERGENCY MEDICINE

## 2025-06-07 PROCEDURE — 87651 STREP A DNA AMP PROBE: CPT | Performed by: EMERGENCY MEDICINE

## 2025-06-07 RX ORDER — ALBUTEROL SULFATE 90 UG/1
1-2 INHALANT RESPIRATORY (INHALATION) EVERY 6 HOURS PRN
Qty: 8 G | Refills: 1 | Status: SHIPPED | OUTPATIENT
Start: 2025-06-07 | End: 2025-06-11

## 2025-06-07 RX ORDER — IPRATROPIUM BROMIDE AND ALBUTEROL SULFATE 2.5; .5 MG/3ML; MG/3ML
3 SOLUTION RESPIRATORY (INHALATION)
Status: COMPLETED | OUTPATIENT
Start: 2025-06-07 | End: 2025-06-07

## 2025-06-07 RX ORDER — AZITHROMYCIN 250 MG/1
500 TABLET, FILM COATED ORAL DAILY
Qty: 5 TABLET | Refills: 0 | Status: SHIPPED | OUTPATIENT
Start: 2025-06-07

## 2025-06-07 RX ADMIN — IPRATROPIUM BROMIDE AND ALBUTEROL SULFATE 3 ML: 2.5; .5 SOLUTION RESPIRATORY (INHALATION) at 01:06

## 2025-06-07 NOTE — FIRST PROVIDER EVALUATION
Emergency Department TeleTriage Encounter Note      CHIEF COMPLAINT    Chief Complaint   Patient presents with    Dizziness    Shortness of Breath     Started today        VITAL SIGNS   Initial Vitals [06/07/25 1056]   BP Pulse Resp Temp SpO2   (!) 114/57 72 18 97.7 °F (36.5 °C) 97 %      MAP       --            ALLERGIES    Review of patient's allergies indicates:  No Known Allergies    PROVIDER TRIAGE NOTE  Patient presents with complaint of shortness of breath and feeling like she is going to pass out.  Reports symptoms started today.      Phy:   Constitutional: well nourished, well developed, appearing stated age, NAD        Initial orders will be placed and care will be transferred to an alternate provider when patient is roomed for a full evaluation. Any additional orders and the final disposition will be determined by that provider.        ORDERS  Labs Reviewed - No data to display    ED Orders (720h ago, onward)      None              Virtual Visit Note: The provider triage portion of this emergency department evaluation and documentation was performed via [employer], a HIPAA-compliant telemedicine application, in concert with a tele-presenter in the room. A face to face patient evaluation with one of my colleagues will occur once the patient is placed in an emergency department room.      DISCLAIMER: This note was prepared with [employer] voice recognition transcription software. Garbled syntax, mangled pronouns, and other bizarre constructions may be attributed to that software system.

## 2025-06-07 NOTE — ED PROVIDER NOTES
Encounter Date: 6/7/2025       History     Chief Complaint   Patient presents with    Dizziness    Shortness of Breath     Started today      29-year-old female presented emergency department complaining of feeling dizzy and short of breath.  Patient having some cough and generalized malaise.  Denies fever or chills or chest pain or abdominal pain or any focal weakness or numbness.      Review of patient's allergies indicates:  No Known Allergies  Past Medical History:   Diagnosis Date    Anemia     Anxiety disorder, unspecified     Asthma     Bipolar disorder, unspecified     Depression     Eczema     GERD (gastroesophageal reflux disease)     Glaucoma     Headache     Heart murmur      Past Surgical History:   Procedure Laterality Date    COLONOSCOPY N/A 03/05/2021    Procedure: COLONOSCOPY;  Surgeon: Jake Williamson MD;  Location: [facility];  Service: Endoscopy;  Laterality: N/A;    ESOPHAGOGASTRODUODENOSCOPY N/A 03/05/2021    Procedure: EGD (ESOPHAGOGASTRODUODENOSCOPY);  Surgeon: Jake Williamson MD;  Location: [facility];  Service: Endoscopy;  Laterality: N/A;    ESOPHAGOGASTRODUODENOSCOPY N/A 12/2/2024    Procedure: EGD (ESOPHAGOGASTRODUODENOSCOPY);  Surgeon: Sourav Rushing MD;  Location: [facility];  Service: Endoscopy;  Laterality: N/A;    UPPER GASTROINTESTINAL ENDOSCOPY       Family History   Problem Relation Name Age of Onset    Hypertension Mother      Hyperlipidemia Mother      Colon polyps Mother      Heart attacks under age 50 Mother      Drug abuse Father      Arthritis Brother      Glaucoma Paternal Grandmother      Dementia Paternal Grandmother      Melanoma Neg Hx      Colon cancer Neg Hx      Crohn's disease Neg Hx      Esophageal cancer Neg Hx      Liver cancer Neg Hx      Rectal cancer Neg Hx      Stomach cancer Neg Hx      Ulcerative colitis Neg Hx       Social History[1]  Review of Systems   Constitutional:  Positive for fatigue.   HENT: Negative.     Eyes: Negative.    Respiratory:   Positive for cough and shortness of breath.    Cardiovascular: Negative.  Negative for chest pain.   Gastrointestinal: Negative.    Endocrine: Negative.    Genitourinary: Negative.    Musculoskeletal: Negative.    Skin: Negative.    Allergic/Immunologic: Negative.    Neurological:  Positive for dizziness.   Hematological: Negative.    Psychiatric/Behavioral: Negative.     All other systems reviewed and are negative.      Physical Exam     Initial Vitals [06/07/25 1056]   BP Pulse Resp Temp SpO2   (!) 114/57 72 18 97.7 °F (36.5 °C) 97 %      MAP       --         Physical Exam    Nursing note and vitals reviewed.  Constitutional: She appears well-developed and well-nourished.   HENT:   Head: Normocephalic and atraumatic.   Nose: Nose normal. Mouth/Throat: Oropharynx is clear and moist.   Eyes: Conjunctivae and EOM are normal. Pupils are equal, round, and reactive to light.   Neck: Neck supple. No thyromegaly present. No tracheal deviation present. No JVD present.   Normal range of motion.  Cardiovascular:  Normal rate, regular rhythm, normal heart sounds and intact distal pulses.           No murmur heard.  Pulmonary/Chest: Breath sounds normal. No stridor. No respiratory distress. She has no wheezes. She has no rales.   Abdominal: Abdomen is soft. Bowel sounds are normal. She exhibits no distension. There is no abdominal tenderness.   Musculoskeletal:         General: No edema. Normal range of motion.      Cervical back: Normal range of motion and neck supple.     Neurological: She is alert and oriented to person, place, and time. She has normal strength. GCS score is 15. GCS eye subscore is 4. GCS verbal subscore is 5. GCS motor subscore is 6.   Skin: Skin is warm. Capillary refill takes less than 2 seconds.   Psychiatric: She has a normal mood and affect. Thought content normal.         ED Course   Procedures  Labs Reviewed   COMPREHENSIVE METABOLIC PANEL - Abnormal       Result Value    Sodium 139      Potassium  4.3      Chloride 106      CO2 21 (*)     Glucose 101      BUN 12      Creatinine 0.9      Calcium 9.4      Protein Total 7.3      Albumin 4.2      Bilirubin Total 0.6            AST 76 (*)     ALT 92 (*)     Anion Gap 12      eGFR >60     CBC WITH DIFFERENTIAL - Abnormal    WBC 8.26      RBC 4.75      Hgb 12.4      Hct 39.2      MCV 83      MCH 26.1 (*)     MCHC 31.6 (*)     RDW 13.8      Platelet Count 347      MPV 10.7      Nucleated RBC 0      Neut % 61.9      Lymph % 26.5      Mono % 7.9      Eos % 2.4      Basophil % 1.1      Imm Grans % 0.2      Neut # 5.1      Lymph # 2.19      Mono # 0.65      Eos # 0.20      Baso # 0.09      Imm Grans # 0.02     GROUP A STREP, MOLECULAR - Normal    Group A Strep Molecular Negative     INFLUENZA A & B BY MOLECULAR - Normal    INFLUENZA A MOLECULAR Negative      INFLUENZA B MOLECULAR  Negative     NT-PRO NATRIURETIC PEPTIDE - Normal    NT-proBNP 61     D DIMER, QUANTITATIVE - Normal    D-Dimer 0.34     SARS-COV-2 RNA AMPLIFICATION, QUAL - Normal    SARS COV-2 Molecular Negative     POCT URINE PREGNANCY - Normal    POC Preg Test, Ur Negative       Acceptable Yes     CBC W/ AUTO DIFFERENTIAL    Narrative:     The following orders were created for panel order CBC W/ AUTO DIFFERENTIAL.  Procedure                               Abnormality         Status                     ---------                               -----------         ------                     CBC with Differential[[id number]]       Abnormal            Final result                 Please view results for these tests on the individual orders.   TROPONIN I HIGH SENSITIVITY          Imaging Results              X-Ray Chest AP Portable (Final result)  Result time 06/07/25 13:54:24      Final result by Emerson Bishop MD (06/07/25 13:54:24)                   Impression:      No acute cardiopulmonary process.      Electronically signed by: Emerson Bishop  Date:    06/07/2025  Time:    13:54                Narrative:    EXAMINATION:  XR CHEST AP PORTABLE    CLINICAL HISTORY:  CHF;    TECHNIQUE:  Single frontal portable view of the chest was performed.    COMPARISON:  06/06/2025    FINDINGS:  Normal cardiomediastinal silhouette.  Lungs are clear without consolidation, pleural effusion, or pneumothorax.  Visualized osseous structures appear intact.                                       Medications   albuterol-ipratropium 2.5 mg-0.5 mg/3 mL nebulizer solution 3 mL (3 mLs Nebulization Given 6/7/25 1304)     Medical Decision Making  29-year-old female with cough and shortness of breath and symptoms consistent with acute bronchitis.  Patient felt significantly better after breathing treatment and screening labs and workup unremarkable and given patient's improvement after treatment of bronchitis will continue treatment for bronchitis.  Discharged with return precautions and instructions and follow-up    Amount and/or Complexity of Data Reviewed  Labs:  Decision-making details documented in ED Course.  Radiology: ordered.    Risk  Prescription drug management.                                      Clinical Impression:  Final diagnoses:  [R06.02] Shortness of breath  [J20.9] Acute bronchitis, unspecified organism (Primary)          ED Disposition Condition    Discharge Stable          ED Prescriptions       Medication Sig Dispense Start Date End Date Auth. Provider    albuterol (PROVENTIL/VENTOLIN HFA) 90 mcg/actuation inhaler Inhale 1-2 puffs into the lungs every 6 (six) hours as needed for Wheezing. Rescue 8 g 6/7/2025 6/7/2026 Ken Parker MD    azithromycin (ZITHROMAX Z-LIVIER) 250 MG tablet Take 2 tablets (500 mg total) by mouth once daily. 5 tablet 6/7/2025 -- Ken Parker MD          Follow-up Information       Follow up With Specialties Details Why Contact Info    Kleber Odonnell MD Internal Medicine In 2 days  [address] [place] [address] [address]  [place] [address]  [phone]                     [1]   Social  History  Tobacco Use    Smoking status: Former     Current packs/day: 0.50     Average packs/day: 0.5 packs/day for 11.5 years (5.8 ttl pk-yrs)     Types: Cigarettes     Start date: 12/3/2013    Smokeless tobacco: Never   Substance Use Topics    Alcohol use: Not Currently    Drug use: Never        Ken Parker MD  06/07/25 [id number]

## 2025-06-09 ENCOUNTER — LAB VISIT (OUTPATIENT)
Dept: LAB | Facility: HOSPITAL | Age: 30
End: 2025-06-09
Attending: INTERNAL MEDICINE
Payer: MEDICARE

## 2025-06-09 DIAGNOSIS — D50.0 IRON DEFICIENCY ANEMIA DUE TO CHRONIC BLOOD LOSS: ICD-10-CM

## 2025-06-09 LAB
ABSOLUTE EOSINOPHIL (SMH): 0.21 K/UL
ABSOLUTE MONOCYTE (SMH): 0.52 K/UL (ref 0.3–1)
ABSOLUTE NEUTROPHIL COUNT (SMH): 4.4 K/UL (ref 1.8–7.7)
BASOPHILS # BLD AUTO: 0.07 K/UL
BASOPHILS NFR BLD AUTO: 0.9 %
ERYTHROCYTE [DISTWIDTH] IN BLOOD BY AUTOMATED COUNT: 14.1 % (ref 11.5–14.5)
FERRITIN SERPL-MCNC: 9.5 NG/ML (ref 20–300)
HCT VFR BLD AUTO: 36 % (ref 37–48.5)
HGB BLD-MCNC: 11.6 GM/DL (ref 12–16)
IMM GRANULOCYTES # BLD AUTO: 0.04 K/UL (ref 0–0.04)
IMM GRANULOCYTES NFR BLD AUTO: 0.5 % (ref 0–0.5)
LYMPHOCYTES # BLD AUTO: 2.12 K/UL (ref 1–4.8)
MCH RBC QN AUTO: 26.1 PG (ref 27–31)
MCHC RBC AUTO-ENTMCNC: 32.2 G/DL (ref 32–36)
MCV RBC AUTO: 81 FL (ref 82–98)
NUCLEATED RBC (/100WBC) (SMH): 0 /100 WBC
OHS QRS DURATION: 78 MS
OHS QTC CALCULATION: 419 MS
PLATELET # BLD AUTO: 331 K/UL (ref 150–450)
PMV BLD AUTO: 9.1 FL (ref 9.2–12.9)
RBC # BLD AUTO: 4.44 M/UL (ref 4–5.4)
RELATIVE EOSINOPHIL (SMH): 2.8 % (ref 0–8)
RELATIVE LYMPHOCYTE (SMH): 28.7 % (ref 18–48)
RELATIVE MONOCYTE (SMH): 7 % (ref 4–15)
RELATIVE NEUTROPHIL (SMH): 60.1 % (ref 38–73)
WBC # BLD AUTO: 7.38 K/UL (ref 3.9–12.7)

## 2025-06-09 PROCEDURE — 82728 ASSAY OF FERRITIN: CPT

## 2025-06-09 PROCEDURE — 85025 COMPLETE CBC W/AUTO DIFF WBC: CPT

## 2025-06-09 PROCEDURE — 36415 COLL VENOUS BLD VENIPUNCTURE: CPT

## 2025-06-11 ENCOUNTER — OFFICE VISIT (OUTPATIENT)
Dept: CARDIOLOGY | Facility: CLINIC | Age: 30
End: 2025-06-11
Payer: MEDICARE

## 2025-06-11 ENCOUNTER — PATIENT MESSAGE (OUTPATIENT)
Dept: PULMONOLOGY | Facility: CLINIC | Age: 30
End: 2025-06-11
Payer: MEDICARE

## 2025-06-11 VITALS
HEIGHT: 59 IN | HEART RATE: 83 BPM | BODY MASS INDEX: 32.22 KG/M2 | DIASTOLIC BLOOD PRESSURE: 80 MMHG | OXYGEN SATURATION: 98 % | WEIGHT: 159.81 LBS | SYSTOLIC BLOOD PRESSURE: 120 MMHG

## 2025-06-11 DIAGNOSIS — R00.2 PALPITATIONS: Primary | ICD-10-CM

## 2025-06-11 DIAGNOSIS — N64.4 BREAST PAIN, LEFT: ICD-10-CM

## 2025-06-11 DIAGNOSIS — R06.02 SOBOE (SHORTNESS OF BREATH ON EXERTION): ICD-10-CM

## 2025-06-11 DIAGNOSIS — K21.9 GASTROESOPHAGEAL REFLUX DISEASE, UNSPECIFIED WHETHER ESOPHAGITIS PRESENT: ICD-10-CM

## 2025-06-11 DIAGNOSIS — G47.33 OSA (OBSTRUCTIVE SLEEP APNEA): Primary | ICD-10-CM

## 2025-06-11 DIAGNOSIS — E66.9 OBESITY WITH BODY MASS INDEX OF 30.0-39.9: ICD-10-CM

## 2025-06-11 PROCEDURE — 3074F SYST BP LT 130 MM HG: CPT | Mod: CPTII,S$GLB,,

## 2025-06-11 PROCEDURE — 1160F RVW MEDS BY RX/DR IN RCRD: CPT | Mod: CPTII,S$GLB,,

## 2025-06-11 PROCEDURE — 3008F BODY MASS INDEX DOCD: CPT | Mod: CPTII,S$GLB,,

## 2025-06-11 PROCEDURE — 99999 PR PBB SHADOW E&M-EST. PATIENT-LVL V: CPT | Mod: PBBFAC,,,

## 2025-06-11 PROCEDURE — 99214 OFFICE O/P EST MOD 30 MIN: CPT | Mod: S$GLB,,,

## 2025-06-11 PROCEDURE — 1159F MED LIST DOCD IN RCRD: CPT | Mod: CPTII,S$GLB,,

## 2025-06-11 PROCEDURE — 3079F DIAST BP 80-89 MM HG: CPT | Mod: CPTII,S$GLB,,

## 2025-06-11 RX ORDER — LEVALBUTEROL TARTRATE 45 UG/1
1-2 AEROSOL, METERED ORAL EVERY 4 HOURS PRN
Qty: 15 G | Refills: 0 | Status: SHIPPED | OUTPATIENT
Start: 2025-06-11 | End: 2026-06-11

## 2025-06-11 NOTE — PROGRESS NOTES
Subjective:    Patient ID:  Sherry Burns is a 29 y.o. female patient here for evaluation No chief complaint on file.      History of Present Illness:     Waking up shortness of breath and associated with palpitations and chest pain.    She takes propranolol 20 mg am and 40 mg pm.    Smokes 1-2 ppd.    She drinks caffeine in the evening- coke.  Takes benadryl q HS as well       Review of patient's allergies indicates:  No Known Allergies    Past Medical History:   Diagnosis Date    Anemia     Anxiety disorder, unspecified     Asthma     Bipolar disorder, unspecified     Depression     Eczema     GERD (gastroesophageal reflux disease)     Glaucoma     Headache     Heart murmur      Past Surgical History:   Procedure Laterality Date    COLONOSCOPY N/A 03/05/2021    Procedure: COLONOSCOPY;  Surgeon: aJke Williamson MD;  Location: [facility];  Service: Endoscopy;  Laterality: N/A;    ESOPHAGOGASTRODUODENOSCOPY N/A 03/05/2021    Procedure: EGD (ESOPHAGOGASTRODUODENOSCOPY);  Surgeon: Jake Williamson MD;  Location: [facility];  Service: Endoscopy;  Laterality: N/A;    ESOPHAGOGASTRODUODENOSCOPY N/A 12/2/2024    Procedure: EGD (ESOPHAGOGASTRODUODENOSCOPY);  Surgeon: Sourav Rushing MD;  Location: [facility];  Service: Endoscopy;  Laterality: N/A;    UPPER GASTROINTESTINAL ENDOSCOPY       Social History[1]     Review of Systems:    As noted in HPI in addition      REVIEW OF SYSTEMS  CARDIOVASCULAR: No recent chest pain, palpitations, arm, neck, or jaw pain  RESPIRATORY: No recent fever, cough chills, SOB or congestion  : No blood in the urine  GI: No Nausea, vomiting, constipation, diarrhea, blood, or reflux.  MUSCULOSKELETAL: No myalgias  NEURO: No lightheadedness or dizziness  EYES: No Double vision, blurry, vision or headache              Objective        Vitals:    06/11/25 0810   BP: 120/80   Pulse: 83       LIPIDS - LAST 2   Lab Results   Component Value Date    CHOL 180 07/16/2024    CHOL 159 01/03/2020     HDL 35 07/16/2024    HDL 58 01/03/2020    LDLCALC 121 07/16/2024    LDLCALC 91.6 01/03/2020    TRIG 135 07/16/2024    TRIG 47 01/03/2020    CHOLHDL 36.5 01/03/2020       CBC - LAST 2  Lab Results   Component Value Date    WBC 7.38 06/09/2025    WBC 8.26 06/07/2025    RBC 4.44 06/09/2025    RBC 4.75 06/07/2025    HGB 11.6 (L) 06/09/2025    HGB 12.4 06/07/2025    HCT 36.0 (L) 06/09/2025    HCT 39.2 06/07/2025    MCV 81 (L) 06/09/2025    MCV 83 06/07/2025    MCH 26.1 (L) 06/09/2025    MCH 26.1 (L) 06/07/2025    MCHC 32.2 06/09/2025    MCHC 31.6 (L) 06/07/2025    RDW 14.1 06/09/2025    RDW 13.8 06/07/2025     06/09/2025     06/07/2025    MPV 9.1 (L) 06/09/2025    MPV 10.7 06/07/2025    GRAN 6.0 01/14/2025    GRAN 66.9 01/14/2025    LYMPH 2.1 01/14/2025    LYMPH 23.8 01/14/2025    MONO 0.6 01/14/2025    MONO 6.9 01/14/2025    BASO 0.05 01/14/2025    BASO 0.05 12/18/2024    NRBC 0 06/09/2025    NRBC 0 06/07/2025       CHEMISTRY & LIVER FUNCTION - LAST 2  Lab Results   Component Value Date     06/07/2025     01/14/2025    K 4.3 06/07/2025    K 3.6 01/14/2025     06/07/2025     01/14/2025    CO2 21 (L) 06/07/2025    CO2 20 (L) 01/14/2025    ANIONGAP 12 06/07/2025    ANIONGAP 10 01/14/2025    BUN 12 06/07/2025    BUN 16 01/14/2025    CREATININE 0.9 06/07/2025    CREATININE 0.9 01/14/2025     06/07/2025     (H) 01/14/2025    CALCIUM 9.4 06/07/2025    CALCIUM 9.3 01/14/2025    MG 2.0 12/18/2024    ALBUMIN 4.2 06/07/2025    ALBUMIN 4.0 01/14/2025    PROT 7.3 06/07/2025    PROT 7.1 01/14/2025    ALKPHOS 118 06/07/2025    ALKPHOS 120 01/14/2025    ALT 92 (H) 06/07/2025    ALT 68 (H) 01/14/2025    AST 76 (H) 06/07/2025    AST 30 01/14/2025    BILITOT 0.6 06/07/2025    BILITOT 0.5 01/14/2025        CARDIAC PROFILE - LAST 2  Lab Results   Component Value Date    BNP 61 06/07/2025    BNP 51 01/14/2025    TROPONINI <0.006 01/14/2025    TROPONINI <0.006 01/14/2025    TROPONINIHS <3  "06/07/2025        COAGULATION - LAST 2  No results found for: "LABPT", "INR", "APTT"    ENDOCRINE & PSA - LAST 2  Lab Results   Component Value Date    HGBA1C 5.9 07/16/2024    TSH 3.454 05/09/2025    TSH 4.735 (H) 01/14/2025        ECHOCARDIOGRAM RESULTS  Results for orders placed during the hospital encounter of 05/14/25    Echo    Interpretation Summary    Left Ventricle: The left ventricle is normal in size. Normal wall thickness. Normal wall motion. There is normal systolic function with a visually estimated ejection fraction of 55 - 60%. There is normal diastolic function.    Right Ventricle: The right ventricle is mildly dilated. Systolic function is normal.    Mitral Valve: There is mild regurgitation.    Tricuspid Valve: There is mild regurgitation.    Pulmonic Valve: There is mild regurgitation.    Pulmonary Artery: No pulmonary hypertension. The estimated pulmonary artery systolic pressure is 22 mmHg.    IVC/SVC: Normal venous pressure at 3 mmHg.      CURRENT/PREVIOUS VISIT EKG  Results for orders placed or performed during the hospital encounter of 06/07/25   EKG 12-lead    Collection Time: 06/07/25 11:41 AM   Result Value Ref Range    QRS Duration 78 ms    OHS QTC Calculation 419 ms    Narrative    Test Reason : R06.02,    Vent. Rate :  63 BPM     Atrial Rate :  63 BPM     P-R Int : 144 ms          QRS Dur :  78 ms      QT Int : 410 ms       P-R-T Axes :  38  76  51 degrees    QTcB Int : 419 ms    Normal sinus rhythm  Normal ECG  When compared with ECG of 08-May-2025 08:10,  No significant change was found  Confirmed by Stanton Chung ([id number]) on 6/9/2025 7:38:47 AM    Referred By: AAAREFERRAL SELF           Confirmed By: Stanton Chung     No valid procedures specified.   Results for orders placed during the hospital encounter of 05/14/25    Exercise Stress - EKG    Interpretation Summary    The patient exercised for 6 minutes 37 seconds on a Roman protocol, achieving a peak heart rate of 146 bpm, which " is 81% of the age predicted maximum heart rate.    The ECG portion of the study is negative for ischemia.    The patient reported no chest pain during the stress test.    The blood pressure response to stress was normal.    There were no arrhythmias during stress.    No valid procedures specified.    PHYSICAL EXAM  CONSTITUTIONAL: Well built, well nourished in no apparent distress  NECK: no carotid bruit, no JVD  LUNGS: CTA  CHEST WALL: no tenderness  HEART: regular rate and rhythm, S1, S2 normal, no murmur, click, rub or gallop   ABDOMEN: soft, non-tender; bowel sounds normal; no masses,  no organomegaly  EXTREMITIES: Extremities normal, no edema, no calf tenderness noted  NEURO: AAO X 3    I HAVE REVIEWED :    The vital signs, nurses notes, and all the pertinent radiology and labs.        Current Outpatient Medications   Medication Instructions    albuterol-budesonide (AIRSUPRA) 90-80 mcg/actuation 2 puffs, Inhalation, Every 4 hours PRN    ALPRAZolam (XANAX) 0.25 mg, Nightly PRN    azithromycin (ZITHROMAX Z-LIVIER) 500 mg, Oral, Daily    budesonide-formoterol 160-4.5 mcg (SYMBICORT) 160-4.5 mcg/actuation HFAA 2 puffs, Inhalation, Every 12 hours, Can also use 1-2 puffs as needed with a max of 12 puffs a day.    busPIRone (BUSPAR) 30 mg, 2 times daily    cetirizine (ZYRTEC) 10 mg, Oral, 2 times daily    clonazePAM (KLONOPIN) 0.5 mg, Daily PRN    DUPIXENT SYRINGE 300 mg/2 mL Syrg Inject 300 mg (2 mL) into the skin every other week    EScitalopram oxalate (LEXAPRO) 20 mg, Daily    eszopiclone (LUNESTA) 2 mg, Nightly PRN    fluocinolone (DERMA-SMOOTHE) 0.01 % external oil Apply to scalp and affected areas as needed up to twice a day for up to 2 weeks at a time.    fluticasone propionate (FLONASE) 100 mcg, Each Nostril, 2 times daily    hydrOXYzine (ATARAX) 50 mg, 2 times daily    ketoconazole (NIZORAL) 2 % shampoo Apply at least three times a week. Leave in for at least 5 minutes and then rinse.    levalbuterol (XOPENEX  HFA) 45 mcg/actuation inhaler 1-2 puffs, Inhalation, Every 4 hours PRN, Rescue    linaCLOtide (LINZESS) 145 mcg, Oral, Daily    magnesium oxide (MAG-OX) 400 mg, Oral, Daily    metFORMIN (GLUCOPHAGE-XR) 500 mg, Every morning    MOUNJARO 2.5 mg, Every 7 days    mupirocin (BACTROBAN) 2 % ointment Apply as needed twice a day to open skin areas for 5 days or until improved.    norethindrone (MICRONOR) 0.35 mg tablet 1 tablet    omeprazole (PRILOSEC) 40 mg, Oral, Daily    ondansetron (ZOFRAN-ODT) 4 mg, Oral, Every 12 hours PRN    potassium chloride (KLOR-CON) 8 MEQ TbSR 8 mEq, Oral, Daily    predniSONE (DELTASONE) 20 MG tablet Take two pills per day for three days followed by one pill per day for three days.    propranoloL (INDERAL) 40 mg, Oral, Nightly    QUEtiapine (SEROQUEL) 200 mg, 2 times daily    triamcinolone acetonide 0.025% (KENALOG) 0.025 % cream Please apply to affected areas when flaring twice a day as needed for up to 2 weeks at a time    triamcinolone acetonide 0.1% (KENALOG) 0.1 % cream Topical (Top), 2 times daily    VRAYLAR 3 mg, Daily          Assessment & Plan     Palpitations  Change albuterol to Xopenex  Continue magnesium oxide  Continue current dosing of propranolol  Check BP at home, if consistently greater than , can consider increasing propranolol to 40 mg BID  Eliminate caffeine   Increase fluids  Stop smoking        SOBOE (shortness of breath on exertion)  Stop smoking  Rescue inhaler- changed to Xopenex  Continue current medication  Monitor BP at home. Can consider increasing propranolol if SBP >110 consistently    Obesity with body mass index of 30.0-39.9  Weight loss and exercise as tolerated    Breast pain, left  Cyst found in L breast  She is to follow up with OBGYN    Gastroesophageal reflux disease  Stable. On PPI          Follow up in about 3 months (around 9/11/2025).            [1]   Social History  Tobacco Use    Smoking status: Former     Current packs/day: 0.50     Average  packs/day: 0.5 packs/day for 11.5 years (5.8 ttl pk-yrs)     Types: Cigarettes     Start date: 12/3/2013    Smokeless tobacco: Never   Substance Use Topics    Alcohol use: Not Currently    Drug use: Never

## 2025-06-11 NOTE — ASSESSMENT & PLAN NOTE
Stop smoking  Rescue inhaler- changed to Xopenex  Continue current medication  Monitor BP at home. Can consider increasing propranolol if SBP >110 consistently

## 2025-06-11 NOTE — ASSESSMENT & PLAN NOTE
Change albuterol to Xopenex  Continue magnesium oxide  Continue current dosing of propranolol  Check BP at home, if consistently greater than , can consider increasing propranolol to 40 mg BID  Eliminate caffeine   Increase fluids  Stop smoking

## 2025-06-12 ENCOUNTER — HOSPITAL ENCOUNTER (OUTPATIENT)
Dept: RADIOLOGY | Facility: HOSPITAL | Age: 30
Discharge: HOME | End: 2025-06-12
Attending: ORTHOPAEDIC SURGERY
Payer: MEDICARE

## 2025-06-12 DIAGNOSIS — M25.522 LEFT ELBOW PAIN: ICD-10-CM

## 2025-06-12 DIAGNOSIS — M25.522 LEFT ELBOW PAIN: Primary | ICD-10-CM

## 2025-06-12 PROCEDURE — 73080 X-RAY EXAM OF ELBOW: CPT | Mod: TC,LT

## 2025-06-12 PROCEDURE — 73080 X-RAY EXAM OF ELBOW: CPT | Mod: 26,LT,, | Performed by: RADIOLOGY

## 2025-06-16 ENCOUNTER — PATIENT MESSAGE (OUTPATIENT)
Dept: DERMATOLOGY | Facility: CLINIC | Age: 30
End: 2025-06-16
Payer: MEDICARE

## 2025-06-16 ENCOUNTER — PATIENT MESSAGE (OUTPATIENT)
Dept: OPTOMETRY | Facility: CLINIC | Age: 30
End: 2025-06-16
Payer: MEDICARE

## 2025-06-16 ENCOUNTER — HOSPITAL ENCOUNTER (EMERGENCY)
Facility: HOSPITAL | Age: 30
Discharge: HOME | End: 2025-06-16
Attending: EMERGENCY MEDICINE
Payer: MEDICARE

## 2025-06-16 VITALS
OXYGEN SATURATION: 99 % | SYSTOLIC BLOOD PRESSURE: 118 MMHG | TEMPERATURE: 98 F | DIASTOLIC BLOOD PRESSURE: 70 MMHG | BODY MASS INDEX: 30.24 KG/M2 | HEIGHT: 59 IN | WEIGHT: 150 LBS | HEART RATE: 74 BPM | RESPIRATION RATE: 18 BRPM

## 2025-06-16 DIAGNOSIS — S59.912A INJURY OF LEFT FOREARM: ICD-10-CM

## 2025-06-16 DIAGNOSIS — S50.12XA CONTUSION OF LEFT FOREARM, INITIAL ENCOUNTER: Primary | ICD-10-CM

## 2025-06-16 DIAGNOSIS — M79.642 LEFT HAND PAIN: ICD-10-CM

## 2025-06-16 LAB
B-HCG UR QL: NEGATIVE
CTP QC/QA: YES
HCV AB SERPL QL IA: NORMAL
HIV 1+2 AB+HIV1 P24 AG SERPL QL IA: NORMAL
HOLD SPECIMEN: NORMAL
HOLD SPECIMEN: NORMAL

## 2025-06-16 PROCEDURE — 99284 EMERGENCY DEPT VISIT MOD MDM: CPT | Mod: 25

## 2025-06-16 PROCEDURE — 81025 URINE PREGNANCY TEST: CPT

## 2025-06-16 PROCEDURE — 96372 THER/PROPH/DIAG INJ SC/IM: CPT

## 2025-06-16 PROCEDURE — 63600175 PHARM REV CODE 636 W HCPCS: Mod: TB,JZ

## 2025-06-16 PROCEDURE — 86803 HEPATITIS C AB TEST: CPT | Performed by: EMERGENCY MEDICINE

## 2025-06-16 PROCEDURE — 87389 HIV-1 AG W/HIV-1&-2 AB AG IA: CPT | Performed by: EMERGENCY MEDICINE

## 2025-06-16 PROCEDURE — 36415 COLL VENOUS BLD VENIPUNCTURE: CPT | Performed by: EMERGENCY MEDICINE

## 2025-06-16 RX ORDER — IBUPROFEN 600 MG/1
600 TABLET, FILM COATED ORAL EVERY 6 HOURS PRN
Qty: 20 TABLET | Refills: 0 | Status: SHIPPED | OUTPATIENT
Start: 2025-06-16

## 2025-06-16 RX ORDER — KETOROLAC TROMETHAMINE 30 MG/ML
15 INJECTION, SOLUTION INTRAMUSCULAR; INTRAVENOUS
Status: COMPLETED | OUTPATIENT
Start: 2025-06-16 | End: 2025-06-16

## 2025-06-16 RX ADMIN — KETOROLAC TROMETHAMINE 15 MG: 30 INJECTION, SOLUTION INTRAMUSCULAR at 07:06

## 2025-06-16 NOTE — DISCHARGE INSTRUCTIONS

## 2025-06-16 NOTE — ED PROVIDER NOTES
Encounter Date: 6/16/2025       History     Chief Complaint   Patient presents with    Hand Pain     Left hand slammed in door yesterday      29-year-old female with a past medical history bipolar, anxiety, depression, eczema, asthma, and GERD presents to ED for left hand pain.  Patient states it occurred yesterday when it accidently got slammed in a car door.  Complaining of a 6/10 pain worse with touch.  No medications prior to arrival.  Also admits to paresthesia and swelling.  Denies fever, numbness, color changes, and wounds.      Review of patient's allergies indicates:  No Known Allergies  Past Medical History:   Diagnosis Date    Anemia     Anxiety disorder, unspecified     Asthma     Bipolar disorder, unspecified     Depression     Eczema     GERD (gastroesophageal reflux disease)     Glaucoma     Headache     Heart murmur      Past Surgical History:   Procedure Laterality Date    COLONOSCOPY N/A 03/05/2021    Procedure: COLONOSCOPY;  Surgeon: Jake Williamson MD;  Location: [facility];  Service: Endoscopy;  Laterality: N/A;    ESOPHAGOGASTRODUODENOSCOPY N/A 03/05/2021    Procedure: EGD (ESOPHAGOGASTRODUODENOSCOPY);  Surgeon: Jake Williamson MD;  Location: [facility];  Service: Endoscopy;  Laterality: N/A;    ESOPHAGOGASTRODUODENOSCOPY N/A 12/2/2024    Procedure: EGD (ESOPHAGOGASTRODUODENOSCOPY);  Surgeon: Sourav Rushign MD;  Location: [facility];  Service: Endoscopy;  Laterality: N/A;    UPPER GASTROINTESTINAL ENDOSCOPY       Family History   Problem Relation Name Age of Onset    Hypertension Mother      Hyperlipidemia Mother      Colon polyps Mother      Heart attacks under age 50 Mother      Drug abuse Father      Arthritis Brother      Glaucoma Paternal Grandmother      Dementia Paternal Grandmother      Melanoma Neg Hx      Colon cancer Neg Hx      Crohn's disease Neg Hx      Esophageal cancer Neg Hx      Liver cancer Neg Hx      Rectal cancer Neg Hx      Stomach cancer Neg Hx      Ulcerative colitis  Neg Hx       Social History[1]  Review of Systems   Constitutional:  Negative for fever.   Musculoskeletal:  Positive for arthralgias (left hand) and joint swelling (left hand).   Skin:  Negative for color change and wound.   Neurological:  Negative for weakness and numbness.        (+) paraesthesia left hand       Physical Exam     Initial Vitals [06/16/25 1749]   BP Pulse Resp Temp SpO2   (!) 119/58 73 18 98 °F (36.7 °C) 97 %      MAP       --         Physical Exam    Nursing note and vitals reviewed.  Constitutional: She appears well-developed and well-nourished. She is not diaphoretic. She is active. She does not appear ill. No distress.   HENT:   Head: Normocephalic and atraumatic.   Right Ear: External ear normal.   Left Ear: External ear normal.   Nose: Nose normal.   Eyes: Conjunctivae, EOM and lids are normal. Pupils are equal, round, and reactive to light. Right eye exhibits no discharge. Left eye exhibits no discharge.   Neck:   Normal range of motion.  Cardiovascular:            Pulses:       Radial pulses are 2+ on the left side.   Pulmonary/Chest: Effort normal. No respiratory distress.   Abdominal: She exhibits no distension.   Musculoskeletal:         General: Normal range of motion.      Left forearm: Bony tenderness (dorsal mid forearm) present. No swelling, edema, deformity or lacerations.      Left wrist: Normal. No snuff box tenderness.      Left hand: Normal.      Cervical back: Normal range of motion.      Comments: Neurovascularly intact.  No erythema, warmth, swelling, wounds, or deformities appreciated.     Neurological: She is alert and oriented to person, place, and time. She has normal strength. No sensory deficit. GCS eye subscore is 4. GCS verbal subscore is 5. GCS motor subscore is 6.   Skin: Skin is dry. Capillary refill takes less than 2 seconds.         ED Course   Procedures  Labs Reviewed   HEPATITIS C ANTIBODY   HEP C VIRUS HOLD SPECIMEN   HIV 1 / 2 ANTIBODY   HIV VIRUS  CONFIRMATION HOLD SPECIMEN   POCT URINE PREGNANCY          Imaging Results              X-Ray Forearm Left (In process)  Result time 06/16/25 19:08:43                     X-Ray Hand 3 view Left (Final result)  Result time 06/16/25 18:20:24      Final result by Jorge Zavaleta DO (06/16/25 18:20:24)                   Impression:      No acute osseous abnormality of the hand.      Electronically signed by: Jorge Zavaleta  Date:    06/16/2025  Time:    18:20               Narrative:    EXAMINATION:  XR HAND COMPLETE 3 VIEW LEFT    CLINICAL HISTORY:  left hand trauma;.    TECHNIQUE:  PA, lateral, and oblique views of the left hand were performed.    COMPARISON:  None    FINDINGS:  There is no acute fracture or dislocation. Alignment is normal. Joint spaces are preserved. There are no erosive changes.                                       Medications   ketorolac injection 15 mg (has no administration in time range)     Medical Decision Making  29-year-old female with a past medical history bipolar, anxiety, depression, eczema, asthma, and GERD presents to ED for left hand pain.   Patient's chart and medical history reviewed.    Ddx:  Fracture  Dislocation  Contusion  Sprain    Patient's vitals reviewed.  Afebrile, no respiratory distress, and nontoxic-appearing in the ED. patient had left dorsal mid forearm tenderness to palpation.  No hand or wrist tenderness to palpation. Neurovascularly intact.  No erythema, warmth, swelling, wounds, or deformities appreciated.  UPT was negative.  Patient given ice and Toradol for pain.  X-ray was ordered from triage for the left hand due to the chief complaint.  Patient has no tenderness palpation of the hand or the wrist.  Pain is actually in the forearm.  We will get forearm x-ray. Hand x-ray was unremarkable per my personal interpretation. Official x-ray interpretation showed No acute osseous abnormality of the hand. Forearm x-ray was unremarkable per my personal interpretation.   The patient is aware that the diagnostic studies will be reviewed by radiology and there is a chance of revision of initial reading, potentially including additional findings.  We will attempt to contact them for clinically significant findings if a change in treatment regimen or follow up is warranted.  Discussed with patient this is most likely bone contusion from her trauma which will take time to heal.  Instructed patient to rest, ice, and elevate.  Motrin sent to pharmacy of choice.  Patient will follow-up with the PCP. Patient agrees with this plan. Discussed with her strict return precautions, she verbalized understanding. Patient is stable for discharge.     Amount and/or Complexity of Data Reviewed  Labs: ordered.  Radiology: ordered.    Risk  Prescription drug management.                                      Clinical Impression:  Final diagnoses:  [M79.642] Left hand pain  [S59.912A] Injury of left forearm  [S50.12XA] Contusion of left forearm, initial encounter (Primary)          ED Disposition Condition    Discharge Stable          ED Prescriptions       Medication Sig Dispense Start Date End Date Auth. Provider    ibuprofen (ADVIL,MOTRIN) 600 MG tablet Take 1 tablet (600 mg total) by mouth every 6 (six) hours as needed for Pain. 20 tablet 6/16/2025 -- Holdsworth, Alayna, PA-C          Follow-up Information       Follow up With Specialties Details Why Contact Info    Kleber Odonnell MD Internal Medicine Call   [address] [place] [address] [address]  [facility] [address]  [phone]                     [1]   Social History  Tobacco Use    Smoking status: Former     Current packs/day: 0.50     Average packs/day: 0.5 packs/day for 11.5 years (5.8 ttl pk-yrs)     Types: Cigarettes     Start date: 12/3/2013    Smokeless tobacco: Never   Substance Use Topics    Alcohol use: Not Currently    Drug use: Never        Holdsworth, Alayna, PA-C  06/16/25 [id number]

## 2025-06-18 ENCOUNTER — OFFICE VISIT (OUTPATIENT)
Facility: CLINIC | Age: 30
End: 2025-06-18
Payer: MEDICARE

## 2025-06-18 VITALS
WEIGHT: 156 LBS | HEIGHT: 59 IN | BODY MASS INDEX: 31.45 KG/M2 | DIASTOLIC BLOOD PRESSURE: 82 MMHG | TEMPERATURE: 98 F | SYSTOLIC BLOOD PRESSURE: 123 MMHG | OXYGEN SATURATION: 97 % | RESPIRATION RATE: 16 BRPM | HEART RATE: 73 BPM

## 2025-06-18 DIAGNOSIS — D50.9 IRON DEFICIENCY ANEMIA, UNSPECIFIED IRON DEFICIENCY ANEMIA TYPE: Primary | ICD-10-CM

## 2025-06-18 PROCEDURE — 3074F SYST BP LT 130 MM HG: CPT | Mod: CPTII,S$GLB,, | Performed by: INTERNAL MEDICINE

## 2025-06-18 PROCEDURE — 99215 OFFICE O/P EST HI 40 MIN: CPT | Mod: S$GLB,,, | Performed by: INTERNAL MEDICINE

## 2025-06-18 PROCEDURE — 99999 PR PBB SHADOW E&M-EST. PATIENT-LVL V: CPT | Mod: PBBFAC,,, | Performed by: INTERNAL MEDICINE

## 2025-06-18 PROCEDURE — 3079F DIAST BP 80-89 MM HG: CPT | Mod: CPTII,S$GLB,, | Performed by: INTERNAL MEDICINE

## 2025-06-18 PROCEDURE — 3008F BODY MASS INDEX DOCD: CPT | Mod: CPTII,S$GLB,, | Performed by: INTERNAL MEDICINE

## 2025-06-18 PROCEDURE — 1159F MED LIST DOCD IN RCRD: CPT | Mod: CPTII,S$GLB,, | Performed by: INTERNAL MEDICINE

## 2025-06-18 PROCEDURE — G2211 COMPLEX E/M VISIT ADD ON: HCPCS | Mod: S$GLB,,, | Performed by: INTERNAL MEDICINE

## 2025-06-18 RX ORDER — SODIUM CHLORIDE 0.9 % (FLUSH) 0.9 %
10 SYRINGE (ML) INJECTION
OUTPATIENT
Start: 2025-06-25

## 2025-06-18 RX ORDER — HEPARIN 100 UNIT/ML
500 SYRINGE INTRAVENOUS
OUTPATIENT
Start: 2025-06-25

## 2025-06-18 RX ORDER — EPINEPHRINE 0.3 MG/.3ML
0.3 INJECTION SUBCUTANEOUS ONCE AS NEEDED
OUTPATIENT
Start: 2025-06-25

## 2025-06-18 RX ORDER — SODIUM FERRIC GLUCONATE COMPLEX IN SUCROSE 12.5 MG/ML
125 INJECTION INTRAVENOUS
OUTPATIENT
Start: 2025-06-25

## 2025-06-19 NOTE — PROGRESS NOTES
[facility] [address]  hematology Oncology Subsequent  encounter note    6/18/25    Subjective:      Patient ID:   Sherry Burns  29 y.o. female  1995  [place]    Chief Complaint:   Anemia    HPI:  29 y.o. female last seen 9/2023.  Returns now 6/18/25  With intermittent anemia and easy bruisability.  She has been on iron pills in the past but has not been on B12 injections.  She denies history of jaundice or gallbladder disorder and has not been transfused.  Her mother admits to having easy bruisability also.      She has history of increased heart rate and is seen per Dr. Parson.  She has history of digestive problems of unclear nature and has had EGD and colonoscopy per Dr. Williamson.  Other history includes eczema and asthma.    She has not had previous surgeries.  She does not have history of drug allergies.  She does not smoke.  She does not drink alcohol.  She is not presently employed.    She had onset of menses at age 16, she describes her menses as normal to heavy, she does not have history of pregnancy.    Her mother has hypertension, diabetes, high cholesterol and admits to having easy bruising.  Her father is alive and well.  She has a brother with asthma.  She does not have children.    Hx of Fe deficiency, on po Fe, but compliance is unclear.    She has e[employer] Man[employer] Medicare  Try for GYN referral on her plan, she says she will not use Dr. Irby's office.    Since May of 2022 I ordered iron infusion to replenish iron stores.  Energy is much improved, pallor has resolved.  She is in good spirits.  She places her energy at a 8/10.    Hgb 8.8 to 12.7. to 13.9. to 11.6 now.   Ferritin 6 to 128. To 88. To 9.5 now.  Fe deficiency anemia, Ferrlicet x's 8 weeks [facility]   RTC 3 months.      ROS:   GEN: normal without any fever, night sweats or weight loss  HEENT: normal with no HA's, sore throat, stiff neck, changes in vision  CV: normal with no CP, SOB, PND, RAMIREZ or orthopnea.  See HPI  PULM: normal  with no SOB, cough, hemoptysis, sputum or pleuritic pain.  See HPI  GI: See HPI  : normal with no hematuria, dysuria  BREAST: normal with no mass, discharge, pain  SKIN: See HPI.     Past Medical History:   Diagnosis Date    Anemia     Anxiety disorder, unspecified     Asthma     Bipolar disorder, unspecified     Depression     Eczema     GERD (gastroesophageal reflux disease)     Glaucoma     Headache     Heart murmur      Past Surgical History:   Procedure Laterality Date    COLONOSCOPY N/A 03/05/2021    Procedure: COLONOSCOPY;  Surgeon: Jake Williamson MD;  Location: [facility];  Service: Endoscopy;  Laterality: N/A;    ESOPHAGOGASTRODUODENOSCOPY N/A 03/05/2021    Procedure: EGD (ESOPHAGOGASTRODUODENOSCOPY);  Surgeon: Jake Williamson MD;  Location: [facility];  Service: Endoscopy;  Laterality: N/A;    ESOPHAGOGASTRODUODENOSCOPY N/A 12/2/2024    Procedure: EGD (ESOPHAGOGASTRODUODENOSCOPY);  Surgeon: Sourav Rushing MD;  Location: [facility];  Service: Endoscopy;  Laterality: N/A;    UPPER GASTROINTESTINAL ENDOSCOPY         Review of patient's allergies indicates:  No Known Allergies  Social History     Socioeconomic History    Marital status: Single   Tobacco Use    Smoking status: Former     Current packs/day: 0.50     Average packs/day: 0.5 packs/day for 11.5 years (5.8 ttl pk-yrs)     Types: Cigarettes     Start date: 12/3/2013    Smokeless tobacco: Never   Substance and Sexual Activity    Alcohol use: Not Currently    Drug use: Never    Sexual activity: Not Currently     Birth control/protection: None     Social Drivers of Health     Financial Resource Strain: Medium Risk (1/2/2025)    Overall Financial Resource Strain (CARDIA)     Difficulty of Paying Living Expenses: Somewhat hard   Food Insecurity: Food Insecurity Present (12/21/2023)    Hunger Vital Sign     Worried About Running Out of Food in the Last Year: Sometimes true     Ran Out of Food in the Last Year: Sometimes true   Transportation Needs: No  Transportation Needs (12/21/2023)    PRAPARE - Transportation     Lack of Transportation (Medical): No     Lack of Transportation (Non-Medical): No   Physical Activity: Insufficiently Active (1/2/2025)    Exercise Vital Sign     Days of Exercise per Week: 2 days     Minutes of Exercise per Session: 10 min   Stress: Stress Concern Present (1/2/2025)    [facility] [place] [facility] - Occupational Stress Questionnaire     Feeling of Stress : Rather much   Housing Stability: Low Risk  (12/21/2023)    Housing Stability Vital Sign     Unable to Pay for Housing in the Last Year: No     Number of Places Lived in the Last Year: 2     Unstable Housing in the Last Year: No         Current Outpatient Medications:     albuterol-budesonide (AIRSUPRA) 90-80 mcg/actuation, Inhale 2 puffs into the lungs every 4 (four) hours as needed (shortness of breath)., Disp: 10.7 g, Rfl: 11    ALPRAZolam (XANAX) 0.25 MG tablet, Take 0.25 mg by mouth nightly as needed., Disp: , Rfl:     azithromycin (ZITHROMAX Z-LIVIER) 250 MG tablet, Take 2 tablets (500 mg total) by mouth once daily., Disp: 5 tablet, Rfl: 0    budesonide-formoterol 160-4.5 mcg (SYMBICORT) 160-4.5 mcg/actuation HFAA, Inhale 2 puffs into the lungs every 12 (twelve) hours. Can also use 1-2 puffs as needed with a max of 12 puffs a day., Disp: 10.2 g, Rfl: 11    busPIRone (BUSPAR) 30 MG Tab, Take 30 mg by mouth 2 (two) times daily., Disp: , Rfl:     cetirizine (ZYRTEC) 10 MG tablet, Take 1 tablet (10 mg total) by mouth 2 (two) times a day., Disp: 30 tablet, Rfl: 0    clonazePAM (KLONOPIN) 0.5 MG tablet, Take 0.5 mg by mouth daily as needed., Disp: , Rfl:     DUPIXENT SYRINGE 300 mg/2 mL Syrg, Inject 300 mg (2 mL) into the skin every other week, Disp: 4 mL, Rfl: 11    EScitalopram oxalate (LEXAPRO) 20 MG tablet, Take 20 mg by mouth once daily., Disp: , Rfl:     eszopiclone (LUNESTA) 2 MG Tab, Take 2 mg by mouth nightly as needed., Disp: , Rfl:     fluocinolone  (DERMA-SMOOTHE) 0.01 % external oil, Apply to scalp and affected areas as needed up to twice a day for up to 2 weeks at a time., Disp: 118.28 mL, Rfl: 2    fluticasone propionate (FLONASE) 50 mcg/actuation nasal spray, 2 sprays (100 mcg total) by Each Nostril route 2 (two) times a day., Disp: 16 g, Rfl: 6    hydrOXYzine (ATARAX) 50 MG tablet, Take 50 mg by mouth 2 (two) times daily., Disp: , Rfl:     ibuprofen (ADVIL,MOTRIN) 600 MG tablet, Take 1 tablet (600 mg total) by mouth every 6 (six) hours as needed for Pain., Disp: 20 tablet, Rfl: 0    ketoconazole (NIZORAL) 2 % shampoo, Apply at least three times a week. Leave in for at least 5 minutes and then rinse., Disp: 120 mL, Rfl: 3    levalbuterol (XOPENEX HFA) 45 mcg/actuation inhaler, Inhale 1-2 puffs into the lungs every 4 (four) hours as needed for Wheezing. Rescue, Disp: 15 g, Rfl: 0    linaCLOtide (LINZESS) 145 mcg Cap capsule, Take 1 capsule (145 mcg total) by mouth once daily., Disp: 90 capsule, Rfl: 0    magnesium oxide (MAG-OX) 400 mg (241.3 mg magnesium) tablet, Take 1 tablet (400 mg total) by mouth once daily., Disp: 90 tablet, Rfl: 3    metFORMIN (GLUCOPHAGE-XR) 500 MG ER 24hr tablet, Take 500 mg by mouth every morning., Disp: , Rfl:     MOUNJARO 2.5 mg/0.5 mL PnIj, Inject 2.5 mg into the skin every 7 days., Disp: , Rfl:     mupirocin (BACTROBAN) 2 % ointment, Apply as needed twice a day to open skin areas for 5 days or until improved., Disp: 30 g, Rfl: 1    norethindrone (MICRONOR) 0.35 mg tablet, Take 1 tablet by mouth., Disp: , Rfl:     omeprazole (PRILOSEC) 40 MG capsule, Take 1 capsule (40 mg total) by mouth Daily., Disp: 90 capsule, Rfl: 1    ondansetron (ZOFRAN-ODT) 4 MG TbDL, Take 1 tablet (4 mg total) by mouth every 12 (twelve) hours as needed (nausea)., Disp: 60 tablet, Rfl: 0    potassium chloride (KLOR-CON) 8 MEQ TbSR, Take 1 tablet (8 mEq total) by mouth once daily., Disp: 90 tablet, Rfl: 1    predniSONE (DELTASONE) 20 MG tablet, Take two  "pills per day for three days followed by one pill per day for three days., Disp: 9 tablet, Rfl: 0    propranoloL (INDERAL) 40 MG tablet, Take 1 tablet (40 mg total) by mouth nightly., Disp: 30 tablet, Rfl: 11    QUEtiapine (SEROQUEL) 200 MG Tab, Take 200 mg by mouth 2 (two) times daily., Disp: , Rfl:     triamcinolone acetonide 0.025% (KENALOG) 0.025 % cream, Please apply to affected areas when flaring twice a day as needed for up to 2 weeks at a time, Disp: 454 g, Rfl: 0    triamcinolone acetonide 0.1% (KENALOG) 0.1 % cream, Apply topically 2 (two) times daily., Disp: 454 g, Rfl: 2    VRAYLAR 3 mg Cap, Take 3 mg by mouth once daily., Disp: , Rfl:           Objective:   Vitals:  Blood pressure 123/82, pulse 73, temperature 98.1 °F (36.7 °C), temperature source Temporal, resp. rate 16, height 4' 11" (1.499 m), weight 70.8 kg (156 lb), last menstrual period 06/02/2025, SpO2 97%.    Physical Examination:   GEN: no apparent distress, comfortable  HEAD: atraumatic and normocephalic  EYES: no pallor, no icterus, no jaundice  ENT:  no pharyngeal erythema, external ears WNL; no nasal discharge  NECK: no masses, thyroid normal, trachea midline, no LAD/LN's, supple  CV: RRR with no murmur; normal pulse  CHEST: Normal respiratory effort; CTAB; normal breath sounds; no wheeze or crackles  ABDOM: nontender and nondistended; soft; no rebound/guarding, L/S NP  MUSC/Skeletal: ROM normal; no crepitus; joints normal  EXTREM: no clubbing, cyanosis, inflammation or swelling  SKIN: no rashes, lesions, ulcers, petechiae.  She has 2 small ecchymoses on the anterior lower aspect of the right leg  : no cvat  NEURO: grossly intact; motor/sensory WNL; no tremors  PSYCH: normal mood, affect and behavior  LYMPH: normal cervical, supraclavicular, axillary and groin LN's  BREASTS:  Left and right breast nontender, without discharge, without palpable mass.    Labs:   Lab Results   Component Value Date    WBC 7.38 06/09/2025    HGB 11.6 (L) " 06/09/2025    HCT 36.0 (L) 06/09/2025    MCV 81 (L) 06/09/2025     06/09/2025    CMP  Sodium   Date Value Ref Range Status   06/07/2025 139 136 - 145 mmol/L Final     Potassium   Date Value Ref Range Status   06/07/2025 4.3 3.5 - 5.1 mmol/L Final     Chloride   Date Value Ref Range Status   06/07/2025 106 95 - 110 mmol/L Final     CO2   Date Value Ref Range Status   06/07/2025 21 (L) 23 - 29 mmol/L Final     Glucose   Date Value Ref Range Status   06/07/2025 101 70 - 110 mg/dL Final     BUN   Date Value Ref Range Status   06/07/2025 12 6 - 20 mg/dL Final     Creatinine   Date Value Ref Range Status   06/07/2025 0.9 0.5 - 1.4 mg/dL Final     Calcium   Date Value Ref Range Status   06/07/2025 9.4 8.7 - 10.5 mg/dL Final     Protein Total   Date Value Ref Range Status   06/07/2025 7.3 6.0 - 8.4 gm/dL Final     Albumin   Date Value Ref Range Status   06/07/2025 4.2 3.5 - 5.2 g/dL Final     Bilirubin Total   Date Value Ref Range Status   06/07/2025 0.6 0.1 - 1.0 mg/dL Final     Comment:     For infants and newborns, interpretation of results should be based   on gestational age, weight and in agreement with clinical   observations.    Premature Infant recommended reference ranges:   0-24 hours:  <8.0 mg/dL   24-48 hours: <12.0 mg/dL   3-5 days:    <15.0 mg/dL   6-29 days:   <15.0 mg/dL     ALP   Date Value Ref Range Status   06/07/2025 118 40 - 150 unit/L Final     AST   Date Value Ref Range Status   06/07/2025 76 (H) 11 - 45 unit/L Final     ALT   Date Value Ref Range Status   06/07/2025 92 (H) 10 - 44 unit/L Final     Anion Gap   Date Value Ref Range Status   06/07/2025 12 8 - 16 mmol/L Final     eGFR if    Date Value Ref Range Status   05/28/2022 >60 >60 mL/min/1.73 m^2 Final     eGFR if non    Date Value Ref Range Status   05/28/2022 >60 >60 mL/min/1.73 m^2 Final     Comment:     Calculation used to obtain the estimated glomerular filtration  rate (eGFR) is the CKD-EPI equation.         Hemoglobin is 13.9, Ferritin 88.    Assessment:   (1) 29 y.o. female with history of easy bruisability.  Initial coagulation studies have been done thus far and have returned normal.  I have ordered additional studies to be completed at [facility].    These results have shown normal coag studies.  I am scheduling platelet aggregation studies at [facility] main campus.    (2) she appears to have a moderate microcytic anemia.  Hgb 8.  Of This may be nutritional or secondary to iron deficiency.    Ferritin returned low at 6.  Fe deficiency anemia 2nd heavy menses.  She is on oral FeSO4 daily.  I supplemented her with iron infusions given through the infusion center.    (3)Recurrent Fe deficiency Anemia  Ferrlicet weekly x's 8 weeks [facility].  CBC ferritin on RTC 3 months.    RTC 3 months with CBC, Ferritin.

## 2025-06-23 ENCOUNTER — TELEPHONE (OUTPATIENT)
Dept: INFUSION THERAPY | Facility: HOSPITAL | Age: 30
End: 2025-06-23

## 2025-07-01 ENCOUNTER — PATIENT MESSAGE (OUTPATIENT)
Dept: PULMONOLOGY | Facility: CLINIC | Age: 30
End: 2025-07-01
Payer: MEDICARE

## 2025-08-12 ENCOUNTER — TELEPHONE (OUTPATIENT)
Dept: RHEUMATOLOGY | Facility: CLINIC | Age: 30
End: 2025-08-12
Payer: MEDICARE

## 2025-08-26 ENCOUNTER — TELEPHONE (OUTPATIENT)
Dept: DERMATOLOGY | Facility: CLINIC | Age: 30
End: 2025-08-26

## 2025-08-26 DIAGNOSIS — L20.84 INTRINSIC ATOPIC DERMATITIS: ICD-10-CM

## 2025-08-26 RX ORDER — DUPILUMAB 300 MG/2ML
INJECTION, SOLUTION SUBCUTANEOUS
Qty: 4 ML | Refills: 0 | Status: ACTIVE | OUTPATIENT
Start: 2025-08-26

## 2025-09-03 ENCOUNTER — PATIENT MESSAGE (OUTPATIENT)
Dept: CARDIOLOGY | Facility: CLINIC | Age: 30
End: 2025-09-03
Payer: MEDICARE

## 2025-09-04 DIAGNOSIS — K21.9 GASTROESOPHAGEAL REFLUX DISEASE, UNSPECIFIED WHETHER ESOPHAGITIS PRESENT: ICD-10-CM

## 2025-09-04 RX ORDER — PROPRANOLOL HYDROCHLORIDE 40 MG/1
40 TABLET ORAL NIGHTLY
COMMUNITY
End: 2025-09-04 | Stop reason: SDUPTHER

## 2025-09-04 RX ORDER — OMEPRAZOLE 40 MG/1
40 CAPSULE, DELAYED RELEASE ORAL
Qty: 90 CAPSULE | Refills: 1 | Status: SHIPPED | OUTPATIENT
Start: 2025-09-04

## 2025-09-05 RX ORDER — PROPRANOLOL HYDROCHLORIDE 40 MG/1
40 TABLET ORAL NIGHTLY
Qty: 90 TABLET | Refills: 3 | Status: SHIPPED | OUTPATIENT
Start: 2025-09-05